# Patient Record
Sex: FEMALE | Race: WHITE | NOT HISPANIC OR LATINO | Employment: OTHER | URBAN - METROPOLITAN AREA
[De-identification: names, ages, dates, MRNs, and addresses within clinical notes are randomized per-mention and may not be internally consistent; named-entity substitution may affect disease eponyms.]

---

## 2017-01-11 ENCOUNTER — HOSPITAL ENCOUNTER (OUTPATIENT)
Dept: RADIOLOGY | Age: 62
Discharge: HOME | End: 2017-01-11
Payer: COMMERCIAL

## 2017-01-11 DIAGNOSIS — Z12.31 ENCOUNTER FOR SCREENING MAMMOGRAM FOR MALIGNANT NEOPLASM OF BREAST: ICD-10-CM

## 2017-01-11 PROCEDURE — G0202 SCR MAMMO BI INCL CAD: HCPCS

## 2017-01-13 ENCOUNTER — HOSPITAL ENCOUNTER (OUTPATIENT)
Dept: MAMMOGRAPHY | Facility: CLINIC | Age: 62
Discharge: HOME | End: 2017-01-13
Payer: COMMERCIAL

## 2017-01-13 ENCOUNTER — HOSPITAL ENCOUNTER (OUTPATIENT)
Dept: ULTRASOUND IMAGING | Facility: CLINIC | Age: 62
Discharge: HOME | End: 2017-01-13
Payer: COMMERCIAL

## 2017-01-13 DIAGNOSIS — R92.8 ABNORMAL MAMMOGRAM, UNSPECIFIED: ICD-10-CM

## 2017-01-13 PROCEDURE — 76642 ULTRASOUND BREAST LIMITED: CPT

## 2017-01-13 PROCEDURE — G0206 DX MAMMO INCL CAD UNI: HCPCS

## 2017-01-24 ENCOUNTER — ALLSCRIPTS OFFICE VISIT (OUTPATIENT)
Dept: OTHER | Facility: OTHER | Age: 62
End: 2017-01-24

## 2017-02-27 ENCOUNTER — ALLSCRIPTS OFFICE VISIT (OUTPATIENT)
Dept: OTHER | Facility: OTHER | Age: 62
End: 2017-02-27

## 2017-03-13 ENCOUNTER — TRANSCRIBE ORDERS (OUTPATIENT)
Dept: ADMINISTRATIVE | Age: 62
End: 2017-03-13

## 2017-03-13 ENCOUNTER — HOSPITAL ENCOUNTER (OUTPATIENT)
Dept: RADIOLOGY | Age: 62
Discharge: HOME | End: 2017-03-13
Payer: COMMERCIAL

## 2017-03-13 ENCOUNTER — ALLSCRIPTS OFFICE VISIT (OUTPATIENT)
Dept: OTHER | Facility: OTHER | Age: 62
End: 2017-03-13

## 2017-03-13 DIAGNOSIS — M79.605 PAIN OF LEFT LEG: ICD-10-CM

## 2017-03-13 DIAGNOSIS — M79.604 PAIN OF RIGHT LEG: ICD-10-CM

## 2017-03-13 PROCEDURE — 72110 X-RAY EXAM L-2 SPINE 4/>VWS: CPT

## 2017-03-13 PROCEDURE — 73521 X-RAY EXAM HIPS BI 2 VIEWS: CPT

## 2017-03-20 ENCOUNTER — APPOINTMENT (OUTPATIENT)
Dept: PHYSICAL THERAPY | Age: 62
End: 2017-03-20
Payer: COMMERCIAL

## 2017-03-20 PROCEDURE — 97163 PT EVAL HIGH COMPLEX 45 MIN: CPT

## 2017-03-22 ENCOUNTER — APPOINTMENT (OUTPATIENT)
Dept: PHYSICAL THERAPY | Age: 62
End: 2017-03-22
Payer: COMMERCIAL

## 2017-03-22 ENCOUNTER — GENERIC CONVERSION - ENCOUNTER (OUTPATIENT)
Dept: OTHER | Facility: OTHER | Age: 62
End: 2017-03-22

## 2017-03-22 PROCEDURE — 97110 THERAPEUTIC EXERCISES: CPT

## 2017-03-27 ENCOUNTER — APPOINTMENT (OUTPATIENT)
Dept: PHYSICAL THERAPY | Age: 62
End: 2017-03-27
Payer: COMMERCIAL

## 2017-03-27 PROCEDURE — 97110 THERAPEUTIC EXERCISES: CPT

## 2017-03-29 ENCOUNTER — LAB CONVERSION - ENCOUNTER (OUTPATIENT)
Dept: OTHER | Facility: OTHER | Age: 62
End: 2017-03-29

## 2017-03-29 ENCOUNTER — APPOINTMENT (OUTPATIENT)
Dept: PHYSICAL THERAPY | Age: 62
End: 2017-03-29
Payer: COMMERCIAL

## 2017-03-29 LAB
25(OH)D3 SERPL-MCNC: 46 NG/ML (ref 30–100)
A/G RATIO (HISTORICAL): 1.8 (CALC) (ref 1–2.5)
ALBUMIN SERPL BCP-MCNC: 4.4 G/DL (ref 3.6–5.1)
ALP SERPL-CCNC: 53 U/L (ref 33–130)
ALT SERPL W P-5'-P-CCNC: 20 U/L (ref 6–29)
AST SERPL W P-5'-P-CCNC: 21 U/L (ref 10–35)
BACTERIA UR QL AUTO: NORMAL /HPF
BASOPHILS # BLD AUTO: 0.6 %
BASOPHILS # BLD AUTO: 50 CELLS/UL (ref 0–200)
BILIRUB SERPL-MCNC: 0.4 MG/DL (ref 0.2–1.2)
BILIRUB UR QL STRIP: NEGATIVE
BUN SERPL-MCNC: 18 MG/DL (ref 7–25)
BUN/CREA RATIO (HISTORICAL): ABNORMAL (CALC) (ref 6–22)
CALCIUM SERPL-MCNC: 9.6 MG/DL (ref 8.6–10.4)
CHLORIDE SERPL-SCNC: 101 MMOL/L (ref 98–110)
CHOLEST SERPL-MCNC: 180 MG/DL (ref 125–200)
CHOLEST/HDLC SERPL: 1.9 (CALC)
CO2 SERPL-SCNC: 31 MMOL/L (ref 20–31)
COLOR UR: YELLOW
COMMENT (HISTORICAL): CLEAR
CREAT SERPL-MCNC: 0.87 MG/DL (ref 0.5–0.99)
DEPRECATED RDW RBC AUTO: 13.9 % (ref 11–15)
EGFR AFRICAN AMERICAN (HISTORICAL): 83 ML/MIN/1.73M2
EGFR-AMERICAN CALC (HISTORICAL): 71 ML/MIN/1.73M2
EOSINOPHIL # BLD AUTO: 398 CELLS/UL (ref 15–500)
EOSINOPHIL # BLD AUTO: 4.8 %
FECAL OCCULT BLOOD DIAGNOSTIC (HISTORICAL): NEGATIVE
GAMMA GLOBULIN (HISTORICAL): 2.4 G/DL (CALC) (ref 1.9–3.7)
GLUCOSE (HISTORICAL): 101 MG/DL (ref 65–99)
GLUCOSE (HISTORICAL): NEGATIVE
HCT VFR BLD AUTO: 46.2 % (ref 35–45)
HDLC SERPL-MCNC: 93 MG/DL
HGB BLD-MCNC: 15 G/DL (ref 11.7–15.5)
HYALINE CASTS #/AREA URNS LPF: NORMAL /LPF
KETONES UR STRIP-MCNC: NEGATIVE MG/DL
LDL CHOLESTEROL (HISTORICAL): 76 MG/DL (CALC)
LEUKOCYTE ESTERASE UR QL STRIP: NEGATIVE
LYMPHOCYTES # BLD AUTO: 1428 CELLS/UL (ref 850–3900)
LYMPHOCYTES # BLD AUTO: 17.2 %
MCH RBC QN AUTO: 29 PG (ref 27–33)
MCHC RBC AUTO-ENTMCNC: 32.4 G/DL (ref 32–36)
MCV RBC AUTO: 89.6 FL (ref 80–100)
MONOCYTES # BLD AUTO: 764 CELLS/UL (ref 200–950)
MONOCYTES (HISTORICAL): 9.2 %
NEUTROPHILS # BLD AUTO: 5661 CELLS/UL (ref 1500–7800)
NEUTROPHILS # BLD AUTO: 68.2 %
NITRITE UR QL STRIP: NEGATIVE
NON-HDL-CHOL (CHOL-HDL) (HISTORICAL): 87 MG/DL (CALC)
PH UR STRIP.AUTO: 6.5 [PH] (ref 5–8)
PLATELET # BLD AUTO: 171 THOUSAND/UL (ref 140–400)
PMV BLD AUTO: 10.9 FL (ref 7.5–12.5)
POTASSIUM SERPL-SCNC: 4.2 MMOL/L (ref 3.5–5.3)
PROT UR STRIP-MCNC: NEGATIVE MG/DL
RBC # BLD AUTO: 5.16 MILLION/UL (ref 3.8–5.1)
RBC (HISTORICAL): NORMAL /HPF
SODIUM SERPL-SCNC: 138 MMOL/L (ref 135–146)
SP GR UR STRIP.AUTO: 1.02 (ref 1–1.03)
SQUAMOUS EPITHELIAL CELLS (HISTORICAL): NORMAL /HPF
TOTAL PROTEIN (HISTORICAL): 6.8 G/DL (ref 6.1–8.1)
TRIGL SERPL-MCNC: 54 MG/DL
TSH SERPL DL<=0.05 MIU/L-ACNC: 3.01 MIU/L (ref 0.4–4.5)
WBC # BLD AUTO: 8.3 THOUSAND/UL (ref 3.8–10.8)
WBC # BLD AUTO: NORMAL /HPF

## 2017-03-29 PROCEDURE — 97110 THERAPEUTIC EXERCISES: CPT

## 2017-04-03 ENCOUNTER — APPOINTMENT (OUTPATIENT)
Dept: PHYSICAL THERAPY | Age: 62
End: 2017-04-03
Payer: COMMERCIAL

## 2017-04-03 PROCEDURE — 97110 THERAPEUTIC EXERCISES: CPT

## 2017-04-05 ENCOUNTER — APPOINTMENT (OUTPATIENT)
Dept: PHYSICAL THERAPY | Age: 62
End: 2017-04-05
Payer: COMMERCIAL

## 2017-04-06 ENCOUNTER — APPOINTMENT (OUTPATIENT)
Dept: PHYSICAL THERAPY | Age: 62
End: 2017-04-06
Payer: COMMERCIAL

## 2017-04-10 ENCOUNTER — APPOINTMENT (OUTPATIENT)
Dept: PHYSICAL THERAPY | Age: 62
End: 2017-04-10
Payer: COMMERCIAL

## 2017-04-12 ENCOUNTER — APPOINTMENT (OUTPATIENT)
Dept: PHYSICAL THERAPY | Age: 62
End: 2017-04-12
Payer: COMMERCIAL

## 2017-04-12 ENCOUNTER — ALLSCRIPTS OFFICE VISIT (OUTPATIENT)
Dept: OTHER | Facility: OTHER | Age: 62
End: 2017-04-12

## 2017-04-12 PROCEDURE — 97110 THERAPEUTIC EXERCISES: CPT

## 2017-04-18 ENCOUNTER — APPOINTMENT (OUTPATIENT)
Dept: PHYSICAL THERAPY | Age: 62
End: 2017-04-18
Payer: COMMERCIAL

## 2017-04-18 PROCEDURE — 97110 THERAPEUTIC EXERCISES: CPT

## 2017-04-20 ENCOUNTER — APPOINTMENT (OUTPATIENT)
Dept: PHYSICAL THERAPY | Age: 62
End: 2017-04-20
Payer: COMMERCIAL

## 2017-04-20 ENCOUNTER — GENERIC CONVERSION - ENCOUNTER (OUTPATIENT)
Dept: OTHER | Facility: OTHER | Age: 62
End: 2017-04-20

## 2017-04-20 PROCEDURE — 97110 THERAPEUTIC EXERCISES: CPT

## 2017-05-10 ENCOUNTER — ALLSCRIPTS OFFICE VISIT (OUTPATIENT)
Dept: OTHER | Facility: OTHER | Age: 62
End: 2017-05-10

## 2017-11-07 ENCOUNTER — ALLSCRIPTS OFFICE VISIT (OUTPATIENT)
Dept: OTHER | Facility: OTHER | Age: 62
End: 2017-11-07

## 2017-11-07 DIAGNOSIS — Z12.31 ENCOUNTER FOR SCREENING MAMMOGRAM FOR MALIGNANT NEOPLASM OF BREAST: ICD-10-CM

## 2017-11-07 DIAGNOSIS — I10 ESSENTIAL (PRIMARY) HYPERTENSION: ICD-10-CM

## 2017-11-07 DIAGNOSIS — R60.9 EDEMA: ICD-10-CM

## 2017-11-07 DIAGNOSIS — E03.9 HYPOTHYROIDISM: ICD-10-CM

## 2017-11-08 NOTE — PROGRESS NOTES
Assessment  1  Benign essential HTN (401 1) (I10)   2  Edema, unspecified type (782 3) (R60 9)   3  Hypothyroidism (244 9) (E03 9)    Plan  Benign essential HTN, Edema, unspecified type    · AMILoride-HydroCHLOROthiazide 5-50 MG Oral Tablet; take 1 tablet by mouth one time daily   · (1) URINALYSIS (will reflex a microscopy if leukocytes, occult blood, protein or nitrites are not within  normal limits); Status:Active; Requested for:07Nov2017;   Benign essential HTN, Edema, unspecified type, Hypothyroidism    · (1) COMPREHENSIVE METABOLIC PANEL; Status:Active; Requested for:07Nov2017;    · (1) TSH; Status:Active; Requested for:07Nov2017;   Benign essential HTN, Hypothyroidism    · (1) CBC/ PLT (NO DIFF); Status:Active; Requested for:07Nov2017;    · (1) HEMOGLOBIN A1C; Status:Active; Requested CGA:[id number];    · (1) LIPID PANEL, FASTING; Status:Active; Requested GLQ:[id number];   Depression screening    · *VB-Depression Screening ; every 1 year; Last 32CKL3502; Next 03GOJ2341; Status:Active   · PHQ-2 Adult Depression Screening ; every 1 year; Last 28UWP1840; Next 10KIT6293; Status:Active  Hypothyroidism    · Levothyroxine Sodium 50 MCG Oral Tablet; take 1 tablet by mouth one time daily  Need for influenza vaccination    · Stop: Fluzone Quadrivalent 0 5 ML Intramuscular Suspension Prefilled Syringe  PMH: Screening for malignant neoplasm of colon    · COLONOSCOPY; Status:Active; Requested CDF:[id number];     Discussion/Summary  Counseling Documentation With Imm: The patient was counseled regarding diagnostic results,-- prognosis,-- impressions  Chief Complaint  Chief Complaint Free Text Note Form: Patient is here for a 4 m f/u visit for hypertension, hypothyroidism and edema  Recommended a colonoscopy and fit test but she refused  No recent labs  She would like an order for labs  She is taking a natural supplement, Natural Calm Magnesium 350 mg powder before bedtime  Refill meds        History of Present Illness  Hypothyroidism (Follow-Up): Interval symptoms:  denies weight gain,-- denies cold intolerance,-- denies fatigue,-- denies weakness,-- denies constipation,-- denies menorrhagia,-- denies dyspnea on exertion,-- denies trouble concentrating,-- denies hair loss-- and-- denies dry skin  Hypertension (Follow-Up): The patient presents for follow-up of essential hypertension  The patient states she has been doing well with her blood pressure control since the last visit  She has no comorbid illnesses  She has no significant interval events  Symptoms: The patient is currently asymptomatic  denies impaired vision,-- denies dyspnea,-- denies chest pain,-- denies intermittent leg claudication-- and-- denies lower extremity edema  Review of Systems  Complete-Female:   Constitutional: No fever, no chills, feels well, no tiredness, no recent weight gain or weight loss  Eyes: No complaints of eye pain, no red eyes, no eyesight problems, no discharge, no dry eyes, no itching of eyes  ENT: no complaints of earache, no loss of hearing, no nose bleeds, no nasal discharge, no sore throat, no hoarseness  Cardiovascular: No complaints of slow heart rate, no fast heart rate, no chest pain, no palpitations, no leg claudication, no lower extremity edema  Respiratory: No complaints of shortness of breath, no wheezing, no cough, no SOB on exertion, no orthopnea, no PND  Gastrointestinal: No complaints of abdominal pain, no constipation, no nausea or vomiting, no diarrhea, no bloody stools  Genitourinary: No complaints of dysuria, no incontinence, no pelvic pain, no dysmenorrhea, no vaginal discharge or bleeding  Musculoskeletal: No complaints of arthralgias, no myalgias, no joint swelling or stiffness, no limb pain or swelling  Integumentary: No complaints of skin rash or lesions, no itching, no skin wounds, no breast pain or lump     Neurological: No complaints of headache, no confusion, no convulsions, no numbness, no dizziness or fainting, no tingling, no limb weakness, no difficulty walking  Psychiatric: Not suicidal, no sleep disturbance, no anxiety or depression, no change in personality, no emotional problems  Endocrine: No complaints of proptosis, no hot flashes, no muscle weakness, no deepening of the voice, no feelings of weakness  Hematologic/Lymphatic: No complaints of swollen glands, no swollen glands in the neck, does not bleed easily, does not bruise easily  Active Problems  1  Benign essential HTN (401 1) (I10)   2  Conjunctival hyperemia, left (372 71) (H11 432)   3  Edema, unspecified type (782 3) (R60 9)   4  Encounter for long-term (current) use of other medications (V58 69) (Z79 899)   5  Hair loss (704 00) (L65 9)   6  Hypothyroidism (244 9) (E03 9)   7  Internal hordeolum (373 12) (H00 029)   8  Lumbar paraspinal muscle spasm (724 8) (M62 830)    Past Medical History  1  History of Elevated BUN (790 6) (R79 9)   2  History of Fluid retention in legs (782 3) (R60 0)   3  History of hypothyroidism (V12 29) (Z86 39)   4  History of Leg pain, bilateral (729 5) (M79 604,M79 605)   5  History of Lumbago With Sciatica (724 3)   6  History of Lumbar radiculopathy (724 4) (M54 16)   7  History of Onychomycosis of toenail (110 1) (B35 1)   8  History of Pain, joint, shoulder (719 41) (M25 519)   9  History of Screening for osteoporosis (V82 81) (Z13 820)   10  History of Strain of abdominal wall, initial encounter (848 8) (S39 011A)    Surgical History  1  History of Appendectomy   2  History of Knee Arthroscopy (Therapeutic)    Family History  Mother    1  Family history of Breast Cancer (V16 3)   2  Family history of Essential Hypertension  Father    3  Family history of    4  Family history of No known health problems    Social History   · Never A Smoker   · No illicit drug use   · Rarely consumes alcohol (V49 89) (Z78 9)   · Travel history  Social History Reviewed:  The social history was reviewed and updated today  The social history was reviewed and is unchanged  Current Meds   1  AMILoride-HydroCHLOROthiazide 5-50 MG Oral Tablet; take 1 tablet by mouth one time daily; Therapy: 04XUH8490 to (Evaluate:11Oct2017)  Requested for: 04KJC2920; Last Rx:90Xjc2849   Ordered   2  Calcium TABS; Therapy: (Recorded:69Bdp2132) to Recorded   3  Fish Oil CAPS; Therapy: (Recorded:39Edx2184) to Recorded   4  Glucosamine-Chondroitin Oral Capsule; Therapy: (Recorded:07Nov2014) to Recorded   5  Levothyroxine Sodium 50 MCG Oral Tablet; take 1 tablet by mouth one time daily; Therapy: 67BXM9804 to (Evaluate:11Jul2017)  Requested for: 12Apr2017; Last Rx:12Apr2017   Ordered   6  Multi-Day Oral Tablet; Therapy: (Recorded:00Amm0527) to Recorded    Allergies  1  No Known Drug Allergies  2  No Known Environmental Allergies   3  No Known Food Allergies    Vitals  Vital Signs    Recorded: 67WME6305 09:33AM   Temperature 96 9 F, Tympanic   Heart Rate 73   Systolic 699, LUE, Sitting   Diastolic 78, LUE, Sitting   Height 5 ft 10 28 in   Weight 156 lb 8 oz   BMI Calculated 22 28   BSA Calculated 1 89   O2 Saturation 97     Physical Exam    Constitutional   General appearance: No acute distress, well appearing and well nourished  -- NAD; looks younger than stated age  Eyes   Conjunctiva and lids: No swelling, erythema or discharge  Pupils and irises: Equal, round and reactive to light  Ears, Nose, Mouth, and Throat   External inspection of ears and nose: Normal     Oropharynx: Normal with no erythema, edema, exudate or lesions  Pulmonary   Respiratory effort: No increased work of breathing or signs of respiratory distress  Auscultation of lungs: Clear to auscultation  Cardiovascular   Auscultation of heart: Normal rate and rhythm, normal S1 and S2, without murmurs      Examination of extremities for edema and/or varicosities: Abnormal   bilateral ankle 1+ pitting edema-- and-- bilateral pretibial 1+ pitting edema  Carotid pulses: Normal     Abdomen   Abdomen: Non-tender, no masses  Liver and spleen: No hepatomegaly or splenomegaly  Lymphatic   Palpation of lymph nodes in neck: No lymphadenopathy  Musculoskeletal   Gait and station: Normal     Digits and nails: Normal without clubbing or cyanosis  Inspection/palpation of joints, bones, and muscles: Normal  -- L lower lumbar TTP  hypertonicity lateral waist    Skin   Skin and subcutaneous tissue: Normal without rashes or lesions  Neurologic   Cranial nerves: Cranial nerves 2-12 intact  Reflexes: 2+ and symmetric  Sensation: No sensory loss  Psychiatric   Orientation to person, place, and time: Normal     Mood and affect: Normal     Additional Exam:  On physical examination there is no abdominal tenderness no guarding no rigidity but when she left her head when supine position I can see to bumps in the abdominal wall area suggestive of abdominal wall hernia but he disease really reducible, also on the examination of the back and lifting her both 1 leg she was having some pain in the abdominal muscles also he she was not able to lift her both legs together because of the tendon pain in the both abdominal wall also there was no tenderness on the lower back area  Results/Data  PHQ-2 Adult Depression Screening 37KIZ0058 09:49AM User, s     Test Name Result Flag Reference   PHQ-2 Adult Depression Score 0     Over the last two weeks, how often have you been bothered by any of the following problems? Little interest or pleasure in doing things: Not at all - 0  Feeling down, depressed, or hopeless: Not at all - 0   PHQ-2 Adult Depression Screening Negative         Health Management  Depression screening   *VB-Depression Screening; every 1 year; Last 29AXP1977; Next Due: 07QYU4677; Active  History of Screening for malignant neoplasm of colon   COLONOSCOPY; every 10 years; Last 35TEI8466; Next Due: 51TFP6194;  Overdue    Signatures Electronically signed by : Claudean Perry, MD; Nov 7 2017 10:05AM EST                       (Author)

## 2017-11-15 ENCOUNTER — LAB CONVERSION - ENCOUNTER (OUTPATIENT)
Dept: OTHER | Facility: OTHER | Age: 62
End: 2017-11-15

## 2017-11-15 LAB
A/G RATIO (HISTORICAL): 1.9 (CALC) (ref 1–2.5)
ALBUMIN SERPL BCP-MCNC: 4 G/DL (ref 3.6–5.1)
ALP SERPL-CCNC: 42 U/L (ref 33–130)
ALT SERPL W P-5'-P-CCNC: 20 U/L (ref 6–29)
AST SERPL W P-5'-P-CCNC: 24 U/L (ref 10–35)
BILIRUB SERPL-MCNC: 0.4 MG/DL (ref 0.2–1.2)
BILIRUB UR QL STRIP: NEGATIVE
BUN SERPL-MCNC: 18 MG/DL (ref 7–25)
BUN/CREA RATIO (HISTORICAL): ABNORMAL (CALC) (ref 6–22)
CALCIUM SERPL-MCNC: 8.9 MG/DL (ref 8.6–10.4)
CHLORIDE SERPL-SCNC: 100 MMOL/L (ref 98–110)
CHOLEST SERPL-MCNC: 151 MG/DL
CHOLEST/HDLC SERPL: 2.1 (CALC)
CO2 SERPL-SCNC: 33 MMOL/L (ref 20–31)
COLOR UR: YELLOW
COMMENT (HISTORICAL): CLEAR
CREAT SERPL-MCNC: 0.88 MG/DL (ref 0.5–0.99)
DEPRECATED RDW RBC AUTO: 12.9 % (ref 11–15)
EGFR AFRICAN AMERICAN (HISTORICAL): 81 ML/MIN/1.73M2
EGFR-AMERICAN CALC (HISTORICAL): 70 ML/MIN/1.73M2
FECAL OCCULT BLOOD DIAGNOSTIC (HISTORICAL): NEGATIVE
GAMMA GLOBULIN (HISTORICAL): 2.1 G/DL (CALC) (ref 1.9–3.7)
GLUCOSE (HISTORICAL): 86 MG/DL (ref 65–99)
GLUCOSE (HISTORICAL): NEGATIVE
HBA1C MFR BLD HPLC: 5.6 % OF TOTAL HGB
HCT VFR BLD AUTO: 39.9 % (ref 35–45)
HDLC SERPL-MCNC: 71 MG/DL
HGB BLD-MCNC: 13.3 G/DL (ref 11.7–15.5)
KETONES UR STRIP-MCNC: NEGATIVE MG/DL
LDL CHOLESTEROL (HISTORICAL): 66 MG/DL (CALC)
LEUKOCYTE ESTERASE UR QL STRIP: NEGATIVE
MCH RBC QN AUTO: 30 PG (ref 27–33)
MCHC RBC AUTO-ENTMCNC: 33.3 G/DL (ref 32–36)
MCV RBC AUTO: 89.9 FL (ref 80–100)
NITRITE UR QL STRIP: NEGATIVE
NON-HDL-CHOL (CHOL-HDL) (HISTORICAL): 80 MG/DL (CALC)
PH UR STRIP.AUTO: 7 [PH] (ref 5–8)
PLATELET # BLD AUTO: 190 THOUSAND/UL (ref 140–400)
PMV BLD AUTO: 11.6 FL (ref 7.5–12.5)
POTASSIUM SERPL-SCNC: 3.7 MMOL/L (ref 3.5–5.3)
PROT UR STRIP-MCNC: NEGATIVE MG/DL
RBC # BLD AUTO: 4.44 MILLION/UL (ref 3.8–5.1)
SODIUM SERPL-SCNC: 138 MMOL/L (ref 135–146)
SP GR UR STRIP.AUTO: 1.01 (ref 1–1.03)
TOTAL PROTEIN (HISTORICAL): 6.1 G/DL (ref 6.1–8.1)
TRIGL SERPL-MCNC: 48 MG/DL
TSH SERPL DL<=0.05 MIU/L-ACNC: 1.54 MIU/L (ref 0.4–4.5)
WBC # BLD AUTO: 7.4 THOUSAND/UL (ref 3.8–10.8)

## 2018-01-10 NOTE — RESULT NOTES
Verified Results  (1) COMPREHENSIVE METABOLIC PANEL [id number] 42:41BI [facility]     Test Name Result Flag Reference   GLUCOSE 86 mg/dL  65-99   Fasting reference interval   UREA NITROGEN (BUN) 18 mg/dL  7-25   CREATININE 0 88 mg/dL  0 50-0 99   For patients >52years of age, the reference limit  for Creatinine is approximately 13% higher for people  identified as -American  eGFR NON-AFR  AMERICAN 70 mL/min/1 73m2  > OR = 60   eGFR AFRICAN AMERICAN 81 mL/min/1 73m2  > OR = 60   BUN/CREATININE RATIO   4-33   NOT APPLICABLE (calc)   SODIUM 138 mmol/L  135-146   POTASSIUM 3 7 mmol/L  3 5-5 3   CHLORIDE 100 mmol/L     CARBON DIOXIDE 33 mmol/L H 20-31   CALCIUM 8 9 mg/dL  8 6-10 4   PROTEIN, TOTAL 6 1 g/dL  6 1-8 1   ALBUMIN 4 0 g/dL  3 6-5 1   GLOBULIN 2 1 g/dL (calc)  1 9-3 7   ALBUMIN/GLOBULIN RATIO 1 9 (calc)  1 0-2 5   BILIRUBIN, TOTAL 0 4 mg/dL  0 2-1 2   ALKALINE PHOSPHATASE 42 U/L     AST 24 U/L  10-35   ALT 20 U/L  6-29     (1) LIPID PANEL, FASTING 45WMS2744 06:47AM [facility]     Test Name Result Flag Reference   CHOLESTEROL, TOTAL 151 mg/dL  <200   HDL CHOLESTEROL 71 mg/dL  >06   TRIGLICERIDES 48 mg/dL  <812   LDL-CHOLESTEROL 66 mg/dL (calc)     Reference range: <100     Desirable range <100 mg/dL for patients with CHD or  diabetes and <70 mg/dL for diabetic patients with  known heart disease  LDL-C is now calculated using the Bassem-Solano   calculation, which is a validated novel method providing   better accuracy than the Friedewald equation in the   estimation of LDL-C  Star Hansen  [place] [id number];977(69): [phone]   (http://[employer]/faq/POP199)   CHOL/HDLC RATIO 2 1 (calc)  <5 0   NON HDL CHOLESTEROL 80 mg/dL (calc)  <130   For patients with diabetes plus 1 major ASCVD risk   factor, treating to a non-HDL-C goal of <100 mg/dL   (LDL-C of <70 mg/dL) is considered a therapeutic   option       (Q) CBC (H/H, RBC, INDICES, WBC, PLT) 62QGA3030 06: 47AM Rajni Alexis     Test Name Result Flag Reference   WHITE BLOOD CELL COUNT 7 4 Thousand/uL  3 8-10 8   RED BLOOD CELL COUNT 4 44 Million/uL  3 80-5 10   HEMOGLOBIN 13 3 g/dL  11 7-15 5   HEMATOCRIT 39 9 %  35 0-45 0   MCV 89 9 fL  80 0-100 0   MCH 30 0 pg  27 0-33 0   MCHC 33 3 g/dL  32 0-36 0   RDW 12 9 %  11 0-15 0   PLATELET COUNT 861 Thousand/uL  140-400   MPV 11 6 fL  7 5-12 5     (Q) URINALYSIS REFLEX [id number] 06:47AM Skylar Blake   REPORT COMMENT:  FASTING:YES     Test Name Result Flag Reference   COLOR YELLOW  YELLOW   APPEARANCE CLEAR  CLEAR   SPECIFIC GRAVITY 1 013  1 001-1 035   PH 7 0  5 0-8 0   GLUCOSE NEGATIVE  NEGATIVE   BILIRUBIN NEGATIVE  NEGATIVE   KETONES NEGATIVE  NEGATIVE   OCCULT BLOOD NEGATIVE  NEGATIVE   PROTEIN NEGATIVE  NEGATIVE   NITRITE NEGATIVE  NEGATIVE   LEUKOCYTE ESTERASE NEGATIVE  NEGATIVE     (Q) HEMOGLOBIN A1c [id number] 06:47AM Rajni Alexis     Test Name Result Flag Reference   HEMOGLOBIN A1c 5 6 % of total Hgb  <5 7   For the purpose of screening for the presence of  diabetes:     <5 7%       Consistent with the absence of diabetes  5 7-6 4%    Consistent with increased risk for diabetes              (prediabetes)  > or =6 5%  Consistent with diabetes     This assay result is consistent with a decreased risk  of diabetes  Currently, no consensus exists regarding use of  hemoglobin A1c for diagnosis of diabetes in children  According to American Diabetes Association (ADA)  guidelines, hemoglobin A1c <7 0% represents optimal  control in non-pregnant diabetic patients  Different  metrics may apply to specific patient populations  Standards of Medical Care in Diabetes(ADA)       (Q) TSH, 3RD GENERATION [id number] 06:47AM Rajni Alexis     Test Name Result Flag Reference   TSH 1 54 mIU/L  0 40-4 50

## 2018-01-12 VITALS
WEIGHT: 156.5 LBS | SYSTOLIC BLOOD PRESSURE: 122 MMHG | HEART RATE: 73 BPM | BODY MASS INDEX: 22.41 KG/M2 | HEIGHT: 70 IN | DIASTOLIC BLOOD PRESSURE: 78 MMHG | TEMPERATURE: 96.9 F | OXYGEN SATURATION: 97 %

## 2018-01-13 VITALS
OXYGEN SATURATION: 98 % | HEIGHT: 71 IN | SYSTOLIC BLOOD PRESSURE: 124 MMHG | BODY MASS INDEX: 23.03 KG/M2 | DIASTOLIC BLOOD PRESSURE: 82 MMHG | HEART RATE: 79 BPM | TEMPERATURE: 96.3 F | WEIGHT: 164.5 LBS

## 2018-01-14 VITALS
TEMPERATURE: 97.7 F | BODY MASS INDEX: 24.01 KG/M2 | SYSTOLIC BLOOD PRESSURE: 126 MMHG | DIASTOLIC BLOOD PRESSURE: 74 MMHG | OXYGEN SATURATION: 99 % | WEIGHT: 171.5 LBS | HEART RATE: 69 BPM | HEIGHT: 71 IN

## 2018-01-15 VITALS
TEMPERATURE: 96.8 F | SYSTOLIC BLOOD PRESSURE: 132 MMHG | DIASTOLIC BLOOD PRESSURE: 90 MMHG | HEIGHT: 71 IN | OXYGEN SATURATION: 97 % | HEART RATE: 68 BPM | WEIGHT: 163 LBS | BODY MASS INDEX: 22.82 KG/M2 | RESPIRATION RATE: 16 BRPM

## 2018-01-15 VITALS
SYSTOLIC BLOOD PRESSURE: 110 MMHG | WEIGHT: 164 LBS | HEIGHT: 70 IN | DIASTOLIC BLOOD PRESSURE: 80 MMHG | TEMPERATURE: 98.3 F | HEART RATE: 79 BPM | OXYGEN SATURATION: 97 % | BODY MASS INDEX: 23.48 KG/M2

## 2018-01-15 NOTE — MISCELLANEOUS
Provider Comments  Provider Comments:   dr Lina Calixto in bath for 6m-tsa appt      Signatures   Electronically signed by : Philip Maher MD; May 10 2017  5:01PM EST

## 2018-02-05 ENCOUNTER — TRANSCRIBE ORDERS (OUTPATIENT)
Dept: LAB | Facility: CLINIC | Age: 63
End: 2018-02-05

## 2018-02-06 ENCOUNTER — HOSPITAL ENCOUNTER (OUTPATIENT)
Dept: RADIOLOGY | Age: 63
Discharge: HOME | End: 2018-02-06
Payer: COMMERCIAL

## 2018-02-06 DIAGNOSIS — Z12.31 ENCOUNTER FOR SCREENING MAMMOGRAM FOR MALIGNANT NEOPLASM OF BREAST: ICD-10-CM

## 2018-02-06 PROCEDURE — 77067 SCR MAMMO BI INCL CAD: CPT

## 2018-03-21 LAB
ALBUMIN SERPL-MCNC: 4 G/DL (ref 3.6–5.1)
ALBUMIN/GLOB SERPL: 1.9 (CALC) (ref 1–2.5)
ALP SERPL-CCNC: 47 U/L (ref 33–130)
ALT SERPL-CCNC: 19 U/L (ref 6–29)
APPEARANCE UR: CLEAR
AST SERPL-CCNC: 23 U/L (ref 10–35)
BILIRUB SERPL-MCNC: 0.4 MG/DL (ref 0.2–1.2)
BILIRUB UR QL STRIP: NEGATIVE
BUN SERPL-MCNC: 20 MG/DL (ref 7–25)
BUN/CREAT SERPL: NORMAL (CALC) (ref 6–22)
CALCIUM SERPL-MCNC: 8.7 MG/DL (ref 8.6–10.4)
CHLORIDE SERPL-SCNC: 103 MMOL/L (ref 98–110)
CHOLEST SERPL-MCNC: 187 MG/DL
CHOLEST/HDLC SERPL: 2.6 (CALC)
CO2 SERPL-SCNC: 31 MMOL/L (ref 20–31)
COLOR UR: YELLOW
CREAT SERPL-MCNC: 0.86 MG/DL (ref 0.5–0.99)
ERYTHROCYTE [DISTWIDTH] IN BLOOD BY AUTOMATED COUNT: 12.4 % (ref 11–15)
GLOBULIN SER CALC-MCNC: 2.1 G/DL (CALC) (ref 1.9–3.7)
GLUCOSE SERPL-MCNC: 88 MG/DL (ref 65–99)
GLUCOSE UR QL STRIP: NEGATIVE
HBA1C MFR BLD: 5.5 % OF TOTAL HGB
HCT VFR BLD AUTO: 41.9 % (ref 35–45)
HDLC SERPL-MCNC: 73 MG/DL
HGB BLD-MCNC: 13.9 G/DL (ref 11.7–15.5)
HGB UR QL STRIP: NEGATIVE
KETONES UR QL STRIP: NEGATIVE
LDLC SERPL CALC-MCNC: 98 MG/DL (CALC)
LEUKOCYTE ESTERASE UR QL STRIP: NEGATIVE
MCH RBC QN AUTO: 30 PG (ref 27–33)
MCHC RBC AUTO-ENTMCNC: 33.2 G/DL (ref 32–36)
MCV RBC AUTO: 90.5 FL (ref 80–100)
NITRITE UR QL STRIP: NEGATIVE
NONHDLC SERPL-MCNC: 114 MG/DL (CALC)
PH UR STRIP: 7 [PH] (ref 5–8)
PLATELET # BLD AUTO: 182 THOUSAND/UL (ref 140–400)
PMV BLD REES-ECKER: 11.3 FL (ref 7.5–12.5)
POTASSIUM SERPL-SCNC: 4.3 MMOL/L (ref 3.5–5.3)
PROT SERPL-MCNC: 6.1 G/DL (ref 6.1–8.1)
PROT UR QL STRIP: NEGATIVE
RBC # BLD AUTO: 4.63 MILLION/UL (ref 3.8–5.1)
SL AMB EGFR AFRICAN AMERICAN: 83 ML/MIN/1.73M2
SL AMB EGFR NON AFRICAN AMERICAN: 72 ML/MIN/1.73M2
SODIUM SERPL-SCNC: 139 MMOL/L (ref 135–146)
SP GR UR STRIP: 1.02 (ref 1–1.03)
TRIGL SERPL-MCNC: 69 MG/DL
TSH SERPL-ACNC: 3.05 MIU/L (ref 0.4–4.5)
WBC # BLD AUTO: 7.9 THOUSAND/UL (ref 3.8–10.8)

## 2018-04-10 RX ORDER — AMOXICILLIN 500 MG
2 CAPSULE ORAL DAILY
COMMUNITY

## 2018-04-10 RX ORDER — LEVOTHYROXINE SODIUM 0.05 MG/1
1 TABLET ORAL DAILY
COMMUNITY
Start: 2011-08-16 | End: 2018-04-12 | Stop reason: SDUPTHER

## 2018-04-10 RX ORDER — AMILORIDE HYDROCHLORIDE AND HYDROCHLOROTHIAZIDE 5; 50 MG/1; MG/1
1 TABLET ORAL DAILY
COMMUNITY
Start: 2011-10-11 | End: 2018-04-12 | Stop reason: SDUPTHER

## 2018-04-10 RX ORDER — ASCORBIC ACID 500 MG
2 TABLET ORAL DAILY
COMMUNITY

## 2018-04-12 ENCOUNTER — OFFICE VISIT (OUTPATIENT)
Dept: INTERNAL MEDICINE CLINIC | Facility: CLINIC | Age: 63
End: 2018-04-12
Payer: COMMERCIAL

## 2018-04-12 VITALS
HEIGHT: 69 IN | WEIGHT: 159.8 LBS | HEART RATE: 67 BPM | DIASTOLIC BLOOD PRESSURE: 76 MMHG | TEMPERATURE: 97.9 F | BODY MASS INDEX: 23.67 KG/M2 | OXYGEN SATURATION: 97 % | SYSTOLIC BLOOD PRESSURE: 112 MMHG

## 2018-04-12 DIAGNOSIS — I10 BENIGN ESSENTIAL HTN: ICD-10-CM

## 2018-04-12 DIAGNOSIS — E03.9 ACQUIRED HYPOTHYROIDISM: Primary | ICD-10-CM

## 2018-04-12 DIAGNOSIS — I10 ESSENTIAL HYPERTENSION: ICD-10-CM

## 2018-04-12 DIAGNOSIS — L65.9 HAIR LOSS: Primary | ICD-10-CM

## 2018-04-12 DIAGNOSIS — Z12.11 SCREENING FOR COLON CANCER: ICD-10-CM

## 2018-04-12 DIAGNOSIS — E03.9 ACQUIRED HYPOTHYROIDISM: ICD-10-CM

## 2018-04-12 PROCEDURE — 3074F SYST BP LT 130 MM HG: CPT | Performed by: FAMILY MEDICINE

## 2018-04-12 PROCEDURE — 1036F TOBACCO NON-USER: CPT | Performed by: FAMILY MEDICINE

## 2018-04-12 PROCEDURE — 99214 OFFICE O/P EST MOD 30 MIN: CPT | Performed by: FAMILY MEDICINE

## 2018-04-12 PROCEDURE — 3078F DIAST BP <80 MM HG: CPT | Performed by: FAMILY MEDICINE

## 2018-04-12 RX ORDER — AMILORIDE HYDROCHLORIDE AND HYDROCHLOROTHIAZIDE 5; 50 MG/1; MG/1
1 TABLET ORAL DAILY
Qty: 90 TABLET | Refills: 1 | Status: SHIPPED | OUTPATIENT
Start: 2018-04-12 | End: 2018-07-22 | Stop reason: SDUPTHER

## 2018-04-12 RX ORDER — LEVOTHYROXINE SODIUM 0.05 MG/1
50 TABLET ORAL DAILY
Qty: 90 TABLET | Refills: 1 | Status: SHIPPED | OUTPATIENT
Start: 2018-04-12 | End: 2018-07-22 | Stop reason: SDUPTHER

## 2018-04-12 NOTE — PROGRESS NOTES
Assessment/Plan:    Hair loss  Discussed dermatology but she has been there in the past, she is trying a new hair oral supplement, recomm hair and nail vitamins  Do not damage with chemicals  Problem List Items Addressed This Visit        Endocrine    Hypothyroidism    Relevant Medications    levothyroxine 50 mcg tablet       Cardiovascular and Mediastinum    Benign essential HTN    Relevant Medications    AMILoride-hydrochlorothiazide (MODURETIC) 5-50 mg per tablet       Other    Hair loss - Primary     Discussed dermatology but she has been there in the past, she is trying a new hair oral supplement, recomm hair and nail vitamins  Do not damage with chemicals  Other Visit Diagnoses     Screening for colon cancer        Relevant Orders    Ambulatory referral to Gastroenterology            Subjective:      Patient ID: Laura Santo is a 60 y o  female  HPI     Hypothyroidism (Follow-Up): Interval symptoms:  denies weight gain,-- denies cold intolerance,-- denies fatigue,-- denies weakness,-- denies constipation,-- denies menorrhagia,-- denies dyspnea on exertion,-- denies trouble concentrating,-- denies hair loss-- and-- denies dry skin  Hypertension (Follow-Up): The patient presents for follow-up of essential hypertension  The patient states she has been doing well with her blood pressure control since the last visit  She has no comorbid illnesses  She has no significant interval events  Symptoms: The patient is currently asymptomatic  denies impaired vision,-- denies dyspnea,-- denies chest pain,-- denies intermittent leg claudication-- and-- denies lower extremity edema            The following portions of the patient's history were reviewed and updated as appropriate: allergies, current medications, past family history, past medical history, past social history, past surgical history and problem list     Review of Systems      Constitutional:  Denies fever or chills   Eyes:  Denies change in visual acuity   HENT:  Denies nasal congestion or sore throat   Respiratory:  Denies cough or shortness of breath or wheezing  Cardiovascular:  Denies palpitations or chest pain  GI:  Denies abdominal pain, nausea, or vomiting  Integument:  Denies rash   Neurologic:  Denies headache or focal weakness      Objective:      /76 (BP Location: Left arm, Patient Position: Sitting, Cuff Size: Standard)   Pulse 67   Temp 97 9 °F (36 6 °C) (Oral)   Ht 5' 9 29" (1 76 m)   Wt 72 5 kg (159 lb 12 8 oz)   SpO2 97%   BMI 23 40 kg/m²          Physical Exam      Constitutional:  Well developed, well nourished, no acute distress, non-toxic appearance   Eyes:  PERRL, conjunctiva normal , non icteric sclera  HENT:  Atraumatic, oropharynx moist  Neck-  supple   Respiratory:  CTA b/l, normal breath sounds, no rales, no wheezing   Cardiovascular:  RRR, no murmurs, no LE edema b/l  GI:  Soft, nondistended, normal bowel sounds x 4, nontender, no organomegaly, no mass, no rebound, no guarding   Neurologic:  no focal deficits noted   Psychiatric:  Speech and behavior appropriate , AAO x 3

## 2018-04-12 NOTE — ASSESSMENT & PLAN NOTE
Discussed dermatology but she has been there in the past, she is trying a new hair oral supplement, recomm hair and nail vitamins  Do not damage with chemicals

## 2018-07-09 DIAGNOSIS — I10 ESSENTIAL HYPERTENSION: ICD-10-CM

## 2018-07-09 RX ORDER — AMILORIDE HYDROCHLORIDE AND HYDROCHLOROTHIAZIDE 5; 50 MG/1; MG/1
TABLET ORAL
Qty: 90 TABLET | Refills: 1 | OUTPATIENT
Start: 2018-07-09

## 2018-07-22 DIAGNOSIS — I10 ESSENTIAL HYPERTENSION: ICD-10-CM

## 2018-07-22 DIAGNOSIS — E03.9 ACQUIRED HYPOTHYROIDISM: ICD-10-CM

## 2018-07-23 RX ORDER — AMILORIDE HYDROCHLORIDE AND HYDROCHLOROTHIAZIDE 5; 50 MG/1; MG/1
TABLET ORAL
Qty: 90 TABLET | Refills: 0 | Status: SHIPPED | OUTPATIENT
Start: 2018-07-23 | End: 2018-10-07 | Stop reason: SDUPTHER

## 2018-07-23 RX ORDER — LEVOTHYROXINE SODIUM 0.05 MG/1
TABLET ORAL
Qty: 90 TABLET | Refills: 1 | Status: SHIPPED | OUTPATIENT
Start: 2018-07-23 | End: 2018-12-27 | Stop reason: SDUPTHER

## 2018-10-07 DIAGNOSIS — I10 ESSENTIAL HYPERTENSION: ICD-10-CM

## 2018-10-07 RX ORDER — AMILORIDE HYDROCHLORIDE AND HYDROCHLOROTHIAZIDE 5; 50 MG/1; MG/1
TABLET ORAL
Qty: 90 TABLET | Refills: 0 | Status: SHIPPED | OUTPATIENT
Start: 2018-10-07 | End: 2019-01-05 | Stop reason: SDUPTHER

## 2018-12-27 DIAGNOSIS — E03.9 ACQUIRED HYPOTHYROIDISM: ICD-10-CM

## 2018-12-28 RX ORDER — LEVOTHYROXINE SODIUM 0.05 MG/1
TABLET ORAL
Qty: 90 TABLET | Refills: 1 | Status: SHIPPED | OUTPATIENT
Start: 2018-12-28 | End: 2019-06-25 | Stop reason: SDUPTHER

## 2019-01-05 DIAGNOSIS — I10 ESSENTIAL HYPERTENSION: ICD-10-CM

## 2019-01-07 RX ORDER — AMILORIDE HYDROCHLORIDE AND HYDROCHLOROTHIAZIDE 5; 50 MG/1; MG/1
TABLET ORAL
Qty: 90 TABLET | Refills: 0 | Status: SHIPPED | OUTPATIENT
Start: 2019-01-07 | End: 2019-04-05 | Stop reason: SDUPTHER

## 2019-01-15 ENCOUNTER — TRANSCRIBE ORDERS (OUTPATIENT)
Dept: ADMINISTRATIVE | Facility: HOSPITAL | Age: 64
End: 2019-01-15

## 2019-01-15 DIAGNOSIS — Z12.39 SCREENING BREAST EXAMINATION: Primary | ICD-10-CM

## 2019-01-21 ENCOUNTER — OFFICE VISIT (OUTPATIENT)
Dept: INTERNAL MEDICINE CLINIC | Age: 64
End: 2019-01-21
Payer: COMMERCIAL

## 2019-01-21 VITALS
DIASTOLIC BLOOD PRESSURE: 64 MMHG | BODY MASS INDEX: 24.34 KG/M2 | SYSTOLIC BLOOD PRESSURE: 110 MMHG | WEIGHT: 166.2 LBS | HEART RATE: 60 BPM | TEMPERATURE: 97.6 F | OXYGEN SATURATION: 99 %

## 2019-01-21 DIAGNOSIS — Z12.39 SCREENING FOR MALIGNANT NEOPLASM OF BREAST: Primary | ICD-10-CM

## 2019-01-21 DIAGNOSIS — Z12.11 SCREENING FOR COLON CANCER: ICD-10-CM

## 2019-01-21 DIAGNOSIS — E03.9 ACQUIRED HYPOTHYROIDISM: ICD-10-CM

## 2019-01-21 DIAGNOSIS — M62.830 LUMBAR PARASPINAL MUSCLE SPASM: ICD-10-CM

## 2019-01-21 DIAGNOSIS — R53.83 OTHER FATIGUE: ICD-10-CM

## 2019-01-21 DIAGNOSIS — I10 BENIGN ESSENTIAL HTN: ICD-10-CM

## 2019-01-21 PROCEDURE — 99214 OFFICE O/P EST MOD 30 MIN: CPT | Performed by: FAMILY MEDICINE

## 2019-01-21 PROCEDURE — 3074F SYST BP LT 130 MM HG: CPT | Performed by: FAMILY MEDICINE

## 2019-01-21 PROCEDURE — 3078F DIAST BP <80 MM HG: CPT | Performed by: FAMILY MEDICINE

## 2019-01-21 RX ORDER — NAPROXEN 500 MG/1
250 TABLET ORAL 2 TIMES DAILY WITH MEALS
Qty: 30 TABLET | Refills: 0 | Status: SHIPPED | OUTPATIENT
Start: 2019-01-21 | End: 2019-02-27

## 2019-01-21 NOTE — PROGRESS NOTES
Assessment/Plan:    No problem-specific Assessment & Plan notes found for this encounter  Problem List Items Addressed This Visit        Endocrine    Hypothyroidism    Relevant Orders    TSH, 3rd generation    T4, free       Cardiovascular and Mediastinum    Benign essential HTN       Other    Lumbar paraspinal muscle spasm    Relevant Medications    naproxen (NAPROSYN) 500 mg tablet      Other Visit Diagnoses     Screening for malignant neoplasm of breast    -  Primary    Relevant Orders    Mammo screening bilateral w 3d & cad    Screening for colon cancer        Relevant Orders    Ambulatory referral to Gastroenterology    Other fatigue        Relevant Orders    TSH, 3rd generation    T4, free    Vitamin D 25 hydroxy              Discussion, continue with medications as previous  Schedule colonoscopy as well as mammogram   Will be due for annual well visit and bone density study after age 71  Start Naprosyn b i d  With meals for the next few days and then as needed  Heat to the area and avoid extra new with activity for the next day or 2  Back pain likely muscular in nature and due to no bony tenderness on exam today  Last imaging from 2016 noted  Refer to Dermatology for lesion on right cheek  She has an appointment in July but would like to be seen sooner  She would like to hold off on biopsy right now      Subjective:  F/up hypothyroidism  Patient ID: Angely Nam is a 58 y o  female  HPI     Hypothyroidism (Follow-Up): Interval symptoms:  denies weight gain,-- denies cold intolerance,-- denies fatigue,-- denies weakness,-- denies constipation,-- denies menorrhagia,-- denies dyspnea on exertion,-- denies trouble concentrating,-- denies hair loss-- and-- denies dry skin  Hypertension (Follow-Up): The patient presents for follow-up of essential hypertension  The patient states she has been doing well with her blood pressure control since the last visit   She has no comorbid illnesses  She has no significant interval events  Symptoms: The patient is currently asymptomatic  denies impaired vision,-- denies dyspnea,-- denies chest pain,-- denies intermittent leg claudication-- and-- denies lower extremity edema        skin lesion on R cheek; dark  And flat but scaly and does not go away  Minor burning  Using neosporin cream  Has been there for several months  Back pain:  Not new but yesterday got much worse, had troube rolling in bed and getting gout, exercises daily and had been slightly aggrevated but now much worse  Not sure what aggrevated it but was having her kitchen renovated           The following portions of the patient's history were reviewed and updated as appropriate: allergies, current medications, past family history, past medical history, past social history, past surgical history and problem list     Review of Systems      Constitutional:  Denies fever or chills , 7 lb weight gain with winter clothes on  Eyes:  Denies change in visual acuity   HENT:  Denies nasal congestion or sore throat   Respiratory:  Denies cough or shortness of breath or wheezing  Cardiovascular:  Denies palpitations or chest pain  GI:  Denies abdominal pain, nausea, or vomiting  Integument:  Denies rash   Neurologic:  Denies headache or focal weakness      Objective:      /64 (BP Location: Left arm, Patient Position: Sitting, Cuff Size: Standard)   Pulse 60   Temp 97 6 °F (36 4 °C) (Tympanic)   Wt 75 4 kg (166 lb 3 2 oz) Comment: shoes on  SpO2 99%   BMI 24 34 kg/m²          Physical Exam      Constitutional:  Well developed, well nourished, no acute distress, non-toxic appearance   Eyes:  PERRL, conjunctiva normal , non icteric sclera  HENT:  Atraumatic, oropharynx moist  Neck-  supple   Respiratory:  CTA b/l, normal breath sounds, no rales, no wheezing   Cardiovascular:  RRR, no murmurs, no LE edema b/l  GI:  Soft, nondistended, normal bowel sounds x 4, nontender, no organomegaly, no mass, no rebound, no guarding   Neurologic:  no focal deficits noted   Psychiatric:  Speech and behavior appropriate , AAO x 3  Skin, right cheek with 2 mm dark circular lesion, flat  Musculoskeletal, mom minimal right straight leg test being positive, no bony pain in spine or sacrum  Tender to palpation right paraspinal muscles  No ischial  tenderness

## 2019-02-19 ENCOUNTER — HOSPITAL ENCOUNTER (OUTPATIENT)
Dept: RADIOLOGY | Age: 64
Discharge: HOME | End: 2019-02-19
Payer: COMMERCIAL

## 2019-02-19 VITALS — BODY MASS INDEX: 24.59 KG/M2 | WEIGHT: 166 LBS | HEIGHT: 69 IN

## 2019-02-19 DIAGNOSIS — Z12.39 SCREENING FOR MALIGNANT NEOPLASM OF BREAST: ICD-10-CM

## 2019-02-19 PROCEDURE — 77067 SCR MAMMO BI INCL CAD: CPT

## 2019-02-19 PROCEDURE — 77063 BREAST TOMOSYNTHESIS BI: CPT

## 2019-02-27 ENCOUNTER — APPOINTMENT (OUTPATIENT)
Dept: RADIOLOGY | Age: 64
End: 2019-02-27
Payer: COMMERCIAL

## 2019-02-27 ENCOUNTER — HOSPITAL ENCOUNTER (OUTPATIENT)
Dept: MAMMOGRAPHY | Facility: CLINIC | Age: 64
Discharge: HOME | End: 2019-02-27
Payer: COMMERCIAL

## 2019-02-27 ENCOUNTER — OFFICE VISIT (OUTPATIENT)
Dept: INTERNAL MEDICINE CLINIC | Age: 64
End: 2019-02-27
Payer: COMMERCIAL

## 2019-02-27 ENCOUNTER — HOSPITAL ENCOUNTER (OUTPATIENT)
Dept: ULTRASOUND IMAGING | Facility: CLINIC | Age: 64
Discharge: HOME | End: 2019-02-27
Payer: COMMERCIAL

## 2019-02-27 VITALS — TEMPERATURE: 97.7 F | DIASTOLIC BLOOD PRESSURE: 78 MMHG | HEART RATE: 64 BPM | SYSTOLIC BLOOD PRESSURE: 112 MMHG

## 2019-02-27 VITALS — BODY MASS INDEX: 24.59 KG/M2 | HEIGHT: 69 IN | WEIGHT: 166 LBS

## 2019-02-27 DIAGNOSIS — R92.8 ABNORMAL MAMMOGRAM: ICD-10-CM

## 2019-02-27 DIAGNOSIS — M79.672 LEFT FOOT PAIN: Primary | ICD-10-CM

## 2019-02-27 DIAGNOSIS — Z11.59 NEED FOR HEPATITIS C SCREENING TEST: ICD-10-CM

## 2019-02-27 DIAGNOSIS — M79.672 LEFT FOOT PAIN: ICD-10-CM

## 2019-02-27 PROCEDURE — 73630 X-RAY EXAM OF FOOT: CPT

## 2019-02-27 PROCEDURE — 77065 DX MAMMO INCL CAD UNI: CPT

## 2019-02-27 PROCEDURE — G0279 TOMOSYNTHESIS, MAMMO: HCPCS

## 2019-02-27 PROCEDURE — 99213 OFFICE O/P EST LOW 20 MIN: CPT | Performed by: NURSE PRACTITIONER

## 2019-02-27 PROCEDURE — 76642 ULTRASOUND BREAST LIMITED: CPT

## 2019-02-27 NOTE — PROGRESS NOTES
Assessment/Plan:    L foot pain  - primarily L 5th digit  - check xray to r/o fx    HM  - hep C screening - ordered  - influenza - declined     Diagnoses and all orders for this visit:    Left foot pain  -     XR foot 3+ vw left; Future  -     Cancel: Hepatitis C antibody; Future    Need for hepatitis C screening test  -     Hepatitis C antibody; Future        Subjective:      Patient ID: Saul Huerta is a 58 y o  female  HPI    L Foot Pain  Pt reports she bumped L foot into door on Monday  No fall  C/o L foot pain, primarily L 5th digit  Pt has been able to walk without difficulties  She has been using advil without some relief  No pain to ankle or calf  The following portions of the patient's history were reviewed and updated as appropriate: allergies, current medications, past family history, past medical history, past social history, past surgical history and problem list     Review of Systems   Cardiovascular: Negative for leg swelling  Musculoskeletal: Positive for arthralgias and joint swelling  Negative for back pain, gait problem and myalgias  Skin: Positive for color change (bruising to L foot)  Negative for wound  Neurological: Negative for numbness           Past Medical History:   Diagnosis Date    Benign essential HTN 9/27/2017    Elevated BUN     resolved 3/10/17    Fluid retention in legs     last assessed 5/12/15    Hypothyroidism     Lumbago with sciatica     last assessed 4/10/14    Lumbar radiculopathy     last assessed 4/10/14    Onychomycosis of toenail     last assessed 11/7/16         Current Outpatient Medications:     AMILoride-hydrochlorothiazide (MODURETIC) 5-50 mg per tablet, TAKE 1 TABLET DAILY, Disp: 90 tablet, Rfl: 0    levothyroxine 50 mcg tablet, TAKE 1 TABLET DAILY, Disp: 90 tablet, Rfl: 1    Multiple Vitamin (MULTI-DAY) TABS, Take 2 tablets by mouth daily , Disp: , Rfl:     Omega-3 Fatty Acids (FISH OIL) 645 MG CAPS, Take 1 capsule by mouth daily , Disp: , Rfl: No Known Allergies    Social History   Past Surgical History:   Procedure Laterality Date    APPENDECTOMY      BREAST CYST ASPIRATION Left 1993    KNEE ARTHROSCOPY Left     therapeutic     Family History   Problem Relation Age of Onset    Breast cancer Mother 80    Hypertension Mother     Ovarian cancer Maternal Aunt 29    Ovarian cancer Cousin 54    Prostate cancer Paternal Uncle 54       Objective:  /78 (BP Location: Left arm, Patient Position: Sitting, Cuff Size: Adult)   Pulse 64   Temp 97 7 °F (36 5 °C) (Tympanic)      Physical Exam   Constitutional: She is oriented to person, place, and time  She appears well-developed and well-nourished  No distress  Cardiovascular: Normal rate and regular rhythm  Pulmonary/Chest: Effort normal and breath sounds normal  No respiratory distress  She has no wheezes  Musculoskeletal:   L foot:  + ecchymosis to L 5th digit   + tenderness to palpation  Mild swelling  No tenderness, ecchymosis, or swelling noted to ankle  Ambulating without difficulties   Neurological: She is alert and oriented to person, place, and time  Skin: Skin is warm and dry  Psychiatric: She has a normal mood and affect   Her behavior is normal  Judgment and thought content normal

## 2019-02-28 ENCOUNTER — TELEPHONE (OUTPATIENT)
Dept: INTERNAL MEDICINE CLINIC | Facility: CLINIC | Age: 64
End: 2019-02-28

## 2019-02-28 DIAGNOSIS — S92.512A CLOSED DISPLACED FRACTURE OF PROXIMAL PHALANX OF LESSER TOE OF LEFT FOOT, INITIAL ENCOUNTER: Primary | ICD-10-CM

## 2019-03-01 ENCOUNTER — OFFICE VISIT (OUTPATIENT)
Dept: OBGYN CLINIC | Facility: CLINIC | Age: 64
End: 2019-03-01
Payer: COMMERCIAL

## 2019-03-01 VITALS
SYSTOLIC BLOOD PRESSURE: 135 MMHG | BODY MASS INDEX: 22.33 KG/M2 | HEIGHT: 70 IN | WEIGHT: 156 LBS | DIASTOLIC BLOOD PRESSURE: 82 MMHG | HEART RATE: 72 BPM

## 2019-03-01 DIAGNOSIS — S92.502A CLOSED FRACTURE OF PHALANX OF LEFT FIFTH TOE, INITIAL ENCOUNTER: Primary | ICD-10-CM

## 2019-03-01 PROCEDURE — 99203 OFFICE O/P NEW LOW 30 MIN: CPT | Performed by: ORTHOPAEDIC SURGERY

## 2019-03-01 PROCEDURE — 28510 TREATMENT OF TOE FRACTURE: CPT | Performed by: ORTHOPAEDIC SURGERY

## 2019-03-01 NOTE — ASSESSMENT & PLAN NOTE
Findings consistent with left 5th toe proximal phalanx nondisplaced fracture  Discussed findings and treatment options with the patient  I reviewed patient's right foot x-ray with her and her   Discussed prognosis of her injury  We will place patient in a postop shoe and allowed to weightbear as tolerated  Patient should avoid activity involve portion of the left foot  I will see patient back in 6 weeks for re-evaluation  Continue elevation, cold compress, and NSAIDs  All patient's questions were answered to their satisfaction  This note is created using dictation transcription  It may contain typographical errors, grammatical errors, improperly dictated words, background noise and other errors

## 2019-03-01 NOTE — PROGRESS NOTES
Assessment:     1  Closed fracture of phalanx of left fifth toe, initial encounter        Plan:     Problem List Items Addressed This Visit        Musculoskeletal and Integument    Closed fracture of phalanx of left fifth toe - Primary     Findings consistent with left 5th toe proximal phalanx nondisplaced fracture  Discussed findings and treatment options with the patient  I reviewed patient's right foot x-ray with her and her   Discussed prognosis of her injury  We will place patient in a postop shoe and allowed to weightbear as tolerated  Patient should avoid activity involve portion of the left foot  I will see patient back in 6 weeks for re-evaluation  Continue elevation, cold compress, and NSAIDs  All patient's questions were answered to their satisfaction  This note is created using dictation transcription  It may contain typographical errors, grammatical errors, improperly dictated words, background noise and other errors  Relevant Orders    Post Op Shoe    Fracture / Dislocation Treatment         Subjective:     Patient ID: Marilyn Kumar is a 58 y o  female  Chief Complaint:  68-year-old female injured her left 5th toe on 2/25/2019 when she walking to her bed  She developed pain in her left 5th toe with swelling and discoloration  Patient was seen by her PCP and an x-ray was ordered  Patient is here for further treatment recommendation  She continued to complain of pain over her left 5th toe when try to walk and wearing regular shoes  Information on patient's intake form was reviewed      Allergy:  No Known Allergies  Medications:  all current active meds have been reviewed  Past Medical History:  Past Medical History:   Diagnosis Date    Benign essential HTN 9/27/2017    Elevated BUN     resolved 3/10/17    Fluid retention in legs     last assessed 5/12/15    Hypothyroidism     Lumbago with sciatica     last assessed 4/10/14    Lumbar radiculopathy     last assessed 4/10/14  Onychomycosis of toenail     last assessed 11/7/16     Past Surgical History:  Past Surgical History:   Procedure Laterality Date    APPENDECTOMY      BREAST CYST ASPIRATION Left 1993    KNEE ARTHROSCOPY Left     therapeutic     Family History:  Family History   Problem Relation Age of Onset   Kendell Carreon Breast cancer Mother 80    Hypertension Mother     Ovarian cancer Maternal Aunt 29    Ovarian cancer Cousin 54    Prostate cancer Paternal Uncle 54     Social History:  Social History     Substance and Sexual Activity   Alcohol Use Yes    Comment: rarely     Social History     Substance and Sexual Activity   Drug Use No     Social History     Tobacco Use   Smoking Status Never Smoker   Smokeless Tobacco Never Used     Review of Systems   Constitutional: Negative  HENT: Negative  Eyes: Negative  Respiratory: Negative  Cardiovascular: Negative  Gastrointestinal: Negative  Endocrine: Negative  Genitourinary: Negative  Musculoskeletal: Positive for arthralgias (Left 5th toe), gait problem (Antalgic) and joint swelling (Left 5th toe)  Skin: Negative  Allergic/Immunologic: Negative  Hematological: Negative  Psychiatric/Behavioral: Negative  Objective:  BP Readings from Last 1 Encounters:   03/01/19 135/82      Wt Readings from Last 1 Encounters:   03/01/19 70 8 kg (156 lb)      BMI:   Estimated body mass index is 22 38 kg/m² as calculated from the following:    Height as of this encounter: 5' 10" (1 778 m)  Weight as of this encounter: 70 8 kg (156 lb)  BSA:   Estimated body surface area is 1 88 meters squared as calculated from the following:    Height as of this encounter: 5' 10" (1 778 m)  Weight as of this encounter: 70 8 kg (156 lb)  Physical Exam   Constitutional: She is oriented to person, place, and time  She appears well-developed  HENT:   Head: Normocephalic and atraumatic  Eyes: Conjunctivae and EOM are normal    Neck: Neck supple     Neurological: She is alert and oriented to person, place, and time  Skin: Skin is warm  Psychiatric: She has a normal mood and affect  Nursing note and vitals reviewed  Left Ankle Exam     Tenderness   Left ankle tenderness location: Left 5th toe  Swelling: mild (Left 5th toe)    Other   Erythema: absent  Sensation: normal  Pulse: present    Comments:  Left 5th toe show no gross deformity but with swelling and ecchymosis  I have personally reviewed pertinent films in PACS and my interpretation is Left foot show nondisplaced 5th toe proximal phalanx fracture       Fracture / Dislocation Treatment  Date/Time: 3/1/2019 1:29 PM  Performed by: Maritza Mckeon MD  Authorized by: Maritza Mckeon MD     Patient Location:  Clinic  Test results available and properly labeled: Yes    Injury location:  Toe  Location details:  Left fifth toe  Injury type:  Fracture  Fracture type: proximal phalanx    Neurovascular status: Neurovascularly intact    Distal perfusion: normal    Neurological function: normal    Range of motion: reduced    Manipulation performed?: No    Immobilization:  Other (comment) (Postop shoe)

## 2019-04-05 DIAGNOSIS — I10 ESSENTIAL HYPERTENSION: ICD-10-CM

## 2019-04-10 RX ORDER — AMILORIDE HYDROCHLORIDE AND HYDROCHLOROTHIAZIDE 5; 50 MG/1; MG/1
TABLET ORAL
Qty: 90 TABLET | Refills: 0 | Status: SHIPPED | OUTPATIENT
Start: 2019-04-10 | End: 2019-07-09 | Stop reason: SDUPTHER

## 2019-04-12 ENCOUNTER — APPOINTMENT (OUTPATIENT)
Dept: RADIOLOGY | Facility: CLINIC | Age: 64
End: 2019-04-12
Payer: COMMERCIAL

## 2019-04-12 ENCOUNTER — OFFICE VISIT (OUTPATIENT)
Dept: OBGYN CLINIC | Facility: CLINIC | Age: 64
End: 2019-04-12

## 2019-04-12 VITALS
HEIGHT: 70 IN | BODY MASS INDEX: 22.33 KG/M2 | DIASTOLIC BLOOD PRESSURE: 78 MMHG | WEIGHT: 156 LBS | SYSTOLIC BLOOD PRESSURE: 122 MMHG

## 2019-04-12 DIAGNOSIS — S92.502D CLOSED FRACTURE OF PHALANX OF LEFT FIFTH TOE WITH ROUTINE HEALING, SUBSEQUENT ENCOUNTER: Primary | ICD-10-CM

## 2019-04-12 DIAGNOSIS — S92.502A CLOSED FRACTURE OF PHALANX OF LEFT FIFTH TOE, INITIAL ENCOUNTER: ICD-10-CM

## 2019-04-12 PROCEDURE — 73630 X-RAY EXAM OF FOOT: CPT

## 2019-04-12 PROCEDURE — 99024 POSTOP FOLLOW-UP VISIT: CPT | Performed by: ORTHOPAEDIC SURGERY

## 2019-04-23 ENCOUNTER — OFFICE VISIT (OUTPATIENT)
Dept: INTERNAL MEDICINE CLINIC | Age: 64
End: 2019-04-23
Payer: COMMERCIAL

## 2019-04-23 VITALS
DIASTOLIC BLOOD PRESSURE: 78 MMHG | OXYGEN SATURATION: 99 % | HEART RATE: 83 BPM | WEIGHT: 157 LBS | TEMPERATURE: 97.6 F | BODY MASS INDEX: 22.53 KG/M2 | SYSTOLIC BLOOD PRESSURE: 118 MMHG

## 2019-04-23 DIAGNOSIS — Z53.20 COLONOSCOPY REFUSED: ICD-10-CM

## 2019-04-23 DIAGNOSIS — Z23 ENCOUNTER FOR IMMUNIZATION: ICD-10-CM

## 2019-04-23 DIAGNOSIS — I10 BENIGN ESSENTIAL HTN: ICD-10-CM

## 2019-04-23 DIAGNOSIS — E03.9 ACQUIRED HYPOTHYROIDISM: ICD-10-CM

## 2019-04-23 DIAGNOSIS — Z28.21 INFLUENZA VACCINE REFUSED: ICD-10-CM

## 2019-04-23 DIAGNOSIS — S92.502D CLOSED FRACTURE OF PHALANX OF LEFT FIFTH TOE WITH ROUTINE HEALING, SUBSEQUENT ENCOUNTER: ICD-10-CM

## 2019-04-23 DIAGNOSIS — Z12.11 SCREENING FOR COLON CANCER: Primary | ICD-10-CM

## 2019-04-23 PROBLEM — L65.9 HAIR LOSS: Status: RESOLVED | Noted: 2018-04-12 | Resolved: 2019-04-23

## 2019-04-23 PROBLEM — M62.830 LUMBAR PARASPINAL MUSCLE SPASM: Status: RESOLVED | Noted: 2017-01-24 | Resolved: 2019-04-23

## 2019-04-23 PROCEDURE — 3078F DIAST BP <80 MM HG: CPT | Performed by: FAMILY MEDICINE

## 2019-04-23 PROCEDURE — 99214 OFFICE O/P EST MOD 30 MIN: CPT | Performed by: FAMILY MEDICINE

## 2019-04-23 PROCEDURE — 1036F TOBACCO NON-USER: CPT | Performed by: FAMILY MEDICINE

## 2019-04-23 PROCEDURE — 3074F SYST BP LT 130 MM HG: CPT | Performed by: FAMILY MEDICINE

## 2019-04-23 RX ORDER — COLLAGEN, HYDROLYSATE (BOVINE) 100 %
1 POWDER (GRAM) MISCELLANEOUS DAILY
COMMUNITY

## 2019-06-25 DIAGNOSIS — E03.9 ACQUIRED HYPOTHYROIDISM: ICD-10-CM

## 2019-06-25 RX ORDER — LEVOTHYROXINE SODIUM 0.05 MG/1
TABLET ORAL
Qty: 90 TABLET | Refills: 1 | Status: SHIPPED | OUTPATIENT
Start: 2019-06-25 | End: 2019-07-09 | Stop reason: SDUPTHER

## 2019-07-01 ENCOUNTER — TRANSCRIBE ORDERS (OUTPATIENT)
Dept: ADMINISTRATIVE | Age: 64
End: 2019-07-01

## 2019-07-01 ENCOUNTER — APPOINTMENT (OUTPATIENT)
Dept: LAB | Age: 64
End: 2019-07-01
Payer: MEDICARE

## 2019-07-01 DIAGNOSIS — E03.9 ACQUIRED HYPOTHYROIDISM: ICD-10-CM

## 2019-07-01 DIAGNOSIS — Z11.59 NEED FOR HEPATITIS C SCREENING TEST: ICD-10-CM

## 2019-07-01 DIAGNOSIS — R53.83 OTHER FATIGUE: ICD-10-CM

## 2019-07-01 LAB
25(OH)D3 SERPL-MCNC: 62.3 NG/ML (ref 30–100)
HCV AB SER QL: NORMAL
T4 FREE SERPL-MCNC: 1.24 NG/DL (ref 0.76–1.46)
TSH SERPL DL<=0.05 MIU/L-ACNC: 1.08 UIU/ML (ref 0.36–3.74)

## 2019-07-01 PROCEDURE — 86803 HEPATITIS C AB TEST: CPT

## 2019-07-01 PROCEDURE — 36415 COLL VENOUS BLD VENIPUNCTURE: CPT

## 2019-07-01 PROCEDURE — 82306 VITAMIN D 25 HYDROXY: CPT

## 2019-07-01 PROCEDURE — 84439 ASSAY OF FREE THYROXINE: CPT

## 2019-07-01 PROCEDURE — 84443 ASSAY THYROID STIM HORMONE: CPT

## 2019-07-09 ENCOUNTER — OFFICE VISIT (OUTPATIENT)
Dept: INTERNAL MEDICINE CLINIC | Facility: CLINIC | Age: 64
End: 2019-07-09
Payer: MEDICARE

## 2019-07-09 VITALS
SYSTOLIC BLOOD PRESSURE: 120 MMHG | BODY MASS INDEX: 22.73 KG/M2 | TEMPERATURE: 98.1 F | HEART RATE: 69 BPM | HEIGHT: 70 IN | WEIGHT: 158.8 LBS | OXYGEN SATURATION: 98 % | DIASTOLIC BLOOD PRESSURE: 82 MMHG

## 2019-07-09 DIAGNOSIS — I10 ESSENTIAL HYPERTENSION: ICD-10-CM

## 2019-07-09 DIAGNOSIS — Z53.20 COLONOSCOPY REFUSED: ICD-10-CM

## 2019-07-09 DIAGNOSIS — Z28.21 INFLUENZA VACCINE REFUSED: ICD-10-CM

## 2019-07-09 DIAGNOSIS — I10 BENIGN ESSENTIAL HTN: ICD-10-CM

## 2019-07-09 DIAGNOSIS — Z23 NEED FOR PNEUMOCOCCAL VACCINE: Primary | ICD-10-CM

## 2019-07-09 DIAGNOSIS — E03.9 ACQUIRED HYPOTHYROIDISM: ICD-10-CM

## 2019-07-09 DIAGNOSIS — Z28.21 REFUSED PNEUMOCOCCAL VACCINATION: ICD-10-CM

## 2019-07-09 PROCEDURE — 99214 OFFICE O/P EST MOD 30 MIN: CPT | Performed by: FAMILY MEDICINE

## 2019-07-09 RX ORDER — LEVOTHYROXINE SODIUM 0.05 MG/1
50 TABLET ORAL DAILY
Qty: 90 TABLET | Refills: 3 | Status: SHIPPED | OUTPATIENT
Start: 2019-07-09 | End: 2019-07-16 | Stop reason: SDUPTHER

## 2019-07-09 RX ORDER — AMILORIDE HYDROCHLORIDE AND HYDROCHLOROTHIAZIDE 5; 50 MG/1; MG/1
1 TABLET ORAL DAILY
Qty: 90 TABLET | Refills: 1 | Status: SHIPPED | OUTPATIENT
Start: 2019-07-09 | End: 2019-07-16 | Stop reason: SDUPTHER

## 2019-07-09 NOTE — PROGRESS NOTES
BMI Counseling: Body mass index is 22 79 kg/m²  Discussed the patient's BMI with her  The BMI is below average  BMI counseling and education was provided to the patient  Patient was advised to gain weight  Assessment/Plan:    No problem-specific Assessment & Plan notes found for this encounter  Problem List Items Addressed This Visit        Endocrine    Hypothyroidism    Relevant Medications    levothyroxine 50 mcg tablet       Cardiovascular and Mediastinum    Benign essential HTN    Relevant Medications    AMILoride-hydrochlorothiazide (MODURETIC) 5-50 mg per tablet       Other    Colonoscopy refused    Influenza vaccine refused    Refused pneumococcal vaccination      Other Visit Diagnoses     Need for pneumococcal vaccine    -  Primary    Relevant Orders    PNEUMOCOCCAL CONJUGATE VACCINE 13-VALENT GREATER THAN 6 MONTHS    Essential hypertension        Relevant Medications    AMILoride-hydrochlorothiazide (MODURETIC) 5-50 mg per tablet              Discussion,   continue with medications as previous  Schedule colonoscopy as well as mammogram- however patient does refuse both  Will be due for annual well visit and bone density study after age 71  Continue with Dermatology for lesion on right cheek  refuse FIT testing  [facility] and cologaurd      BMI Counseling: Body mass index is 22 79 kg/m²  Discussed the patient's BMI with her  The BMI is below average  BMI counseling and education was provided to the patient  Patient was advised to gain weight  Subjective:  F/up hypothyroidism  Patient ID: Kun Sauceda is a 71 y o  female  HPI     Hypothyroidism (Follow-Up): Interval symptoms:  denies weight gain,-- denies cold intolerance,-- denies fatigue,-- denies weakness,-- denies constipation,-- denies menorrhagia,-- denies dyspnea on exertion,-- denies trouble concentrating,-- denies hair loss-- and-- denies dry skin  Hypertension (Follow-Up):  The patient presents for follow-up of essential hypertension  The patient states she has been doing well with her blood pressure control since the last visit  She has no comorbid illnesses  She has no significant interval events  Symptoms: The patient is currently asymptomatic  denies impaired vision,-- denies dyspnea,-- denies chest pain,-- denies intermittent leg claudication-- and-- denies lower extremity edema        skin lesion on R cheek; dark  And flat but scaly and does not go away  Minor burning  Using neosporin cream  Has been there for several months  4/2019; seeing Dermatology and had cryotherapy done twice  Going for full body check in June, July 2019 done and is good for one year            The following portions of the patient's history were reviewed and updated as appropriate: allergies, current medications, past family history, past medical history, past social history, past surgical history and problem list     Review of Systems      Constitutional:  Denies fever or chills ,  No change in weight  Eyes:  Denies change in visual acuity   HENT:  Denies nasal congestion or sore throat   Respiratory:  Denies cough or shortness of breath or wheezing  Cardiovascular:  Denies palpitations or chest pain  GI:  Denies abdominal pain, nausea, or vomiting, no heartburn  Integument:  Denies rash   Neurologic:  Denies headache or focal weakness, no dizziness  No urine issues no nocturia      Objective:      /82 (BP Location: Left arm, Patient Position: Sitting, Cuff Size: Adult)   Pulse 69   Temp 98 1 °F (36 7 °C) (Oral)   Ht 5' 10" (1 778 m)   Wt 72 kg (158 lb 12 8 oz)   SpO2 98%   BMI 22 79 kg/m²          Physical Exam      Constitutional:  Well developed, well nourished, no acute distress, non-toxic appearance   Eyes:  PERRL, conjunctiva normal , non icteric sclera  HENT:  Atraumatic, oropharynx moist  Neck-  supple   Respiratory:  CTA b/l, normal breath sounds, no rales, no wheezing   Cardiovascular:  RRR, no murmurs, no LE edema b/l  GI:  Soft, nondistended, normal bowel sounds x 4, nontender, no organomegaly, no mass, no rebound, no guarding   Neurologic:  no focal deficits noted   Psychiatric:  Speech and behavior appropriate , AAO x 3

## 2019-07-10 RX ORDER — AMILORIDE HYDROCHLORIDE AND HYDROCHLOROTHIAZIDE 5; 50 MG/1; MG/1
TABLET ORAL
Qty: 90 TABLET | Refills: 0 | OUTPATIENT
Start: 2019-07-10

## 2019-07-16 DIAGNOSIS — I10 ESSENTIAL HYPERTENSION: ICD-10-CM

## 2019-07-16 DIAGNOSIS — E03.9 ACQUIRED HYPOTHYROIDISM: ICD-10-CM

## 2019-07-16 RX ORDER — LEVOTHYROXINE SODIUM 0.05 MG/1
50 TABLET ORAL DAILY
Qty: 90 TABLET | Refills: 1 | Status: SHIPPED | OUTPATIENT
Start: 2019-07-16 | End: 2020-03-10 | Stop reason: SDUPTHER

## 2019-07-16 RX ORDER — AMILORIDE HYDROCHLORIDE AND HYDROCHLOROTHIAZIDE 5; 50 MG/1; MG/1
1 TABLET ORAL DAILY
Qty: 90 TABLET | Refills: 1 | Status: SHIPPED | OUTPATIENT
Start: 2019-07-16 | End: 2020-01-29 | Stop reason: SDUPTHER

## 2019-07-16 NOTE — TELEPHONE ENCOUNTER
Patient called very upset because she has not received her amiloride and levothyroxine yet  Dr Sandra Fry sent Rxs to [address] [place] [address] on 07/09/19, but I noted in patient's chart that the scripts printed in the office and were not sent  She must have these by tomorrow as she is leaving for [place] at the end of this week and does not want to risk waiting to the last minute  Patient also asked if you can call [facility] to cancel the order as she does not want to pay twice for the same medication  She is going to be out of the country [place] 3 [place]

## 2019-07-16 NOTE — TELEPHONE ENCOUNTER
Looks like scripts were printed at visit  Per Georgette Donnelly pt  Does not have scripts  Will re send to [facility] to sign off on  Will call pt  Once signed

## 2020-01-28 ENCOUNTER — OFFICE VISIT (OUTPATIENT)
Dept: INTERNAL MEDICINE CLINIC | Facility: CLINIC | Age: 65
End: 2020-01-28
Payer: MEDICARE

## 2020-01-28 VITALS
TEMPERATURE: 98.6 F | WEIGHT: 159.6 LBS | SYSTOLIC BLOOD PRESSURE: 114 MMHG | HEIGHT: 70 IN | DIASTOLIC BLOOD PRESSURE: 72 MMHG | OXYGEN SATURATION: 98 % | HEART RATE: 81 BPM | BODY MASS INDEX: 22.85 KG/M2

## 2020-01-28 DIAGNOSIS — E03.9 ACQUIRED HYPOTHYROIDISM: Primary | ICD-10-CM

## 2020-01-28 PROCEDURE — 99213 OFFICE O/P EST LOW 20 MIN: CPT | Performed by: FAMILY MEDICINE

## 2020-01-28 RX ORDER — QUINIDINE GLUCONATE 324 MG
2 TABLET, EXTENDED RELEASE ORAL DAILY
COMMUNITY

## 2020-01-28 RX ORDER — MAGNESIUM CARBONATE
325 POWDER (GRAM) MISCELLANEOUS DAILY
COMMUNITY

## 2020-01-28 NOTE — PROGRESS NOTES
Assessment/Plan:    1  Acquired hypothyroidism  -     TSH, 3rd generation; Future  -     T4, free; Future        emergency room records from Er at [facility] reviewed including lab work  They recommended patient see a cardiologist and she would like a 2nd opinion  She has an appointment tomorrow  I have advised her to take 1 and half tablets of Synthroid to make 75 mcg on Monday Wednesday Friday only and continue with 50 mcg all other days  She will repeat a TSH and T4 in 4-6 weeks  I also advised her to decrease her caffeine intake and increase water intake which she will do  There are no Patient Instructions on file for this visit  No follow-ups on file  Subjective:      Patient ID: Erick Dunaway is a 71 y o  female  Chief Complaint   Patient presents with    Follow-up     ER visit 1/19/20 at [facility] for palpitations and lightheadedness  Her symptoms continue to come and go  HPI       Palpitations, went to the emergency room due to feeling palpitations  She is not sure if there skipped beats or fast beats but had occasional dizziness with that  Her TSH was checked and found to be in the 4 range  She usually she is in the 2 range  She is taking 50 mcg of Synthroid every morning on an empty stomach and has not changed anything  Her other laboratory data was checked and was in normal range  She has had no syncopal or presyncopal episodes  She she drinks a large amount of caffeine through Ts throughout the day and sometimes bruise her own black tea  She does not drink any coffee  She is active at the gym and states she does drink enough water  She does not skip any meals      The following portions of the patient's history were reviewed and updated as appropriate: allergies, current medications, past family history, past medical history, past social history, past surgical history and problem list     Review of Systems      Constitutional:  Denies fever or chills   Eyes:  Denies double or blurry vision  HENT:  Denies nasal congestion or sore throat   Respiratory:  Denies cough or shortness of breath or wheezing  Cardiovascular:  Denies palpitations or chest pain  GI:  Denies abdominal pain, nausea, or vomiting  Integument:  Denies rash , no open areas  Neurologic:  Denies headache or focal weakness        Current Outpatient Medications   Medication Sig Dispense Refill    AMILoride-hydrochlorothiazide (MODURETIC) 5-50 mg per tablet Take 1 tablet by mouth daily 90 tablet 1    Calcium Carbonate-Vitamin D3 600-400 MG-UNIT TABS Take 2 tablets by mouth daily      Collagen Hydrolysate POWD 1 Scoop by Does not apply route daily       Glucosamine-Chondroit-Vit C-Mn (GLUCOSAMINE-CHONDROITIN) TABS Take 2 tablets by mouth daily      levothyroxine 50 mcg tablet Take 1 tablet (50 mcg total) by mouth daily 90 tablet 1    Magnesium Carbonate POWD 325 mg by Does not apply route daily      Multiple Vitamin (MULTI-DAY) TABS Take 2 tablets by mouth daily       Multiple Vitamins-Minerals (ULTRA AYSHA PO) Take 2 tablets by mouth daily      Omega-3 Fatty Acids (FISH OIL) 1200 MG CAPS Take 2 capsules by mouth daily        No current facility-administered medications for this visit          Objective:    /72 (BP Location: Left arm, Patient Position: Sitting, Cuff Size: Standard)   Pulse 81   Temp 98 6 °F (37 °C) (Oral)   Ht 5' 10 16" (1 782 m)   Wt 72 4 kg (159 lb 9 6 oz)   SpO2 98%   BMI 22 80 kg/m²        Physical Exam       Constitutional:  Well developed, well nourished, no acute distress, non-toxic appearance   Eyes:  PERRL, conjunctiva normal , non icteric sclera  HENT:  Atraumatic, oropharynx moist  Neck-  supple   Respiratory:  CTA b/l, normal breath sounds, no rales, no wheezing   Cardiovascular:  RRR, no murmurs, no LE edema b/l  GI:  Soft, nondistended, normal bowel sounds x 4, nontender, no organomegaly, no mass, no rebound, no guarding   Neurologic:  no focal deficits noted Psychiatric:  Speech and behavior appropriate , AAO x [address], [place]

## 2020-01-29 DIAGNOSIS — I10 ESSENTIAL HYPERTENSION: ICD-10-CM

## 2020-01-29 DIAGNOSIS — E03.9 ACQUIRED HYPOTHYROIDISM: ICD-10-CM

## 2020-01-29 RX ORDER — LEVOTHYROXINE SODIUM 0.05 MG/1
50 TABLET ORAL DAILY
Qty: 90 TABLET | Refills: 1 | Status: CANCELLED | OUTPATIENT
Start: 2020-01-29

## 2020-01-29 RX ORDER — AMILORIDE HYDROCHLORIDE AND HYDROCHLOROTHIAZIDE 5; 50 MG/1; MG/1
1 TABLET ORAL DAILY
Qty: 90 TABLET | Refills: 1 | Status: SHIPPED | OUTPATIENT
Start: 2020-01-29 | End: 2020-07-27 | Stop reason: SDUPTHER

## 2020-01-29 NOTE — TELEPHONE ENCOUNTER
This patient was seen by Dr Ekaterina Magaña yesterday and did not realize that she needed a refill on her Amloride and the pharmacy is [facility]

## 2020-02-03 ENCOUNTER — TELEPHONE (OUTPATIENT)
Dept: INTERNAL MEDICINE CLINIC | Facility: CLINIC | Age: 65
End: 2020-02-03

## 2020-02-03 DIAGNOSIS — E03.9 ACQUIRED HYPOTHYROIDISM: Primary | ICD-10-CM

## 2020-02-03 NOTE — TELEPHONE ENCOUNTER
Patient called inquiring about getting a lab order for Anti Thyro Globulin Anti body test - she states that you ordered her BW and she would like that test added as well

## 2020-02-10 ENCOUNTER — TRANSCRIBE ORDERS (OUTPATIENT)
Dept: ADMINISTRATIVE | Facility: HOSPITAL | Age: 65
End: 2020-02-10

## 2020-02-10 DIAGNOSIS — Z80.3 FAMILY HISTORY OF BREAST CANCER: Primary | ICD-10-CM

## 2020-02-10 DIAGNOSIS — Z12.39 SCREENING FOR MALIGNANT NEOPLASM OF BREAST: ICD-10-CM

## 2020-02-20 ENCOUNTER — TELEPHONE (OUTPATIENT)
Dept: INTERNAL MEDICINE CLINIC | Age: 65
End: 2020-02-20

## 2020-02-20 NOTE — TELEPHONE ENCOUNTER
Patient called the office and is due for her mammogram and needs an order for her mammogram  Can we please put an order in for this patient

## 2020-02-24 ENCOUNTER — APPOINTMENT (OUTPATIENT)
Dept: LAB | Age: 65
End: 2020-02-24
Payer: MEDICARE

## 2020-02-24 ENCOUNTER — HOSPITAL ENCOUNTER (OUTPATIENT)
Dept: RADIOLOGY | Age: 65
Discharge: HOME | End: 2020-02-24
Payer: MEDICARE

## 2020-02-24 VITALS — WEIGHT: 155 LBS | HEIGHT: 70 IN | BODY MASS INDEX: 22.19 KG/M2

## 2020-02-24 DIAGNOSIS — Z12.39 SCREENING FOR MALIGNANT NEOPLASM OF BREAST: ICD-10-CM

## 2020-02-24 DIAGNOSIS — Z80.3 FAMILY HISTORY OF BREAST CANCER: ICD-10-CM

## 2020-02-24 DIAGNOSIS — E03.9 ACQUIRED HYPOTHYROIDISM: ICD-10-CM

## 2020-02-24 LAB
T4 FREE SERPL-MCNC: 1.24 NG/DL (ref 0.76–1.46)
TSH SERPL DL<=0.05 MIU/L-ACNC: 1.67 UIU/ML (ref 0.36–3.74)

## 2020-02-24 PROCEDURE — 84439 ASSAY OF FREE THYROXINE: CPT

## 2020-02-24 PROCEDURE — 77063 BREAST TOMOSYNTHESIS BI: CPT

## 2020-02-24 PROCEDURE — 86800 THYROGLOBULIN ANTIBODY: CPT

## 2020-02-24 PROCEDURE — 36415 COLL VENOUS BLD VENIPUNCTURE: CPT

## 2020-02-24 PROCEDURE — 84443 ASSAY THYROID STIM HORMONE: CPT

## 2020-02-24 PROCEDURE — 77067 SCR MAMMO BI INCL CAD: CPT

## 2020-02-25 LAB — THYROGLOB AB SERPL-ACNC: <1 IU/ML (ref 0–0.9)

## 2020-03-10 ENCOUNTER — OFFICE VISIT (OUTPATIENT)
Dept: INTERNAL MEDICINE CLINIC | Facility: CLINIC | Age: 65
End: 2020-03-10
Payer: MEDICARE

## 2020-03-10 VITALS
DIASTOLIC BLOOD PRESSURE: 68 MMHG | WEIGHT: 161.2 LBS | TEMPERATURE: 98.3 F | HEART RATE: 64 BPM | BODY MASS INDEX: 23.08 KG/M2 | OXYGEN SATURATION: 99 % | HEIGHT: 70 IN | SYSTOLIC BLOOD PRESSURE: 100 MMHG

## 2020-03-10 DIAGNOSIS — Z28.21 INFLUENZA VACCINE REFUSED: ICD-10-CM

## 2020-03-10 DIAGNOSIS — E03.9 ACQUIRED HYPOTHYROIDISM: ICD-10-CM

## 2020-03-10 DIAGNOSIS — Z28.21 REFUSED PNEUMOCOCCAL VACCINATION: ICD-10-CM

## 2020-03-10 DIAGNOSIS — Z13.220 SCREENING CHOLESTEROL LEVEL: ICD-10-CM

## 2020-03-10 DIAGNOSIS — M94.9 DISORDER OF CARTILAGE: ICD-10-CM

## 2020-03-10 DIAGNOSIS — Z53.20 COLONOSCOPY REFUSED: ICD-10-CM

## 2020-03-10 DIAGNOSIS — Z00.00 WELCOME TO MEDICARE PREVENTIVE VISIT: Primary | ICD-10-CM

## 2020-03-10 PROBLEM — M79.672 LEFT FOOT PAIN: Status: RESOLVED | Noted: 2019-02-27 | Resolved: 2020-03-10

## 2020-03-10 PROCEDURE — 99214 OFFICE O/P EST MOD 30 MIN: CPT | Performed by: FAMILY MEDICINE

## 2020-03-10 PROCEDURE — G0403 EKG FOR INITIAL PREVENT EXAM: HCPCS | Performed by: FAMILY MEDICINE

## 2020-03-10 PROCEDURE — 3078F DIAST BP <80 MM HG: CPT | Performed by: FAMILY MEDICINE

## 2020-03-10 PROCEDURE — G0402 INITIAL PREVENTIVE EXAM: HCPCS | Performed by: FAMILY MEDICINE

## 2020-03-10 PROCEDURE — 1036F TOBACCO NON-USER: CPT | Performed by: FAMILY MEDICINE

## 2020-03-10 PROCEDURE — 3074F SYST BP LT 130 MM HG: CPT | Performed by: FAMILY MEDICINE

## 2020-03-10 PROCEDURE — 3008F BODY MASS INDEX DOCD: CPT | Performed by: FAMILY MEDICINE

## 2020-03-10 RX ORDER — LEVOTHYROXINE SODIUM 0.05 MG/1
50 TABLET ORAL DAILY
Qty: 110 TABLET | Refills: 1 | Status: CANCELLED | OUTPATIENT
Start: 2020-03-10

## 2020-03-10 RX ORDER — LEVOTHYROXINE SODIUM 0.05 MG/1
50 TABLET ORAL DAILY
Qty: 110 TABLET | Refills: 1 | Status: SHIPPED | OUTPATIENT
Start: 2020-03-10 | End: 2020-09-23 | Stop reason: SDUPTHER

## 2020-03-10 NOTE — PROGRESS NOTES
Assessment/Plan:    1  Welcome to Medicare preventive visit  -     POCT ECG  -     CBC and differential; Future    2  Disorder of cartilage  -     DXA bone density spine hip and pelvis; Future; Expected date: 03/10/2020    3  Influenza vaccine refused    4  Refused pneumococcal vaccination    5  Colonoscopy refused    6  Acquired hypothyroidism  -     levothyroxine 50 mcg tablet; Take 1 tablet (50 mcg total) by mouth daily Alt with 1 5 tab M,W,F  -     TSH, 3rd generation; Future  -     T4, free; Future  -     Comprehensive metabolic panel; Future  -     CBC and differential; Future    7  Screening cholesterol level  -     Lipid panel; Future            Patient Instructions       Medicare Preventive Visit Patient Instructions  Thank you for completing your Welcome to Medicare Visit or Medicare Annual Wellness Visit today  Your next wellness visit will be due in one year (3/10/2021)  The screening/preventive services that you may require over the next 5-10 years are detailed below  Some tests may not apply to you based off risk factors and/or age  Screening tests ordered at today's visit but not completed yet may show as past due  Also, please note that scanned in results may not display below  Preventive Screenings:  Service Recommendations Previous Testing/Comments   Colorectal Cancer Screening  * Colonoscopy    * Fecal Occult Blood Test (FOBT)/Fecal Immunochemical Test (FIT)  * Fecal DNA/Cologuard Test  * Flexible Sigmoidoscopy Age: 54-65 years old   Colonoscopy: every 10 years (may be performed more frequently if at higher risk)  OR  FOBT/FIT: every 1 year  OR  Cologuard: every 3 years  OR  Sigmoidoscopy: every 5 years  Screening may be recommended earlier than age 48 if at higher risk for colorectal cancer  Also, an individualized decision between you and your healthcare provider will decide whether screening between the ages of 74-80 would be appropriate   Colonoscopy: 03/10/2017  FOBT/FIT: Not on file  Cologuard: Not on file  Sigmoidoscopy: Not on file    Screening Current     Breast Cancer Screening Age: 36 years old  Frequency: every 1-2 years  Not required if history of left and right mastectomy Mammogram: 02/24/2020    Screening Current   Cervical Cancer Screening Between the ages of 21-29, pap smear recommended once every 3 years  Between the ages of 33-67, can perform pap smear with HPV co-testing every 5 years  Recommendations may differ for women with a history of total hysterectomy, cervical cancer, or abnormal pap smears in past  Pap Smear: Not on file    Screening Not Indicated   Hepatitis C Screening Once for adults born between 1945 and 1965  More frequently in patients at high risk for Hepatitis C Hep C Antibody: 07/01/2019    Screening Current   Diabetes Screening 1-2 times per year if you're at risk for diabetes or have pre-diabetes Fasting glucose: No results in last 5 years   A1C: 5 5 % of total Hgb       Cholesterol Screening Once every 5 years if you don't have a lipid disorder  May order more often based on risk factors  Lipid panel: 03/20/2018    Screening Current     Other Preventive Screenings Covered by Medicare:  1  Abdominal Aortic Aneurysm (AAA) Screening: covered once if your at risk  You're considered to be at risk if you have a family history of AAA  2  Lung Cancer Screening: covers low dose CT scan once per year if you meet all of the following conditions: (1) Age 50-69; (2) No signs or symptoms of lung cancer; (3) Current smoker or have quit smoking within the last 15 years; (4) You have a tobacco smoking history of at least 30 pack years (packs per day multiplied by number of years you smoked); (5) You get a written order from a healthcare provider    3  Glaucoma Screening: covered annually if you're considered high risk: (1) You have diabetes OR (2) Family history of glaucoma OR (3)  aged 48 and older OR (3)  American aged 72 and older  4  Osteoporosis Screening: covered every 2 years if you meet one of the following conditions: (1) You're estrogen deficient and at risk for osteoporosis based off medical history and other findings; (2) Have a vertebral abnormality; (3) On glucocorticoid therapy for more than 3 months; (4) Have primary hyperparathyroidism; (5) On osteoporosis medications and need to assess response to drug therapy  · Last bone density test (DXA Scan): Not on file  5  HIV Screening: covered annually if you're between the age of 12-76  Also covered annually if you are younger than 13 and older than 72 with risk factors for HIV infection  For pregnant patients, it is covered up to 3 times per pregnancy  Immunizations:  Immunization Recommendations   Influenza Vaccine Annual influenza vaccination during flu season is recommended for all persons aged >= 6 months who do not have contraindications   Pneumococcal Vaccine (Prevnar and Pneumovax)  * Prevnar = PCV13  * Pneumovax = PPSV23   Adults 25-60 years old: 1-3 doses may be recommended based on certain risk factors  Adults 72 years old: Prevnar (PCV13) vaccine recommended followed by Pneumovax (PPSV23) vaccine  If already received PPSV23 since turning 65, then PCV13 recommended at least one year after PPSV23 dose  Hepatitis B Vaccine 3 dose series if at intermediate or high risk (ex: diabetes, end stage renal disease, liver disease)   Tetanus (Td) Vaccine - COST NOT COVERED BY MEDICARE PART B Following completion of primary series, a booster dose should be given every 10 years to maintain immunity against tetanus  Td may also be given as tetanus wound prophylaxis  Tdap Vaccine - COST NOT COVERED BY MEDICARE PART B Recommended at least once for all adults  For pregnant patients, recommended with each pregnancy     Shingles Vaccine (Shingrix) - COST NOT COVERED BY MEDICARE PART B  2 shot series recommended in those aged 48 and above     Health Maintenance Due:      Topic Date Due    MAMMOGRAM  02/24/2021    CRC Screening: Colonoscopy  03/10/2027    Hepatitis C Screening  Completed     Immunizations Due:      Topic Date Due    DTaP,Tdap,and Td Vaccines (1 - Tdap) 05[birth year]02/1965    Pneumococcal Vaccine: 65+ Years (1 of 2 - PCV13) 05/02/2019    Influenza Vaccine  07/01/2019     Advance Directives   What are advance directives? Advance directives are legal documents that state your wishes and plans for medical care  These plans are made ahead of time in case you lose your ability to make decisions for yourself  Advance directives can apply to any medical decision, such as the treatments you want, and if you want to donate organs  What are the types of advance directives? There are many types of advance directives, and each state has rules about how to use them  You may choose a combination of any of the following:  · Living will: This is a written record of the treatment you want  You can also choose which treatments you do not want, which to limit, and which to stop at a certain time  This includes surgery, medicine, IV fluid, and tube feedings  · Durable power of  for healthcare [facility]): This is a written record that states who you want to make healthcare choices for you when you are unable to make them for yourself  This person, called a proxy, is usually a family member or a friend  You may choose more than 1 proxy  · Do not resuscitate (DNR) order:  A DNR order is used in case your heart stops beating or you stop breathing  It is a request not to have certain forms of treatment, such as CPR  A DNR order may be included in other types of advance directives  · Medical directive: This covers the care that you want if you are in a coma, near death, or unable to make decisions for yourself  You can list the treatments you want for each condition   Treatment may include pain medicine, surgery, blood transfusions, dialysis, IV or tube feedings, and a ventilator (breathing machine)  · Values history: This document has questions about your views, beliefs, and how you feel and think about life  This information can help others choose the care that you would choose  Why are advance directives important? An advance directive helps you control your care  Although spoken wishes may be used, it is better to have your wishes written down  Spoken wishes can be misunderstood, or not followed  Treatments may be given even if you do not want them  An advance directive may make it easier for your family to make difficult choices about your care  © Copyright [employer] 2018 Information is for End User's use only and may not be sold, redistributed or otherwise used for commercial purposes  All illustrations and images included in CareNotes® are the copyrighted property of A D [employer] M [employer] Inc  or [facility] Jocelyn Martinez         No follow-ups on file  Subjective:      Patient ID: Sahara Pineda is a 71 y o  female  Chief Complaint   Patient presents with    Follow-up     10 M f/u visit for hypothyroidism, review labs  Patient c/o pain in low back and left knee      Medicare Wellness Visit     Welcome to Medicare AW       HPI    The following portions of the patient's history were reviewed and updated as appropriate: allergies, current medications, past family history, past medical history, past social history, past surgical history and problem list     Review of Systems      Constitutional:  Denies fever or chills   Eyes:  Denies double or blurry vision  HENT:  Denies nasal congestion or sore throat   Respiratory:  Denies cough or shortness of breath or wheezing  Cardiovascular:  Denies palpitations or chest pain  GI:  Denies abdominal pain, nausea, or vomiting  Integument:  Denies rash , no open areas  Neurologic:  Denies headache or focal weakness        Current Outpatient Medications   Medication Sig Dispense Refill    AMILoride-hydrochlorothiazide (MODURETIC) 5-50 mg per tablet Take 1 tablet by mouth daily 90 tablet 1    Collagen Hydrolysate POWD 1 Scoop by Does not apply route daily       Glucosamine-Chondroit-Vit C-Mn (GLUCOSAMINE-CHONDROITIN) TABS Take 2 tablets by mouth daily      levothyroxine 50 mcg tablet Take 1 tablet (50 mcg total) by mouth daily Alt with 1 5 tab M,W,F 110 tablet 1    Magnesium Carbonate POWD 325 mg by Does not apply route daily      Multiple Vitamin (MULTI-DAY) TABS Take 2 tablets by mouth daily       Multiple Vitamins-Minerals (ULTRA ASYHA PO) Take 2 tablets by mouth daily      Omega-3 Fatty Acids (FISH OIL) 1200 MG CAPS Take 2 capsules by mouth daily       Calcium Carbonate-Vitamin D3 600-400 MG-UNIT TABS Take 2 tablets by mouth daily       No current facility-administered medications for this visit          Objective:    /68 (BP Location: Left arm, Patient Position: Sitting, Cuff Size: Standard)   Pulse 64   Temp 98 3 °F (36 8 °C) (Oral)   Ht 5' 10" (1 778 m)   Wt 73 1 kg (161 lb 3 2 oz)   SpO2 99%   BMI 23 13 kg/m²        Physical Exam       Constitutional:  Well developed, well nourished, no acute distress, non-toxic appearance   Eyes:  PERRL, conjunctiva normal , non icteric sclera  HENT:  Atraumatic, oropharynx moist  Neck-  supple   Respiratory:  CTA b/l, normal breath sounds, no rales, no wheezing   Cardiovascular:  RRR, no murmurs, no LE edema b/l  GI:  Soft, nondistended, normal bowel sounds x 4, nontender, no organomegaly, no mass, no rebound, no guarding   Neurologic:  no focal deficits noted   Psychiatric:  Speech and behavior appropriate , AAO x 3        Lendel Pyo, DO

## 2020-03-10 NOTE — PROGRESS NOTES
Assessment and Plan:     Problem List Items Addressed This Visit        Other    Colonoscopy refused    Influenza vaccine refused    Refused pneumococcal vaccination      Other Visit Diagnoses     Welcome to Medicare preventive visit    -  Primary    Relevant Orders    POCT ECG (Completed)    Disorder of cartilage        Relevant Orders    DXA bone density spine hip and pelvis           Preventive health issues were discussed with patient, and age appropriate screening tests were ordered as noted in patient's After Visit Summary  Personalized health advice and appropriate referrals for health education or preventive services given if needed, as noted in patient's After Visit Summary  History of Present Illness:     Patient presents for Welcome to Medicare visit       Patient Care Team:  Aaron Mcclelland DO as PCP - General (Family Medicine)     Review of Systems:     Review of Systems     Constitutional:  Denies fever or chills   Eyes:  Denies double or blurry vision  HENT:  Denies nasal congestion or sore throat   Respiratory:  Denies cough or shortness of breath or wheezing  Cardiovascular:  Denies palpitations or chest pain  GI:  Denies abdominal pain, nausea, or vomiting  Integument:  Denies rash , no open areas  Neurologic:  Denies headache or focal weakness       Problem List:     Patient Active Problem List   Diagnosis    Benign essential HTN    Hypothyroidism    Closed fracture of phalanx of left fifth toe    Colonoscopy refused    Influenza vaccine refused    Refused pneumococcal vaccination      Past Medical and Surgical History:     Past Medical History:   Diagnosis Date    Benign essential HTN 9/27/2017    Elevated BUN     resolved 3/10/17    Fluid retention in legs     last assessed 5/12/15    Hypothyroidism     Lumbago with sciatica     last assessed 4/10/14    Lumbar radiculopathy     last assessed 4/10/14    Onychomycosis of toenail     last assessed 11/7/16     Past Surgical History: Procedure Laterality Date    APPENDECTOMY      BREAST CYST ASPIRATION Left 1993    KNEE ARTHROSCOPY Left     therapeutic      Family History:     Family History   Problem Relation Age of Onset    Breast cancer Mother 80    Hypertension Mother     Ovarian cancer Maternal Aunt 29    Ovarian cancer Cousin 54    Prostate cancer Paternal Uncle 54    No Known Problems Maternal Grandmother     No Known Problems Maternal Grandfather     No Known Problems Paternal Grandmother     No Known Problems Paternal Grandfather     No Known Problems Maternal Aunt     No Known Problems Paternal Aunt       Social History:     E-Cigarette/Vaping    E-Cigarette Use Never User      E-Cigarette/Vaping Substances    Nicotine No     THC No     CBD No     Flavoring No     Other No     Unknown No      Social History     Socioeconomic History    Marital status: /Civil Union     Spouse name: None    Number of children: None    Years of education: None    Highest education level: None   Occupational History    None   Social Needs    Financial resource strain: None    Food insecurity:     Worry: None     Inability: None    Transportation needs:     Medical: None     Non-medical: None   Tobacco Use    Smoking status: Never Smoker    Smokeless tobacco: Never Used   Substance and Sexual Activity    Alcohol use: Yes     Comment: rarely    Drug use: No    Sexual activity: None   Lifestyle    Physical activity:     Days per week: None     Minutes per session: None    Stress: None   Relationships    Social connections:     Talks on phone: None     Gets together: None     Attends Orthodox service: None     Active member of club or organization: None     Attends meetings of clubs or organizations: None     Relationship status: None    Intimate partner violence:     Fear of current or ex partner: None     Emotionally abused: None     Physically abused: None     Forced sexual activity: None   Other Topics Concern    None   Social History Narrative    [place] April[place]      Medications and Allergies:     Current Outpatient Medications   Medication Sig Dispense Refill    AMILoride-hydrochlorothiazide (MODURETIC) 5-50 mg per tablet Take 1 tablet by mouth daily 90 tablet 1    Collagen Hydrolysate POWD 1 Scoop by Does not apply route daily       Glucosamine-Chondroit-Vit C-Mn (GLUCOSAMINE-CHONDROITIN) TABS Take 2 tablets by mouth daily      levothyroxine 50 mcg tablet Take 1 tablet (50 mcg total) by mouth daily 90 tablet 1    Magnesium Carbonate POWD 325 mg by Does not apply route daily      Multiple Vitamin (MULTI-DAY) TABS Take 2 tablets by mouth daily       Multiple Vitamins-Minerals (ULTRA AYSHA PO) Take 2 tablets by mouth daily      Omega-3 Fatty Acids (FISH OIL) 1200 MG CAPS Take 2 capsules by mouth daily       Calcium Carbonate-Vitamin D3 600-400 MG-UNIT TABS Take 2 tablets by mouth daily       No current facility-administered medications for this visit  No Known Allergies   Immunizations: There is no immunization history for the selected administration types on file for this patient  Health Maintenance:         Topic Date Due    MAMMOGRAM  02/24/2021    CRC Screening: Colonoscopy  03/10/2027    Hepatitis C Screening  Completed         Topic Date Due    DTaP,Tdap,and Td Vaccines (1 - Tdap) 05/02/1965    Pneumococcal Vaccine: 65+ Years (1 of 2 - PCV13) 05/02/2019    Influenza Vaccine  07/01/2019      Medicare Screening Tests and Risk Assessments:     Radha is here for her Welcome to Medicare visit  Health Risk Assessment:   Patient feels that their physical health rating is same  Eyesight was rated as slightly worse  Hearing was rated as same  Patient feels that their emotional and mental health rating is same  Pain experienced in the last 7 days has been some  Patient's pain rating has been 5/10   Patient states that she has experienced no weight loss or gain in last 6 months  Depression Screening:   PHQ-2 Score: 0      Fall Risk Screening: In the past year, patient has experienced: no history of falling in past year      Urinary Incontinence Screening:   Patient has not leaked urine accidently in the last six months  Home Safety:  Patient has trouble with stairs inside or outside of their home  Patient has working smoke alarms and has working carbon monoxide detector  Home safety hazards include: none  Nutrition:   Current diet is Regular  Medications:   Patient is currently taking over-the-counter supplements  OTC medications include: see medication list  Patient is able to manage medications  Activities of Daily Living (ADLs)/Instrumental Activities of Daily Living (IADLs):   Walk and transfer into and out of bed and chair?: Yes  Dress and groom yourself?: Yes    Bathe or shower yourself?: Yes    Feed yourself? Yes  Do your laundry/housekeeping?: Yes  Manage your money, pay your bills and track your expenses?: Yes  Make your own meals?: Yes    Do your own shopping?: Yes    Previous Hospitalizations:   Any hospitalizations or ED visits within the last 12 months?: Yes    How many hospitalizations have you had in the last year?: 1-2    Advance Care Planning:   Living will: No    Durable POA for healthcare:  Yes    Advanced directive: No    Advanced directive counseling given: Yes    End of Life Decisions reviewed with patient: Yes      Comments:     Cognitive Screening:   Provider or family/friend/caregiver concerned regarding cognition?: No    PREVENTIVE SCREENINGS      Cardiovascular Screening:    General: Screening Current      Colorectal Cancer Screening:     General: Screening Current      Breast Cancer Screening:     General: Screening Current      Cervical Cancer Screening:    General: Screening Not Indicated      Abdominal Aortic Aneurysm (AAA) Screening:        General: Screening Not Indicated      Lung Cancer Screening:     General: Screening Not Indicated      Hepatitis C Screening:    General: Screening Current    Other Counseling Topics:   Alcohol use counseling, car/seat belt/driving safety, skin self-exam, sunscreen and calcium and vitamin D intake and regular weightbearing exercise        Visual Acuity Screening    Right eye Left eye Both eyes   Without correction: 20/40 20/30 20/25   With correction:           Physical Exam:     /68 (BP Location: Left arm, Patient Position: Sitting, Cuff Size: Standard)   Pulse 64   Temp 98 3 °F (36 8 °C) (Oral)   Ht 5' 10" (1 778 m)   Wt 73 1 kg (161 lb 3 2 oz)   SpO2 99%   BMI 23 13 kg/m²     Physical Exam     Constitutional:  Well developed, well nourished, no acute distress, non-toxic appearance   Eyes:  PERRL, conjunctiva normal , non icteric sclera  HENT:  Atraumatic, oropharynx moist  Neck-  supple   Respiratory:  CTA b/l, normal breath sounds, no rales, no wheezing   Cardiovascular:  RRR, no murmurs, no LE edema b/l  GI:  Soft, nondistended, normal bowel sounds x 4, nontender, no organomegaly, no mass, no rebound, no guarding   Neurologic:  no focal deficits noted   Psychiatric:  Speech and behavior appropriate , AAO x 3        Kelvin Mario,

## 2020-03-10 NOTE — PATIENT INSTRUCTIONS
Medicare Preventive Visit Patient Instructions  Thank you for completing your Welcome to Medicare Visit or Medicare Annual Wellness Visit today  Your next wellness visit will be due in one year (3/10/2021)  The screening/preventive services that you may require over the next 5-10 years are detailed below  Some tests may not apply to you based off risk factors and/or age  Screening tests ordered at today's visit but not completed yet may show as past due  Also, please note that scanned in results may not display below  Preventive Screenings:  Service Recommendations Previous Testing/Comments   Colorectal Cancer Screening  * Colonoscopy    * Fecal Occult Blood Test (FOBT)/Fecal Immunochemical Test (FIT)  * Fecal DNA/Cologuard Test  * Flexible Sigmoidoscopy Age: 54-65 years old   Colonoscopy: every 10 years (may be performed more frequently if at higher risk)  OR  FOBT/FIT: every 1 year  OR  Cologuard: every 3 years  OR  Sigmoidoscopy: every 5 years  Screening may be recommended earlier than age 48 if at higher risk for colorectal cancer  Also, an individualized decision between you and your healthcare provider will decide whether screening between the ages of 74-80 would be appropriate  Colonoscopy: 03/10/2017  FOBT/FIT: Not on file  Cologuard: Not on file  Sigmoidoscopy: Not on file    Screening Current     Breast Cancer Screening Age: 36 years old  Frequency: every 1-2 years  Not required if history of left and right mastectomy Mammogram: 02/24/2020    Screening Current   Cervical Cancer Screening Between the ages of 21-29, pap smear recommended once every 3 years  Between the ages of 33-67, can perform pap smear with HPV co-testing every 5 years     Recommendations may differ for women with a history of total hysterectomy, cervical cancer, or abnormal pap smears in past  Pap Smear: Not on file    Screening Not Indicated   Hepatitis C Screening Once for adults born between 1945 and 1965  More frequently in patients at high risk for Hepatitis C Hep C Antibody: 07/01/2019    Screening Current   Diabetes Screening 1-2 times per year if you're at risk for diabetes or have pre-diabetes Fasting glucose: No results in last 5 years   A1C: 5 5 % of total Hgb       Cholesterol Screening Once every 5 years if you don't have a lipid disorder  May order more often based on risk factors  Lipid panel: 03/20/2018    Screening Current     Other Preventive Screenings Covered by Medicare:  1  Abdominal Aortic Aneurysm (AAA) Screening: covered once if your at risk  You're considered to be at risk if you have a family history of AAA  2  Lung Cancer Screening: covers low dose CT scan once per year if you meet all of the following conditions: (1) Age 50-69; (2) No signs or symptoms of lung cancer; (3) Current smoker or have quit smoking within the last 15 years; (4) You have a tobacco smoking history of at least 30 pack years (packs per day multiplied by number of years you smoked); (5) You get a written order from a healthcare provider  3  Glaucoma Screening: covered annually if you're considered high risk: (1) You have diabetes OR (2) Family history of glaucoma OR (3)  aged 48 and older OR (3)  American aged 72 and older  3  Osteoporosis Screening: covered every 2 years if you meet one of the following conditions: (1) You're estrogen deficient and at risk for osteoporosis based off medical history and other findings; (2) Have a vertebral abnormality; (3) On glucocorticoid therapy for more than 3 months; (4) Have primary hyperparathyroidism; (5) On osteoporosis medications and need to assess response to drug therapy  · Last bone density test (DXA Scan): Not on file  5  HIV Screening: covered annually if you're between the age of 12-76  Also covered annually if you are younger than 13 and older than 72 with risk factors for HIV infection   For pregnant patients, it is covered up to 3 times per pregnancy  Immunizations:  Immunization Recommendations   Influenza Vaccine Annual influenza vaccination during flu season is recommended for all persons aged >= 6 months who do not have contraindications   Pneumococcal Vaccine (Prevnar and Pneumovax)  * Prevnar = PCV13  * Pneumovax = PPSV23   Adults 25-60 years old: 1-3 doses may be recommended based on certain risk factors  Adults 72 years old: Prevnar (PCV13) vaccine recommended followed by Pneumovax (PPSV23) vaccine  If already received PPSV23 since turning 65, then PCV13 recommended at least one year after PPSV23 dose  Hepatitis B Vaccine 3 dose series if at intermediate or high risk (ex: diabetes, end stage renal disease, liver disease)   Tetanus (Td) Vaccine - COST NOT COVERED BY MEDICARE PART B Following completion of primary series, a booster dose should be given every 10 years to maintain immunity against tetanus  Td may also be given as tetanus wound prophylaxis  Tdap Vaccine - COST NOT COVERED BY MEDICARE PART B Recommended at least once for all adults  For pregnant patients, recommended with each pregnancy  Shingles Vaccine (Shingrix) - COST NOT COVERED BY MEDICARE PART B  2 shot series recommended in those aged 48 and above     Health Maintenance Due:      Topic Date Due    MAMMOGRAM  02/24/2021    CRC Screening: Colonoscopy  03/10/2027    Hepatitis C Screening  Completed     Immunizations Due:      Topic Date Due    DTaP,Tdap,and Td Vaccines (1 - Tdap) 05/02/1966    Pneumococcal Vaccine: 65+ Years (1 of 2 - PCV13) 05/02/2019    Influenza Vaccine  07/01/2019     Advance Directives   What are advance directives? Advance directives are legal documents that state your wishes and plans for medical care  These plans are made ahead of time in case you lose your ability to make decisions for yourself  Advance directives can apply to any medical decision, such as the treatments you want, and if you want to donate organs     What are the types of advance directives? There are many types of advance directives, and each state has rules about how to use them  You may choose a combination of any of the following:  · Living will: This is a written record of the treatment you want  You can also choose which treatments you do not want, which to limit, and which to stop at a certain time  This includes surgery, medicine, IV fluid, and tube feedings  · Durable power of  for healthcare [facility]): This is a written record that states who you want to make healthcare choices for you when you are unable to make them for yourself  This person, called a proxy, is usually a family member or a friend  You may choose more than 1 proxy  · Do not resuscitate (DNR) order:  A DNR order is used in case your heart stops beating or you stop breathing  It is a request not to have certain forms of treatment, such as CPR  A DNR order may be included in other types of advance directives  · Medical directive: This covers the care that you want if you are in a coma, near death, or unable to make decisions for yourself  You can list the treatments you want for each condition  Treatment may include pain medicine, surgery, blood transfusions, dialysis, IV or tube feedings, and a ventilator (breathing machine)  · Values history: This document has questions about your views, beliefs, and how you feel and think about life  This information can help others choose the care that you would choose  Why are advance directives important? An advance directive helps you control your care  Although spoken wishes may be used, it is better to have your wishes written down  Spoken wishes can be misunderstood, or not followed  Treatments may be given even if you do not want them  An advance directive may make it easier for your family to make difficult choices about your care        © Copyright [employer] 2018 Information is for End User's use only and may not be sold, redistributed or otherwise used for commercial purposes   All illustrations and images included in CareNotes® are the copyrighted property of A D A M , Inc  or Delphine Pedroza

## 2020-07-18 DIAGNOSIS — I10 ESSENTIAL HYPERTENSION: ICD-10-CM

## 2020-07-20 RX ORDER — AMILORIDE HYDROCHLORIDE AND HYDROCHLOROTHIAZIDE 5; 50 MG/1; MG/1
TABLET ORAL
Qty: 90 TABLET | Refills: 1 | OUTPATIENT
Start: 2020-07-20

## 2020-07-27 DIAGNOSIS — I10 ESSENTIAL HYPERTENSION: ICD-10-CM

## 2020-07-28 RX ORDER — AMILORIDE HYDROCHLORIDE AND HYDROCHLOROTHIAZIDE 5; 50 MG/1; MG/1
1 TABLET ORAL DAILY
Qty: 90 TABLET | Refills: 1 | Status: SHIPPED | OUTPATIENT
Start: 2020-07-28 | End: 2021-01-22 | Stop reason: SDUPTHER

## 2020-08-25 ENCOUNTER — TRANSCRIBE ORDERS (OUTPATIENT)
Dept: ADMINISTRATIVE | Age: 65
End: 2020-08-25

## 2020-08-25 ENCOUNTER — APPOINTMENT (OUTPATIENT)
Dept: LAB | Age: 65
End: 2020-08-25
Payer: MEDICARE

## 2020-08-25 DIAGNOSIS — Z00.00 WELCOME TO MEDICARE PREVENTIVE VISIT: ICD-10-CM

## 2020-08-25 DIAGNOSIS — E03.9 ACQUIRED HYPOTHYROIDISM: ICD-10-CM

## 2020-08-25 LAB
BASOPHILS # BLD AUTO: 0.06 THOUSANDS/ΜL (ref 0–0.1)
BASOPHILS NFR BLD AUTO: 1 % (ref 0–1)
EOSINOPHIL # BLD AUTO: 0.26 THOUSAND/ΜL (ref 0–0.61)
EOSINOPHIL NFR BLD AUTO: 3 % (ref 0–6)
ERYTHROCYTE [DISTWIDTH] IN BLOOD BY AUTOMATED COUNT: 13.1 % (ref 11.6–15.1)
HCT VFR BLD AUTO: 42.9 % (ref 34.8–46.1)
HGB BLD-MCNC: 13.8 G/DL (ref 11.5–15.4)
IMM GRANULOCYTES # BLD AUTO: 0.03 THOUSAND/UL (ref 0–0.2)
IMM GRANULOCYTES NFR BLD AUTO: 0 % (ref 0–2)
LYMPHOCYTES # BLD AUTO: 1.26 THOUSANDS/ΜL (ref 0.6–4.47)
LYMPHOCYTES NFR BLD AUTO: 16 % (ref 14–44)
MCH RBC QN AUTO: 29.9 PG (ref 26.8–34.3)
MCHC RBC AUTO-ENTMCNC: 32.2 G/DL (ref 31.4–37.4)
MCV RBC AUTO: 93 FL (ref 82–98)
MONOCYTES # BLD AUTO: 0.82 THOUSAND/ΜL (ref 0.17–1.22)
MONOCYTES NFR BLD AUTO: 10 % (ref 4–12)
NEUTROPHILS # BLD AUTO: 5.66 THOUSANDS/ΜL (ref 1.85–7.62)
NEUTS SEG NFR BLD AUTO: 70 % (ref 43–75)
NRBC BLD AUTO-RTO: 0 /100 WBCS
PLATELET # BLD AUTO: 180 THOUSANDS/UL (ref 149–390)
PMV BLD AUTO: 11.3 FL (ref 8.9–12.7)
RBC # BLD AUTO: 4.62 MILLION/UL (ref 3.81–5.12)
T4 FREE SERPL-MCNC: 1.04 NG/DL (ref 0.76–1.46)
TSH SERPL DL<=0.05 MIU/L-ACNC: 1.53 UIU/ML (ref 0.36–3.74)
WBC # BLD AUTO: 8.09 THOUSAND/UL (ref 4.31–10.16)

## 2020-08-25 PROCEDURE — 84439 ASSAY OF FREE THYROXINE: CPT

## 2020-08-25 PROCEDURE — 36415 COLL VENOUS BLD VENIPUNCTURE: CPT

## 2020-08-25 PROCEDURE — 84443 ASSAY THYROID STIM HORMONE: CPT

## 2020-08-25 PROCEDURE — 85025 COMPLETE CBC W/AUTO DIFF WBC: CPT

## 2020-09-08 ENCOUNTER — OFFICE VISIT (OUTPATIENT)
Dept: INTERNAL MEDICINE CLINIC | Facility: CLINIC | Age: 65
End: 2020-09-08
Payer: MEDICARE

## 2020-09-08 VITALS
HEART RATE: 65 BPM | BODY MASS INDEX: 23.25 KG/M2 | SYSTOLIC BLOOD PRESSURE: 122 MMHG | HEIGHT: 70 IN | OXYGEN SATURATION: 99 % | DIASTOLIC BLOOD PRESSURE: 82 MMHG | WEIGHT: 162.4 LBS | TEMPERATURE: 98.5 F

## 2020-09-08 DIAGNOSIS — I10 BENIGN ESSENTIAL HTN: Primary | ICD-10-CM

## 2020-09-08 DIAGNOSIS — Z23 NEED FOR PNEUMOCOCCAL VACCINE: ICD-10-CM

## 2020-09-08 DIAGNOSIS — Z23 NEED FOR DIPHTHERIA-TETANUS-PERTUSSIS (TDAP) VACCINE: ICD-10-CM

## 2020-09-08 DIAGNOSIS — E03.9 ACQUIRED HYPOTHYROIDISM: ICD-10-CM

## 2020-09-08 DIAGNOSIS — Z13.220 SCREENING CHOLESTEROL LEVEL: ICD-10-CM

## 2020-09-08 DIAGNOSIS — Z12.4 PAP SMEAR FOR CERVICAL CANCER SCREENING: ICD-10-CM

## 2020-09-08 PROBLEM — Z28.21 REFUSED PNEUMOCOCCAL VACCINATION: Status: RESOLVED | Noted: 2019-07-09 | Resolved: 2020-09-08

## 2020-09-08 PROBLEM — Z28.21 INFLUENZA VACCINE REFUSED: Status: RESOLVED | Noted: 2019-04-23 | Resolved: 2020-09-08

## 2020-09-08 PROCEDURE — 90471 IMMUNIZATION ADMIN: CPT | Performed by: FAMILY MEDICINE

## 2020-09-08 PROCEDURE — 99214 OFFICE O/P EST MOD 30 MIN: CPT | Performed by: FAMILY MEDICINE

## 2020-09-08 PROCEDURE — 90715 TDAP VACCINE 7 YRS/> IM: CPT | Performed by: FAMILY MEDICINE

## 2020-09-08 PROCEDURE — 90670 PCV13 VACCINE IM: CPT | Performed by: FAMILY MEDICINE

## 2020-09-08 PROCEDURE — G0009 ADMIN PNEUMOCOCCAL VACCINE: HCPCS | Performed by: FAMILY MEDICINE

## 2020-09-08 NOTE — PROGRESS NOTES
Assessment/Plan:    1  Benign essential HTN  -     Comprehensive metabolic panel; Future  -     CBC and differential; Future    2  Acquired hypothyroidism  -     TSH, 3rd generation; Future  -     T4, free; Future    3  Screening cholesterol level  -     Lipid panel; Future    4  Pap smear for cervical cancer screening  -     Ambulatory referral to Obstetrics / Gynecology; Future    5  Need for pneumococcal vaccine  -     PNEUMOCOCCAL CONJUGATE VACCINE 13-VALENT GREATER THAN 6 MONTHS    6  Need for diphtheria-tetanus-pertussis (Tdap) vaccine  -     TDAP VACCINE GREATER THAN OR EQUAL TO 6YO IM            There are no Patient Instructions on file for this visit  Return in about 6 months (around 3/8/2021) for Recheck and one month for Flu vaccine  Subjective:      Patient ID: Katelyn Mcgarry is a 76 y o  female  Chief Complaint   Patient presents with    Follow-up     10 M f/u visit for hypothyroidism, review labs       HPI      Hypothyroidism (Follow-Up): Interval symptoms:  denies weight gain,-- denies cold intolerance,-- denies fatigue,-- denies weakness,-- denies constipation,-- denies menorrhagia,-- denies dyspnea on exertion,-- denies trouble concentrating,-- denies hair loss-- and-- denies dry skin          Hypertension (Follow-Up): The patient presents for follow-up of essential hypertension  The patient states she has been doing well with her blood pressure control since the last visit  She has no comorbid illnesses  She has no significant interval events     Symptoms: The patient is currently asymptomatic  denies impaired vision,-- denies dyspnea,-- denies chest pain,-- denies intermittent leg claudication-- and-- denies lower extremity edema       The following portions of the patient's history were reviewed and updated as appropriate: allergies, current medications, past family history, past medical history, past social history, past surgical history and problem list     Review of Systems Constitutional:  Denies fever or chills , slightly increased since last visit  Eyes:  Denies double or blurry vision  HENT:  Denies nasal congestion or sore throat , no ear issues  Respiratory:  Denies cough or shortness of breath or wheezing  Cardiovascular:  Denies palpitations or chest pain  GI:  Denies abdominal pain, nausea, or vomiting, no heartburn  Integument:  Denies rash , no open areas  Neurologic:  Denies headache or focal weakness, no dizziness        Current Outpatient Medications   Medication Sig Dispense Refill    AMILoride-hydrochlorothiazide (MODURETIC) 5-50 mg per tablet Take 1 tablet by mouth daily 90 tablet 1    Calcium Carbonate-Vitamin D3 600-400 MG-UNIT TABS Take 2 tablets by mouth daily      Collagen Hydrolysate POWD 1 Scoop by Does not apply route daily       Glucosamine-Chondroit-Vit C-Mn (GLUCOSAMINE-CHONDROITIN) TABS Take 2 tablets by mouth daily      levothyroxine 50 mcg tablet Take 1 tablet (50 mcg total) by mouth daily Alt with 1 5 tab M,W,F 110 tablet 1    Magnesium Carbonate POWD 325 mg by Does not apply route daily      Multiple Vitamin (MULTI-DAY) TABS Take 2 tablets by mouth daily       Multiple Vitamins-Minerals (ULTRA AYSHA PO) Take 2 tablets by mouth daily      Omega-3 Fatty Acids (FISH OIL) 1200 MG CAPS Take 2 capsules by mouth daily        No current facility-administered medications for this visit          Objective:    /82 (BP Location: Left arm, Patient Position: Sitting, Cuff Size: Standard)   Pulse 65   Temp 98 5 °F (36 9 °C) (Temporal)   Ht 5' 10" (1 778 m)   Wt 73 7 kg (162 lb 6 4 oz)   SpO2 99%   BMI 23 30 kg/m²        Physical Exam         Constitutional:  Well developed, well nourished, no acute distress, non-toxic appearance   Eyes:  PERRL, conjunctiva normal , non icteric sclera  HENT:  Atraumatic, oropharynx moist  Neck-  supple   Respiratory:  CTA b/l, normal breath sounds, no rales, no wheezing   Cardiovascular:  RRR, no murmurs, no LE edema b/l  GI:  Soft, nondistended, normal bowel sounds x 4, nontender, no organomegaly, no mass, no rebound, no guarding   Neurologic:  no focal deficits noted   Psychiatric:  Speech and behavior appropriate , AAO x 3      Deatra Plume, DO

## 2020-09-23 DIAGNOSIS — E03.9 ACQUIRED HYPOTHYROIDISM: ICD-10-CM

## 2020-09-24 RX ORDER — LEVOTHYROXINE SODIUM 0.05 MG/1
50 TABLET ORAL DAILY
Qty: 110 TABLET | Refills: 3 | Status: SHIPPED | OUTPATIENT
Start: 2020-09-24 | End: 2020-09-28 | Stop reason: SDUPTHER

## 2020-09-28 DIAGNOSIS — E03.9 ACQUIRED HYPOTHYROIDISM: ICD-10-CM

## 2020-09-28 RX ORDER — LEVOTHYROXINE SODIUM 0.05 MG/1
50 TABLET ORAL DAILY
Qty: 10 TABLET | Refills: 0 | Status: SHIPPED | OUTPATIENT
Start: 2020-09-28 | End: 2020-12-31 | Stop reason: SDUPTHER

## 2020-09-28 NOTE — TELEPHONE ENCOUNTER
Patient in need of partial of levothyroxine #10 [place]  Patients mal order for [employer] will not be here in time

## 2020-09-29 ENCOUNTER — HOSPITAL ENCOUNTER (OUTPATIENT)
Dept: RADIOLOGY | Age: 65
Discharge: HOME | End: 2020-09-29
Payer: MEDICARE

## 2020-09-29 DIAGNOSIS — M94.9 DISORDER OF CARTILAGE: ICD-10-CM

## 2020-09-29 PROCEDURE — 77080 DXA BONE DENSITY AXIAL: CPT

## 2020-10-06 ENCOUNTER — CLINICAL SUPPORT (OUTPATIENT)
Dept: INTERNAL MEDICINE CLINIC | Age: 65
End: 2020-10-06
Payer: MEDICARE

## 2020-10-06 DIAGNOSIS — Z23 NEED FOR INFLUENZA VACCINATION: Primary | ICD-10-CM

## 2020-10-06 PROCEDURE — G0008 ADMIN INFLUENZA VIRUS VAC: HCPCS

## 2020-10-06 PROCEDURE — 90662 IIV NO PRSV INCREASED AG IM: CPT

## 2020-10-21 ENCOUNTER — OFFICE VISIT (OUTPATIENT)
Dept: INTERNAL MEDICINE CLINIC | Age: 65
End: 2020-10-21
Payer: MEDICARE

## 2020-10-21 VITALS
DIASTOLIC BLOOD PRESSURE: 70 MMHG | SYSTOLIC BLOOD PRESSURE: 110 MMHG | WEIGHT: 164.8 LBS | BODY MASS INDEX: 23.59 KG/M2 | HEIGHT: 70 IN | TEMPERATURE: 98 F | OXYGEN SATURATION: 99 % | HEART RATE: 76 BPM

## 2020-10-21 DIAGNOSIS — B35.1 ONYCHOMYCOSIS: Primary | ICD-10-CM

## 2020-10-21 PROCEDURE — 99213 OFFICE O/P EST LOW 20 MIN: CPT | Performed by: FAMILY MEDICINE

## 2020-10-21 RX ORDER — TERBINAFINE HYDROCHLORIDE 250 MG/1
250 TABLET ORAL DAILY
Qty: 90 TABLET | Refills: 0 | Status: SHIPPED | OUTPATIENT
Start: 2020-10-21 | End: 2021-01-19

## 2020-12-04 ENCOUNTER — LAB (OUTPATIENT)
Dept: LAB | Age: 65
End: 2020-12-04
Payer: MEDICARE

## 2020-12-04 DIAGNOSIS — B35.1 ONYCHOMYCOSIS: ICD-10-CM

## 2020-12-04 LAB
ALBUMIN SERPL BCP-MCNC: 3.8 G/DL (ref 3.5–5)
ALP SERPL-CCNC: 51 U/L (ref 46–116)
ALT SERPL W P-5'-P-CCNC: 29 U/L (ref 12–78)
AST SERPL W P-5'-P-CCNC: 20 U/L (ref 5–45)
BILIRUB DIRECT SERPL-MCNC: 0.14 MG/DL (ref 0–0.2)
BILIRUB SERPL-MCNC: 0.37 MG/DL (ref 0.2–1)
PROT SERPL-MCNC: 7 G/DL (ref 6.4–8.2)

## 2020-12-04 PROCEDURE — 80076 HEPATIC FUNCTION PANEL: CPT

## 2020-12-04 PROCEDURE — 36415 COLL VENOUS BLD VENIPUNCTURE: CPT

## 2020-12-31 DIAGNOSIS — E03.9 ACQUIRED HYPOTHYROIDISM: ICD-10-CM

## 2020-12-31 RX ORDER — LEVOTHYROXINE SODIUM 0.05 MG/1
50 TABLET ORAL DAILY
Qty: 108 TABLET | Refills: 1 | Status: SHIPPED | OUTPATIENT
Start: 2020-12-31 | End: 2021-03-23 | Stop reason: SDUPTHER

## 2021-01-05 DIAGNOSIS — B35.1 ONYCHOMYCOSIS: ICD-10-CM

## 2021-01-05 DIAGNOSIS — I10 ESSENTIAL HYPERTENSION: ICD-10-CM

## 2021-01-05 RX ORDER — AMILORIDE HYDROCHLORIDE AND HYDROCHLOROTHIAZIDE 5; 50 MG/1; MG/1
1 TABLET ORAL DAILY
Qty: 90 TABLET | Refills: 1 | OUTPATIENT
Start: 2021-01-05

## 2021-01-05 RX ORDER — TERBINAFINE HYDROCHLORIDE 250 MG/1
TABLET ORAL
Qty: 90 TABLET | Refills: 0 | OUTPATIENT
Start: 2021-01-05

## 2021-01-16 DIAGNOSIS — I10 ESSENTIAL HYPERTENSION: ICD-10-CM

## 2021-01-17 DIAGNOSIS — B35.1 ONYCHOMYCOSIS: ICD-10-CM

## 2021-01-18 RX ORDER — TERBINAFINE HYDROCHLORIDE 250 MG/1
TABLET ORAL
Qty: 90 TABLET | Refills: 0 | OUTPATIENT
Start: 2021-01-18

## 2021-01-19 RX ORDER — AMILORIDE HYDROCHLORIDE AND HYDROCHLOROTHIAZIDE 5; 50 MG/1; MG/1
1 TABLET ORAL DAILY
Qty: 90 TABLET | Refills: 1 | OUTPATIENT
Start: 2021-01-19

## 2021-01-22 DIAGNOSIS — I10 ESSENTIAL HYPERTENSION: ICD-10-CM

## 2021-01-22 RX ORDER — AMILORIDE HYDROCHLORIDE AND HYDROCHLOROTHIAZIDE 5; 50 MG/1; MG/1
1 TABLET ORAL DAILY
Qty: 90 TABLET | Refills: 1 | Status: SHIPPED | OUTPATIENT
Start: 2021-01-22 | End: 2021-07-22 | Stop reason: SDUPTHER

## 2021-02-13 DIAGNOSIS — Z23 ENCOUNTER FOR IMMUNIZATION: ICD-10-CM

## 2021-02-19 ENCOUNTER — TELEPHONE (OUTPATIENT)
Dept: INTERNAL MEDICINE CLINIC | Age: 66
End: 2021-02-19

## 2021-02-19 NOTE — TELEPHONE ENCOUNTER
Pt called the office and had a question about getting the antibody test once she has her second COVID vaccine on Sunday  She wanted to know how long does she need to wait to have this test done and do the hospitals only do this test or do the other labs within [place] [address] do the test too  I did tell her that someone would get back to her on Monday

## 2021-02-19 NOTE — TELEPHONE ENCOUNTER
Please discuss with patient that I am not sure if her insurance will cover antibody testing  I would recommend that she wait approximately 2 weeks after her 2nd vaccination to be tested  I am also not sure if other hospitals do this testing please call and notify  I would assume that they do since it is a blood draw

## 2021-02-22 NOTE — TELEPHONE ENCOUNTER
Please advise patient to refer to the [facility] website for the most up to date information

## 2021-02-22 NOTE — TELEPHONE ENCOUNTER
I called the patient with the information  She just wants to know if the vaccines "work "  I did explain re: unknown cost and availability of the testing  She will call the labs in her area for more information  I asked that she call back for the order if she wants to proceed

## 2021-03-18 ENCOUNTER — APPOINTMENT (OUTPATIENT)
Dept: LAB | Age: 66
End: 2021-03-18
Payer: MEDICARE

## 2021-03-18 DIAGNOSIS — I10 BENIGN ESSENTIAL HTN: ICD-10-CM

## 2021-03-18 DIAGNOSIS — E03.9 ACQUIRED HYPOTHYROIDISM: ICD-10-CM

## 2021-03-18 DIAGNOSIS — Z13.220 SCREENING CHOLESTEROL LEVEL: ICD-10-CM

## 2021-03-18 LAB
ALBUMIN SERPL BCP-MCNC: 3.8 G/DL (ref 3.5–5)
ALP SERPL-CCNC: 55 U/L (ref 46–116)
ALT SERPL W P-5'-P-CCNC: 27 U/L (ref 12–78)
ANION GAP SERPL CALCULATED.3IONS-SCNC: 1 MMOL/L (ref 4–13)
AST SERPL W P-5'-P-CCNC: 17 U/L (ref 5–45)
BASOPHILS # BLD AUTO: 0.06 THOUSANDS/ΜL (ref 0–0.1)
BASOPHILS NFR BLD AUTO: 1 % (ref 0–1)
BILIRUB SERPL-MCNC: 0.61 MG/DL (ref 0.2–1)
BUN SERPL-MCNC: 17 MG/DL (ref 5–25)
CALCIUM SERPL-MCNC: 8.9 MG/DL (ref 8.3–10.1)
CHLORIDE SERPL-SCNC: 104 MMOL/L (ref 100–108)
CHOLEST SERPL-MCNC: 178 MG/DL (ref 50–200)
CO2 SERPL-SCNC: 33 MMOL/L (ref 21–32)
CREAT SERPL-MCNC: 0.87 MG/DL (ref 0.6–1.3)
EOSINOPHIL # BLD AUTO: 0.56 THOUSAND/ΜL (ref 0–0.61)
EOSINOPHIL NFR BLD AUTO: 7 % (ref 0–6)
ERYTHROCYTE [DISTWIDTH] IN BLOOD BY AUTOMATED COUNT: 13.2 % (ref 11.6–15.1)
GFR SERPL CREATININE-BSD FRML MDRD: 70 ML/MIN/1.73SQ M
GLUCOSE P FAST SERPL-MCNC: 98 MG/DL (ref 65–99)
HCT VFR BLD AUTO: 44.5 % (ref 34.8–46.1)
HDLC SERPL-MCNC: 87 MG/DL
HGB BLD-MCNC: 14.6 G/DL (ref 11.5–15.4)
IMM GRANULOCYTES # BLD AUTO: 0.02 THOUSAND/UL (ref 0–0.2)
IMM GRANULOCYTES NFR BLD AUTO: 0 % (ref 0–2)
LDLC SERPL CALC-MCNC: 75 MG/DL (ref 0–100)
LYMPHOCYTES # BLD AUTO: 1.39 THOUSANDS/ΜL (ref 0.6–4.47)
LYMPHOCYTES NFR BLD AUTO: 17 % (ref 14–44)
MCH RBC QN AUTO: 30 PG (ref 26.8–34.3)
MCHC RBC AUTO-ENTMCNC: 32.8 G/DL (ref 31.4–37.4)
MCV RBC AUTO: 91 FL (ref 82–98)
MONOCYTES # BLD AUTO: 0.83 THOUSAND/ΜL (ref 0.17–1.22)
MONOCYTES NFR BLD AUTO: 10 % (ref 4–12)
NEUTROPHILS # BLD AUTO: 5.3 THOUSANDS/ΜL (ref 1.85–7.62)
NEUTS SEG NFR BLD AUTO: 65 % (ref 43–75)
NONHDLC SERPL-MCNC: 91 MG/DL
NRBC BLD AUTO-RTO: 0 /100 WBCS
PLATELET # BLD AUTO: 188 THOUSANDS/UL (ref 149–390)
PMV BLD AUTO: 11.1 FL (ref 8.9–12.7)
POTASSIUM SERPL-SCNC: 3.7 MMOL/L (ref 3.5–5.3)
PROT SERPL-MCNC: 6.9 G/DL (ref 6.4–8.2)
RBC # BLD AUTO: 4.87 MILLION/UL (ref 3.81–5.12)
SODIUM SERPL-SCNC: 138 MMOL/L (ref 136–145)
T4 FREE SERPL-MCNC: 1.16 NG/DL (ref 0.76–1.46)
TRIGL SERPL-MCNC: 80 MG/DL
TSH SERPL DL<=0.05 MIU/L-ACNC: 2.33 UIU/ML (ref 0.36–3.74)
WBC # BLD AUTO: 8.16 THOUSAND/UL (ref 4.31–10.16)

## 2021-03-18 PROCEDURE — 36415 COLL VENOUS BLD VENIPUNCTURE: CPT

## 2021-03-18 PROCEDURE — 84443 ASSAY THYROID STIM HORMONE: CPT

## 2021-03-18 PROCEDURE — 80061 LIPID PANEL: CPT

## 2021-03-18 PROCEDURE — 85025 COMPLETE CBC W/AUTO DIFF WBC: CPT

## 2021-03-18 PROCEDURE — 80053 COMPREHEN METABOLIC PANEL: CPT

## 2021-03-18 PROCEDURE — 84439 ASSAY OF FREE THYROXINE: CPT

## 2021-03-23 ENCOUNTER — OFFICE VISIT (OUTPATIENT)
Dept: INTERNAL MEDICINE CLINIC | Facility: CLINIC | Age: 66
End: 2021-03-23
Payer: MEDICARE

## 2021-03-23 VITALS
HEART RATE: 71 BPM | BODY MASS INDEX: 24.05 KG/M2 | TEMPERATURE: 98.3 F | WEIGHT: 168 LBS | HEIGHT: 70 IN | SYSTOLIC BLOOD PRESSURE: 120 MMHG | OXYGEN SATURATION: 98 % | DIASTOLIC BLOOD PRESSURE: 62 MMHG

## 2021-03-23 DIAGNOSIS — Z00.00 MEDICARE ANNUAL WELLNESS VISIT, SUBSEQUENT: ICD-10-CM

## 2021-03-23 DIAGNOSIS — B07.9 WARTS OF FOOT: ICD-10-CM

## 2021-03-23 DIAGNOSIS — Z53.20 COLONOSCOPY REFUSED: ICD-10-CM

## 2021-03-23 DIAGNOSIS — Z12.31 ENCOUNTER FOR SCREENING MAMMOGRAM FOR MALIGNANT NEOPLASM OF BREAST: ICD-10-CM

## 2021-03-23 DIAGNOSIS — E03.9 ACQUIRED HYPOTHYROIDISM: ICD-10-CM

## 2021-03-23 DIAGNOSIS — Z12.11 SCREENING FOR COLON CANCER: ICD-10-CM

## 2021-03-23 DIAGNOSIS — I10 BENIGN ESSENTIAL HTN: ICD-10-CM

## 2021-03-23 DIAGNOSIS — R10.2 PELVIC PAIN: ICD-10-CM

## 2021-03-23 DIAGNOSIS — Z12.39 ENCOUNTER FOR SCREENING FOR MALIGNANT NEOPLASM OF BREAST, UNSPECIFIED SCREENING MODALITY: Primary | ICD-10-CM

## 2021-03-23 PROCEDURE — 1123F ACP DISCUSS/DSCN MKR DOCD: CPT | Performed by: FAMILY MEDICINE

## 2021-03-23 PROCEDURE — G0438 PPPS, INITIAL VISIT: HCPCS | Performed by: FAMILY MEDICINE

## 2021-03-23 PROCEDURE — 99214 OFFICE O/P EST MOD 30 MIN: CPT | Performed by: FAMILY MEDICINE

## 2021-03-23 RX ORDER — LEVOTHYROXINE SODIUM 0.05 MG/1
50 TABLET ORAL DAILY
Qty: 108 TABLET | Refills: 3 | Status: SHIPPED | OUTPATIENT
Start: 2021-03-23 | End: 2022-03-21 | Stop reason: SDUPTHER

## 2021-03-23 NOTE — PROGRESS NOTES
Assessment and Plan:     Problem List Items Addressed This Visit        Endocrine    Hypothyroidism       Other    Medicare annual wellness visit, subsequent      Other Visit Diagnoses     Encounter for screening for malignant neoplasm of breast, unspecified screening modality    -  Primary           Preventive health issues were discussed with patient, and age appropriate screening tests were ordered as noted in patient's After Visit Summary  Personalized health advice and appropriate referrals for health education or preventive services given if needed, as noted in patient's After Visit Summary       History of Present Illness:     Patient presents for Medicare Annual Wellness visit    Patient Care Team:  Fawn Cueto DO as PCP - General (Family Medicine)  Fawn Cueto DO as PCP - PCP-[facility] Attributed-Roster     Problem List:     Patient Active Problem List   Diagnosis    Benign essential HTN    Hypothyroidism    Closed fracture of phalanx of left fifth toe    Colonoscopy refused    Screening cholesterol level    Medicare annual wellness visit, subsequent      Past Medical and Surgical History:     Past Medical History:   Diagnosis Date    Benign essential HTN 9/27/2017    Elevated BUN     resolved 3/10/17    Fluid retention in legs     last assessed 5/12/15    Hypothyroidism     Lumbago with sciatica     last assessed 4/10/14    Lumbar radiculopathy     last assessed 4/10/14    Onychomycosis of toenail     last assessed 11/7/16     Past Surgical History:   Procedure Laterality Date    APPENDECTOMY      BREAST CYST ASPIRATION Left 1993    KNEE ARTHROSCOPY Left     therapeutic      Family History:     Family History   Problem Relation Age of Onset   Hayden Fer Breast cancer Mother 80    Hypertension Mother     Ovarian cancer Maternal Aunt 29    Ovarian cancer Cousin 54    Prostate cancer Paternal Uncle 54    No Known Problems Maternal Grandmother     No Known Problems Maternal Grandfather  No Known Problems Paternal Grandmother     No Known Problems Paternal Grandfather     No Known Problems Maternal Aunt     No Known Problems Paternal Aunt       Social History:     E-Cigarette/Vaping    E-Cigarette Use Never User      E-Cigarette/Vaping Substances    Nicotine No     THC No     CBD No     Flavoring No     Other No     Unknown No      Social History     Socioeconomic History    Marital status: /Civil Union     Spouse name: None    Number of children: None    Years of education: None    Highest education level: None   Occupational History    None   Social Needs    Financial resource strain: None    Food insecurity     Worry: None     Inability: None    Transportation needs     Medical: None     Non-medical: None   Tobacco Use    Smoking status: Never Smoker    Smokeless tobacco: Never Used   Substance and Sexual Activity    Alcohol use: Yes     Comment: rarely    Drug use: No    Sexual activity: None   Lifestyle    Physical activity     Days per week: None     Minutes per session: None    Stress: None   Relationships    Social connections     Talks on phone: None     Gets together: None     Attends Hinduism service: None     Active member of club or organization: None     Attends meetings of clubs or organizations: None     Relationship status: None    Intimate partner violence     Fear of current or ex partner: None     Emotionally abused: None     Physically abused: None     Forced sexual activity: None   Other Topics Concern    None   Social History Narrative    Pt visited [place] April[place]July [place]      Medications and Allergies:     Current Outpatient Medications   Medication Sig Dispense Refill    AMILoride-hydrochlorothiazide (MODURETIC) 5-50 mg per tablet Take 1 tablet by mouth daily 90 tablet 1    Calcium Carbonate-Vitamin D3 600-400 MG-UNIT TABS Take 2 tablets by mouth daily      Collagen Hydrolysate POWD 1 Scoop by Does not apply route daily       Glucosamine-Chondroit-Vit C-Mn (GLUCOSAMINE-CHONDROITIN) TABS Take 2 tablets by mouth daily      levothyroxine 50 mcg tablet Take 1 tablet (50 mcg total) by mouth daily Alt with 1 5 tab M,W,F 108 tablet 1    Magnesium Carbonate POWD 325 mg by Does not apply route daily      Multiple Vitamins-Minerals (ULTRA AYSHA PO) Take 2 tablets by mouth daily      Omega-3 Fatty Acids (FISH OIL) 1200 MG CAPS Take 2 capsules by mouth daily       Multiple Vitamin (MULTI-DAY) TABS Take 2 tablets by mouth daily        No current facility-administered medications for this visit  No Known Allergies   Immunizations:     Immunization History   Administered Date(s) Administered    Influenza, high dose seasonal 0 7 mL 10/06/2020    Pneumococcal Conjugate 13-Valent 09/08/2020    SARS-CoV-2 / COVID-19 mRNA IM ([facility]) 01/24/2021, 02/21/2021    Tdap 09/08/2020      Health Maintenance:         Topic Date Due    MAMMOGRAM  02/24/2021    Colorectal Cancer Screening  03/10/2027    Hepatitis C Screening  Completed     There are no preventive care reminders to display for this patient  Medicare Health Risk Assessment:     /62 (BP Location: Left arm, Patient Position: Sitting, Cuff Size: Standard)   Pulse 71   Temp 98 3 °F (36 8 °C)   Ht 5' 10 47" (1 79 m) Comment: shoes on  Wt 76 2 kg (168 lb) Comment: shoes on  SpO2 98%   BMI 23 78 kg/m²      Radha is here for her Subsequent Wellness visit  Health Risk Assessment:   Patient rates overall health as very good  Patient feels that their physical health rating is same  Patient is very satisfied with their life  Eyesight was rated as slightly worse  Hearing was rated as same  Patient feels that their emotional and mental health rating is same  Patients states they are never, rarely angry  Patient states they are never, rarely unusually tired/fatigued  Pain experienced in the last 7 days has been some  Patient's pain rating has been 4/10   Patient states that she has experienced no weight loss or gain in last 6 months  Depression Screening:   PHQ-2 Score: 0      Fall Risk Screening: In the past year, patient has experienced: no history of falling in past year      Urinary Incontinence Screening:   Patient has not leaked urine accidently in the last six months  Home Safety:  Patient has trouble with stairs inside or outside of their home  Patient has working smoke alarms and has working carbon monoxide detector  Home safety hazards include: none  Nutrition:   Current diet is Regular  Medications:   Patient is currently taking over-the-counter supplements  OTC medications include: see medication list  Patient is able to manage medications  Activities of Daily Living (ADLs)/Instrumental Activities of Daily Living (IADLs):   Walk and transfer into and out of bed and chair?: Yes  Dress and groom yourself?: Yes    Bathe or shower yourself?: Yes    Feed yourself? Yes  Do your laundry/housekeeping?: Yes  Manage your money, pay your bills and track your expenses?: Yes  Make your own meals?: Yes    Do your own shopping?: Yes    Previous Hospitalizations:   Any hospitalizations or ED visits within the last 12 months?: No      Advance Care Planning:   Living will: No      PREVENTIVE SCREENINGS      Cardiovascular Screening:    General: Screening Current      Diabetes Screening:     General: Screening Current      Colorectal Cancer Screening:     General: Screening Current      Breast Cancer Screening:     General: Screening Current      Cervical Cancer Screening:    General: Screening Not Indicated      Lung Cancer Screening:     General: Screening Not Indicated      Hepatitis C Screening:    General: Screening Current    Screening, Brief Intervention, and Referral to Treatment (SBIRT)    Screening      Single Item Drug Screening:  How often have you used an illegal drug (including marijuana) or a prescription medication for non-medical reasons in the past year? never    Single Item Drug Screen Score: 0  Interpretation: Negative screen for possible drug use disorder    Other Counseling Topics:   Car/seat belt/driving safety, skin self-exam, sunscreen and regular weightbearing exercise and calcium and vitamin D intake         Lexie Leary DO

## 2021-03-23 NOTE — PATIENT INSTRUCTIONS
Medicare Preventive Visit Patient Instructions  Thank you for completing your Welcome to Medicare Visit or Medicare Annual Wellness Visit today  Your next wellness visit will be due in one year (3/24/2022)  The screening/preventive services that you may require over the next 5-10 years are detailed below  Some tests may not apply to you based off risk factors and/or age  Screening tests ordered at today's visit but not completed yet may show as past due  Also, please note that scanned in results may not display below  Preventive Screenings:  Service Recommendations Previous Testing/Comments   Colorectal Cancer Screening  * Colonoscopy    * Fecal Occult Blood Test (FOBT)/Fecal Immunochemical Test (FIT)  * Fecal DNA/Cologuard Test  * Flexible Sigmoidoscopy Age: 54-65 years old   Colonoscopy: every 10 years (may be performed more frequently if at higher risk)  OR  FOBT/FIT: every 1 year  OR  Cologuard: every 3 years  OR  Sigmoidoscopy: every 5 years  Screening may be recommended earlier than age 48 if at higher risk for colorectal cancer  Also, an individualized decision between you and your healthcare provider will decide whether screening between the ages of 74-80 would be appropriate  Colonoscopy: 03/10/2017  FOBT/FIT: Not on file  Cologuard: Not on file  Sigmoidoscopy: Not on file    Screening Current     Breast Cancer Screening Age: 36 years old  Frequency: every 1-2 years  Not required if history of left and right mastectomy Mammogram: 02/24/2020    Screening Current   Cervical Cancer Screening Between the ages of 21-29, pap smear recommended once every 3 years  Between the ages of 33-67, can perform pap smear with HPV co-testing every 5 years     Recommendations may differ for women with a history of total hysterectomy, cervical cancer, or abnormal pap smears in past  Pap Smear: Not on file    Screening Not Indicated   Hepatitis C Screening Once for adults born between 1945 and 1965  More frequently in patients at high risk for Hepatitis C Hep C Antibody: 07/01/2019    Screening Current   Diabetes Screening 1-2 times per year if you're at risk for diabetes or have pre-diabetes Fasting glucose: 98 mg/dL   A1C: 5 5 % of total Hgb    Screening Current   Cholesterol Screening Once every 5 years if you don't have a lipid disorder  May order more often based on risk factors  Lipid panel: 03/18/2021    Screening Current     Other Preventive Screenings Covered by Medicare:  1  Abdominal Aortic Aneurysm (AAA) Screening: covered once if your at risk  You're considered to be at risk if you have a family history of AAA  2  Lung Cancer Screening: covers low dose CT scan once per year if you meet all of the following conditions: (1) Age 50-69; (2) No signs or symptoms of lung cancer; (3) Current smoker or have quit smoking within the last 15 years; (4) You have a tobacco smoking history of at least 30 pack years (packs per day multiplied by number of years you smoked); (5) You get a written order from a healthcare provider  3  Glaucoma Screening: covered annually if you're considered high risk: (1) You have diabetes OR (2) Family history of glaucoma OR (3)  aged 48 and older OR (3)  American aged 72 and older  3  Osteoporosis Screening: covered every 2 years if you meet one of the following conditions: (1) You're estrogen deficient and at risk for osteoporosis based off medical history and other findings; (2) Have a vertebral abnormality; (3) On glucocorticoid therapy for more than 3 months; (4) Have primary hyperparathyroidism; (5) On osteoporosis medications and need to assess response to drug therapy  · Last bone density test (DXA Scan): 09/29/2020   5  HIV Screening: covered annually if you're between the age of 15-65  Also covered annually if you are younger than 13 and older than 72 with risk factors for HIV infection   For pregnant patients, it is covered up to 3 times per pregnancy  Immunizations:  Immunization Recommendations   Influenza Vaccine Annual influenza vaccination during flu season is recommended for all persons aged >= 6 months who do not have contraindications   Pneumococcal Vaccine (Prevnar and Pneumovax)  * Prevnar = PCV13  * Pneumovax = PPSV23   Adults 25-60 years old: 1-3 doses may be recommended based on certain risk factors  Adults 72 years old: Prevnar (PCV13) vaccine recommended followed by Pneumovax (PPSV23) vaccine  If already received PPSV23 since turning 65, then PCV13 recommended at least one year after PPSV23 dose  Hepatitis B Vaccine 3 dose series if at intermediate or high risk (ex: diabetes, end stage renal disease, liver disease)   Tetanus (Td) Vaccine - COST NOT COVERED BY MEDICARE PART B Following completion of primary series, a booster dose should be given every 10 years to maintain immunity against tetanus  Td may also be given as tetanus wound prophylaxis  Tdap Vaccine - COST NOT COVERED BY MEDICARE PART B Recommended at least once for all adults  For pregnant patients, recommended with each pregnancy  Shingles Vaccine (Shingrix) - COST NOT COVERED BY MEDICARE PART B  2 shot series recommended in those aged 48 and above     Health Maintenance Due:      Topic Date Due    MAMMOGRAM  02/24/2021    Colorectal Cancer Screening  03/10/2027    Hepatitis C Screening  Completed     Immunizations Due:  There are no preventive care reminders to display for this patient  Advance Directives   What are advance directives? Advance directives are legal documents that state your wishes and plans for medical care  These plans are made ahead of time in case you lose your ability to make decisions for yourself  Advance directives can apply to any medical decision, such as the treatments you want, and if you want to donate organs  What are the types of advance directives?   There are many types of advance directives, and each state has rules about how to use them  You may choose a combination of any of the following:  · Living will: This is a written record of the treatment you want  You can also choose which treatments you do not want, which to limit, and which to stop at a certain time  This includes surgery, medicine, IV fluid, and tube feedings  · Durable power of  for healthcare [facility]): This is a written record that states who you want to make healthcare choices for you when you are unable to make them for yourself  This person, called a proxy, is usually a family member or a friend  You may choose more than 1 proxy  · Do not resuscitate (DNR) order:  A DNR order is used in case your heart stops beating or you stop breathing  It is a request not to have certain forms of treatment, such as CPR  A DNR order may be included in other types of advance directives  · Medical directive: This covers the care that you want if you are in a coma, near death, or unable to make decisions for yourself  You can list the treatments you want for each condition  Treatment may include pain medicine, surgery, blood transfusions, dialysis, IV or tube feedings, and a ventilator (breathing machine)  · Values history: This document has questions about your views, beliefs, and how you feel and think about life  This information can help others choose the care that you would choose  Why are advance directives important? An advance directive helps you control your care  Although spoken wishes may be used, it is better to have your wishes written down  Spoken wishes can be misunderstood, or not followed  Treatments may be given even if you do not want them  An advance directive may make it easier for your family to make difficult choices about your care  © Copyright [employer] 2018 Information is for End User's use only and may not be sold, redistributed or otherwise used for commercial purposes   All illustrations and images included in CareNotes® are the copyrighted property of A D A M , Inc  or [address] [place], [place] [address]cheryl [facility]

## 2021-03-23 NOTE — PROGRESS NOTES
Assessment/Plan:    1  Encounter for screening for malignant neoplasm of breast, unspecified screening modality  -     Mammo screening bilateral w 3d & cad; Future; Expected date: 03/23/2021    2  Acquired hypothyroidism  -     levothyroxine 50 mcg tablet; Take 1 tablet (50 mcg total) by mouth daily Alt with 1 5 tab M,W,F    3  Medicare annual wellness visit, subsequent    4  Colonoscopy refused    5  Benign essential HTN    6  Screening for colon cancer  -     Cologuard; Future    7  Encounter for screening mammogram for malignant neoplasm of breast   -     Mammo screening bilateral w 3d & cad; Future; Expected date: 03/23/2021    8  Warts of foot    9  Pelvic pain  -     US pelvis complete non OB; Future; Expected date: 03/23/2021            Patient Instructions       Medicare Preventive Visit Patient Instructions  Thank you for completing your Welcome to Medicare Visit or Medicare Annual Wellness Visit today  Your next wellness visit will be due in one year (3/24/2022)  The screening/preventive services that you may require over the next 5-10 years are detailed below  Some tests may not apply to you based off risk factors and/or age  Screening tests ordered at today's visit but not completed yet may show as past due  Also, please note that scanned in results may not display below  Preventive Screenings:  Service Recommendations Previous Testing/Comments   Colorectal Cancer Screening  * Colonoscopy    * Fecal Occult Blood Test (FOBT)/Fecal Immunochemical Test (FIT)  * Fecal DNA/Cologuard Test  * Flexible Sigmoidoscopy Age: 54-65 years old   Colonoscopy: every 10 years (may be performed more frequently if at higher risk)  OR  FOBT/FIT: every 1 year  OR  Cologuard: every 3 years  OR  Sigmoidoscopy: every 5 years  Screening may be recommended earlier than age 48 if at higher risk for colorectal cancer   Also, an individualized decision between you and your healthcare provider will decide whether screening between the ages of 74-80 would be appropriate  Colonoscopy: 03/10/2017  FOBT/FIT: Not on file  Cologuard: Not on file  Sigmoidoscopy: Not on file    Screening Current     Breast Cancer Screening Age: 36 years old  Frequency: every 1-2 years  Not required if history of left and right mastectomy Mammogram: 02/24/2020    Screening Current   Cervical Cancer Screening Between the ages of 21-29, pap smear recommended once every 3 years  Between the ages of 33-67, can perform pap smear with HPV co-testing every 5 years  Recommendations may differ for women with a history of total hysterectomy, cervical cancer, or abnormal pap smears in past  Pap Smear: Not on file    Screening Not Indicated   Hepatitis C Screening Once for adults born between 1945 and 1965  More frequently in patients at high risk for Hepatitis C Hep C Antibody: 07/01/2019    Screening Current   Diabetes Screening 1-2 times per year if you're at risk for diabetes or have pre-diabetes Fasting glucose: 98 mg/dL   A1C: 5 5 % of total Hgb    Screening Current   Cholesterol Screening Once every 5 years if you don't have a lipid disorder  May order more often based on risk factors  Lipid panel: 03/18/2021    Screening Current     Other Preventive Screenings Covered by Medicare:  1  Abdominal Aortic Aneurysm (AAA) Screening: covered once if your at risk  You're considered to be at risk if you have a family history of AAA  2  Lung Cancer Screening: covers low dose CT scan once per year if you meet all of the following conditions: (1) Age 50-69; (2) No signs or symptoms of lung cancer; (3) Current smoker or have quit smoking within the last 15 years; (4) You have a tobacco smoking history of at least 30 pack years (packs per day multiplied by number of years you smoked); (5) You get a written order from a healthcare provider    3  Glaucoma Screening: covered annually if you're considered high risk: (1) You have diabetes OR (2) Family history of glaucoma OR (3)  aged 48 and older OR (3)  American aged 72 and older  3  Osteoporosis Screening: covered every 2 years if you meet one of the following conditions: (1) You're estrogen deficient and at risk for osteoporosis based off medical history and other findings; (2) Have a vertebral abnormality; (3) On glucocorticoid therapy for more than 3 months; (4) Have primary hyperparathyroidism; (5) On osteoporosis medications and need to assess response to drug therapy  · Last bone density test (DXA Scan): 09/29/2020   5  HIV Screening: covered annually if you're between the age of 15-65  Also covered annually if you are younger than 13 and older than 72 with risk factors for HIV infection  For pregnant patients, it is covered up to 3 times per pregnancy  Immunizations:  Immunization Recommendations   Influenza Vaccine Annual influenza vaccination during flu season is recommended for all persons aged >= 6 months who do not have contraindications   Pneumococcal Vaccine (Prevnar and Pneumovax)  * Prevnar = PCV13  * Pneumovax = PPSV23   Adults 25-60 years old: 1-3 doses may be recommended based on certain risk factors  Adults 72 years old: Prevnar (PCV13) vaccine recommended followed by Pneumovax (PPSV23) vaccine  If already received PPSV23 since turning 65, then PCV13 recommended at least one year after PPSV23 dose  Hepatitis B Vaccine 3 dose series if at intermediate or high risk (ex: diabetes, end stage renal disease, liver disease)   Tetanus (Td) Vaccine - COST NOT COVERED BY MEDICARE PART B Following completion of primary series, a booster dose should be given every 10 years to maintain immunity against tetanus  Td may also be given as tetanus wound prophylaxis  Tdap Vaccine - COST NOT COVERED BY MEDICARE PART B Recommended at least once for all adults  For pregnant patients, recommended with each pregnancy     Shingles Vaccine (Shingrix) - COST NOT COVERED BY MEDICARE PART B  2 shot series recommended in those aged 48 and above     Health Maintenance Due:      Topic Date Due    MAMMOGRAM  02/24/2021    Colorectal Cancer Screening  03/10/2027    Hepatitis C Screening  Completed     Immunizations Due:  There are no preventive care reminders to display for this patient  Advance Directives   What are advance directives? Advance directives are legal documents that state your wishes and plans for medical care  These plans are made ahead of time in case you lose your ability to make decisions for yourself  Advance directives can apply to any medical decision, such as the treatments you want, and if you want to donate organs  What are the types of advance directives? There are many types of advance directives, and each state has rules about how to use them  You may choose a combination of any of the following:  · Living will: This is a written record of the treatment you want  You can also choose which treatments you do not want, which to limit, and which to stop at a certain time  This includes surgery, medicine, IV fluid, and tube feedings  · Durable power of  for healthcare [facility]): This is a written record that states who you want to make healthcare choices for you when you are unable to make them for yourself  This person, called a proxy, is usually a family member or a friend  You may choose more than 1 proxy  · Do not resuscitate (DNR) order:  A DNR order is used in case your heart stops beating or you stop breathing  It is a request not to have certain forms of treatment, such as CPR  A DNR order may be included in other types of advance directives  · Medical directive: This covers the care that you want if you are in a coma, near death, or unable to make decisions for yourself  You can list the treatments you want for each condition  Treatment may include pain medicine, surgery, blood transfusions, dialysis, IV or tube feedings, and a ventilator (breathing machine)    · Values history: This document has questions about your views, beliefs, and how you feel and think about life  This information can help others choose the care that you would choose  Why are advance directives important? An advance directive helps you control your care  Although spoken wishes may be used, it is better to have your wishes written down  Spoken wishes can be misunderstood, or not followed  Treatments may be given even if you do not want them  An advance directive may make it easier for your family to make difficult choices about your care  © Copyright [employer] 2018 Information is for End User's use only and may not be sold, redistributed or otherwise used for commercial purposes  All illustrations and images included in CareNotes® are the copyrighted property of A D A M , Inc  or [address]        Return in about 6 months (around 9/23/2021) for Recheck- no labs next visit  Subjective:      Patient ID: Declan Arroyo is a 76 y o  female  Chief Complaint   Patient presents with    Follow-up     labs 3/18/21  - hypothyroidism     Medicare Wellness Visit     AWV-SUB       HPI       Lower pelvic abdominal pain  Was refer to gyn they did not feel that that was the source of her discomfort  Patient feels more persistently but not sure or stabbing and thinks it may be related to occasional constipation however she does have a bowel movement on a daily basis  She went to physical therapy and they stated that she has tight fascia in her legs  Hypothyroidism, on levothyroxine 50 mcg daily and is compliant  Thyroid levels are well controlled  Hypertension  On amiloride with hydrochlorothiazide that she is tolerating well  Checks blood pressure at home and is well controlled  No side effects and renal function is stable      The following portions of the patient's history were reviewed and updated as appropriate: allergies, current medications, past family history, past medical history, past social history, past surgical history and problem list     Review of Systems      Constitutional:  Denies fever or chills   Eyes:  Denies double or blurry vision  HENT:  Denies nasal congestion or sore throat   Respiratory:  Denies cough or shortness of breath or wheezing  Cardiovascular:  Denies palpitations or chest pain  GI:  Denies abdominal pain, nausea, or vomiting, no loose stools  Integument:  Denies rash , no open areas  Neurologic:  Denies headache or focal weakness, no dizziness        Current Outpatient Medications   Medication Sig Dispense Refill    AMILoride-hydrochlorothiazide (MODURETIC) 5-50 mg per tablet Take 1 tablet by mouth daily 90 tablet 1    Calcium Carbonate-Vitamin D3 600-400 MG-UNIT TABS Take 2 tablets by mouth daily      Collagen Hydrolysate POWD 1 Scoop by Does not apply route daily       Glucosamine-Chondroit-Vit C-Mn (GLUCOSAMINE-CHONDROITIN) TABS Take 2 tablets by mouth daily      levothyroxine 50 mcg tablet Take 1 tablet (50 mcg total) by mouth daily Alt with 1 5 tab M,W,F 108 tablet 3    Magnesium Carbonate POWD 325 mg by Does not apply route daily      Multiple Vitamins-Minerals (ULTRA AYSHA PO) Take 2 tablets by mouth daily      Omega-3 Fatty Acids (FISH OIL) 1200 MG CAPS Take 2 capsules by mouth daily       Multiple Vitamin (MULTI-DAY) TABS Take 2 tablets by mouth daily        No current facility-administered medications for this visit          Objective:    /62 (BP Location: Left arm, Patient Position: Sitting, Cuff Size: Standard)   Pulse 71   Temp 98 3 °F (36 8 °C)   Ht 5' 10 47" (1 79 m) Comment: shoes on  Wt 76 2 kg (168 lb) Comment: shoes on  SpO2 98%   BMI 23 78 kg/m²        Physical Exam       Constitutional:  Well developed, well nourished, no acute distress, non-toxic appearance   Eyes:  PERRL, conjunctiva normal , non icteric sclera  HENT:  Atraumatic, oropharynx moist  Neck-  supple   Respiratory:  CTA b/l, normal breath sounds, no rales, no wheezing   Cardiovascular:  RRR, no murmurs, no LE edema b/l  GI:  Soft, nondistended, normal bowel sounds x 4, nontender, no organomegaly, no mass, no rebound, no guarding   Neurologic:  no focal deficits noted   Psychiatric:  Speech and behavior appropriate , AAO x 3  MS; b/l IT band TTP    R lateral foot wart noted      Woody Pleas, DO

## 2021-03-24 ENCOUNTER — PROCEDURE VISIT (OUTPATIENT)
Dept: INTERNAL MEDICINE CLINIC | Facility: CLINIC | Age: 66
End: 2021-03-24
Payer: MEDICARE

## 2021-03-24 VITALS
TEMPERATURE: 98.3 F | OXYGEN SATURATION: 100 % | HEIGHT: 70 IN | SYSTOLIC BLOOD PRESSURE: 112 MMHG | DIASTOLIC BLOOD PRESSURE: 80 MMHG | WEIGHT: 168 LBS | HEART RATE: 74 BPM | BODY MASS INDEX: 24.05 KG/M2

## 2021-03-24 DIAGNOSIS — B07.9 WARTS OF FOOT: Primary | ICD-10-CM

## 2021-03-24 PROCEDURE — 17110 DESTRUCTION B9 LES UP TO 14: CPT | Performed by: FAMILY MEDICINE

## 2021-03-24 RX ORDER — MULTIVIT WITH MINERALS/LUTEIN
1000 TABLET ORAL DAILY
COMMUNITY

## 2021-03-24 NOTE — PROGRESS NOTES
Lesion Destruction    Date/Time: 3/24/2021 5:23 PM  Performed by: Lito Bell DO  Authorized by: Lito Bell DO   Universal Protocol:  Consent given by: patient  Patient understanding: patient states understanding of the procedure being performed      Procedure Details - Lesion Destruction:     Number of Lesions:  1  Lesion 1:     Body area:  Lower extremity    Lower extremity location:  R foot (lateral side)    Malignancy: benign lesion      Destruction method: surgical removal        Not sent for pathology  Post care instructions given to her and her

## 2021-04-13 ENCOUNTER — TELEPHONE (OUTPATIENT)
Dept: INTERNAL MEDICINE CLINIC | Facility: CLINIC | Age: 66
End: 2021-04-13

## 2021-04-13 NOTE — TELEPHONE ENCOUNTER
Left message on machine for patient to call me at [place] office  Dr Princess Thomas would patient to return Cologuard kit within next week  Pt can return kit to any one of the [facility] offices and staff will return kit to [facility] for processing

## 2021-04-13 NOTE — TELEPHONE ENCOUNTER
Patient returning Alphonse's call, Patient was busy this week and will return the Cologuard kit as soon as she can possibly this week yet

## 2021-07-22 DIAGNOSIS — I10 ESSENTIAL HYPERTENSION: ICD-10-CM

## 2021-07-23 RX ORDER — AMILORIDE HYDROCHLORIDE AND HYDROCHLOROTHIAZIDE 5; 50 MG/1; MG/1
1 TABLET ORAL DAILY
Qty: 90 TABLET | Refills: 1 | Status: SHIPPED | OUTPATIENT
Start: 2021-07-23 | End: 2022-01-18 | Stop reason: SDUPTHER

## 2021-08-19 ENCOUNTER — HOSPITAL ENCOUNTER (OUTPATIENT)
Dept: RADIOLOGY | Age: 66
Discharge: HOME | End: 2021-08-19
Payer: MEDICARE

## 2021-08-19 DIAGNOSIS — R10.2 PELVIC PAIN: ICD-10-CM

## 2021-08-19 PROCEDURE — 76830 TRANSVAGINAL US NON-OB: CPT

## 2021-08-19 PROCEDURE — 76856 US EXAM PELVIC COMPLETE: CPT

## 2021-08-27 ENCOUNTER — TELEPHONE (OUTPATIENT)
Dept: INTERNAL MEDICINE CLINIC | Age: 66
End: 2021-08-27

## 2021-08-27 NOTE — TELEPHONE ENCOUNTER
No emergent issues but would need GYN to evaluate " pelvic congestion"  and small cyst on US   If she needs referral, we can recommend

## 2021-08-30 NOTE — TELEPHONE ENCOUNTER
Spoke with pt regarding notes below, she is going to f/u with GYN as long as medicare pays for visit

## 2021-09-08 ENCOUNTER — TELEPHONE (OUTPATIENT)
Dept: INTERNAL MEDICINE CLINIC | Facility: CLINIC | Age: 66
End: 2021-09-08

## 2021-09-08 DIAGNOSIS — R93.89 ABNORMAL PELVIC ULTRASOUND: ICD-10-CM

## 2021-09-08 DIAGNOSIS — N89.8 CYST, VAGINA: Primary | ICD-10-CM

## 2021-09-08 NOTE — TELEPHONE ENCOUNTER
----- Message from Meir Azar, DO sent at 9/7/2021  5:31 PM EDT -----  Please advise patient that I would like her to see GYN to see if MRI id=s needed   I can recommend if she does not have anyone in mind, thanks

## 2021-09-08 NOTE — TELEPHONE ENCOUNTER
----- Message from Shane Ascencio, DO sent at 9/7/2021  5:31 PM EDT -----  Please advise patient that I would like her to see GYN to see if MRI id=s needed   I can recommend if she does not have anyone in mind, thanks

## 2021-09-08 NOTE — TELEPHONE ENCOUNTER
Order processed and patient called for GYN appt  The first available appt is on 11/17/21  Dr Kat Souza would like you to review the ultrasound report/films and determine if the timing of this appointment is appropriate  Thank you!

## 2021-09-08 NOTE — TELEPHONE ENCOUNTER
----- Message from Daya Brown, DO sent at 9/7/2021  5:31 PM EDT -----  Please advise patient that I would like her to see GYN to see if MRI id=s needed   I can recommend if she does not have anyone in mind, thanks

## 2021-09-08 NOTE — TELEPHONE ENCOUNTER
I called the patient with the result note message from Dr Tunde Hermosillo  She is agreeable to follow up with GYN  She would like a GYN provider that has Epic access  She is happy with Dr Carole Green but she is independent and does not have Epic access    Dr Tunde Hermosillo recommends Dr Scott Dyson

## 2021-09-17 ENCOUNTER — RA CDI HCC (OUTPATIENT)
Dept: OTHER | Facility: HOSPITAL | Age: 66
End: 2021-09-17

## 2021-09-23 ENCOUNTER — OFFICE VISIT (OUTPATIENT)
Dept: INTERNAL MEDICINE CLINIC | Age: 66
End: 2021-09-23
Payer: MEDICARE

## 2021-09-23 ENCOUNTER — OFFICE VISIT (OUTPATIENT)
Dept: OBGYN CLINIC | Facility: CLINIC | Age: 66
End: 2021-09-23
Payer: MEDICARE

## 2021-09-23 VITALS
BODY MASS INDEX: 23.05 KG/M2 | HEIGHT: 70 IN | DIASTOLIC BLOOD PRESSURE: 76 MMHG | SYSTOLIC BLOOD PRESSURE: 118 MMHG | WEIGHT: 161 LBS

## 2021-09-23 VITALS
HEIGHT: 70 IN | HEART RATE: 68 BPM | DIASTOLIC BLOOD PRESSURE: 70 MMHG | OXYGEN SATURATION: 100 % | TEMPERATURE: 98.2 F | BODY MASS INDEX: 23.08 KG/M2 | SYSTOLIC BLOOD PRESSURE: 110 MMHG | WEIGHT: 161.2 LBS

## 2021-09-23 DIAGNOSIS — L84 CALLUS OF FOOT: ICD-10-CM

## 2021-09-23 DIAGNOSIS — R93.89 ABNORMAL PELVIC ULTRASOUND: Primary | ICD-10-CM

## 2021-09-23 DIAGNOSIS — E03.9 ACQUIRED HYPOTHYROIDISM: ICD-10-CM

## 2021-09-23 DIAGNOSIS — Z01.419 ENCOUNTER FOR CERVICAL PAP SMEAR WITH PELVIC EXAM: ICD-10-CM

## 2021-09-23 DIAGNOSIS — I10 BENIGN ESSENTIAL HTN: Primary | ICD-10-CM

## 2021-09-23 DIAGNOSIS — Z13.220 SCREENING CHOLESTEROL LEVEL: ICD-10-CM

## 2021-09-23 DIAGNOSIS — N89.8 VAGINAL CYST: ICD-10-CM

## 2021-09-23 DIAGNOSIS — N89.8 CYST, VAGINA: ICD-10-CM

## 2021-09-23 PROBLEM — B07.9 WARTS OF FOOT: Status: RESOLVED | Noted: 2021-03-23 | Resolved: 2021-09-23

## 2021-09-23 PROCEDURE — G0145 SCR C/V CYTO,THINLAYER,RESCR: HCPCS | Performed by: OBSTETRICS & GYNECOLOGY

## 2021-09-23 PROCEDURE — G0476 HPV COMBO ASSAY CA SCREEN: HCPCS | Performed by: OBSTETRICS & GYNECOLOGY

## 2021-09-23 PROCEDURE — 99203 OFFICE O/P NEW LOW 30 MIN: CPT | Performed by: OBSTETRICS & GYNECOLOGY

## 2021-09-23 PROCEDURE — 99214 OFFICE O/P EST MOD 30 MIN: CPT | Performed by: FAMILY MEDICINE

## 2021-09-23 NOTE — PROGRESS NOTES
Assessment/Plan:    Pap smear done  Reviewed pelvic ultrasound revealing benign findings  Patient asymptomatic  No further evaluation needed  Reviewed all precautions  Return to office in 1 year  I am also requesting prior gyn records,  To assure stability of ultrasound findings  All questions answered  No problem-specific Assessment & Plan notes found for this encounter  Diagnoses and all orders for this visit:    Abnormal pelvic ultrasound  -     Ambulatory referral to Gynecology    Cyst, vagina  -     Ambulatory referral to Gynecology    Encounter for cervical Pap smear with pelvic exam  -     Cancel: Liquid-based pap, diagnostic  -     Liquid-based pap, screening          Subjective:      Patient ID: Mary Grace Yi is a 78 y o  female  HPI     This is a very pleasant 50-year-old female  ( x2, age 38, 44) presents as a new patient for discussion of pelvic ultrasound  Patient's last gyn exam was approximately 2 years ago  She went through menopause at age 38  She has never been on hormone replacement therapy  She denies any vaginal bleeding or spotting  There has been no changes in bowel or bladder function  She has been in a monogamous relationship with her  for over 42 years  They have not been sexually active in several years  She has been having episodes of lower pelvic discomfort for couple of years now,  Worse with physical activity, nonradiating  She denies any changes in bowel or bladder function  Patient states on her last couple of gyn exams she was having pelvic ultrasounds for possible vaginal cyst unchanged over the last several years  She denies any vaginal discomfort or bleeding spotting or discharge        Reviewed pelvic ultrasound done  revealing normal size uterus, normal endometrial stripe, nonspecific findings for pelvic congestion /adenomyosis, 1 cm simple cyst     The following portions of the patient's history were reviewed and updated as appropriate: allergies, current medications, past family history, past medical history, past social history, past surgical history and problem list     Review of Systems   Constitutional: Negative for fatigue, fever and unexpected weight change  Respiratory: Negative for cough, chest tightness, shortness of breath and wheezing  Cardiovascular: Negative  Negative for chest pain and palpitations  Gastrointestinal: Negative  Negative for abdominal distention, abdominal pain, blood in stool, constipation, diarrhea, nausea and vomiting  Genitourinary: Negative  Negative for difficulty urinating, dyspareunia, dysuria, flank pain, frequency, genital sores, hematuria, pelvic pain, urgency, vaginal bleeding, vaginal discharge and vaginal pain  Skin: Negative for rash  Objective:      /76   Ht 5' 10" (1 778 m)   Wt 73 kg (161 lb)   BMI 23 10 kg/m²          Physical Exam  Abdominal:      General: Bowel sounds are normal  There is no distension  Palpations: Abdomen is soft  Tenderness: There is no abdominal tenderness  There is no guarding or rebound  Genitourinary:     General: Normal vulva  Labia:         Right: No rash, tenderness or lesion  Left: No rash, tenderness or lesion  Vagina: No signs of injury  No vaginal discharge, tenderness or lesions  Cervix: No cervical motion tenderness, discharge, friability, lesion, erythema or cervical bleeding  Uterus: Not enlarged and not tender  Adnexa:         Right: No mass, tenderness or fullness  Left: No mass, tenderness or fullness  Comments: External genitalia is within normal limits  The vagina is evident of estrogen deficiency  Cervix is parous  There is a small nabothian cyst noted  No evidence of Amie's duct cyst  There is no pelvic floor prolapse  Rectovaginal exam is confirmatory  Neurological:      Mental Status: She is alert and oriented to person, place, and time  Psychiatric:         Behavior: Behavior normal

## 2021-09-23 NOTE — PROGRESS NOTES
Assessment/Plan:    1  Benign essential HTN  -     Comprehensive metabolic panel; Future  -     CBC and differential; Future    2  Acquired hypothyroidism  -     TSH, 3rd generation with Free T4 reflex; Future    3  Screening cholesterol level  -     Lipid panel; Future    4  Callus of foot    5  Vaginal cyst            There are no Patient Instructions on file for this visit  Return in about 6 months (around 3/23/2022) for Recheck  Subjective:      Patient ID: Juan Luis Rosa is a 78 y o  female  Chief Complaint   Patient presents with    Follow-up     6 month F/U        HPI         Lower pelvic abdominal pain  Was refer to gyn they did not feel that that was the source of her discomfort  Patient feels more persistently but not sure or stabbing and thinks it may be related to occasional constipation however she does have a bowel movement on a daily basis  She went to physical therapy and they stated that she has tight fascia in her legs  September 2021, intermittent but much less  Was seen by another gyn due to abnormal ultrasound showing vaginal cyst   She was cleared from that aspect does not need any further management     Hypothyroidism, on levothyroxine 50 mcg daily and is compliant  Thyroid levels are well controlled      Hypertension  On amiloride with hydrochlorothiazide that she is tolerating well  Checks blood pressure at home and is well controlled  No side effects and renal function is stable  September 2021, well controlled with medications    She is compliant    The following portions of the patient's history were reviewed and updated as appropriate: allergies, current medications, past family history, past medical history, past social history, past surgical history and problem list     Review of Systems        Constitutional:  Denies fever or chills   Eyes:  Denies double or blurry vision  HENT:  Denies nasal congestion or sore throat   Respiratory:  Denies cough or shortness of breath or wheezing  Cardiovascular:  Denies palpitations or chest pain  GI:  Denies abdominal pain, nausea, or vomiting, no loose stools, no reflux  Integument:  Denies rash , no open areas  Neurologic:  Denies headache or focal weakness, no dizziness  : no dysuria      Current Outpatient Medications   Medication Sig Dispense Refill    AMILoride-hydrochlorothiazide (MODURETIC) 5-50 mg per tablet Take 1 tablet by mouth daily 90 tablet 1    Ascorbic Acid (vitamin C) 1000 MG tablet Take 1,000 mg by mouth daily      Calcium Carbonate-Vitamin D3 600-400 MG-UNIT TABS Take 2 tablets by mouth daily      Collagen Hydrolysate POWD 1 Scoop by Does not apply route daily       Glucosamine-Chondroit-Vit C-Mn (GLUCOSAMINE-CHONDROITIN) TABS Take 2 tablets by mouth daily      levothyroxine 50 mcg tablet Take 1 tablet (50 mcg total) by mouth daily Alt with 1 5 tab M,W,F 108 tablet 3    Magnesium Carbonate POWD 325 mg by Does not apply route daily      Multiple Vitamin (MULTI-DAY) TABS Take 2 tablets by mouth daily       Multiple Vitamins-Minerals (ULTRA AYSHA PO) Take 2 tablets by mouth daily      Omega-3 Fatty Acids (FISH OIL) 1200 MG CAPS Take 2 capsules by mouth daily        No current facility-administered medications for this visit         Objective:    /70 (BP Location: Left arm, Patient Position: Sitting, Cuff Size: Standard)   Pulse 68   Temp 98 2 °F (36 8 °C) (Temporal)   Ht 5' 10" (1 778 m)   Wt 73 1 kg (161 lb 3 2 oz)   SpO2 100%   BMI 23 13 kg/m²        Physical Exam       Constitutional:  Well developed, well nourished, no acute distress, non-toxic appearance   Eyes:  PERRL, conjunctiva normal , non icteric sclera  HENT:  Atraumatic, oropharynx moist  Neck-  supple   Respiratory:  CTA b/l, normal breath sounds, no rales, no wheezing   Cardiovascular:  RRR, no murmurs, no LE edema b/l  GI:  Soft, nondistended, normal bowel sounds x 4, nontender, no organomegaly, no mass, no rebound, no guarding Neurologic:  no focal deficits noted   Psychiatric:  Speech and behavior appropriate , AAO x 3        Ellie DO Iker

## 2021-09-24 LAB
HPV HR 12 DNA CVX QL NAA+PROBE: NEGATIVE
HPV16 DNA CVX QL NAA+PROBE: NEGATIVE
HPV18 DNA CVX QL NAA+PROBE: NEGATIVE

## 2021-10-01 LAB
LAB AP GYN PRIMARY INTERPRETATION: NORMAL
Lab: NORMAL

## 2021-10-14 ENCOUNTER — APPOINTMENT (OUTPATIENT)
Dept: RADIOLOGY | Age: 66
End: 2021-10-14
Payer: MEDICARE

## 2021-10-14 ENCOUNTER — OFFICE VISIT (OUTPATIENT)
Dept: INTERNAL MEDICINE CLINIC | Age: 66
End: 2021-10-14
Payer: MEDICARE

## 2021-10-14 VITALS
DIASTOLIC BLOOD PRESSURE: 70 MMHG | OXYGEN SATURATION: 99 % | HEIGHT: 70 IN | HEART RATE: 78 BPM | WEIGHT: 163.6 LBS | SYSTOLIC BLOOD PRESSURE: 108 MMHG | TEMPERATURE: 98.2 F | BODY MASS INDEX: 23.42 KG/M2

## 2021-10-14 DIAGNOSIS — S89.90XA TRAUMATIC INJURY OF KNEE: ICD-10-CM

## 2021-10-14 DIAGNOSIS — M25.562 ACUTE PAIN OF LEFT KNEE: ICD-10-CM

## 2021-10-14 DIAGNOSIS — M25.562 ACUTE PAIN OF LEFT KNEE: Primary | ICD-10-CM

## 2021-10-14 PROCEDURE — 73564 X-RAY EXAM KNEE 4 OR MORE: CPT

## 2021-10-14 PROCEDURE — 99213 OFFICE O/P EST LOW 20 MIN: CPT | Performed by: INTERNAL MEDICINE

## 2021-10-19 ENCOUNTER — TELEPHONE (OUTPATIENT)
Dept: INTERNAL MEDICINE CLINIC | Age: 66
End: 2021-10-19

## 2021-11-02 ENCOUNTER — EVALUATION (OUTPATIENT)
Dept: PHYSICAL THERAPY | Facility: CLINIC | Age: 66
End: 2021-11-02
Payer: MEDICARE

## 2021-11-02 DIAGNOSIS — M25.562 ACUTE PAIN OF LEFT KNEE: Primary | ICD-10-CM

## 2021-11-02 PROCEDURE — 97110 THERAPEUTIC EXERCISES: CPT | Performed by: PHYSICAL THERAPIST

## 2021-11-02 PROCEDURE — 97161 PT EVAL LOW COMPLEX 20 MIN: CPT | Performed by: PHYSICAL THERAPIST

## 2021-11-02 PROCEDURE — 97112 NEUROMUSCULAR REEDUCATION: CPT | Performed by: PHYSICAL THERAPIST

## 2021-11-04 ENCOUNTER — OFFICE VISIT (OUTPATIENT)
Dept: PHYSICAL THERAPY | Facility: CLINIC | Age: 66
End: 2021-11-04
Payer: MEDICARE

## 2021-11-04 DIAGNOSIS — M25.562 ACUTE PAIN OF LEFT KNEE: Primary | ICD-10-CM

## 2021-11-04 PROCEDURE — 97110 THERAPEUTIC EXERCISES: CPT

## 2021-11-04 PROCEDURE — 97112 NEUROMUSCULAR REEDUCATION: CPT

## 2021-11-08 ENCOUNTER — COMPLETE EYE EXAM (OUTPATIENT)
Dept: URBAN - METROPOLITAN AREA CLINIC 6 | Facility: CLINIC | Age: 66
End: 2021-11-08

## 2021-11-08 ENCOUNTER — OFFICE VISIT (OUTPATIENT)
Dept: PHYSICAL THERAPY | Facility: CLINIC | Age: 66
End: 2021-11-08
Payer: MEDICARE

## 2021-11-08 DIAGNOSIS — M25.562 ACUTE PAIN OF LEFT KNEE: Primary | ICD-10-CM

## 2021-11-08 DIAGNOSIS — H35.363: ICD-10-CM

## 2021-11-08 DIAGNOSIS — H25.813: ICD-10-CM

## 2021-11-08 PROCEDURE — 97140 MANUAL THERAPY 1/> REGIONS: CPT | Performed by: PHYSICAL THERAPIST

## 2021-11-08 PROCEDURE — 92014 COMPRE OPH EXAM EST PT 1/>: CPT

## 2021-11-08 PROCEDURE — 97112 NEUROMUSCULAR REEDUCATION: CPT | Performed by: PHYSICAL THERAPIST

## 2021-11-08 PROCEDURE — 97110 THERAPEUTIC EXERCISES: CPT | Performed by: PHYSICAL THERAPIST

## 2021-11-08 PROCEDURE — 1036F TOBACCO NON-USER: CPT

## 2021-11-08 PROCEDURE — G8427 DOCREV CUR MEDS BY ELIG CLIN: HCPCS

## 2021-11-08 ASSESSMENT — VISUAL ACUITY
OS_CC: J1+
OS_PH: 20/30-1
OS_SC: 20/60
OD_OTHER: OTC +2.75
OD_SC: 20/60+2
OD_PH: 20/30-1
OD_CC: J1+

## 2021-11-08 ASSESSMENT — TONOMETRY
OD_IOP_MMHG: 13
OS_IOP_MMHG: 13

## 2021-11-11 ENCOUNTER — OFFICE VISIT (OUTPATIENT)
Dept: PHYSICAL THERAPY | Facility: CLINIC | Age: 66
End: 2021-11-11
Payer: MEDICARE

## 2021-11-11 DIAGNOSIS — M25.562 ACUTE PAIN OF LEFT KNEE: Primary | ICD-10-CM

## 2021-11-11 PROCEDURE — 97110 THERAPEUTIC EXERCISES: CPT

## 2021-11-11 PROCEDURE — 97112 NEUROMUSCULAR REEDUCATION: CPT

## 2021-11-11 PROCEDURE — 97140 MANUAL THERAPY 1/> REGIONS: CPT

## 2021-11-16 ENCOUNTER — OFFICE VISIT (OUTPATIENT)
Dept: PHYSICAL THERAPY | Facility: CLINIC | Age: 66
End: 2021-11-16
Payer: MEDICARE

## 2021-11-16 DIAGNOSIS — M25.562 ACUTE PAIN OF LEFT KNEE: Primary | ICD-10-CM

## 2021-11-16 PROCEDURE — 97112 NEUROMUSCULAR REEDUCATION: CPT | Performed by: PHYSICAL THERAPIST

## 2021-11-16 PROCEDURE — 97110 THERAPEUTIC EXERCISES: CPT | Performed by: PHYSICAL THERAPIST

## 2021-11-16 PROCEDURE — 97140 MANUAL THERAPY 1/> REGIONS: CPT | Performed by: PHYSICAL THERAPIST

## 2021-11-18 ENCOUNTER — OFFICE VISIT (OUTPATIENT)
Dept: INTERNAL MEDICINE CLINIC | Age: 66
End: 2021-11-18
Payer: MEDICARE

## 2021-11-18 ENCOUNTER — OFFICE VISIT (OUTPATIENT)
Dept: PHYSICAL THERAPY | Facility: CLINIC | Age: 66
End: 2021-11-18
Payer: MEDICARE

## 2021-11-18 ENCOUNTER — APPOINTMENT (OUTPATIENT)
Dept: RADIOLOGY | Age: 66
End: 2021-11-18
Payer: MEDICARE

## 2021-11-18 VITALS
BODY MASS INDEX: 23.31 KG/M2 | DIASTOLIC BLOOD PRESSURE: 74 MMHG | HEART RATE: 81 BPM | WEIGHT: 162.8 LBS | SYSTOLIC BLOOD PRESSURE: 116 MMHG | TEMPERATURE: 98.4 F | OXYGEN SATURATION: 100 % | HEIGHT: 70 IN

## 2021-11-18 DIAGNOSIS — M25.562 ACUTE PAIN OF LEFT KNEE: Primary | ICD-10-CM

## 2021-11-18 DIAGNOSIS — M79.672 ACUTE PAIN OF LEFT FOOT: ICD-10-CM

## 2021-11-18 DIAGNOSIS — M79.672 ACUTE PAIN OF LEFT FOOT: Primary | ICD-10-CM

## 2021-11-18 DIAGNOSIS — M25.562 ACUTE PAIN OF LEFT KNEE: ICD-10-CM

## 2021-11-18 PROBLEM — M79.673 PAIN OF FOOT AND TOES: Status: ACTIVE | Noted: 2019-02-27

## 2021-11-18 PROBLEM — M79.676 PAIN OF FOOT AND TOES: Status: ACTIVE | Noted: 2019-02-27

## 2021-11-18 PROCEDURE — 97140 MANUAL THERAPY 1/> REGIONS: CPT

## 2021-11-18 PROCEDURE — 73630 X-RAY EXAM OF FOOT: CPT

## 2021-11-18 PROCEDURE — 97110 THERAPEUTIC EXERCISES: CPT

## 2021-11-18 PROCEDURE — 97112 NEUROMUSCULAR REEDUCATION: CPT

## 2021-11-18 PROCEDURE — 99213 OFFICE O/P EST LOW 20 MIN: CPT

## 2021-11-19 ENCOUNTER — TELEPHONE (OUTPATIENT)
Dept: INTERNAL MEDICINE CLINIC | Age: 66
End: 2021-11-19

## 2021-11-22 ENCOUNTER — OFFICE VISIT (OUTPATIENT)
Dept: PHYSICAL THERAPY | Facility: CLINIC | Age: 66
End: 2021-11-22
Payer: MEDICARE

## 2021-11-22 DIAGNOSIS — M25.562 ACUTE PAIN OF LEFT KNEE: Primary | ICD-10-CM

## 2021-11-22 PROCEDURE — 97110 THERAPEUTIC EXERCISES: CPT

## 2021-11-22 PROCEDURE — 97112 NEUROMUSCULAR REEDUCATION: CPT

## 2021-11-22 PROCEDURE — 97140 MANUAL THERAPY 1/> REGIONS: CPT

## 2021-11-24 ENCOUNTER — OFFICE VISIT (OUTPATIENT)
Dept: PHYSICAL THERAPY | Facility: CLINIC | Age: 66
End: 2021-11-24
Payer: MEDICARE

## 2021-11-24 DIAGNOSIS — M25.562 ACUTE PAIN OF LEFT KNEE: Primary | ICD-10-CM

## 2021-11-24 PROCEDURE — 97110 THERAPEUTIC EXERCISES: CPT | Performed by: PHYSICAL THERAPIST

## 2021-11-24 PROCEDURE — 97112 NEUROMUSCULAR REEDUCATION: CPT | Performed by: PHYSICAL THERAPIST

## 2021-11-24 PROCEDURE — 97140 MANUAL THERAPY 1/> REGIONS: CPT | Performed by: PHYSICAL THERAPIST

## 2021-11-29 ENCOUNTER — OFFICE VISIT (OUTPATIENT)
Dept: PHYSICAL THERAPY | Facility: CLINIC | Age: 66
End: 2021-11-29
Payer: MEDICARE

## 2021-11-29 DIAGNOSIS — M25.562 ACUTE PAIN OF LEFT KNEE: Primary | ICD-10-CM

## 2021-11-29 PROCEDURE — 97112 NEUROMUSCULAR REEDUCATION: CPT | Performed by: PHYSICAL THERAPIST

## 2021-11-29 PROCEDURE — 97140 MANUAL THERAPY 1/> REGIONS: CPT | Performed by: PHYSICAL THERAPIST

## 2021-11-29 PROCEDURE — 97110 THERAPEUTIC EXERCISES: CPT | Performed by: PHYSICAL THERAPIST

## 2021-12-02 ENCOUNTER — EVALUATION (OUTPATIENT)
Dept: PHYSICAL THERAPY | Facility: CLINIC | Age: 66
End: 2021-12-02
Payer: MEDICARE

## 2021-12-02 DIAGNOSIS — M25.562 ACUTE PAIN OF LEFT KNEE: Primary | ICD-10-CM

## 2021-12-02 PROCEDURE — 97112 NEUROMUSCULAR REEDUCATION: CPT | Performed by: PHYSICAL THERAPIST

## 2021-12-02 PROCEDURE — 97110 THERAPEUTIC EXERCISES: CPT | Performed by: PHYSICAL THERAPIST

## 2021-12-06 ENCOUNTER — OFFICE VISIT (OUTPATIENT)
Dept: PHYSICAL THERAPY | Facility: CLINIC | Age: 66
End: 2021-12-06
Payer: MEDICARE

## 2021-12-06 DIAGNOSIS — M25.562 ACUTE PAIN OF LEFT KNEE: Primary | ICD-10-CM

## 2021-12-06 PROCEDURE — 97110 THERAPEUTIC EXERCISES: CPT | Performed by: PHYSICAL THERAPIST

## 2021-12-06 PROCEDURE — 97112 NEUROMUSCULAR REEDUCATION: CPT | Performed by: PHYSICAL THERAPIST

## 2021-12-07 ENCOUNTER — IMMUNIZATIONS (OUTPATIENT)
Dept: INTERNAL MEDICINE CLINIC | Age: 66
End: 2021-12-07
Payer: MEDICARE

## 2021-12-07 DIAGNOSIS — Z23 NEED FOR INFLUENZA VACCINATION: Primary | ICD-10-CM

## 2021-12-07 PROCEDURE — 90662 IIV NO PRSV INCREASED AG IM: CPT

## 2021-12-07 PROCEDURE — G0008 ADMIN INFLUENZA VIRUS VAC: HCPCS

## 2021-12-08 ENCOUNTER — OFFICE VISIT (OUTPATIENT)
Dept: PHYSICAL THERAPY | Facility: CLINIC | Age: 66
End: 2021-12-08
Payer: MEDICARE

## 2021-12-08 DIAGNOSIS — M25.562 ACUTE PAIN OF LEFT KNEE: Primary | ICD-10-CM

## 2021-12-08 PROCEDURE — 97110 THERAPEUTIC EXERCISES: CPT

## 2021-12-08 PROCEDURE — 97140 MANUAL THERAPY 1/> REGIONS: CPT

## 2021-12-08 PROCEDURE — 97112 NEUROMUSCULAR REEDUCATION: CPT

## 2021-12-13 ENCOUNTER — OFFICE VISIT (OUTPATIENT)
Dept: PHYSICAL THERAPY | Facility: CLINIC | Age: 66
End: 2021-12-13
Payer: MEDICARE

## 2021-12-13 DIAGNOSIS — M25.562 ACUTE PAIN OF LEFT KNEE: Primary | ICD-10-CM

## 2021-12-13 PROCEDURE — 97140 MANUAL THERAPY 1/> REGIONS: CPT | Performed by: PHYSICAL THERAPIST

## 2021-12-13 PROCEDURE — 97112 NEUROMUSCULAR REEDUCATION: CPT | Performed by: PHYSICAL THERAPIST

## 2021-12-13 PROCEDURE — 97110 THERAPEUTIC EXERCISES: CPT | Performed by: PHYSICAL THERAPIST

## 2021-12-16 ENCOUNTER — OFFICE VISIT (OUTPATIENT)
Dept: PHYSICAL THERAPY | Facility: CLINIC | Age: 66
End: 2021-12-16
Payer: MEDICARE

## 2021-12-16 DIAGNOSIS — M25.562 ACUTE PAIN OF LEFT KNEE: Primary | ICD-10-CM

## 2021-12-16 PROCEDURE — 97112 NEUROMUSCULAR REEDUCATION: CPT | Performed by: PHYSICAL THERAPIST

## 2021-12-16 PROCEDURE — 97110 THERAPEUTIC EXERCISES: CPT | Performed by: PHYSICAL THERAPIST

## 2021-12-21 ENCOUNTER — APPOINTMENT (OUTPATIENT)
Dept: PHYSICAL THERAPY | Facility: CLINIC | Age: 66
End: 2021-12-21
Payer: MEDICARE

## 2021-12-28 ENCOUNTER — APPOINTMENT (OUTPATIENT)
Dept: PHYSICAL THERAPY | Facility: CLINIC | Age: 66
End: 2021-12-28
Payer: MEDICARE

## 2022-01-10 ENCOUNTER — PRE-OP CATARACT MEASUREMENTS (OUTPATIENT)
Dept: URBAN - METROPOLITAN AREA CLINIC 6 | Facility: CLINIC | Age: 67
End: 2022-01-10

## 2022-01-10 DIAGNOSIS — H25.813: ICD-10-CM

## 2022-01-10 DIAGNOSIS — H35.363: ICD-10-CM

## 2022-01-10 PROCEDURE — 92012 INTRM OPH EXAM EST PATIENT: CPT

## 2022-01-10 PROCEDURE — 92136 OPHTHALMIC BIOMETRY: CPT

## 2022-01-10 ASSESSMENT — KERATOMETRY
OS_AXISANGLE_DEGREES: 168
OD_K1POWER_DIOPTERS: 41.00
OD_AXISANGLE2_DEGREES: 86
OS_AXISANGLE2_DEGREES: 78
OD_AXISANGLE_DEGREES: 176
OS_K1POWER_DIOPTERS: 40.50
OD_K2POWER_DIOPTERS: 42.25
OS_K2POWER_DIOPTERS: 41.50

## 2022-01-10 ASSESSMENT — VISUAL ACUITY
OD_CC: 20/60-1
OS_CC: J1-2
OD_CC: J1
OS_GLARE: 20/80
OD_PH: 20/25-1
OD_GLARE: 20/60
OS_CC: 20/30-2

## 2022-01-10 ASSESSMENT — TONOMETRY
OS_IOP_MMHG: 20
OD_IOP_MMHG: 18

## 2022-01-18 DIAGNOSIS — I10 ESSENTIAL HYPERTENSION: ICD-10-CM

## 2022-01-18 RX ORDER — AMILORIDE HYDROCHLORIDE AND HYDROCHLOROTHIAZIDE 5; 50 MG/1; MG/1
TABLET ORAL
Qty: 90 TABLET | Refills: 1 | OUTPATIENT
Start: 2022-01-18

## 2022-01-18 RX ORDER — AMILORIDE HYDROCHLORIDE AND HYDROCHLOROTHIAZIDE 5; 50 MG/1; MG/1
1 TABLET ORAL DAILY
Qty: 90 TABLET | Refills: 1 | Status: SHIPPED | OUTPATIENT
Start: 2022-01-18 | End: 2022-07-18 | Stop reason: SDUPTHER

## 2022-03-21 DIAGNOSIS — E03.9 ACQUIRED HYPOTHYROIDISM: ICD-10-CM

## 2022-03-21 RX ORDER — LEVOTHYROXINE SODIUM 0.05 MG/1
50 TABLET ORAL DAILY
Qty: 108 TABLET | Refills: 3 | Status: SHIPPED | OUTPATIENT
Start: 2022-03-21

## 2022-03-29 ENCOUNTER — CONSULT (OUTPATIENT)
Dept: INTERNAL MEDICINE CLINIC | Facility: OTHER | Age: 67
End: 2022-03-29
Payer: MEDICARE

## 2022-03-29 VITALS
DIASTOLIC BLOOD PRESSURE: 80 MMHG | TEMPERATURE: 98 F | HEIGHT: 70 IN | HEART RATE: 55 BPM | SYSTOLIC BLOOD PRESSURE: 130 MMHG | BODY MASS INDEX: 22.9 KG/M2 | WEIGHT: 160 LBS | OXYGEN SATURATION: 97 %

## 2022-03-29 DIAGNOSIS — H25.9 AGE-RELATED CATARACT OF BOTH EYES, UNSPECIFIED AGE-RELATED CATARACT TYPE: ICD-10-CM

## 2022-03-29 DIAGNOSIS — Z12.31 ENCOUNTER FOR SCREENING MAMMOGRAM FOR MALIGNANT NEOPLASM OF BREAST: Primary | ICD-10-CM

## 2022-03-29 DIAGNOSIS — Z01.818 PREOP EXAMINATION: ICD-10-CM

## 2022-03-29 DIAGNOSIS — I10 BENIGN ESSENTIAL HTN: ICD-10-CM

## 2022-03-29 DIAGNOSIS — E03.9 ACQUIRED HYPOTHYROIDISM: ICD-10-CM

## 2022-03-29 PROCEDURE — 99213 OFFICE O/P EST LOW 20 MIN: CPT | Performed by: NURSE PRACTITIONER

## 2022-03-29 RX ORDER — GENTAMICIN SULFATE 3 MG/ML
SOLUTION/ DROPS OPHTHALMIC
COMMUNITY
Start: 2022-01-10 | End: 2022-07-15

## 2022-03-29 RX ORDER — CYCLOPENTOLATE HCL 1 %
DROPS OPHTHALMIC (EYE)
COMMUNITY
Start: 2022-03-18 | End: 2022-04-15

## 2022-03-29 RX ORDER — BROMFENAC SODIUM 0.7 MG/ML
SOLUTION/ DROPS OPHTHALMIC
COMMUNITY
Start: 2022-03-16 | End: 2022-07-27

## 2022-03-29 RX ORDER — PREDNISOLONE ACETATE 10 MG/ML
SUSPENSION/ DROPS OPHTHALMIC
COMMUNITY
Start: 2022-03-14 | End: 2022-07-27

## 2022-03-29 RX ORDER — OFLOXACIN 3 MG/ML
SOLUTION/ DROPS OPHTHALMIC
COMMUNITY
Start: 2022-03-14 | End: 2022-07-27

## 2022-03-29 RX ORDER — KETOROLAC TROMETHAMINE 5 MG/ML
SOLUTION OPHTHALMIC
COMMUNITY
Start: 2022-01-10 | End: 2022-04-15 | Stop reason: ALTCHOICE

## 2022-03-29 NOTE — PROGRESS NOTES
Assessment/Plan:    Hypothyroidism  Due for repeat TSH  Continue on levothyroxine 50 mcg tablet daily  Benign essential HTN  Well controlled on Moduretic  HR on the softer side today  Diagnoses and all orders for this visit:    Encounter for screening mammogram for malignant neoplasm of breast  -     Mammo screening bilateral w 3d & cad; Future    Acquired hypothyroidism    Benign essential HTN    Age-related cataract of both eyes, unspecified age-related cataract type    Preop examination    Other orders  -     Prolensa 0 07 % SOLN  -     Cyclogyl 1 % ophthalmic solution; INSTILL 1 DROP INTO THE RIGHT EYE THREE TIMES DAILY  -     gentamicin (GARAMYCIN) 0 3 % ophthalmic solution  -     ketorolac (ACULAR) 0 5 % ophthalmic solution  -     ofloxacin (OCUFLOX) 0 3 % ophthalmic solution; INSTILL 1 DROP INTO THE RIGHT EYE FOUR TIMES A DAY  -     prednisoLONE acetate (PRED FORTE) 1 % ophthalmic suspension; INSTILL 1 DROP INTO THE RIGHT EYE THREE TIMES A DAY          Subjective:    Matthias Pantoja is a 78y o  year old male or female who presents to the office today for a preoperative consultation at the request of surgeon Dr Alisia Dan who plans on performing right cataract surgery on 4/4/22 and left cataract surgery on 5/2/22  This consultation is requested for the specific conditions prompting preoperative evaluation (i e  because of potential affect on operative risk)  Planned anesthesia: MAC  The patient has the following known anesthesia issues: denies  Patients bleeding risk: no remote history of abnormal bleeding  Patient does not have objections to receiving blood products if needed  Patient is able to walk 4 blocks without symptoms  Patient is able to walk up 2 flights of stairs without symptoms       Significant past medical history includes:  Patient Active Problem List   Diagnosis    Benign essential HTN    Hypothyroidism    Acute pain of left foot    Closed fracture of phalanx of left fifth toe    Screening cholesterol level    Medicare annual wellness visit, subsequent    Pelvic pain    Callus of foot    Vaginal cyst    Acute pain of left knee    Traumatic injury of knee    Age-related cataract of both eyes          Tobacco use: denies  Alcohol use: social  Illicit drug use: denies    Symptoms:   Easy bleeding: no  Easy bruising: no  Frequent nose bleeds: no  Chest pain: no  Cough: no  Dyspnea on exertion:no  Edema: no  Palpitations: no  Wheezing: no    Living situation: Patient lives with her  in a two story home  Her  will be caring for her after surgery  shedoes not have post-op concerns  The following portions of the patient's history were reviewed and updated as appropriate: allergies, current medications, past family history, past medical history, past social history, past surgical history and problem list     Review of Systems  Review of Systems   Constitutional: Negative for activity change, appetite change, chills, diaphoresis and fever  HENT: Negative for congestion, ear discharge, ear pain, postnasal drip, rhinorrhea, sinus pressure, sinus pain and sore throat  Eyes: Negative for pain, discharge, itching and visual disturbance  Respiratory: Negative for cough, chest tightness, shortness of breath and wheezing  Cardiovascular: Negative for chest pain, palpitations and leg swelling  Gastrointestinal: Negative for abdominal pain, blood in stool, constipation, diarrhea, nausea and vomiting  Endocrine: Negative for polydipsia, polyphagia and polyuria  Genitourinary: Negative for difficulty urinating, dysuria, hematuria and urgency  Musculoskeletal: Negative for arthralgias, back pain and neck pain  Skin: Negative for rash and wound  Neurological: Negative for dizziness, weakness, numbness and headaches           Past Medical History:   Diagnosis Date    Benign essential HTN 9/27/2017    Elevated BUN     resolved 3/10/17    Fluid retention in legs last assessed 5/12/15    Hypothyroidism     Lumbago with sciatica     last assessed 4/10/14    Lumbar radiculopathy     last assessed 4/10/14    Onychomycosis of toenail     last assessed 11/7/16     Past Surgical History:   Procedure Laterality Date    APPENDECTOMY      BREAST CYST ASPIRATION Left 1993    KNEE ARTHROSCOPY Left     therapeutic     Social History     Tobacco Use    Smoking status: Never Smoker    Smokeless tobacco: Never Used   Vaping Use    Vaping Use: Never used   Substance Use Topics    Alcohol use: Yes     Comment: rarely    Drug use: No     Family History   Problem Relation Age of Onset    Breast cancer Mother 80    Hypertension Mother     Ovarian cancer Maternal Aunt 29    Ovarian cancer Cousin 54    Prostate cancer Paternal Uncle 54    No Known Problems Maternal Grandmother     No Known Problems Maternal Grandfather     No Known Problems Paternal Grandmother     No Known Problems Paternal Grandfather     No Known Problems Maternal Aunt     No Known Problems Paternal Aunt      Patient has no known allergies    Current Outpatient Medications   Medication Sig Dispense Refill    AMILoride-hydrochlorothiazide (MODURETIC) 5-50 mg per tablet Take 1 tablet by mouth daily 90 tablet 1    Ascorbic Acid (vitamin C) 1000 MG tablet Take 1,000 mg by mouth daily      Calcium Carbonate-Vitamin D3 600-400 MG-UNIT TABS Take 2 tablets by mouth daily      Collagen Hydrolysate POWD 1 Scoop by Does not apply route daily       Cyclogyl 1 % ophthalmic solution INSTILL 1 DROP INTO THE RIGHT EYE THREE TIMES DAILY      gentamicin (GARAMYCIN) 0 3 % ophthalmic solution       Glucosamine-Chondroit-Vit C-Mn (GLUCOSAMINE-CHONDROITIN) TABS Take 2 tablets by mouth daily      ketorolac (ACULAR) 0 5 % ophthalmic solution       levothyroxine 50 mcg tablet Take 1 tablet (50 mcg total) by mouth daily Alt with 1 5 tab M,W,F 108 tablet 3    Magnesium Carbonate POWD 325 mg by Does not apply route daily  Multiple Vitamin (MULTI-DAY) TABS Take 2 tablets by mouth daily       Multiple Vitamins-Minerals (ULTRA AYSHA PO) Take 2 tablets by mouth daily      ofloxacin (OCUFLOX) 0 3 % ophthalmic solution INSTILL 1 DROP INTO THE RIGHT EYE FOUR TIMES A DAY      Omega-3 Fatty Acids (FISH OIL) 1200 MG CAPS Take 2 capsules by mouth daily       prednisoLONE acetate (PRED FORTE) 1 % ophthalmic suspension INSTILL 1 DROP INTO THE RIGHT EYE THREE TIMES A DAY      Prolensa 0 07 % SOLN        No current facility-administered medications for this visit  Objective:       /80 (BP Location: Left arm, Patient Position: Sitting, Cuff Size: Adult)   Pulse 55   Temp 98 °F (36 7 °C) (Temporal)   Ht 5' 10" (1 778 m)   Wt 72 6 kg (160 lb)   SpO2 97%   BMI 22 96 kg/m²   Physical Exam  Constitutional:       General: She is not in acute distress  Appearance: She is well-developed  She is not diaphoretic  HENT:      Head: Normocephalic and atraumatic  Right Ear: External ear normal       Left Ear: External ear normal       Nose: Nose normal       Mouth/Throat:      Pharynx: No oropharyngeal exudate  Eyes:      General:         Right eye: No discharge  Left eye: No discharge  Conjunctiva/sclera: Conjunctivae normal       Pupils: Pupils are equal, round, and reactive to light  Neck:      Thyroid: No thyromegaly  Cardiovascular:      Rate and Rhythm: Normal rate and regular rhythm  Heart sounds: Normal heart sounds  No murmur heard  No friction rub  No gallop  Pulmonary:      Effort: Pulmonary effort is normal  No respiratory distress  Breath sounds: Normal breath sounds  No stridor  No wheezing or rales  Abdominal:      General: Bowel sounds are normal  There is no distension  Palpations: Abdomen is soft  Tenderness: There is no abdominal tenderness  Musculoskeletal:      Cervical back: Normal range of motion and neck supple     Lymphadenopathy:      Cervical: No cervical adenopathy  Skin:     General: Skin is warm and dry  Findings: No erythema or rash  Neurological:      Mental Status: She is alert and oriented to person, place, and time  Psychiatric:         Behavior: Behavior normal          Thought Content: Thought content normal          Judgment: Judgment normal             Lab Review   Due for updated blood work, last labs from last year  Assessment:     78 y o  male or female with planned surgery as above  Known risk factors for perioperative complications: None      Cardiac Risk Estimation:     Radha Mendez is cleared from a cardiovascular standpoint to proceed with surgery  she is at a mild risk from a cardiovascular standpoint at this time without any additional cardiac testing  Reevaluation needed if he should present with symptoms prior to surgery  Plan:  Preoperative workup as follows fasting blood work  Change in medication regimen before surgery: Discussed with surgery prior to surgery      Addendum 03/30/2022 at 1:01 p m :  Reviewed fasting blood work patient cleared for surgery

## 2022-03-30 ENCOUNTER — APPOINTMENT (OUTPATIENT)
Dept: LAB | Age: 67
End: 2022-03-30
Payer: MEDICARE

## 2022-03-30 ENCOUNTER — TELEPHONE (OUTPATIENT)
Dept: INTERNAL MEDICINE CLINIC | Facility: OTHER | Age: 67
End: 2022-03-30

## 2022-03-30 DIAGNOSIS — E03.9 ACQUIRED HYPOTHYROIDISM: ICD-10-CM

## 2022-03-30 DIAGNOSIS — I10 BENIGN ESSENTIAL HTN: ICD-10-CM

## 2022-03-30 DIAGNOSIS — Z13.220 SCREENING CHOLESTEROL LEVEL: ICD-10-CM

## 2022-03-30 LAB
ALBUMIN SERPL BCP-MCNC: 3.5 G/DL (ref 3.5–5)
ALP SERPL-CCNC: 49 U/L (ref 46–116)
ALT SERPL W P-5'-P-CCNC: 23 U/L (ref 12–78)
ANION GAP SERPL CALCULATED.3IONS-SCNC: 1 MMOL/L (ref 4–13)
AST SERPL W P-5'-P-CCNC: 21 U/L (ref 5–45)
BASOPHILS # BLD AUTO: 0.06 THOUSANDS/ΜL (ref 0–0.1)
BASOPHILS NFR BLD AUTO: 1 % (ref 0–1)
BILIRUB SERPL-MCNC: 0.65 MG/DL (ref 0.2–1)
BUN SERPL-MCNC: 14 MG/DL (ref 5–25)
CALCIUM SERPL-MCNC: 8.9 MG/DL (ref 8.3–10.1)
CHLORIDE SERPL-SCNC: 99 MMOL/L (ref 100–108)
CHOLEST SERPL-MCNC: 171 MG/DL
CO2 SERPL-SCNC: 32 MMOL/L (ref 21–32)
CREAT SERPL-MCNC: 0.79 MG/DL (ref 0.6–1.3)
EOSINOPHIL # BLD AUTO: 0.35 THOUSAND/ΜL (ref 0–0.61)
EOSINOPHIL NFR BLD AUTO: 5 % (ref 0–6)
ERYTHROCYTE [DISTWIDTH] IN BLOOD BY AUTOMATED COUNT: 13.2 % (ref 11.6–15.1)
GFR SERPL CREATININE-BSD FRML MDRD: 77 ML/MIN/1.73SQ M
GLUCOSE P FAST SERPL-MCNC: 85 MG/DL (ref 65–99)
HCT VFR BLD AUTO: 42 % (ref 34.8–46.1)
HDLC SERPL-MCNC: 82 MG/DL
HGB BLD-MCNC: 13.8 G/DL (ref 11.5–15.4)
IMM GRANULOCYTES # BLD AUTO: 0.02 THOUSAND/UL (ref 0–0.2)
IMM GRANULOCYTES NFR BLD AUTO: 0 % (ref 0–2)
LDLC SERPL CALC-MCNC: 80 MG/DL (ref 0–100)
LYMPHOCYTES # BLD AUTO: 1.19 THOUSANDS/ΜL (ref 0.6–4.47)
LYMPHOCYTES NFR BLD AUTO: 18 % (ref 14–44)
MCH RBC QN AUTO: 29.6 PG (ref 26.8–34.3)
MCHC RBC AUTO-ENTMCNC: 32.9 G/DL (ref 31.4–37.4)
MCV RBC AUTO: 90 FL (ref 82–98)
MONOCYTES # BLD AUTO: 0.88 THOUSAND/ΜL (ref 0.17–1.22)
MONOCYTES NFR BLD AUTO: 13 % (ref 4–12)
NEUTROPHILS # BLD AUTO: 4.07 THOUSANDS/ΜL (ref 1.85–7.62)
NEUTS SEG NFR BLD AUTO: 63 % (ref 43–75)
NONHDLC SERPL-MCNC: 89 MG/DL
NRBC BLD AUTO-RTO: 0 /100 WBCS
PLATELET # BLD AUTO: 201 THOUSANDS/UL (ref 149–390)
PMV BLD AUTO: 10.6 FL (ref 8.9–12.7)
POTASSIUM SERPL-SCNC: 4.1 MMOL/L (ref 3.5–5.3)
PROT SERPL-MCNC: 6.5 G/DL (ref 6.4–8.2)
RBC # BLD AUTO: 4.66 MILLION/UL (ref 3.81–5.12)
SODIUM SERPL-SCNC: 132 MMOL/L (ref 136–145)
TRIGL SERPL-MCNC: 43 MG/DL
TSH SERPL DL<=0.05 MIU/L-ACNC: 2.14 UIU/ML (ref 0.36–3.74)
WBC # BLD AUTO: 6.57 THOUSAND/UL (ref 4.31–10.16)

## 2022-03-30 PROCEDURE — 36415 COLL VENOUS BLD VENIPUNCTURE: CPT

## 2022-03-30 PROCEDURE — 85025 COMPLETE CBC W/AUTO DIFF WBC: CPT

## 2022-03-30 PROCEDURE — 84443 ASSAY THYROID STIM HORMONE: CPT

## 2022-03-30 PROCEDURE — 80053 COMPREHEN METABOLIC PANEL: CPT

## 2022-03-30 PROCEDURE — 80061 LIPID PANEL: CPT

## 2022-03-30 NOTE — TELEPHONE ENCOUNTER
Please call and notify patient that I reviewed her labs and we faxed forms and note to surgeon for surgery  She was noted to be hyponatremic which means a low-sodium, we would recommend a repeat BMP in 2 weeks, please call and notify patient  Patient will have follow-up with Dr Gali Moran in the middle of April I will send as an FYI  If still noted to be hyponatremic may recommend adjusting Amiloride-hydrochlorothiazide

## 2022-04-15 ENCOUNTER — OFFICE VISIT (OUTPATIENT)
Dept: INTERNAL MEDICINE CLINIC | Age: 67
End: 2022-04-15
Payer: MEDICARE

## 2022-04-15 VITALS
HEIGHT: 70 IN | TEMPERATURE: 97.6 F | HEART RATE: 74 BPM | WEIGHT: 161.3 LBS | SYSTOLIC BLOOD PRESSURE: 120 MMHG | BODY MASS INDEX: 23.09 KG/M2 | DIASTOLIC BLOOD PRESSURE: 78 MMHG | OXYGEN SATURATION: 98 %

## 2022-04-15 DIAGNOSIS — M25.562 ACUTE PAIN OF LEFT KNEE: ICD-10-CM

## 2022-04-15 DIAGNOSIS — I10 BENIGN ESSENTIAL HTN: ICD-10-CM

## 2022-04-15 DIAGNOSIS — R79.89 HIGH SERUM HIGH DENSITY LIPOPROTEIN (HDL): ICD-10-CM

## 2022-04-15 DIAGNOSIS — E03.9 ACQUIRED HYPOTHYROIDISM: ICD-10-CM

## 2022-04-15 DIAGNOSIS — M85.89 OSTEOPENIA OF MULTIPLE SITES: ICD-10-CM
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
## 2022-04-15 DIAGNOSIS — E87.1 HYPONATREMIA: ICD-10-CM

## 2022-04-15 DIAGNOSIS — Z00.00 MEDICARE ANNUAL WELLNESS VISIT, SUBSEQUENT: Primary | ICD-10-CM

## 2022-04-15 DIAGNOSIS — M25.469 EDEMA OF KNEE: ICD-10-CM

## 2022-04-15 PROBLEM — M79.672 ACUTE PAIN OF LEFT FOOT: Status: RESOLVED | Noted: 2019-02-27 | Resolved: 2022-04-15

## 2022-04-15 PROBLEM — Z13.220 SCREENING CHOLESTEROL LEVEL: Status: RESOLVED | Noted: 2020-09-08 | Resolved: 2022-04-15

## 2022-04-15 PROBLEM — S92.502A: Status: RESOLVED | Noted: 2019-02-28 | Resolved: 2022-04-15

## 2022-04-15 PROCEDURE — G0439 PPPS, SUBSEQ VISIT: HCPCS | Performed by: FAMILY MEDICINE

## 2022-04-15 PROCEDURE — 99214 OFFICE O/P EST MOD 30 MIN: CPT | Performed by: FAMILY MEDICINE

## 2022-04-15 PROCEDURE — 1123F ACP DISCUSS/DSCN MKR DOCD: CPT | Performed by: FAMILY MEDICINE

## 2022-04-15 RX ORDER — AMOXICILLIN 875 MG/1
TABLET, COATED ORAL
COMMUNITY
Start: 2022-04-15 | End: 2022-07-15

## 2022-04-15 NOTE — PROGRESS NOTES
Assessment and Plan:     Problem List Items Addressed This Visit        Endocrine    Hypothyroidism       Cardiovascular and Mediastinum    Benign essential HTN       Other    Medicare annual wellness visit, subsequent - Primary    Hyponatremia    High serum high density lipoprotein (HDL)          mammo due  [place] 2021  PNA 23 - due     Preventive health issues were discussed with patient, and age appropriate screening tests were ordered as noted in patient's After Visit Summary  Personalized health advice and appropriate referrals for health education or preventive services given if needed, as noted in patient's After Visit Summary       History of Present Illness:     Patient presents for Medicare Annual Wellness visit    Patient Care Team:  Jb Looney DO as PCP - General (Family Medicine)  Jb Looney DO as PCP - PCP-[facility] Dollene Gosselin, MD (Obstetrics and Gynecology)     Problem List:     Patient Active Problem List   Diagnosis    Benign essential HTN    Hypothyroidism    Acute pain of left foot    Closed fracture of phalanx of left fifth toe    Medicare annual wellness visit, subsequent    Pelvic pain    Callus of foot    Vaginal cyst    Acute pain of left knee    Traumatic injury of knee    Age-related cataract of both eyes    Hyponatremia    High serum high density lipoprotein (HDL)      Past Medical and Surgical History:     Past Medical History:   Diagnosis Date    Benign essential HTN 9/27/2017    Elevated BUN     resolved 3/10/17    Fluid retention in legs     last assessed 5/12/15    Hypothyroidism     Lumbago with sciatica     last assessed 4/10/14    Lumbar radiculopathy     last assessed 4/10/14    Onychomycosis of toenail     last assessed 11/7/16     Past Surgical History:   Procedure Laterality Date    APPENDECTOMY      BREAST CYST ASPIRATION Left 1993    CATARACT EXTRACTION Right     KNEE ARTHROSCOPY Left     therapeutic      Family History:     Family History   Problem Relation Age of Onset    Breast cancer Mother 80    Hypertension Mother     Ovarian cancer Maternal Aunt 29    Ovarian cancer Cousin 54    Prostate cancer Paternal Uncle 54    No Known Problems Maternal Grandmother     No Known Problems Maternal Grandfather     No Known Problems Paternal Grandmother     No Known Problems Paternal Grandfather     No Known Problems Maternal Aunt     No Known Problems Paternal Aunt       Social History:     Social History     Socioeconomic History    Marital status: /Civil Union     Spouse name: None    Number of children: None    Years of education: None    Highest education level: None   Occupational History    None   Tobacco Use    Smoking status: Never Smoker    Smokeless tobacco: Never Used   Vaping Use    Vaping Use: Never used   Substance and Sexual Activity    Alcohol use: Yes     Comment: rarely    Drug use: No    Sexual activity: Not Currently   Other Topics Concern    None   Social History Narrative    [place] April[place]     Social Determinants of Health     Financial Resource Strain: Not on file   Food Insecurity: Not on file   Transportation Needs: Not on file   Physical Activity: Not on file   Stress: Not on file   Social Connections: Not on file   Intimate Partner Violence: Not on file   Housing Stability: Not on file      Medications and Allergies:     Current Outpatient Medications   Medication Sig Dispense Refill    AMILoride-hydrochlorothiazide (MODURETIC) 5-50 mg per tablet Take 1 tablet by mouth daily 90 tablet 1    Ascorbic Acid (vitamin C) 1000 MG tablet Take 1,000 mg by mouth daily      Calcium Carbonate-Vitamin D3 600-400 MG-UNIT TABS Take 2 tablets by mouth daily      Collagen Hydrolysate POWD 1 Scoop by Does not apply route daily       gentamicin (GARAMYCIN) 0 3 % ophthalmic solution       Glucosamine-Chondroit-Vit C-Mn (GLUCOSAMINE-CHONDROITIN) TABS Take 2 tablets by mouth daily      levothyroxine 50 mcg tablet Take 1 tablet (50 mcg total) by mouth daily Alt with 1 5 tab M,W,F 108 tablet 3    Magnesium Carbonate POWD 325 mg by Does not apply route daily      Multiple Vitamin (MULTI-DAY) TABS Take 2 tablets by mouth daily       Multiple Vitamins-Minerals (ULTRA AYSAH PO) Take 2 tablets by mouth daily      ofloxacin (OCUFLOX) 0 3 % ophthalmic solution INSTILL 1 DROP INTO THE RIGHT EYE FOUR TIMES A DAY      Omega-3 Fatty Acids (FISH OIL) 1200 MG CAPS Take 2 capsules by mouth daily       prednisoLONE acetate (PRED FORTE) 1 % ophthalmic suspension INSTILL 1 DROP INTO THE RIGHT EYE THREE TIMES A DAY      amoxicillin (AMOXIL) 875 mg tablet  (Patient not taking: Reported on 4/15/2022 )      Prolensa 0 07 % SOLN        No current facility-administered medications for this visit  No Known Allergies   Immunizations:     Immunization History   Administered Date(s) Administered    COVID-19 MODERNA VACC 0 5 ML IM 01/24/2021, 02/21/2021, 10/24/2021    COVID-19, unspecified 01/24/2021, 02/21/2021    Influenza, high dose seasonal 0 7 mL 10/06/2020, 12/07/2021    Pneumococcal Conjugate 13-Valent 09/08/2020    Tdap 09/08/2020      Health Maintenance:         Topic Date Due    Breast Cancer Screening: Mammogram  02/24/2021    Colorectal Cancer Screening  04/20/2024    Hepatitis C Screening  Completed         Topic Date Due    Pneumococcal Vaccine: 65+ Years (2 of 2 - PPSV23) 09/08/2021      Medicare Health Risk Assessment:     /78 (BP Location: Left arm, Patient Position: Sitting, Cuff Size: Standard)   Pulse 74   Temp 97 6 °F (36 4 °C) (Temporal)   Ht 5' 10" (1 778 m)   Wt 73 2 kg (161 lb 4 8 oz)   SpO2 98%   BMI 23 14 kg/m²      Radha is here for her Subsequent Wellness visit  Last Medicare Wellness visit information reviewed, patient interviewed and updates made to the record today  Health Risk Assessment:   Patient rates overall health as very good   Patient feels that their physical health rating is same  Patient is satisfied with their life  Eyesight was rated as slightly worse  Hearing was rated as slightly worse  Patient feels that their emotional and mental health rating is same  Patients states they are sometimes angry  Patient states they are sometimes unusually tired/fatigued  Pain experienced in the last 7 days has been some  Patient's pain rating has been 6/10  Patient states that she has experienced no weight loss or gain in last 6 months  Depression Screening:   PHQ-2 Score: 0      Fall Risk Screening: In the past year, patient has experienced: history of falling in past year    Number of falls: 2 or more  Injured during fall?: Yes    Feels unsteady when standing or walking?: Yes    Worried about falling?: Yes      Urinary Incontinence Screening:   Patient has not leaked urine accidently in the last six months  Home Safety:  Patient does not have trouble with stairs inside or outside of their home  Patient has working smoke alarms and has working carbon monoxide detector  Home safety hazards include: none  Nutrition:   Current diet is Regular  Medications:   Patient is currently taking over-the-counter supplements  OTC medications include: Vitamins  Patient is able to manage medications  Activities of Daily Living (ADLs)/Instrumental Activities of Daily Living (IADLs):   Walk and transfer into and out of bed and chair?: Yes  Dress and groom yourself?: Yes    Bathe or shower yourself?: Yes    Feed yourself?  Yes  Do your laundry/housekeeping?: Yes  Manage your money, pay your bills and track your expenses?: Yes  Make your own meals?: Yes    Do your own shopping?: Yes    Previous Hospitalizations:   Any hospitalizations or ED visits within the last 12 months?: No      Advance Care Planning:   Living will: No    Durable POA for healthcare: No    Advanced directive: No      Comments: Her     Cognitive Screening:   Provider or family/friend/caregiver concerned regarding cognition?: No    PREVENTIVE SCREENINGS      Cardiovascular Screening:    General: Screening Current      Diabetes Screening:     General: Screening Current      Colorectal Cancer Screening:     General: Screening Current      Cervical Cancer Screening:    General: Screening Not Indicated      Lung Cancer Screening:     General: Screening Not Indicated      Hepatitis C Screening:    General: Screening Current    Screening, Brief Intervention, and Referral to Treatment (SBIRT)    Screening  Typical number of drinks in a day: 0  Typical number of drinks in a week: 0  Interpretation: Low risk drinking behavior  AUDIT-C Screenin) How often did you have a drink containing alcohol in the past year? monthly or less  2) How many drinks did you have on a typical day when you were drinking in the past year? 0  3) How often did you have 6 or more drinks on one occasion in the past year? never    AUDIT-C Score: 1  Interpretation: Score 0-2 (female): Negative screen for alcohol misuse    Single Item Drug Screening:  How often have you used an illegal drug (including marijuana) or a prescription medication for non-medical reasons in the past year? never    Single Item Drug Screen Score: 0  Interpretation: Negative screen for possible drug use disorder    Brief Intervention  Alcohol & drug use screenings were reviewed  No concerns regarding substance use disorder identified  Other Counseling Topics:   Car/seat belt/driving safety, skin self-exam, sunscreen and regular weightbearing exercise and calcium and vitamin D intake         Yue Ramírez,

## 2022-04-15 NOTE — PROGRESS NOTES
Answers for HPI/ROS submitted by the patient on 4/8/2022  How would you rate your overall health?: very good  Compared to last year, how is your physical health?: same  In general, how satisfied are you with your life?: satisfied  Compared to last year, how is your eyesight?: slightly worse  Compared to last year, how is your hearing?: slightly worse  Compared to last year, how is your emotional/mental health?: same  How often is anger a problem for you?: sometimes  How often do you feel unusually tired/fatigued?: sometimes  In the past 7 days, how much pain have you experienced?: some  If you answered "some" or "a lot", please rate the severity of your pain on a scale of 1 to 10 (1 being the least severe pain and 10 being the most intense pain)  : 6/10  In the past 6 months, have you lost or gained 10 pounds without trying?: No  One or more falls in the last year: Yes  In the past 6 months, have you accidentally leaked urine?: No  Do you have trouble with the stairs inside or outside your home?: No  Does your home have working smoke alarms?: Yes  Does your home have a carbon monoxide monitor?: Yes  Which safety hazards (if any) have you experienced in your home? Please select all that apply : none  How would you describe your current diet?  Please select all that apply : Regular  In addition to prescription medications, are you taking any over-the-counter supplements?: Yes  If yes, what supplements are you taking?: Vitamins  Can you manage your medications?: Yes  Are you currently taking any opioid medications?: No  Can you walk and transfer into and out of your bed and chair?: Yes  Can you dress and groom yourself?: Yes  Can you bathe or shower yourself?: Yes  Can you feed yourself?: Yes  Can you do your laundry/ housekeeping?: Yes  Can you manage your money, pay your bills, and track your expenses?: Yes  Can you make your own meals?: Yes  Can you do your own shopping?: Yes  Within the last 12 months, have you had any hospitalizations or Emergency Department visits?: No  Do you have a living will?: No  Do you have a Durable POA (Power of ) for healthcare decisions?: No  Do you have an Advanced Directive for end of life decisions?: No  How often have you used an illegal drug (including marijuana) or a prescription medication for non-medical reasons in the past year?: never  What is the typical number of drinks you consume in a day?: 0  What is the typical number of drinks you consume in a week?: 0  How often did you have a drink containing alcohol in the past year?: monthly or less  How many drinks did you have on a typical day  when you were drinking in the past year?: 0  How often did you have 6 or more drinks on one occasion in the past year?: never    Assessment/Plan:    1  Medicare annual wellness visit, subsequent    2  Benign essential HTN  -     Comprehensive metabolic panel; Future    3  Acquired hypothyroidism    4  Hyponatremia    5  High serum high density lipoprotein (HDL)    6  Acute pain of left knee  -     MRI knee left  wo contrast; Future; Expected date: 04/15/2022    7  Fall on same level from slipping, initial encounter  -     MRI knee left  wo contrast; Future; Expected date: 04/15/2022    8  Osteopenia of multiple sites  -     DXA bone density spine hip and pelvis; Future; Expected date: 04/15/2022    9  Edema of knee  -     MRI knee left  wo contrast; Future; Expected date: 04/15/2022       completed PT, no change or improvement, still swollen and had a fall since that time, had ecchymosis, last XRAY was suggestive of ligament injury  Visible edema noted today        Depression Screening and Follow-up Plan: Patient was screened for depression during today's encounter  They screened negative with a PHQ-2 score of 0  Falls Plan of Care: balance, strength, and gait training instructions were provided            Patient Instructions       Medicare Preventive Visit Patient Instructions  Thank you for completing your Welcome to Medicare Visit or Medicare Annual Wellness Visit today  Your next wellness visit will be due in one year (4/16/2023)  The screening/preventive services that you may require over the next 5-10 years are detailed below  Some tests may not apply to you based off risk factors and/or age  Screening tests ordered at today's visit but not completed yet may show as past due  Also, please note that scanned in results may not display below  Preventive Screenings:  Service Recommendations Previous Testing/Comments   Colorectal Cancer Screening  * Colonoscopy    * Fecal Occult Blood Test (FOBT)/Fecal Immunochemical Test (FIT)  * Fecal DNA/Cologuard Test  * Flexible Sigmoidoscopy Age: 54-65 years old   Colonoscopy: every 10 years (may be performed more frequently if at higher risk)  OR  FOBT/FIT: every 1 year  OR  Cologuard: every 3 years  OR  Sigmoidoscopy: every 5 years  Screening may be recommended earlier than age 48 if at higher risk for colorectal cancer  Also, an individualized decision between you and your healthcare provider will decide whether screening between the ages of 74-80 would be appropriate  Colonoscopy: 01/01/2007  FOBT/FIT: Not on file  Cologuard: 04/20/2021  Sigmoidoscopy: Not on file    Screening Current     Breast Cancer Screening Age: 36 years old  Frequency: every 1-2 years  Not required if history of left and right mastectomy Mammogram: 02/24/2020        Cervical Cancer Screening Between the ages of 21-29, pap smear recommended once every 3 years  Between the ages of 33-67, can perform pap smear with HPV co-testing every 5 years     Recommendations may differ for women with a history of total hysterectomy, cervical cancer, or abnormal pap smears in past  Pap Smear: 09/23/2021    Screening Not Indicated   Hepatitis C Screening Once for adults born between 1945 and 1965  More frequently in patients at high risk for Hepatitis C Hep C Antibody: 07/01/2019    Screening Current   Diabetes Screening 1-2 times per year if you're at risk for diabetes or have pre-diabetes Fasting glucose: 85 mg/dL   A1C: 5 5 % of total Hgb    Screening Current   Cholesterol Screening Once every 5 years if you don't have a lipid disorder  May order more often based on risk factors  Lipid panel: 03/30/2022    Screening Current     Other Preventive Screenings Covered by Medicare:  1  Abdominal Aortic Aneurysm (AAA) Screening: covered once if your at risk  You're considered to be at risk if you have a family history of AAA  2  Lung Cancer Screening: covers low dose CT scan once per year if you meet all of the following conditions: (1) Age 50-69; (2) No signs or symptoms of lung cancer; (3) Current smoker or have quit smoking within the last 15 years; (4) You have a tobacco smoking history of at least 30 pack years (packs per day multiplied by number of years you smoked); (5) You get a written order from a healthcare provider  3  Glaucoma Screening: covered annually if you're considered high risk: (1) You have diabetes OR (2) Family history of glaucoma OR (3)  aged 48 and older OR (3)  American aged 72 and older  3  Osteoporosis Screening: covered every 2 years if you meet one of the following conditions: (1) You're estrogen deficient and at risk for osteoporosis based off medical history and other findings; (2) Have a vertebral abnormality; (3) On glucocorticoid therapy for more than 3 months; (4) Have primary hyperparathyroidism; (5) On osteoporosis medications and need to assess response to drug therapy  · Last bone density test (DXA Scan): 09/29/2020   5  HIV Screening: covered annually if you're between the age of 15-65  Also covered annually if you are younger than 13 and older than 72 with risk factors for HIV infection  For pregnant patients, it is covered up to 3 times per pregnancy      Immunizations:  Immunization Recommendations   Influenza Vaccine Annual influenza vaccination during flu season is recommended for all persons aged >= 6 months who do not have contraindications   Pneumococcal Vaccine (Prevnar and Pneumovax)  * Prevnar = PCV13  * Pneumovax = PPSV23   Adults 25-60 years old: 1-3 doses may be recommended based on certain risk factors  Adults 72 years old: Prevnar (PCV13) vaccine recommended followed by Pneumovax (PPSV23) vaccine  If already received PPSV23 since turning 65, then PCV13 recommended at least one year after PPSV23 dose  Hepatitis B Vaccine 3 dose series if at intermediate or high risk (ex: diabetes, end stage renal disease, liver disease)   Tetanus (Td) Vaccine - COST NOT COVERED BY MEDICARE PART B Following completion of primary series, a booster dose should be given every 10 years to maintain immunity against tetanus  Td may also be given as tetanus wound prophylaxis  Tdap Vaccine - COST NOT COVERED BY MEDICARE PART B Recommended at least once for all adults  For pregnant patients, recommended with each pregnancy  Shingles Vaccine (Shingrix) - COST NOT COVERED BY MEDICARE PART B  2 shot series recommended in those aged 48 and above     Health Maintenance Due:      Topic Date Due    Breast Cancer Screening: Mammogram  02/24/2021    Colorectal Cancer Screening  04/20/2024    Hepatitis C Screening  Completed     Immunizations Due:      Topic Date Due    Pneumococcal Vaccine: 65+ Years (2 of 2 - PPSV23) 09/08/2021     Advance Directives   What are advance directives? Advance directives are legal documents that state your wishes and plans for medical care  These plans are made ahead of time in case you lose your ability to make decisions for yourself  Advance directives can apply to any medical decision, such as the treatments you want, and if you want to donate organs  What are the types of advance directives? There are many types of advance directives, and each state has rules about how to use them   You may choose a combination of any of the following:  · Living will: This is a written record of the treatment you want  You can also choose which treatments you do not want, which to limit, and which to stop at a certain time  This includes surgery, medicine, IV fluid, and tube feedings  · Durable power of  for healthcare [place]): This is a written record that states who you want to make healthcare choices for you when you are unable to make them for yourself  This person, called a proxy, is usually a family member or a friend  You may choose more than 1 proxy  · Do not resuscitate (DNR) order:  A DNR order is used in case your heart stops beating or you stop breathing  It is a request not to have certain forms of treatment, such as CPR  A DNR order may be included in other types of advance directives  · Medical directive: This covers the care that you want if you are in a coma, near death, or unable to make decisions for yourself  You can list the treatments you want for each condition  Treatment may include pain medicine, surgery, blood transfusions, dialysis, IV or tube feedings, and a ventilator (breathing machine)  · Values history: This document has questions about your views, beliefs, and how you feel and think about life  This information can help others choose the care that you would choose  Why are advance directives important? An advance directive helps you control your care  Although spoken wishes may be used, it is better to have your wishes written down  Spoken wishes can be misunderstood, or not followed  Treatments may be given even if you do not want them  An advance directive may make it easier for your family to make difficult choices about your care  Fall Prevention    Fall prevention  includes ways to make your home and other areas safer  It also includes ways you can move more carefully to prevent a fall   Health conditions that cause changes in your blood pressure, vision, or muscle strength and coordination may increase your risk for falls  Medicines may also increase your risk for falls if they make you dizzy, weak, or sleepy  Fall prevention tips:   · Stand or sit up slowly  · Use assistive devices as directed  · Wear shoes that fit well and have soles that   · Wear a personal alarm  · Stay active  · Manage your medical conditions  Home Safety Tips:  · Add items to prevent falls in the bathroom  · Keep paths clear  · Install bright lights in your home  · Keep items you use often on shelves within reach  · Paint or place reflective tape on the edges of your stairs  © Copyright [employer] 2018 Information is for End User's use only and may not be sold, redistributed or otherwise used for commercial purposes  All illustrations and images included in CareNotes® are the copyrighted property of [employer]  or [facility] Prevention   AMBULATORY CARE:   Fall prevention  includes ways to make your home and other areas safer  It also includes ways you can move more carefully to prevent a fall  Health conditions that cause changes in your blood pressure, vision, or muscle strength and coordination may increase your risk for falls  Medicines may also increase your risk for falls if they make you dizzy, weak, or sleepy  Call 911 or have someone else call if:   · You have fallen and are unconscious  · You have fallen and cannot move part of your body  Contact your healthcare provider if:   · You have fallen and have pain or a headache  · You have questions or concerns about your condition or care  Fall prevention tips:   · Stand or sit up slowly  This may help you keep your balance and prevent falls  · Use assistive devices as directed  Your healthcare provider may suggest that you use a cane or walker to help you keep your balance  You may need to have grab bars put in your bathroom near the toilet or in the shower      · Wear shoes that fit well and have soles that   Wear shoes both inside and outside  Use slippers with good   Do not wear shoes with high heels  · Wear a personal alarm  This is a device that allows you to call 911 if you fall and need help  Ask your healthcare provider for more information  · Stay active  Exercise can help strengthen your muscles and improve your balance  Your healthcare provider may recommend water aerobics or walking  He or she may also recommend physical therapy to improve your coordination  Never start an exercise program without talking to your healthcare provider first          · Manage your medical conditions  Keep all appointments with your healthcare providers  Visit your eye doctor as directed  Home safety tips:       · Add items to prevent falls in the bathroom  Put nonslip strips on your bath or shower floor to prevent you from slipping  Use a bath mat if you do not have carpet in the bathroom  This will prevent you from falling when you step out of the bath or shower  Use a shower seat so you do not need to stand while you shower  Sit on the toilet or a chair in your bathroom to dry yourself and put on clothing  This will prevent you from losing your balance from drying or dressing yourself while you are standing  · Keep paths clear  Remove books, shoes, and other objects from walkways and stairs  Place cords for telephones and lamps out of the way so that you do not need to walk over them  Tape them down if you cannot move them  Remove small rugs  If you cannot remove a rug, secure it with double-sided tape  This will prevent you from tripping  · Install bright lights in your home  Use night lights to help light paths to the bathroom or kitchen  Always turn on the light before you start walking  · Keep items you use often on shelves within reach  Do not use a step stool to help you reach an item  · Paint or place reflective tape on the edges of your stairs    This will help you see the stairs better  Follow up with your doctor as directed:  Write down your questions so you remember to ask them during your visits  © Copyright [employer] 2022 Information is for End User's use only and may not be sold, redistributed or otherwise used for commercial purposes  All illustrations and images included in CareNotes® are the copyrighted property of [employer]  or Delphine Martinez   The above information is an  only  It is not intended as medical advice for individual conditions or treatments  Talk to your doctor, nurse or pharmacist before following any medical regimen to see if it is safe and effective for you  Due for PNA 23- will schedule in 3 weeks due to easter and eye surgery upcoming    Return in about 6 months (around 10/15/2022) for Recheck  Subjective:      Patient ID: Sanaz Rodriguez is a 78 y o  female  Chief Complaint   Patient presents with    Follow-up     HTN  Review labs     Medicare Wellness Visit     SUB AWV       HPI     Hypothyroidism, on levothyroxine 50 mcg daily and is compliant   Thyroid levels are well controlled      Hypertension   On amiloride with hydrochlorothiazide that she is tolerating well   Checks blood pressure at home and is well controlled   No side effects and renal function is stable  September 2021, well controlled with medications  She is compliant      Knee swelling, left knee swelling for several months now  Completed her 6 weeks of physical therapy with no improvement  X-ray she is suggestive of ligamentous injury  Since that time she has had a fall and her weight landed on the left knee    It is still markedly swollen and she is afraid that something happened      The following portions of the patient's history were reviewed and updated as appropriate: allergies, current medications, past family history, past medical history, past social history, past surgical history and problem list     Review of Systems Constitutional:  Denies fever or chills   Eyes:  Denies double , blurry vision or eye pain  HENT:  Denies nasal congestion or sore throat   Respiratory:  Denies cough or shortness of breath or wheezing  Cardiovascular:  Denies palpitations or chest pain  GI:  Denies abdominal pain, nausea, or vomiting, no loose stools, no reflux  Integument:  Denies rash , no open areas  Neurologic:  Denies headache or focal weakness, no dizziness  : no dysuria, or hematuria'    Current Outpatient Medications   Medication Sig Dispense Refill    AMILoride-hydrochlorothiazide (MODURETIC) 5-50 mg per tablet Take 1 tablet by mouth daily 90 tablet 1    Ascorbic Acid (vitamin C) 1000 MG tablet Take 1,000 mg by mouth daily      Calcium Carbonate-Vitamin D3 600-400 MG-UNIT TABS Take 2 tablets by mouth daily      Collagen Hydrolysate POWD 1 Scoop by Does not apply route daily       gentamicin (GARAMYCIN) 0 3 % ophthalmic solution       Glucosamine-Chondroit-Vit C-Mn (GLUCOSAMINE-CHONDROITIN) TABS Take 2 tablets by mouth daily      levothyroxine 50 mcg tablet Take 1 tablet (50 mcg total) by mouth daily Alt with 1 5 tab M,W,F 108 tablet 3    Magnesium Carbonate POWD 325 mg by Does not apply route daily      Multiple Vitamin (MULTI-DAY) TABS Take 2 tablets by mouth daily       Multiple Vitamins-Minerals (ULTRA AYSHA PO) Take 2 tablets by mouth daily      ofloxacin (OCUFLOX) 0 3 % ophthalmic solution INSTILL 1 DROP INTO THE RIGHT EYE FOUR TIMES A DAY      Omega-3 Fatty Acids (FISH OIL) 1200 MG CAPS Take 2 capsules by mouth daily       prednisoLONE acetate (PRED FORTE) 1 % ophthalmic suspension INSTILL 1 DROP INTO THE RIGHT EYE THREE TIMES A DAY      amoxicillin (AMOXIL) 875 mg tablet  (Patient not taking: Reported on 4/15/2022 )      Prolensa 0 07 % SOLN        No current facility-administered medications for this visit         Objective:    /78 (BP Location: Left arm, Patient Position: Sitting, Cuff Size: Standard) Pulse 74   Temp 97 6 °F (36 4 °C) (Temporal)   Ht 5' 10" (1 778 m)   Wt 73 2 kg (161 lb 4 8 oz)   SpO2 98%   BMI 23 14 kg/m²        Physical Exam        Constitutional:  Well developed, well nourished, no acute distress, non-toxic appearance   Eyes:  PERRL, conjunctiva normal , non icteric sclera  HENT:  Atraumatic, oropharynx moist  Neck-  supple   Respiratory:  CTA b/l, normal breath sounds, no rales, no wheezing   Cardiovascular:  RRR, no murmurs, no LE edema b/l  GI:  Soft, nondistended, normal bowel sounds x 4, nontender, no organomegaly, no mass, no rebound, no guarding   Neurologic:  no focal deficits noted   Psychiatric:  Speech and behavior appropriate , AAO x 3  Musculoskeletal, no gait, left knee visibly edematous compared to the right  Full range of motion  No laxity  Yong Machado,   Falls Plan of Care: Balance, strength, and gait training instructions were provided

## 2022-04-15 NOTE — PATIENT INSTRUCTIONS
Medicare Preventive Visit Patient Instructions  Thank you for completing your Welcome to Medicare Visit or Medicare Annual Wellness Visit today  Your next wellness visit will be due in one year (4/16/2023)  The screening/preventive services that you may require over the next 5-10 years are detailed below  Some tests may not apply to you based off risk factors and/or age  Screening tests ordered at today's visit but not completed yet may show as past due  Also, please note that scanned in results may not display below  Preventive Screenings:  Service Recommendations Previous Testing/Comments   Colorectal Cancer Screening  * Colonoscopy    * Fecal Occult Blood Test (FOBT)/Fecal Immunochemical Test (FIT)  * Fecal DNA/Cologuard Test  * Flexible Sigmoidoscopy Age: 54-65 years old   Colonoscopy: every 10 years (may be performed more frequently if at higher risk)  OR  FOBT/FIT: every 1 year  OR  Cologuard: every 3 years  OR  Sigmoidoscopy: every 5 years  Screening may be recommended earlier than age 48 if at higher risk for colorectal cancer  Also, an individualized decision between you and your healthcare provider will decide whether screening between the ages of 74-80 would be appropriate  Colonoscopy: 01/01/2007  FOBT/FIT: Not on file  Cologuard: 04/20/2021  Sigmoidoscopy: Not on file    Screening Current     Breast Cancer Screening Age: 36 years old  Frequency: every 1-2 years  Not required if history of left and right mastectomy Mammogram: 02/24/2020        Cervical Cancer Screening Between the ages of 21-29, pap smear recommended once every 3 years  Between the ages of 33-67, can perform pap smear with HPV co-testing every 5 years     Recommendations may differ for women with a history of total hysterectomy, cervical cancer, or abnormal pap smears in past  Pap Smear: 09/23/2021    Screening Not Indicated   Hepatitis C Screening Once for adults born between 1945 and 1965  More frequently in patients at high risk for Hepatitis C Hep C Antibody: 07/01/2019    Screening Current   Diabetes Screening 1-2 times per year if you're at risk for diabetes or have pre-diabetes Fasting glucose: 85 mg/dL   A1C: 5 5 % of total Hgb    Screening Current   Cholesterol Screening Once every 5 years if you don't have a lipid disorder  May order more often based on risk factors  Lipid panel: 03/30/2022    Screening Current     Other Preventive Screenings Covered by Medicare:  1  Abdominal Aortic Aneurysm (AAA) Screening: covered once if your at risk  You're considered to be at risk if you have a family history of AAA  2  Lung Cancer Screening: covers low dose CT scan once per year if you meet all of the following conditions: (1) Age 50-69; (2) No signs or symptoms of lung cancer; (3) Current smoker or have quit smoking within the last 15 years; (4) You have a tobacco smoking history of at least 30 pack years (packs per day multiplied by number of years you smoked); (5) You get a written order from a healthcare provider  3  Glaucoma Screening: covered annually if you're considered high risk: (1) You have diabetes OR (2) Family history of glaucoma OR (3)  aged 48 and older OR (3)  American aged 72 and older  3  Osteoporosis Screening: covered every 2 years if you meet one of the following conditions: (1) You're estrogen deficient and at risk for osteoporosis based off medical history and other findings; (2) Have a vertebral abnormality; (3) On glucocorticoid therapy for more than 3 months; (4) Have primary hyperparathyroidism; (5) On osteoporosis medications and need to assess response to drug therapy  · Last bone density test (DXA Scan): 09/29/2020   5  HIV Screening: covered annually if you're between the age of 15-65  Also covered annually if you are younger than 13 and older than 72 with risk factors for HIV infection  For pregnant patients, it is covered up to 3 times per pregnancy      Immunizations:  Immunization Recommendations   Influenza Vaccine Annual influenza vaccination during flu season is recommended for all persons aged >= 6 months who do not have contraindications   Pneumococcal Vaccine (Prevnar and Pneumovax)  * Prevnar = PCV13  * Pneumovax = PPSV23   Adults 25-60 years old: 1-3 doses may be recommended based on certain risk factors  Adults 72 years old: Prevnar (PCV13) vaccine recommended followed by Pneumovax (PPSV23) vaccine  If already received PPSV23 since turning 65, then PCV13 recommended at least one year after PPSV23 dose  Hepatitis B Vaccine 3 dose series if at intermediate or high risk (ex: diabetes, end stage renal disease, liver disease)   Tetanus (Td) Vaccine - COST NOT COVERED BY MEDICARE PART B Following completion of primary series, a booster dose should be given every 10 years to maintain immunity against tetanus  Td may also be given as tetanus wound prophylaxis  Tdap Vaccine - COST NOT COVERED BY MEDICARE PART B Recommended at least once for all adults  For pregnant patients, recommended with each pregnancy  Shingles Vaccine (Shingrix) - COST NOT COVERED BY MEDICARE PART B  2 shot series recommended in those aged 48 and above     Health Maintenance Due:      Topic Date Due    Breast Cancer Screening: Mammogram  02/24/2021    Colorectal Cancer Screening  04/20/2024    Hepatitis C Screening  Completed     Immunizations Due:      Topic Date Due    Pneumococcal Vaccine: 65+ Years (2 of 2 - PPSV23) 09/08/2021     Advance Directives   What are advance directives? Advance directives are legal documents that state your wishes and plans for medical care  These plans are made ahead of time in case you lose your ability to make decisions for yourself  Advance directives can apply to any medical decision, such as the treatments you want, and if you want to donate organs  What are the types of advance directives?   There are many types of advance directives, and each state has rules about how to use them  You may choose a combination of any of the following:  · Living will: This is a written record of the treatment you want  You can also choose which treatments you do not want, which to limit, and which to stop at a certain time  This includes surgery, medicine, IV fluid, and tube feedings  · Durable power of  for healthcare [facility] [place] [facility]): This is a written record that states who you want to make healthcare choices for you when you are unable to make them for yourself  This person, called a proxy, is usually a family member or a friend  You may choose more than 1 proxy  · Do not resuscitate (DNR) order:  A DNR order is used in case your heart stops beating or you stop breathing  It is a request not to have certain forms of treatment, such as CPR  A DNR order may be included in other types of advance directives  · Medical directive: This covers the care that you want if you are in a coma, near death, or unable to make decisions for yourself  You can list the treatments you want for each condition  Treatment may include pain medicine, surgery, blood transfusions, dialysis, IV or tube feedings, and a ventilator (breathing machine)  · Values history: This document has questions about your views, beliefs, and how you feel and think about life  This information can help others choose the care that you would choose  Why are advance directives important? An advance directive helps you control your care  Although spoken wishes may be used, it is better to have your wishes written down  Spoken wishes can be misunderstood, or not followed  Treatments may be given even if you do not want them  An advance directive may make it easier for your family to make difficult choices about your care  Fall Prevention    Fall prevention  includes ways to make your home and other areas safer  It also includes ways you can move more carefully to prevent a fall   Health conditions that cause changes in your blood pressure, vision, or muscle strength and coordination may increase your risk for falls  Medicines may also increase your risk for falls if they make you dizzy, weak, or sleepy  Fall prevention tips:   · Stand or sit up slowly  · Use assistive devices as directed  · Wear shoes that fit well and have soles that   · Wear a personal alarm  · Stay active  · Manage your medical conditions  Home Safety Tips:  · Add items to prevent falls in the bathroom  · Keep paths clear  · Install bright lights in your home  · Keep items you use often on shelves within reach  · Paint or place reflective tape on the edges of your stairs  © Copyright [employer] 2018 Information is for End User's use only and may not be sold, redistributed or otherwise used for commercial purposes  All illustrations and images included in CareNotes® are the copyrighted property of [employer]  or [facility] Prevention   AMBULATORY CARE:   Fall prevention  includes ways to make your home and other areas safer  It also includes ways you can move more carefully to prevent a fall  Health conditions that cause changes in your blood pressure, vision, or muscle strength and coordination may increase your risk for falls  Medicines may also increase your risk for falls if they make you dizzy, weak, or sleepy  Call 911 or have someone else call if:   · You have fallen and are unconscious  · You have fallen and cannot move part of your body  Contact your healthcare provider if:   · You have fallen and have pain or a headache  · You have questions or concerns about your condition or care  Fall prevention tips:   · Stand or sit up slowly  This may help you keep your balance and prevent falls  · Use assistive devices as directed  Your healthcare provider may suggest that you use a cane or walker to help you keep your balance   You may need to have grab bars put in your bathroom near the toilet or in the shower  · Wear shoes that fit well and have soles that   Wear shoes both inside and outside  Use slippers with good   Do not wear shoes with high heels  · Wear a personal alarm  This is a device that allows you to call 911 if you fall and need help  Ask your healthcare provider for more information  · Stay active  Exercise can help strengthen your muscles and improve your balance  Your healthcare provider may recommend water aerobics or walking  He or she may also recommend physical therapy to improve your coordination  Never start an exercise program without talking to your healthcare provider first          · Manage your medical conditions  Keep all appointments with your healthcare providers  Visit your eye doctor as directed  Home safety tips:       · Add items to prevent falls in the bathroom  Put nonslip strips on your bath or shower floor to prevent you from slipping  Use a bath mat if you do not have carpet in the bathroom  This will prevent you from falling when you step out of the bath or shower  Use a shower seat so you do not need to stand while you shower  Sit on the toilet or a chair in your bathroom to dry yourself and put on clothing  This will prevent you from losing your balance from drying or dressing yourself while you are standing  · Keep paths clear  Remove books, shoes, and other objects from walkways and stairs  Place cords for telephones and lamps out of the way so that you do not need to walk over them  Tape them down if you cannot move them  Remove small rugs  If you cannot remove a rug, secure it with double-sided tape  This will prevent you from tripping  · Install bright lights in your home  Use night lights to help light paths to the bathroom or kitchen  Always turn on the light before you start walking  · Keep items you use often on shelves within reach  Do not use a step stool to help you reach an item      · Paint or place reflective tape on the edges of your stairs  This will help you see the stairs better  Follow up with your doctor as directed:  Write down your questions so you remember to ask them during your visits  © Copyright [employer] 2022 Information is for End User's use only and may not be sold, redistributed or otherwise used for commercial purposes  All illustrations and images included in CareNotes® are the copyrighted property of [employer]  or Delphine Pedroza  The above information is an  only  It is not intended as medical advice for individual conditions or treatments  Talk to your doctor, nurse or pharmacist before following any medical regimen to see if it is safe and effective for you

## 2022-04-26 ENCOUNTER — CONSULT (OUTPATIENT)
Dept: INTERNAL MEDICINE CLINIC | Age: 67
End: 2022-04-26
Payer: MEDICARE

## 2022-04-26 VITALS
HEART RATE: 74 BPM | WEIGHT: 164 LBS | DIASTOLIC BLOOD PRESSURE: 68 MMHG | HEIGHT: 70 IN | BODY MASS INDEX: 23.48 KG/M2 | OXYGEN SATURATION: 99 % | TEMPERATURE: 98.3 F | SYSTOLIC BLOOD PRESSURE: 116 MMHG

## 2022-04-26 DIAGNOSIS — Z01.818 PRE-OP TESTING: ICD-10-CM

## 2022-04-26 DIAGNOSIS — I10 BENIGN ESSENTIAL HTN: ICD-10-CM

## 2022-04-26 DIAGNOSIS — E03.9 ACQUIRED HYPOTHYROIDISM: ICD-10-CM

## 2022-04-26 DIAGNOSIS — H25.9 AGE-RELATED CATARACT OF BOTH EYES, UNSPECIFIED AGE-RELATED CATARACT TYPE: Primary | ICD-10-CM

## 2022-04-26 PROCEDURE — 99213 OFFICE O/P EST LOW 20 MIN: CPT | Performed by: NURSE PRACTITIONER

## 2022-04-26 NOTE — PROGRESS NOTES
Assessment/Plan:    Age-related cataract of both eyes  Pre op form faxed, cleared for surgery  Hypothyroidism  TSH  Within normal range, continue levothyroxine 50 mcg tablet daily  Benign essential HTN  Well controlled on Moduretic  Diagnoses and all orders for this visit:    Age-related cataract of both eyes, unspecified age-related cataract type    Acquired hypothyroidism    Benign essential HTN          Subjective:    Kelby Wing is a 78y o  year old male or female who presents to the office today for a preoperative consultation at the request of surgeon Dr Mati Alan who plans on performing left eye cataract surgery on May 2  This consultation is requested for the specific conditions prompting preoperative evaluation (i e  because of potential affect on operative risk): Ramez Chaney Planned anesthesia: local  The patient has the following known anesthesia issues: none  Patients bleeding risk: no recent abnormal bleeding  Patient does not have objections to receiving blood products if needed  Patient is able to walk 4 blocks without symptoms  Patient is able to walk up 2 flights of stairs without symptoms  Significant past medical history includes  Patient Active Problem List   Diagnosis    Benign essential HTN    Hypothyroidism    Medicare annual wellness visit, subsequent    Pelvic pain    Callus of foot    Vaginal cyst    Acute pain of left knee    Traumatic injury of knee    Age-related cataract of both eyes    Hyponatremia    High serum high density lipoprotein (HDL)    Fall from slipping       Tobacco use: denies  Alcohol use: occassional  Illicit drug use: denies    Symptoms:   Easy bleeding: no  Easy bruising: no  Frequent nose bleeds: no  Chest pain: no  Cough: no  Dyspnea on exertion:no  Edema: no  Palpitations: no  Wheezing: no    Living situation: Patient lives with her  in a two story home  Her  will be caring for her after surgery  shedoes not have post-op concerns  The following portions of the patient's history were reviewed and updated as appropriate: allergies, current medications, past family history, past medical history, past social history, past surgical history and problem list     Review of Systems  Review of Systems   Constitutional: Negative for activity change, appetite change, chills, diaphoresis and fever  HENT: Negative for congestion, ear discharge, ear pain, postnasal drip, rhinorrhea, sinus pressure, sinus pain and sore throat  Eyes: Negative for pain, discharge, itching and visual disturbance  Respiratory: Negative for cough, chest tightness, shortness of breath and wheezing  Cardiovascular: Negative for chest pain, palpitations and leg swelling  Gastrointestinal: Negative for abdominal pain, constipation, diarrhea, nausea and vomiting  Endocrine: Negative for polydipsia, polyphagia and polyuria  Genitourinary: Negative for difficulty urinating, dysuria and urgency  Musculoskeletal: Negative for arthralgias, back pain and neck pain  Skin: Negative for rash and wound  Neurological: Negative for dizziness, weakness, numbness and headaches           Past Medical History:   Diagnosis Date    Benign essential HTN 9/27/2017    Elevated BUN     resolved 3/10/17    Fluid retention in legs     last assessed 5/12/15    Hypothyroidism     Lumbago with sciatica     last assessed 4/10/14    Lumbar radiculopathy     last assessed 4/10/14    Onychomycosis of toenail     last assessed 11/7/16     Past Surgical History:   Procedure Laterality Date    APPENDECTOMY      BREAST CYST ASPIRATION Left 1993    CATARACT EXTRACTION Right     KNEE ARTHROSCOPY Left     therapeutic     Social History     Tobacco Use    Smoking status: Never Smoker    Smokeless tobacco: Never Used   Vaping Use    Vaping Use: Never used   Substance Use Topics    Alcohol use: Not Currently     Alcohol/week: 0 0 standard drinks     Comment: rarely    Drug use: Never Family History   Problem Relation Age of Onset    Breast cancer Mother 80    Hypertension Mother     Ovarian cancer Maternal Aunt 29    Ovarian cancer Cousin 54    Prostate cancer Paternal Uncle 54    No Known Problems Maternal Grandmother     No Known Problems Maternal Grandfather     No Known Problems Paternal Grandmother     No Known Problems Paternal Grandfather     No Known Problems Maternal Aunt     No Known Problems Paternal Aunt      Patient has no known allergies  Current Outpatient Medications   Medication Sig Dispense Refill    AMILoride-hydrochlorothiazide (MODURETIC) 5-50 mg per tablet Take 1 tablet by mouth daily 90 tablet 1    amoxicillin (AMOXIL) 875 mg tablet        Ascorbic Acid (vitamin C) 1000 MG tablet Take 1,000 mg by mouth daily      Calcium Carbonate-Vitamin D3 600-400 MG-UNIT TABS Take 2 tablets by mouth daily      Collagen Hydrolysate POWD 1 Scoop by Does not apply route daily       gentamicin (GARAMYCIN) 0 3 % ophthalmic solution       Glucosamine-Chondroit-Vit C-Mn (GLUCOSAMINE-CHONDROITIN) TABS Take 2 tablets by mouth daily      levothyroxine 50 mcg tablet Take 1 tablet (50 mcg total) by mouth daily Alt with 1 5 tab M,W,F 108 tablet 3    Magnesium Carbonate POWD 325 mg by Does not apply route daily      Multiple Vitamin (MULTI-DAY) TABS Take 2 tablets by mouth daily       Multiple Vitamins-Minerals (ULTRA AYSHA PO) Take 2 tablets by mouth daily      ofloxacin (OCUFLOX) 0 3 % ophthalmic solution INSTILL 1 DROP INTO THE RIGHT EYE FOUR TIMES A DAY      Omega-3 Fatty Acids (FISH OIL) 1200 MG CAPS Take 2 capsules by mouth daily       prednisoLONE acetate (PRED FORTE) 1 % ophthalmic suspension INSTILL 1 DROP INTO THE RIGHT EYE THREE TIMES A DAY      Prolensa 0 07 % SOLN        No current facility-administered medications for this visit         Objective:       /68 (BP Location: Left arm, Patient Position: Sitting, Cuff Size: Standard)   Pulse 74   Temp 98 3 °F (36 8 °C) (Temporal)   Ht 5' 10" (1 778 m)   Wt 74 4 kg (164 lb)   SpO2 99%   BMI 23 53 kg/m²   Physical Exam  Constitutional:       General: She is not in acute distress  Appearance: She is well-developed  She is not diaphoretic  HENT:      Head: Normocephalic and atraumatic  Right Ear: External ear normal       Left Ear: External ear normal       Nose: Nose normal       Mouth/Throat:      Pharynx: No oropharyngeal exudate  Eyes:      General:         Right eye: No discharge  Left eye: No discharge  Conjunctiva/sclera: Conjunctivae normal       Pupils: Pupils are equal, round, and reactive to light  Neck:      Thyroid: No thyromegaly  Cardiovascular:      Rate and Rhythm: Normal rate and regular rhythm  Heart sounds: Normal heart sounds  No murmur heard  No friction rub  No gallop  Pulmonary:      Effort: Pulmonary effort is normal  No respiratory distress  Breath sounds: Normal breath sounds  No stridor  No wheezing or rales  Abdominal:      General: Bowel sounds are normal  There is no distension  Palpations: Abdomen is soft  Tenderness: There is no abdominal tenderness  Musculoskeletal:      Cervical back: Normal range of motion and neck supple  Lymphadenopathy:      Cervical: No cervical adenopathy  Skin:     General: Skin is warm and dry  Findings: No erythema or rash  Neurological:      Mental Status: She is alert and oriented to person, place, and time  Psychiatric:         Behavior: Behavior normal          Thought Content:  Thought content normal          Judgment: Judgment normal             Lab Review   Appointment on 03/30/2022   Component Date Value    Sodium 03/30/2022 132*    Potassium 03/30/2022 4 1     Chloride 03/30/2022 99*    CO2 03/30/2022 32     ANION GAP 03/30/2022 1*    BUN 03/30/2022 14     Creatinine 03/30/2022 0 79     Glucose, Fasting 03/30/2022 85     Calcium 03/30/2022 8 9     AST 03/30/2022 21     ALT 03/30/2022 23     Alkaline Phosphatase 03/30/2022 49     Total Protein 03/30/2022 6 5     Albumin 03/30/2022 3 5     Total Bilirubin 03/30/2022 0 65     eGFR 03/30/2022 77     TSH 3RD GENERATON 03/30/2022 2 140     Cholesterol 03/30/2022 171     Triglycerides 03/30/2022 43     HDL, Direct 03/30/2022 82     LDL Calculated 03/30/2022 80     Non-HDL-Chol (CHOL-HDL) 03/30/2022 89     WBC 03/30/2022 6 57     RBC 03/30/2022 4 66     Hemoglobin 03/30/2022 13 8     Hematocrit 03/30/2022 42 0     MCV 03/30/2022 90     MCH 03/30/2022 29 6     MCHC 03/30/2022 32 9     RDW 03/30/2022 13 2     MPV 03/30/2022 10 6     Platelets 55/02/1402 201     nRBC 03/30/2022 0     Neutrophils Relative 03/30/2022 63     Immat GRANS % 03/30/2022 0     Lymphocytes Relative 03/30/2022 18     Monocytes Relative 03/30/2022 13*    Eosinophils Relative 03/30/2022 5     Basophils Relative 03/30/2022 1     Neutrophils Absolute 03/30/2022 4 07     Immature Grans Absolute 03/30/2022 0 02     Lymphocytes Absolute 03/30/2022 1 19     Monocytes Absolute 03/30/2022 0 88     Eosinophils Absolute 03/30/2022 0 35     Basophils Absolute 03/30/2022 0 06         Assessment:     78 y o  male or female with planned surgery as above  Known risk factors for perioperative complications: None      Cardiac Risk Estimation:     Radha Mendez is cleared from a cardiovascular standpoint to proceed with surgery  she is at a mild risk from a cardiovascular standpoint at this time without any additional cardiac testing  Reevaluation needed if he should present with symptoms prior to surgery  Plan:  Preoperative workup as follows none  Change in medication regimen before surgery: Patient is to discuss with surgeon prior to surgery  Jennie Lemus

## 2022-05-06 ENCOUNTER — HOSPITAL ENCOUNTER (OUTPATIENT)
Dept: RADIOLOGY | Age: 67
Discharge: HOME | End: 2022-05-06
Payer: MEDICARE

## 2022-05-06 DIAGNOSIS — M25.562 ACUTE PAIN OF LEFT KNEE: ICD-10-CM
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
## 2022-05-06 DIAGNOSIS — M25.469 EDEMA OF KNEE: ICD-10-CM

## 2022-05-06 PROCEDURE — 73721 MRI JNT OF LWR EXTRE W/O DYE: CPT

## 2022-05-10 ENCOUNTER — TELEPHONE (OUTPATIENT)
Dept: INTERNAL MEDICINE CLINIC | Age: 67
End: 2022-05-10

## 2022-05-10 ENCOUNTER — CLINICAL SUPPORT (OUTPATIENT)
Dept: INTERNAL MEDICINE CLINIC | Age: 67
End: 2022-05-10
Payer: MEDICARE

## 2022-05-10 DIAGNOSIS — Z23 NEED FOR PNEUMOCOCCAL VACCINE: Primary | ICD-10-CM

## 2022-05-10 PROBLEM — S83.232A COMPLEX TEAR OF MEDIAL MENISCUS OF LEFT KNEE AS CURRENT INJURY: Status: ACTIVE | Noted: 2022-05-10

## 2022-05-10 PROCEDURE — 90670 PCV13 VACCINE IM: CPT

## 2022-05-10 PROCEDURE — G0009 ADMIN PNEUMOCOCCAL VACCINE: HCPCS

## 2022-05-10 NOTE — TELEPHONE ENCOUNTER
Pt was in today for her pneumo vaccine and was asking to review results fo MRI  They will be travelling at the end of the month so they would like to know if there will be a tx plan before traveling

## 2022-05-10 NOTE — TELEPHONE ENCOUNTER
Thanks, she has a tear of one of her knee ligaments  She can travel but I would recommend ortho eval when she comes back  Referral placed

## 2022-07-15 ENCOUNTER — APPOINTMENT (OUTPATIENT)
Dept: RADIOLOGY | Age: 67
End: 2022-07-15
Payer: MEDICARE

## 2022-07-15 ENCOUNTER — OFFICE VISIT (OUTPATIENT)
Dept: INTERNAL MEDICINE CLINIC | Age: 67
End: 2022-07-15
Payer: MEDICARE

## 2022-07-15 VITALS
SYSTOLIC BLOOD PRESSURE: 108 MMHG | BODY MASS INDEX: 21.76 KG/M2 | DIASTOLIC BLOOD PRESSURE: 66 MMHG | WEIGHT: 152 LBS | OXYGEN SATURATION: 98 % | TEMPERATURE: 97.7 F | HEART RATE: 72 BPM | HEIGHT: 70 IN

## 2022-07-15 DIAGNOSIS — M25.562 ACUTE PAIN OF BOTH KNEES: ICD-10-CM

## 2022-07-15 DIAGNOSIS — M25.561 ACUTE PAIN OF RIGHT KNEE: ICD-10-CM

## 2022-07-15 DIAGNOSIS — G89.29 CHRONIC RIGHT-SIDED LOW BACK PAIN WITH RIGHT-SIDED SCIATICA: ICD-10-CM

## 2022-07-15 DIAGNOSIS — M54.41 CHRONIC RIGHT-SIDED LOW BACK PAIN WITH RIGHT-SIDED SCIATICA: ICD-10-CM

## 2022-07-15 DIAGNOSIS — M25.561 ACUTE PAIN OF BOTH KNEES: ICD-10-CM
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
## 2022-07-15 DIAGNOSIS — I49.9 IRREGULARLY IRREGULAR HEART RHYTHM: ICD-10-CM

## 2022-07-15 PROCEDURE — 99214 OFFICE O/P EST MOD 30 MIN: CPT | Performed by: INTERNAL MEDICINE

## 2022-07-15 PROCEDURE — 72110 X-RAY EXAM L-2 SPINE 4/>VWS: CPT

## 2022-07-15 PROCEDURE — 93000 ELECTROCARDIOGRAM COMPLETE: CPT | Performed by: INTERNAL MEDICINE

## 2022-07-15 PROCEDURE — 73564 X-RAY EXAM KNEE 4 OR MORE: CPT

## 2022-07-15 RX ORDER — NAPROXEN 500 MG/1
500 TABLET ORAL 2 TIMES DAILY WITH MEALS
Qty: 30 TABLET | Refills: 1 | Status: SHIPPED | OUTPATIENT
Start: 2022-07-15 | End: 2022-10-24

## 2022-07-15 NOTE — PROGRESS NOTES
Assessment/Plan:    Bilateral knee pain status post fall  -will order x-ray of bilateral knees and follow up with the results  -will start patient on naproxen 500 mg twice daily with meals p r n  pain  -will refer patient to physical therapy    Low back pain  -lumbar x-ray done about 5 years ago showed degenerative disc disease with disc space narrowing diffusely throughout the lumbar spine and most pronounced at L2-L3  -will order an updated lumbar x-ray  -continue with naproxen   -will refer patient to physical therapy    Cardiac arrhythmia  -heart rhythm was irregularly irregular and  point of care EKG showed presence of T-waves but with premature ventricular beats  - I offered to refer patient to Cardiology with she states that she will follow with her neighbor who is a cardiologist and has seen her in the past  -I urged patient to ensure that she sees a cardiologist as soon as possible and explained to her and her  that her heart is much more important than her knees  -she was counseled to cut down on caffeine and to increase her water intake     Diagnoses and all orders for this visit:    Fall, initial encounter  -     XR knee 4+ vw left injury; Future    Acute pain of right knee  -     XR knee 4+ vw right injury; Future  -     Ambulatory Referral to Physical Therapy; Future  -     naproxen (Naprosyn) 500 mg tablet; Take 1 tablet (500 mg total) by mouth 2 (two) times a day with meals    Chronic right-sided low back pain with right-sided sciatica  -     XR spine lumbar minimum 4 views non injury; Future  -     Ambulatory Referral to Physical Therapy; Future  -     naproxen (Naprosyn) 500 mg tablet; Take 1 tablet (500 mg total) by mouth 2 (two) times a day with meals    Irregularly irregular heart rhythm  -     POCT ECG    Acute pain of both knees  -     XR knee 4+ vw left injury; Future  -     Ambulatory Referral to Physical Therapy; Future  -     naproxen (Naprosyn) 500 mg tablet;  Take 1 tablet (500 mg total) by mouth 2 (two) times a day with meals             Subjective:      Patient ID: Angely Nam is a 75 y o  female  HPI  Pt presents with complaints that she fell about 2 weeks ago in [place] ([facility]) and did not have time to go the hospital because she was busy [employer]  Of note, she has fallen multiple times  She denies any dizziness, palpitations or chest pain because she fell  She states that she has fascia problems from doing a lot of sports from when she was young and normally uses a foam roller to reduce the pain in her thighs but that for the past few days she has neglected to use it because of the pain in her knees and now has pain in her thighs  Of note, she states that she tripped and fell in [place] ([facility]) and bruised up her knees and legs  She fell on both knees and had fallen two times last year on her left knee and is going to see an orthopedic surgeon because of the tear in her knee joint  She has been taking advil once a day at night  Her pain is 0/10 sitting but pain is  occurs on trying to stand up or trying to sit down and at those times it is excrutiaing  Also c/o of back pain, right sided for about a couple of years  Last xray lumbar region was in 2017 and showed DDD  She used to go horse back riding as a child  Back pain is 7/10 and radiates down the right leg to the thigh  Walks every morning and walked 4 miles this morning   Occasionally has palpitations and per/she took a lot of caffeine today prior to visit  A review of her previous  EKG done in at [facility] was read as sinus arrhythmia  POC EKG today shows ventricular ectopic beats  Patient states that she has a cardiologist who is her neighbor that she will follow-up with  She keeps insisting that she drank a lot of caffeine this morning, walked 4 miles and was recently very stressed out and believes that this is what is likely contributing to her abnormal heart rhythm        The following portions of the patient's history were reviewed and updated as appropriate:   She  has a past medical history of Benign essential HTN (9/27/2017), Elevated BUN, Fluid retention in legs, Hypothyroidism, Lumbago with sciatica, Lumbar radiculopathy, and Onychomycosis of toenail  She   Patient Active Problem List    Diagnosis Date Noted    Complex tear of medial meniscus of left knee as current injury 05/10/2022    Hyponatremia 04/15/2022    High serum high density lipoprotein (HDL) 04/15/2022    Fall from slipping 04/15/2022    Age-related cataract of both eyes 03/29/2022    Acute pain of left knee 10/14/2021    Traumatic injury of knee 10/14/2021    Callus of foot 09/23/2021    Vaginal cyst 09/23/2021    Medicare annual wellness visit, subsequent 03/23/2021    Pelvic pain 03/23/2021    Benign essential HTN 09/27/2017    Hypothyroidism 10/11/2013     She  has a past surgical history that includes Appendectomy; Knee arthroscopy (Left); Breast cyst aspiration (Left, 1993); and Cataract extraction (Right)  Her family history includes Breast cancer (age of onset: 80) in her mother; Hypertension in her mother; No Known Problems in her maternal aunt, maternal grandfather, maternal grandmother, paternal aunt, paternal grandfather, and paternal grandmother; Ovarian cancer (age of onset: 29) in her maternal aunt; Ovarian cancer (age of onset: 54) in her cousin; Prostate cancer (age of onset: 54) in her paternal uncle  She  reports that she has never smoked  She has never used smokeless tobacco  She reports previous alcohol use  She reports that she does not use drugs    Current Outpatient Medications   Medication Sig Dispense Refill    AMILoride-hydrochlorothiazide (MODURETIC) 5-50 mg per tablet Take 1 tablet by mouth daily 90 tablet 1    Ascorbic Acid (vitamin C) 1000 MG tablet Take 1,000 mg by mouth daily      Calcium Carbonate-Vitamin D3 600-400 MG-UNIT TABS Take 2 tablets by mouth daily      Collagen Hydrolysate POWD 1 Scoop by Does not apply route daily       Glucosamine-Chondroit-Vit C-Mn (GLUCOSAMINE-CHONDROITIN) TABS Take 2 tablets by mouth daily      levothyroxine 50 mcg tablet Take 1 tablet (50 mcg total) by mouth daily Alt with 1 5 tab M,W,F 108 tablet 3    Magnesium Carbonate POWD 325 mg by Does not apply route daily      Multiple Vitamin (MULTI-DAY) TABS Take 2 tablets by mouth daily       Multiple Vitamins-Minerals (ULTRA AYSHA PO) Take 2 tablets by mouth daily      naproxen (Naprosyn) 500 mg tablet Take 1 tablet (500 mg total) by mouth 2 (two) times a day with meals 30 tablet 1    ofloxacin (OCUFLOX) 0 3 % ophthalmic solution INSTILL 1 DROP INTO THE RIGHT EYE FOUR TIMES A DAY      Omega-3 Fatty Acids (FISH OIL) 1200 MG CAPS Take 2 capsules by mouth daily       prednisoLONE acetate (PRED FORTE) 1 % ophthalmic suspension INSTILL 1 DROP INTO THE RIGHT EYE THREE TIMES A DAY      Prolensa 0 07 % SOLN        No current facility-administered medications for this visit       Current Outpatient Medications on File Prior to Visit   Medication Sig    AMILoride-hydrochlorothiazide (MODURETIC) 5-50 mg per tablet Take 1 tablet by mouth daily    Ascorbic Acid (vitamin C) 1000 MG tablet Take 1,000 mg by mouth daily    Calcium Carbonate-Vitamin D3 600-400 MG-UNIT TABS Take 2 tablets by mouth daily    Collagen Hydrolysate POWD 1 Scoop by Does not apply route daily     Glucosamine-Chondroit-Vit C-Mn (GLUCOSAMINE-CHONDROITIN) TABS Take 2 tablets by mouth daily    levothyroxine 50 mcg tablet Take 1 tablet (50 mcg total) by mouth daily Alt with 1 5 tab M,W,F    Magnesium Carbonate POWD 325 mg by Does not apply route daily    Multiple Vitamin (MULTI-DAY) TABS Take 2 tablets by mouth daily     Multiple Vitamins-Minerals (ULTRA AYSHA PO) Take 2 tablets by mouth daily    ofloxacin (OCUFLOX) 0 3 % ophthalmic solution INSTILL 1 DROP INTO THE RIGHT EYE FOUR TIMES A DAY    Omega-3 Fatty Acids (FISH OIL) 1200 MG CAPS Take 2 capsules by mouth daily  prednisoLONE acetate (PRED FORTE) 1 % ophthalmic suspension INSTILL 1 DROP INTO THE RIGHT EYE THREE TIMES A DAY    Prolensa 0 07 % SOLN     [DISCONTINUED] amoxicillin (AMOXIL) 875 mg tablet      [DISCONTINUED] gentamicin (GARAMYCIN) 0 3 % ophthalmic solution      No current facility-administered medications on file prior to visit  She has No Known Allergies       Review of Systems   Constitutional: Negative for activity change, chills, fatigue, fever and unexpected weight change  HENT: Negative for ear pain, postnasal drip, rhinorrhea, sinus pressure and sore throat  Eyes: Negative for pain  Respiratory: Negative for cough, choking, chest tightness, shortness of breath and wheezing  Cardiovascular: Positive for palpitations (Occasional palpitation)  Negative for chest pain and leg swelling  Gastrointestinal: Negative for abdominal pain, constipation, diarrhea, nausea and vomiting  Genitourinary: Negative for dysuria and hematuria  Musculoskeletal: Positive for arthralgias ( bilateral knee pain, right worse than left) and back pain  Negative for gait problem, joint swelling, myalgias and neck stiffness  Skin: Positive for color change ( bruising of the lower extremities status post fall)  Negative for pallor and rash  Neurological: Negative for dizziness, tremors, seizures, syncope, light-headedness and headaches  Hematological: Negative for adenopathy  Psychiatric/Behavioral: Negative for behavioral problems  Objective:      /66 (BP Location: Left arm, Patient Position: Sitting, Cuff Size: Standard)   Pulse 72   Temp 97 7 °F (36 5 °C) (Temporal)   Ht 5' 10" (1 778 m)   Wt 68 9 kg (152 lb)   SpO2 98%   BMI 21 81 kg/m²          Physical Exam  Constitutional:       General: She is not in acute distress  Appearance: She is well-developed  She is not diaphoretic  HENT:      Head: Normocephalic and atraumatic        Right Ear: External ear normal       Left Ear: External ear normal       Nose: Nose normal       Mouth/Throat:      Mouth: Mucous membranes are dry  Pharynx: No oropharyngeal exudate  Eyes:      General: No scleral icterus  Right eye: No discharge  Left eye: No discharge  Conjunctiva/sclera: Conjunctivae normal       Pupils: Pupils are equal, round, and reactive to light  Neck:      Thyroid: No thyromegaly  Vascular: No JVD  Trachea: No tracheal deviation  Cardiovascular:      Rate and Rhythm: Normal rate  Rhythm irregularly irregular  Heart sounds: Normal heart sounds  No murmur heard  No friction rub  No gallop  Comments: Irregularly irregular heart rhythm  Pulmonary:      Effort: Pulmonary effort is normal  No respiratory distress  Breath sounds: Normal breath sounds  No wheezing or rales  Chest:      Chest wall: No tenderness  Abdominal:      General: Bowel sounds are normal  There is no distension  Palpations: Abdomen is soft  There is no mass  Tenderness: There is no abdominal tenderness  There is no guarding or rebound  Musculoskeletal:         General: No deformity  Normal range of motion  Cervical back: Normal range of motion and neck supple  Lumbar back: No tenderness ( no lumbar tenderness on palpation with negative straight leg raise test)  Right knee: Tenderness (Tenderness in the anterior joint line and the lateral joint and I of the right knee with bruising and swelling) present over the lateral joint line  Left knee: Tenderness present over the medial joint line ( tenderness in the mid the joint line of the left knee)  Lymphadenopathy:      Cervical: No cervical adenopathy  Skin:     General: Skin is warm and dry  Coloration: Skin is not pale  Findings: Ecchymosis ( ecchymosis, healing on the lower right leg) present  No erythema or rash  Neurological:      Mental Status: She is alert and oriented to person, place, and time        Cranial Nerves: No cranial nerve deficit  Motor: No abnormal muscle tone  Coordination: Coordination normal       Gait: Gait abnormal (Antalgic gait, favoring the right lower extremity)  Deep Tendon Reflexes: Reflexes are normal and symmetric  Psychiatric:         Behavior: Behavior normal            Appointment on 03/30/2022   Component Date Value Ref Range Status    Sodium 03/30/2022 132 (A) 136 - 145 mmol/L Final    Potassium 03/30/2022 4 1  3 5 - 5 3 mmol/L Final    Chloride 03/30/2022 99 (A) 100 - 108 mmol/L Final    CO2 03/30/2022 32  21 - 32 mmol/L Final    ANION GAP 03/30/2022 1 (A) 4 - 13 mmol/L Final    BUN 03/30/2022 14  5 - 25 mg/dL Final    Creatinine 03/30/2022 0 79  0 60 - 1 30 mg/dL Final    Standardized to IDMS reference method    Glucose, Fasting 03/30/2022 85  65 - 99 mg/dL Final    Specimen collection should occur prior to Sulfasalazine administration due to the potential for falsely depressed results  Specimen collection should occur prior to Sulfapyridine administration due to the potential for falsely elevated results   Calcium 03/30/2022 8 9  8 3 - 10 1 mg/dL Final    AST 03/30/2022 21  5 - 45 U/L Final    Specimen collection should occur prior to Sulfasalazine administration due to the potential for falsely depressed results   ALT 03/30/2022 23  12 - 78 U/L Final    Specimen collection should occur prior to Sulfasalazine and/or Sulfapyridine administration due to the potential for falsely depressed results   Alkaline Phosphatase 03/30/2022 49  46 - 116 U/L Final    Total Protein 03/30/2022 6 5  6 4 - 8 2 g/dL Final    Albumin 03/30/2022 3 5  3 5 - 5 0 g/dL Final    Total Bilirubin 03/30/2022 0 65  0 20 - 1 00 mg/dL Final    Use of this assay is not recommended for patients undergoing treatment with eltrombopag due to the potential for falsely elevated results      eGFR 03/30/2022 77  ml/min/1 73sq m Final    TSH 3RD GENERATON 03/30/2022 2 140  0 358 - 3 740 uIU/mL Final    The recommended reference ranges for TSH during pregnancy are as follows:   First trimester 0 1 to 2 5 uIU/mL   Second trimester  0 2 to 3 0 uIU/mL   Third trimester 0 3 to 3 0 uIU/m    Note: Normal ranges may not apply to patients who are transgender, non-binary, or whose legal sex, sex at birth, and gender identity differ   Cholesterol 03/30/2022 171  See Comment mg/dL Final    Cholesterol:         Pediatric <18 Years        Desirable          <170 mg/dL      Borderline High    170-199 mg/dL      High               >=200 mg/dL        Adult >=18 Years            Desirable         <200 mg/dL      Borderline High   200-239 mg/dL      High              >239 mg/dL      Triglycerides 03/30/2022 43  See Comment mg/dL Final    Triglyceride:     0-9Y            <75mg/dL     10Y-17Y         <90 mg/dL       >=18Y     Normal          <150 mg/dL     Borderline High 150-199 mg/dL     High            200-499 mg/dL        Very High       >499 mg/dL    Specimen collection should occur prior to N-Acetylcysteine or Metamizole administration due to the potential for falsely depressed results   HDL, Direct 03/30/2022 82  >=50 mg/dL Final    Specimen collection should occur prior to Metamizole administration due to the potential for falsley depressed results   LDL Calculated 03/30/2022 80  0 - 100 mg/dL Final    LDL Cholesterol:     Optimal           <100 mg/dl     Near Optimal      100-129 mg/dl     Above Optimal       Borderline High 130-159 mg/dl       High            160-189 mg/dl       Very High       >189 mg/dl         This screening LDL is a calculated result  It does not have the accuracy of the Direct Measured LDL in the monitoring of patients with hyperlipidemia and/or statin therapy  Direct Measure LDL (UOI820) must be ordered separately in these patients      Non-HDL-Chol (CHOL-HDL) 03/30/2022 89  mg/dl Final    WBC 03/30/2022 6 57  4 31 - 10 16 Thousand/uL Final    RBC 03/30/2022 4 66  3 81 - 5 12 Million/uL Final    Hemoglobin 03/30/2022 13 8  11 5 - 15 4 g/dL Final    Hematocrit 03/30/2022 42 0  34 8 - 46 1 % Final    MCV 03/30/2022 90  82 - 98 fL Final    MCH 03/30/2022 29 6  26 8 - 34 3 pg Final    MCHC 03/30/2022 32 9  31 4 - 37 4 g/dL Final    RDW 03/30/2022 13 2  11 6 - 15 1 % Final    MPV 03/30/2022 10 6  8 9 - 12 7 fL Final    Platelets 51/89/0116 201  149 - 390 Thousands/uL Final    nRBC 03/30/2022 0  /100 WBCs Final    Neutrophils Relative 03/30/2022 63  43 - 75 % Final    Immat GRANS % 03/30/2022 0  0 - 2 % Final    Lymphocytes Relative 03/30/2022 18  14 - 44 % Final    Monocytes Relative 03/30/2022 13 (A) 4 - 12 % Final    Eosinophils Relative 03/30/2022 5  0 - 6 % Final    Basophils Relative 03/30/2022 1  0 - 1 % Final    Neutrophils Absolute 03/30/2022 4 07  1 85 - 7 62 Thousands/µL Final    Immature Grans Absolute 03/30/2022 0 02  0 00 - 0 20 Thousand/uL Final    Lymphocytes Absolute 03/30/2022 1 19  0 60 - 4 47 Thousands/µL Final    Monocytes Absolute 03/30/2022 0 88  0 17 - 1 22 Thousand/µL Final    Eosinophils Absolute 03/30/2022 0 35  0 00 - 0 61 Thousand/µL Final    Basophils Absolute 03/30/2022 0 06  0 00 - 0 10 Thousands/µL Final   Office Visit on 09/23/2021   Component Date Value Ref Range Status    Case Report 09/23/2021    Final                    Value:Gynecologic Cytology Report                       Case: [id number]                                  Authorizing Provider:  Lilly Huddleston DO       Collected:           09/23/2021 [id number]              Ordering Location:     [facility]      Received:            09/23/2021 7025              First Screen:          Lin Wilks                                                                  Rescreen:              [place] Anthony                                                               Specimen:    LIQUID-BASED PAP, SCREENING, Cervix                                                        Primary Interpretation 09/23/2021 Negative for intraepithelial lesion or malignancy   Final    Specimen Adequacy 09/23/2021 Satisfactory for evaluation  Endocervical/transformation zone component present  Final    Additional Information 09/23/2021    Final                    Value: This result contains rich text formatting which cannot be displayed here   LMP 09/23/2021    Final    This result contains rich text formatting which cannot be displayed here   HPV Other HR 09/23/2021 Negative  Negative Final    HPV types: 64,43,47,93,06,21,15,17,61,73,33 and 68 DNA are undetectable or below the pre-set threshold      HPV16 09/23/2021 Negative  Negative Final    HPV18 09/23/2021 Negative  Negative Final

## 2022-07-18 DIAGNOSIS — I10 ESSENTIAL HYPERTENSION: ICD-10-CM

## 2022-07-18 RX ORDER — AMILORIDE HYDROCHLORIDE AND HYDROCHLOROTHIAZIDE 5; 50 MG/1; MG/1
1 TABLET ORAL DAILY
Qty: 90 TABLET | Refills: 1 | Status: SHIPPED | OUTPATIENT
Start: 2022-07-18

## 2022-07-19 ENCOUNTER — EVALUATION (OUTPATIENT)
Dept: PHYSICAL THERAPY | Age: 67
End: 2022-07-19
Payer: MEDICARE

## 2022-07-19 DIAGNOSIS — M25.562 ACUTE PAIN OF BOTH KNEES: Primary | ICD-10-CM

## 2022-07-19 DIAGNOSIS — G89.29 CHRONIC RIGHT-SIDED LOW BACK PAIN WITH RIGHT-SIDED SCIATICA: ICD-10-CM

## 2022-07-19 DIAGNOSIS — M54.41 CHRONIC RIGHT-SIDED LOW BACK PAIN WITH RIGHT-SIDED SCIATICA: ICD-10-CM

## 2022-07-19 DIAGNOSIS — M25.561 ACUTE PAIN OF RIGHT KNEE: ICD-10-CM

## 2022-07-19 DIAGNOSIS — M25.561 ACUTE PAIN OF BOTH KNEES: Primary | ICD-10-CM

## 2022-07-19 PROCEDURE — 97161 PT EVAL LOW COMPLEX 20 MIN: CPT

## 2022-07-19 PROCEDURE — 97110 THERAPEUTIC EXERCISES: CPT

## 2022-07-19 NOTE — PROGRESS NOTES
PT Evaluation     Today's date: 2022  Patient name: Lucinda Mcgrath  : 1955  MRN: [id number]  Referring provider: Antonio Rodgers DO  Dx:   Encounter Diagnosis     ICD-10-CM    1  Acute pain of both knees  M25 561 Ambulatory Referral to Physical Therapy    M25 562    2  Acute pain of right knee  M25 561 Ambulatory Referral to Physical Therapy   3  Chronic right-sided low back pain with right-sided sciatica  M54 41 Ambulatory Referral to Physical Therapy    G89 29                   Assessment  Assessment details: Pt is a 75 y o  female presents to IE with chief c/o b/l knee pain and R sided low back pain that radiates into RLE  Hx of multiple falls in the past year onto both knees, arthroscopic knee surgery, and MRI finding of meniscal tear in L knee which she previously received OP PT  Signs and symptoms indicate diagnosis of SI joint dysfunction and acute pain of b/l knees following fall/trauma  Pt responded positively to repeated extension in standing to reduce SI joint pain  She has primary impairments of decreased LE strength, decreased LE flexibility, and increased pain  Exhibits difficulty with ambulation and transfers during IE  Pt's impairments are limiting her functionally as she has difficulty walking, difficulty standing for long periods of time, difficulty with transitional movements, difficulty ascending/descending stairs, and difficulty with ADL's/leisure activities  Pt was given exercises as part of an HEP for LE flexibility and repeated extension  Educated pt on proper completion of HEP, normal response to exercises, and general POC for diagnoses  Advised her to continue with foam rolling for symptom management  She verbalizes understanding of all education provided  All questions answered  Pt would benefit from skilled OP PT in order to improve upon impairments and return to PLOF    Impairments: abnormal gait, abnormal or restricted ROM, impaired physical strength, lacks appropriate home exercise program, pain with function and poor posture   Understanding of Dx/Px/POC: good  Goals  Short term goals  Pt will improve strength by 1/2 grade in LE's  Pt will improve lumbar ROM by 10%  Pt will improve hamstring flexibility in 90/90 by 2 degrees  Pt will report 50% functional improvement  Pt will be able to walk usual distances with <5/10 pain     Long term goals  Pt will be independent with HEP and/or symptom management   Pt will return to PLOF and perform normal leisure activities with a 90% reduction in symptoms  Pt will be able to ambulate usual distances with <3/10 pain  Pt will be able to perform sit-to-stand transfer with <3/10 pain and without use of UE's   Pt will improve FOTO >/= expected        Plan  Planned therapy interventions: patient education, therapeutic exercise, graded exercise, functional ROM exercises, flexibility, home exercise program, manual therapy, activity modification, strengthening, stretching, joint mobilization, massage, therapeutic activities, balance/weight bearing training and neuromuscular re-education  Frequency: 2x week  Duration in visits: 8  Duration in weeks: 4  Treatment plan discussed with: patient        Subjective Evaluation    History of Present Illness  Mechanism of injury: Pt states she has fallen about 3 times in the past year  She fell in August of last year where she tripped and fell on L knee  It swelled up and she went to PT at [place] [address] but the swelling never went away so she stopped  She did not have any pain in the knee it was just the swelling and when she was climbing into bed or kneeling  She then fell again in February on her L knee again  Then she was on vacation about 3-4 weeks ago and fell onto both knees  The knees swelled up and were painful  She saw her MD when she got back from vacation and was told to see ortho and go to PT  She has ortho MD appt later this month  She would like to avoid surgery if possible   She has had two arthroscopic surgeries on her L knee  Her R knee is currently more painful than L knee  She only has pain in her knees when she puts weight through her legs  She has been putting ice on her knees  Denies clicking/popping/locking in her knees  Denies instability  Her main complaint is tightness and soreness in the thighs  She states that she has a fascia problem and has neglected to use foam roller like she normally does because her knees have been sore form the fall  She went and got x-rays on her knees but has not gotten results back yet  Back pain started a few years ago  She has pain on the R side and it is stiff  It feels as if she bumped it  Pt states that sometimes the pain will radiate down the back of the thigh  Mostly when she walking she has back pain and when she is standing  If she is sitting she does not have any pain  No pain when she is sleeping  She tries to keep her legs elevated with a pillow  She is more concerned about her low back than her knees at this time  Recurrent probem    Quality of life: good    Pain  Current pain ratin  At best pain ratin  At worst pain ratin  Quality: dull ache, sharp and discomfort  Relieving factors: rest, ice and medications  Aggravating factors: walking, standing and lifting  Progression: worsening    Treatments  Previous treatment: physical therapy  Current treatment: physical therapy  Patient Goals  Patient goal: Trying to get rid of the fluid in her knees         Objective     Tenderness   Left Knee   Tenderness in the lateral joint line and medial joint line  Right Knee   Tenderness in the fibular head and lateral joint line       Active Range of Motion     Lumbar   Flexion:  with pain Restriction level: minimal  Extension:  Restriction level: moderate  Left lateral flexion:  Restriction level: moderate  Right lateral flexion:  with pain Restriction level: moderate  Left rotation:  Restriction level: moderate  Right rotation:  Restriction level: moderate  Left Knee   Flexion: 133 degrees   Extension: 0 degrees     Right Knee   Flexion: 138 degrees   Extension: 0 degrees     Additional Active Range of Motion Details  Lumbar AROM (cm from third finger tip to floor)    Flex: 5 cm  L lat flex: 58 cm  R lat flex: 60 cm    Ext: 20 degrees    Strength/Myotome Testing     Left Hip   Planes of Motion   Flexion: 3+  Abduction: 4  Adduction: 4+    Right Hip   Planes of Motion   Flexion: 3+  Abduction: 4  Adduction: 4+    Left Knee   Flexion: 4  Extension: 4    Right Knee   Flexion: 4-  Extension: 3+    Left Ankle/Foot   Dorsiflexion: WFL  Plantar flexion: WFL  Right Ankle/Foot   Dorsiflexion: WFL  Plantar flexion: WFL  Tests     Lumbar   Positive SIJ compression  Negative sacral thrust       Left   Negative passive SLR and slump test      Right   Negative passive SLR and slump test      Left Pelvic Girdle/Sacrum   Positive: thigh thrust      Right Pelvic Girdle/Sacrum   Positive: thigh thrust      Left Hip   Negative RAFFI and FADIR  Right Hip   Negative RAFFI and FADIR  Precautions: Hx of falls;  Hx of L medial meniscal tear; Hypothyroidism; HTN      Manuals 7/19             Piriformis stretch                                                    Neuro Re-Ed                                                                                                        Ther Ex             HEP (H/S stretch, hip flexor stretch prone, Standing repeated extension) Education 10'             Nu step              Prone repeated ext             Supine bridge             SLR's on mat             SAQ             Lat band walks             [employer] machines             Ther Activity             Step ups             Sled push/pull              Gait Training                                       Modalities

## 2022-07-26 ENCOUNTER — OFFICE VISIT (OUTPATIENT)
Dept: PHYSICAL THERAPY | Age: 67
End: 2022-07-26
Payer: MEDICARE

## 2022-07-26 DIAGNOSIS — M25.561 ACUTE PAIN OF BOTH KNEES: Primary | ICD-10-CM

## 2022-07-26 DIAGNOSIS — M25.561 ACUTE PAIN OF RIGHT KNEE: ICD-10-CM

## 2022-07-26 DIAGNOSIS — M25.562 ACUTE PAIN OF BOTH KNEES: Primary | ICD-10-CM

## 2022-07-26 DIAGNOSIS — G89.29 CHRONIC RIGHT-SIDED LOW BACK PAIN WITH RIGHT-SIDED SCIATICA: ICD-10-CM

## 2022-07-26 DIAGNOSIS — M54.41 CHRONIC RIGHT-SIDED LOW BACK PAIN WITH RIGHT-SIDED SCIATICA: ICD-10-CM

## 2022-07-26 PROCEDURE — 97110 THERAPEUTIC EXERCISES: CPT

## 2022-07-26 PROCEDURE — 97140 MANUAL THERAPY 1/> REGIONS: CPT

## 2022-07-26 NOTE — PROGRESS NOTES
Daily Note     Today's date: 2022  Patient name: Angy Taylor  : 1955  MRN: [id number]  Referring provider: Milvia Mazariegos DO  Dx:   Encounter Diagnosis     ICD-10-CM    1  Acute pain of both knees  M25 561     M25 562    2  Acute pain of right knee  M25 561    3  Chronic right-sided low back pain with right-sided sciatica  M54 41     G89 29                   Subjective: Pt states that she is feeling some R thigh pain today that she believes is coming from her back  Objective: See treatment diary below      Assessment: Initiated POC for low back pain and b/l knee pain as indicated below  Pt challenged by LE strengthening exercises  Decreased symptoms since IE  Educated pt to continue with extension-preference and monitor symptoms  She verbalized understanding  Tolerated treatment well  Patient demonstrated fatigue post treatment, exhibited good technique with therapeutic exercises and would benefit from continued PT      Plan: Continue per plan of care  Precautions: Hx of falls;  Hx of L medial meniscal tear; Hypothyroidism; HTN      Manuals            Piriformis stretch  NR           Prone hip flexor stretch  NR                                     Neuro Re-Ed                                                                                                        Ther Ex             HEP (H/S stretch, hip flexor stretch prone, Standing repeated extension) Education 10'             Nu step   10' L3           Prone press ups  x30            Supine bridge  3x10            SLR's on mat  2x10 flex/abd           SAQ  3x10            Standing ext  x30 // bars           Lat band walks             Cybex machines             Ther Activity             Step ups             Sled push/pull              Gait Training                                       Modalities

## 2022-07-28 ENCOUNTER — OFFICE VISIT (OUTPATIENT)
Dept: OBGYN CLINIC | Facility: HOSPITAL | Age: 67
End: 2022-07-28
Attending: FAMILY MEDICINE
Payer: MEDICARE

## 2022-07-28 ENCOUNTER — OFFICE VISIT (OUTPATIENT)
Dept: PHYSICAL THERAPY | Age: 67
End: 2022-07-28
Payer: MEDICARE

## 2022-07-28 VITALS
HEIGHT: 70 IN | DIASTOLIC BLOOD PRESSURE: 87 MMHG | BODY MASS INDEX: 22.05 KG/M2 | WEIGHT: 154 LBS | HEART RATE: 85 BPM | SYSTOLIC BLOOD PRESSURE: 136 MMHG

## 2022-07-28 DIAGNOSIS — M54.41 CHRONIC RIGHT-SIDED LOW BACK PAIN WITH RIGHT-SIDED SCIATICA: ICD-10-CM

## 2022-07-28 DIAGNOSIS — M70.61 GREATER TROCHANTERIC BURSITIS OF RIGHT HIP: ICD-10-CM

## 2022-07-28 DIAGNOSIS — G89.29 CHRONIC RIGHT-SIDED LOW BACK PAIN WITHOUT SCIATICA: Primary | ICD-10-CM

## 2022-07-28 DIAGNOSIS — M25.561 ACUTE PAIN OF RIGHT KNEE: ICD-10-CM

## 2022-07-28 DIAGNOSIS — M17.11 LOCALIZED OSTEOARTHRITIS OF RIGHT KNEE: ICD-10-CM

## 2022-07-28 DIAGNOSIS — G89.29 CHRONIC RIGHT-SIDED LOW BACK PAIN WITH RIGHT-SIDED SCIATICA: ICD-10-CM

## 2022-07-28 DIAGNOSIS — M25.562 ACUTE PAIN OF BOTH KNEES: Primary | ICD-10-CM

## 2022-07-28 DIAGNOSIS — S83.232D COMPLEX TEAR OF MEDIAL MENISCUS OF LEFT KNEE AS CURRENT INJURY, SUBSEQUENT ENCOUNTER: ICD-10-CM

## 2022-07-28 DIAGNOSIS — M25.561 ACUTE PAIN OF BOTH KNEES: Primary | ICD-10-CM

## 2022-07-28 DIAGNOSIS — M70.52 PES ANSERINUS BURSITIS OF LEFT KNEE: ICD-10-CM

## 2022-07-28 DIAGNOSIS — M17.12 LOCALIZED OSTEOARTHRITIS OF LEFT KNEE: ICD-10-CM

## 2022-07-28 DIAGNOSIS — M54.50 CHRONIC RIGHT-SIDED LOW BACK PAIN WITHOUT SCIATICA: Primary | ICD-10-CM

## 2022-07-28 PROCEDURE — 20610 DRAIN/INJ JOINT/BURSA W/O US: CPT | Performed by: ORTHOPAEDIC SURGERY

## 2022-07-28 PROCEDURE — 97110 THERAPEUTIC EXERCISES: CPT

## 2022-07-28 PROCEDURE — 99204 OFFICE O/P NEW MOD 45 MIN: CPT | Performed by: ORTHOPAEDIC SURGERY

## 2022-07-28 PROCEDURE — 97140 MANUAL THERAPY 1/> REGIONS: CPT

## 2022-07-28 RX ORDER — LIDOCAINE HYDROCHLORIDE 10 MG/ML
2 INJECTION, SOLUTION INFILTRATION; PERINEURAL
Status: COMPLETED | OUTPATIENT
Start: 2022-07-28 | End: 2022-07-28

## 2022-07-28 RX ORDER — BETAMETHASONE SODIUM PHOSPHATE AND BETAMETHASONE ACETATE 3; 3 MG/ML; MG/ML
6 INJECTION, SUSPENSION INTRA-ARTICULAR; INTRALESIONAL; INTRAMUSCULAR; SOFT TISSUE
Status: COMPLETED | OUTPATIENT
Start: 2022-07-28 | End: 2022-07-28

## 2022-07-28 RX ORDER — BUPIVACAINE HYDROCHLORIDE 2.5 MG/ML
2 INJECTION, SOLUTION INFILTRATION; PERINEURAL
Status: COMPLETED | OUTPATIENT
Start: 2022-07-28 | End: 2022-07-28

## 2022-07-28 RX ADMIN — LIDOCAINE HYDROCHLORIDE 2 ML: 10 INJECTION, SOLUTION INFILTRATION; PERINEURAL at 16:53

## 2022-07-28 RX ADMIN — BUPIVACAINE HYDROCHLORIDE 2 ML: 2.5 INJECTION, SOLUTION INFILTRATION; PERINEURAL at 16:53

## 2022-07-28 RX ADMIN — BETAMETHASONE SODIUM PHOSPHATE AND BETAMETHASONE ACETATE 6 MG: 3; 3 INJECTION, SUSPENSION INTRA-ARTICULAR; INTRALESIONAL; INTRAMUSCULAR; SOFT TISSUE at 16:53

## 2022-07-28 NOTE — PROGRESS NOTES
Daily Note     Today's date: 2022  Patient name: Wisam Arambula  : 1955  MRN: [id number]  Referring provider: Saima Haddad DO  Dx:   Encounter Diagnosis     ICD-10-CM    1  Acute pain of both knees  M25 561     M25 562    2  Acute pain of right knee  M25 561    3  Chronic right-sided low back pain with right-sided sciatica  M54 41     G89 29                   Subjective: Pt states that she noticed less pain in her back last night when she was standing and cooking which was surprising because this is when her back usually hurts the most  She has now noticed that in the mornings it is more painful and then she walks and it improves but following her walks she starts to get pain on the front of her thigh  She walked 5 miles this morning and has pain on the front of her thigh  Objective: See treatment diary below      Assessment: Pt had to stop exercise on Nu step due to experiencing pain in RLE  After standing and ambulating a few feet the symptoms in RLE diminished  She exhibited decreased R hip flexor flexibility with manual stretching when compared to R  Educated pt to decrease walking distance in the morning or try to perform two walks (one in the AM and once in the PM) at shorter distances  She verbalizes understanding  Tolerated treatment well  Patient demonstrated fatigue post treatment, exhibited good technique with therapeutic exercises and would benefit from continued PT      Plan: Continue per plan of care  Precautions: Hx of falls;  Hx of L medial meniscal tear; Hypothyroidism; HTN      Manuals           Piriformis stretch  NR           Prone hip flexor stretch  NR NR          H/S stretch   NR                       Neuro Re-Ed                                                                                                        Ther Ex             HEP (H/S stretch, hip flexor stretch prone, Standing repeated extension) Education 10'             Nu step (seat #12 / arms #10)  10' L3 8 5' L5          Prone press ups  x30  x30           Supine bridge  3x10  3x10           SLR's on mat  2x10 flex/abd 2x10 ea flex/abd           SAQ  3x10  3x10 3#          Standing ext  x30 // bars x30 // bars          Lat band walks             Cybex machines   NT          Ther Activity             Step ups             Sled push/pull              Gait Training                                       Modalities

## 2022-07-28 NOTE — PROGRESS NOTES
Assessment:   Diagnosis ICD-10-CM Associated Orders   1  Chronic right-sided low back pain without sciatica  M54 50 Ambulatory Referral to Pain Management    G89 29    2  Complex tear of medial meniscus of left knee as current injury, subsequent encounter  S83 [id number] Ambulatory Referral to Orthopedic Surgery   3  Localized osteoarthritis of left knee  M17 12    4  Localized osteoarthritis of right knee  M17 11    5  Pes anserinus bursitis of left knee  M70 52        Plan:  Radha describes weight-bearing discomfort to both knees after multiple falls during this past year which has mostly resolved  She does however continue with bilateral knee swelling  She was offered corticosteroid injections for her bilateral osteoarthritic knees, left knee pes bursitis and right greater trochanter bursitisse were excepted and performed as outlined below  She was instructed to ice and elevate her knees tonight  She can take her naproxen as prescribed to her by her PCP as needed  In regards to her right-sided low back pain she should continue in physical therapy with hopeful continued improvement  A referral was also made to our Spine and Pain Team for evaluation treatment  To do next visit:  No follow-ups on file  The above stated was discussed in layman's terms and the patient expressed understanding  All questions were answered to the patient's satisfaction  Subjective:   Annie Denney is a 75 y o  female who presents today for evaluation of both knees and her right sided buttock pain  She began with mostly left-sided knee pain at the end of the summer after a fall  She was seen by her PCP at which time a referral to physical therapy was made  She has been a few months in PT which extended into the holidays  She did have some mild relief after finishing a formal course of physical therapy but mostly noticed some continued discomfort and persistent swelling    She again fell the beginning of this year and an MRI was performed  This showed a meniscal tear and she was referred to our office for further orthopedic evaluation and treatment  She took a trip to [place] in the meantime for 6 weeks at which time she again fell onto both knees and had an onset of knee pain, swelling and bruising  This has since subsided but she still notices puffiness to both knees  Along with the jelly fish feeling to both knees she notices some right sided buttock pain since her fall as well  This seems to be her biggest pain generator at today's office visit  Her primary care physician did put her into more for formal physical therapy for treatment of her right-sided low back pain which has helped some over the past few visits but still continues  She does admit to occasional radiation down into the right leg  She denies numbness or tingling  She has never been treated for low back issues in the past   She is currently taking naproxen as prescribed by her primary care physician        Review of systems negative unless otherwise specified in HPI    Past Medical History:   Diagnosis Date    Benign essential HTN 9/27/2017    Elevated BUN     resolved 3/10/17    Fluid retention in legs     last assessed 5/12/15    Hypothyroidism     Lumbago with sciatica     last assessed 4/10/14    Lumbar radiculopathy     last assessed 4/10/14    Onychomycosis of toenail     last assessed 11/7/16       Past Surgical History:   Procedure Laterality Date    APPENDECTOMY      BREAST CYST ASPIRATION Left 1993    CATARACT EXTRACTION Right     KNEE ARTHROSCOPY Left     therapeutic       Family History   Problem Relation Age of Onset    Breast cancer Mother 80    Hypertension Mother     Ovarian cancer Maternal Aunt 29    Ovarian cancer Cousin 54    Prostate cancer Paternal Uncle 54    No Known Problems Maternal Grandmother     No Known Problems Maternal Grandfather     No Known Problems Paternal Grandmother     No Known Problems Paternal Grandfather     No Known Problems Maternal Aunt     No Known Problems Paternal Aunt        Social History     Occupational History    Not on file   Tobacco Use    Smoking status: Never Smoker    Smokeless tobacco: Never Used   Vaping Use    Vaping Use: Never used   Substance and Sexual Activity    Alcohol use: Not Currently     Alcohol/week: 0 0 standard drinks     Comment: rarely    Drug use: Never    Sexual activity: Not Currently     Partners: Male         Current Outpatient Medications:     AMILoride-hydrochlorothiazide (MODURETIC) 5-50 mg per tablet, Take 1 tablet by mouth daily, Disp: 90 tablet, Rfl: 1    Ascorbic Acid (vitamin C) 1000 MG tablet, Take 1,000 mg by mouth daily, Disp: , Rfl:     Calcium Carbonate-Vitamin D3 600-400 MG-UNIT TABS, Take 2 tablets by mouth daily, Disp: , Rfl:     Collagen Hydrolysate POWD, 1 Scoop by Does not apply route daily , Disp: , Rfl:     Glucosamine-Chondroit-Vit C-Mn (GLUCOSAMINE-CHONDROITIN) TABS, Take 2 tablets by mouth daily, Disp: , Rfl:     levothyroxine 50 mcg tablet, Take 1 tablet (50 mcg total) by mouth daily Alt with 1 5 tab M,W,F, Disp: 108 tablet, Rfl: 3    Magnesium Carbonate POWD, 325 mg by Does not apply route daily, Disp: , Rfl:     Multiple Vitamin (MULTI-DAY) TABS, Take 2 tablets by mouth daily , Disp: , Rfl:     Multiple Vitamins-Minerals (ULTRA AYSHA PO), Take 2 tablets by mouth daily, Disp: , Rfl:     naproxen (Naprosyn) 500 mg tablet, Take 1 tablet (500 mg total) by mouth 2 (two) times a day with meals, Disp: 30 tablet, Rfl: 1    Omega-3 Fatty Acids (FISH OIL) 1200 MG CAPS, Take 2 capsules by mouth daily , Disp: , Rfl:     No Known Allergies         Vitals:    07/28/22 1558   BP: 136/87   Pulse: 85       Objective:                    Bilateral knee exam reveals some soft tissue swelling to both knees with left-sided pes anserine bursa tenderness  Trace effusions to both knees and bilateral mild varus deformity    She has retropatellar crepitation with passive motion which is full to 0° of extension and flexion past 110  She has tenderness to palpation of the right greater trochanter bursa and mildly into the right SI joint  Negative straight leg raise  Her motor exam is 5/5  Diagnostics, reviewed and taken today if performed as documented:  X-rays of both knees were reviewed that were taken in July of 2022 and confirm tricompartmental osteoarthritis with medial lateral and patellofemoral joint space narrowing as well as bone spur formation  Her lumbar x-rays were also reviewed and do also confirm degenerative changes including decreased disc space especially at L3-4  Procedures, if performed today:    Large joint arthrocentesis: bilateral knee  Universal Protocol:  Risks and benefits: risks, benefits and alternatives were discussed  Time out: Immediately prior to procedure a "time out" was called to verify the correct patient, procedure, equipment, support staff and site/side marked as required  Timeout called at: 7/28/2022 4:43 PM   Supporting Documentation  Indications: pain   Procedure Details  Location: knee - bilateral knee  Needle size: 22 G  Ultrasound guidance: no  Approach: anterolateral    Patient tolerance: patient tolerated the procedure well with no immediate complications    Large joint arthrocentesis: L anserine bursa  Universal Protocol:  Consent given by: patient  Time out: Immediately prior to procedure a "time out" was called to verify the correct patient, procedure, equipment, support staff and site/side marked as required    Timeout called at: 7/28/2022 4:43 PM   Supporting Documentation  Indications: pain   Procedure Details  Location: knee - L anserine bursa  Needle size: 22 G  Ultrasound guidance: no  Approach: posterior  Medications administered: 2 mL lidocaine 1 %; 6 mg betamethasone acetate-betamethasone sodium phosphate 6 (3-3) mg/mL; 2 mL bupivacaine 0 25 %    Patient tolerance: patient tolerated the procedure well with no immediate complications    Large joint arthrocentesis: R greater trochanteric bursa  Universal Protocol:  Consent given by: patient  Time out: Immediately prior to procedure a "time out" was called to verify the correct patient, procedure, equipment, support staff and site/side marked as required  Timeout called at: 7/28/2022 4:44 PM   Supporting Documentation  Indications: pain   Procedure Details  Location: hip - R greater trochanteric bursa  Needle size: 22 G  Ultrasound guidance: no  Approach: lateral  Medications administered: 2 mL lidocaine 1 %; 6 mg betamethasone acetate-betamethasone sodium phosphate 6 (3-3) mg/mL; 2 mL bupivacaine 0 25 %    Patient tolerance: patient tolerated the procedure well with no immediate complications          Portions of the record may have been created with voice recognition software  Occasional wrong word or "sound a like" substitutions may have occurred due to the inherent limitations of voice recognition software  Read the chart carefully and recognize, using context, where substitutions have occurred

## 2022-08-02 ENCOUNTER — APPOINTMENT (OUTPATIENT)
Dept: PHYSICAL THERAPY | Age: 67
End: 2022-08-02
Payer: MEDICARE

## 2022-08-04 ENCOUNTER — OFFICE VISIT (OUTPATIENT)
Dept: PHYSICAL THERAPY | Age: 67
End: 2022-08-04
Payer: MEDICARE

## 2022-08-04 DIAGNOSIS — M25.562 ACUTE PAIN OF BOTH KNEES: ICD-10-CM

## 2022-08-04 DIAGNOSIS — M25.561 ACUTE PAIN OF RIGHT KNEE: ICD-10-CM

## 2022-08-04 DIAGNOSIS — G89.29 CHRONIC RIGHT-SIDED LOW BACK PAIN WITH RIGHT-SIDED SCIATICA: Primary | ICD-10-CM

## 2022-08-04 DIAGNOSIS — M54.41 CHRONIC RIGHT-SIDED LOW BACK PAIN WITH RIGHT-SIDED SCIATICA: Primary | ICD-10-CM

## 2022-08-04 DIAGNOSIS — M25.561 ACUTE PAIN OF BOTH KNEES: ICD-10-CM

## 2022-08-04 PROCEDURE — 97110 THERAPEUTIC EXERCISES: CPT

## 2022-08-04 PROCEDURE — 97140 MANUAL THERAPY 1/> REGIONS: CPT

## 2022-08-04 NOTE — PROGRESS NOTES
Daily Note     Today's date: 2022  Patient name: Wisam Arambula  : 1955  MRN: [id number]  Referring provider: Saima Haddad DO  Dx:   Encounter Diagnosis     ICD-10-CM    1  Chronic right-sided low back pain with right-sided sciatica  M54 41     G89 29    2  Acute pain of both knees  M25 561     M25 562    3  Acute pain of right knee  M25 561                   Subjective: Pt states that she is "getting there" in regards to symptoms  She reports R buttock pain today  She saw the orthopedic MD who gave her injections in both knees and into her R hip  She has noted improvement in symptoms since receiving the injections  She has decreased her walking in the morning  Objective: See treatment diary below      Assessment: Pt was unable to tolerate nu step due to pain in R leg  Had pt switch to recumbent bike which she was able to tolerate without R knee symptoms  Incorporated additional hip abductor strengthening exercises with resisted hip abduction and lateral step ups  Pt was able to complete without significant increase in symptoms  Tolerated treatment well  Patient demonstrated fatigue post treatment, exhibited good technique with therapeutic exercises and would benefit from continued PT      Plan: Continue per plan of care  Precautions: Hx of falls;  Hx of L medial meniscal tear; Hypothyroidism; HTN      Manuals          Piriformis stretch  NR  NR         Prone hip flexor stretch  NR NR NR         H/S stretch   NR NR                      Neuro Re-Ed                                                                                                        Ther Ex             HEP (H/S stretch, hip flexor stretch prone, Standing repeated extension) Education 10'             Nu step (seat #12 / arms #10)  10' L3 8 5' L5 Nu step 5' L5 (switched to bike 2/2 pain) Bike 5' L1         Prone press ups  x30  x30  x30          Supine bridge  3x10  3x10  3x10          SLR's on mat  2x10 flex/abd 2x10 ea flex/abd  2x10 flex/abd/ext         SAQ  3x10  3x10 3# NT         Standing ext  x30 // bars x30 // bars          Hip abd TB    3x10 blue          Lat band walks             Cybex machines   NT          Ther Activity    Lat 6" 3x10 b/l         Step ups             Sled push/pull              Gait Training                                       Modalities

## 2022-08-09 ENCOUNTER — OFFICE VISIT (OUTPATIENT)
Dept: PHYSICAL THERAPY | Age: 67
End: 2022-08-09
Payer: MEDICARE

## 2022-08-09 DIAGNOSIS — M25.561 ACUTE PAIN OF RIGHT KNEE: ICD-10-CM

## 2022-08-09 DIAGNOSIS — G89.29 CHRONIC RIGHT-SIDED LOW BACK PAIN WITH RIGHT-SIDED SCIATICA: Primary | ICD-10-CM

## 2022-08-09 DIAGNOSIS — M25.561 ACUTE PAIN OF BOTH KNEES: ICD-10-CM

## 2022-08-09 DIAGNOSIS — M54.41 CHRONIC RIGHT-SIDED LOW BACK PAIN WITH RIGHT-SIDED SCIATICA: Primary | ICD-10-CM

## 2022-08-09 DIAGNOSIS — M25.562 ACUTE PAIN OF BOTH KNEES: ICD-10-CM

## 2022-08-09 PROCEDURE — 97140 MANUAL THERAPY 1/> REGIONS: CPT

## 2022-08-09 PROCEDURE — 97110 THERAPEUTIC EXERCISES: CPT

## 2022-08-09 NOTE — PROGRESS NOTES
Daily Note     Today's date: 2022  Patient name: Shady Rome  : 1955  MRN: [id number]  Referring provider: Mike Rosales DO  Dx:   Encounter Diagnosis     ICD-10-CM    1  Chronic right-sided low back pain with right-sided sciatica  M54 41     G89 29    2  Acute pain of both knees  M25 561     M25 562    3  Acute pain of right knee  M25 561                   Subjective: Pt states that in the morning she has a lot of pain her back and R leg  Pain improves throughout the day  Objective: See treatment diary below      Assessment: Pt continues to exhibit decreased hamstring and hip flexor flexibility  Improves following manual static stretching  Incorporated and educated pt today on core strengthening and started with basic posterior pelvic exercise  Pt exhibited good form and control of abdominal muscles with this exercise  Challenged with additional of alternating hip flexion  Continued with hip strengthening and stabilization  Tolerated treatment well  Patient demonstrated fatigue post treatment, exhibited good technique with therapeutic exercises and would benefit from continued PT      Plan: Continue per plan of care  Precautions: Hx of falls;  Hx of L medial meniscal tear; Hypothyroidism; HTN      Manuals         Piriformis stretch  NR  NR NR        Prone hip flexor stretch  NR NR NR         H/S stretch   NR NR NR                     Neuro Re-Ed                                                                                                        Ther Ex             HEP (H/S stretch, hip flexor stretch prone, Standing repeated extension) Education 10'             Nu step (seat #12 / arms #10)  10' L3 8 5' L5 Nu step 5' L5 (switched to bike 2/2 pain) Bike 5' L1 Bike 10'         PPT      5"x15        PPT with alternating hip flex     x20 ea R/L        Prone press ups  x30  x30  x30  NT        Supine bridge  3x10  3x10  3x10  3x10         SLR's on mat  2x10 flex/abd 2x10 ea flex/abd  2x10 flex/abd/ext 2x10 ext        SAQ  3x10  3x10 3# NT         Standing ext  x30 // bars x30 // bars          Hip abd TB    3x10 blue  3x10 black         Lat band walks             Cybex machines   NT          Ther Activity             Step ups    Lat 6" 3x10 b/l Lat 6" 3x10 b/l         Sled push/pull              Gait Training                                       Modalities

## 2022-08-11 ENCOUNTER — APPOINTMENT (OUTPATIENT)
Dept: PHYSICAL THERAPY | Age: 67
End: 2022-08-11
Payer: MEDICARE

## 2022-08-16 ENCOUNTER — OFFICE VISIT (OUTPATIENT)
Dept: PHYSICAL THERAPY | Age: 67
End: 2022-08-16
Payer: MEDICARE

## 2022-08-16 DIAGNOSIS — M25.562 ACUTE PAIN OF BOTH KNEES: ICD-10-CM

## 2022-08-16 DIAGNOSIS — M54.41 CHRONIC RIGHT-SIDED LOW BACK PAIN WITH RIGHT-SIDED SCIATICA: Primary | ICD-10-CM

## 2022-08-16 DIAGNOSIS — G89.29 CHRONIC RIGHT-SIDED LOW BACK PAIN WITH RIGHT-SIDED SCIATICA: Primary | ICD-10-CM

## 2022-08-16 DIAGNOSIS — M25.561 ACUTE PAIN OF BOTH KNEES: ICD-10-CM

## 2022-08-16 DIAGNOSIS — M25.561 ACUTE PAIN OF RIGHT KNEE: ICD-10-CM

## 2022-08-16 PROCEDURE — 97110 THERAPEUTIC EXERCISES: CPT

## 2022-08-16 NOTE — PROGRESS NOTES
Daily Note     Today's date: 2022  Patient name: Alannah Edwards  : 1955  MRN: [id number]  Referring provider: Dora Ch DO  Dx:   Encounter Diagnosis     ICD-10-CM    1  Chronic right-sided low back pain with right-sided sciatica  M54 41     G89 29    2  Acute pain of both knees  M25 561     M25 562    3  Acute pain of right knee  M25 561                   Subjective: Pt states that she has pain in her low back in the mornings  She was unable to complete exercises for her low back over the past week because she had family visiting and she was on her feet a lot  She continues to walk every day  She is putting ointment on her knees and is trying to take care of her body  Objective: See treatment diary below      Assessment: Pt challenged by current therex as she experiences fatigue and slight muscle soreness especially with hip straight leg raises  Flexibility deficits noted with manual stretching today  Pt's pain improves with general movement and exercise  Provided pt with advanced HEP for LE strengthening and lumbar mobility  Tolerated treatment well  Patient demonstrated fatigue post treatment, exhibited good technique with therapeutic exercises and would benefit from continued PT      Plan: Continue per plan of care  Precautions: Hx of falls;  Hx of L medial meniscal tear; Hypothyroidism; HTN      Manuals        Piriformis stretch  NR  NR NR NR       Prone hip flexor stretch  NR NR NR         H/S stretch   NR NR NR NR                    Neuro Re-Ed                                                                                                        Ther Ex             HEP (H/S stretch, hip flexor stretch prone, Standing repeated extension) Education 10'             Nu step (seat #12 / arms #10)  10' L3 8 5' L5 Nu step 5' L5 (switched to bike 2/2 pain) Bike 5' L1 Bike 10'  Bike 10'        PPT      5"x15        PPT with alternating hip flex     x20 ea R/L x30 Prone press ups  x30  x30  x30  NT x20        Supine bridge  3x10  3x10  3x10  3x10  3x10        SLR's on mat  2x10 flex/abd 2x10 ea flex/abd  2x10 flex/abd/ext 2x10 ext 2x10 flex/abd/ext RLE       SAQ  3x10  3x10 3# NT         Standing ext  x30 // bars x30 // bars          Hip abd TB    3x10 blue  3x10 black  3x10 black        Lat band walks             Cybex machines   NT          Ther Activity             Step ups    Lat 6" 3x10 b/l Lat 6" 3x10 b/l  Lat 6" 3x10 b/l        Sled push/pull              Gait Training                                       Modalities

## 2022-08-18 ENCOUNTER — EVALUATION (OUTPATIENT)
Dept: PHYSICAL THERAPY | Age: 67
End: 2022-08-18
Payer: MEDICARE

## 2022-08-18 DIAGNOSIS — M25.561 ACUTE PAIN OF BOTH KNEES: ICD-10-CM

## 2022-08-18 DIAGNOSIS — M25.562 ACUTE PAIN OF BOTH KNEES: ICD-10-CM

## 2022-08-18 DIAGNOSIS — G89.29 CHRONIC RIGHT-SIDED LOW BACK PAIN WITH RIGHT-SIDED SCIATICA: Primary | ICD-10-CM

## 2022-08-18 DIAGNOSIS — M25.561 ACUTE PAIN OF RIGHT KNEE: ICD-10-CM

## 2022-08-18 DIAGNOSIS — M54.41 CHRONIC RIGHT-SIDED LOW BACK PAIN WITH RIGHT-SIDED SCIATICA: Primary | ICD-10-CM

## 2022-08-18 PROCEDURE — 97164 PT RE-EVAL EST PLAN CARE: CPT

## 2022-08-18 PROCEDURE — 97110 THERAPEUTIC EXERCISES: CPT

## 2022-08-18 NOTE — PROGRESS NOTES
PT Re-Evaluation     Today's date: 2022  Patient name: Bijan Luo  : 1955  MRN: [id number]  Referring provider: Anna Guadalupe DO  Dx:   Encounter Diagnosis     ICD-10-CM    1  Chronic right-sided low back pain with right-sided sciatica  M54 41     G89 29    2  Acute pain of both knees  M25 561     M25 562    3  Acute pain of right knee  M25 561                   Assessment  Assessment details: Pt is a 75 y o  female presents to OP PT for re-evaluation with of chief c/o b/l knee pain and R sided low back pain that radiates into RLE  Pain is the most severe in the mornings but improves with movement  She has attended 6 sessions of OP PT at this time  She has not noticed any significant improvement since starting PT but her condition has not worsened which she attributes to staying active and performing the exercises  Pt continues to be limited functionally as she has difficulty walking, difficulty standing for long periods of time, difficulty with transitional movements, difficulty ascending/descending stairs, and difficulty with ADL's/leisure activities  Pt would benefit from continued OP PT for 2 more visits to become independent with exercises and transition to HEP     Impairments: abnormal gait, abnormal or restricted ROM, impaired physical strength, lacks appropriate home exercise program, pain with function and poor posture   Understanding of Dx/Px/POC: good  Goals  Short term goals  Pt will improve strength by 1/2 grade in LE's - in progress  Pt will improve lumbar ROM by 10% - MET  Pt will improve hamstring flexibility in 90/90 by 2 degrees - MET  Pt will report 50% functional improvement - in progress  Pt will be able to walk usual distances with <5/10 pain - in progress    Long term goals  Pt will be independent with HEP and/or symptom management   Pt will return to PLOF and perform normal leisure activities with a 90% reduction in symptoms  Pt will be able to ambulate usual distances with <3/10 pain  Pt will be able to perform sit-to-stand transfer with <3/10 pain and without use of UE's   Pt will improve FOTO >/= expected        Plan  Planned therapy interventions: patient education, therapeutic exercise, graded exercise, functional ROM exercises, flexibility, home exercise program, manual therapy, activity modification, strengthening, stretching, joint mobilization, massage, therapeutic activities, balance/weight bearing training and neuromuscular re-education  Frequency: 2x week  Duration in visits: 8  Duration in weeks: 4  Treatment plan discussed with: patient        Subjective Evaluation    History of Present Illness  Mechanism of injury: Pt states she has fallen about 3 times in the past year  She fell in August of last year where she tripped and fell on L knee  It swelled up and she went to PT at [place] [id number] but the swelling never went away so she stopped  She did not have any pain in the knee it was just the swelling and when she was climbing into bed or kneeling  She then fell again in February on her L knee again  Then she was on vacation about 3-4 weeks ago and fell onto both knees  The knees swelled up and were painful  She saw her MD when she got back from vacation and was told to see ortho and go to PT  She has ortho MD appt later this month  She would like to avoid surgery if possible  She has had two arthroscopic surgeries on her L knee  Her R knee is currently more painful than L knee  She only has pain in her knees when she puts weight through her legs  She has been putting ice on her knees  Denies clicking/popping/locking in her knees  Denies instability  Her main complaint is tightness and soreness in the thighs  She states that she has a fascia problem and has neglected to use foam roller like she normally does because her knees have been sore form the fall  She went and got x-rays on her knees but has not gotten results back yet  Back pain started a few years ago   She has pain on the R side and it is stiff  It feels as if she bumped it  Pt states that sometimes the pain will radiate down the back of the thigh  Mostly when she walking she has back pain and when she is standing  If she is sitting she does not have any pain  No pain when she is sleeping  She tries to keep her legs elevated with a pillow  She is more concerned about her low back than her knees at this time  Re-evaluation (22): Pt states that she has felt the same this week  She wakes up with a lot of pain in her back but it gets better when she walks and throughout the day  Pt has not noticed any improvement since starting PT  She still has the pain in the morning in her back and also in her R leg  She also notices swelling in both knees  She knows she needs to keep active and strengthen her muscles because of the arthritis in both her back and her knees  She would like to keep her two appointment next week in order to stay with exercising and become more independent with them so that she can transition to doing exercises on her own  Recurrent probem    Quality of life: good    Pain  Current pain ratin  At best pain rating: 3  At worst pain ratin  Quality: dull ache, sharp and discomfort  Relieving factors: rest, ice and medications  Aggravating factors: walking, standing and lifting  Progression: worsening    Treatments  Previous treatment: physical therapy  Current treatment: physical therapy  Patient Goals  Patient goal: Trying to get rid of the fluid in her knees         Objective     Tenderness   Left Knee   Tenderness in the lateral joint line and medial joint line  Right Knee   Tenderness in the fibular head and lateral joint line       Active Range of Motion     Lumbar   Flexion:  with pain Restriction level: minimal  Extension:  Restriction level: moderate  Left lateral flexion:  Restriction level: moderate  Right lateral flexion:  with pain Restriction level: moderate  Left rotation:  Restriction level: moderate  Right rotation:  Restriction level: moderate  Left Knee   Flexion: 135 degrees   Extension: 0 degrees     Right Knee   Flexion: 138 degrees   Extension: 0 degrees     Additional Active Range of Motion Details  Lumbar AROM (cm from third finger tip to floor)    Flex: 5 cm  L lat flex: 58 cm  R lat flex: 60 cm    Ext: 20 degrees    Strength/Myotome Testing     Left Hip   Planes of Motion   Flexion: 4-  Abduction: 4  Adduction: 4+    Right Hip   Planes of Motion   Flexion: 4-  Abduction: 4  Adduction: 4+    Left Knee   Flexion: 4  Extension: 4    Right Knee   Flexion: 4-  Extension: 4-    Left Ankle/Foot   Dorsiflexion: WFL  Plantar flexion: WFL  Right Ankle/Foot   Dorsiflexion: WFL  Plantar flexion: WFL  Tests     Lumbar   Positive SIJ compression  Negative sacral thrust       Left   Negative passive SLR and slump test      Right   Negative passive SLR and slump test      Left Pelvic Girdle/Sacrum   Positive: thigh thrust      Right Pelvic Girdle/Sacrum   Positive: thigh thrust      Left Hip   Negative RAFFI and FADIR  Right Hip   Negative RAFFI and FADIR  Precautions: Hx of falls;  Hx of L medial meniscal tear; Hypothyroidism; HTN    Manuals 7/19 7/26 7/28 8/4 8/9 8/16 8/18      Piriformis stretch  NR  NR NR NR NR      Prone hip flexor stretch  NR NR NR         H/S stretch   NR NR NR NR NR                   Neuro Re-Ed                                                                                                        Ther Ex             HEP (H/S stretch, hip flexor stretch prone, Standing repeated extension) Education 10'             Nu step (seat #12 / arms #10)  10' L3 8 5' L5 Nu step 5' L5 (switched to bike 2/2 pain) Bike 5' L1 Bike 10'  Bike 10'  Bike 10'       PPT      5"x15        PPT with alternating hip flex     x20 ea R/L x30        Prone press ups  x30  x30  x30  NT x20  x20       Supine bridge  3x10  3x10  3x10  3x10  3x10 SLR's on mat  2x10 flex/abd 2x10 ea flex/abd  2x10 flex/abd/ext 2x10 ext 2x10 flex/abd/ext RLE 2x10 flex/abd/ext      SAQ  3x10  3x10 3# NT         Standing ext  x30 // bars x30 // bars          Hip abd TB    3x10 blue  3x10 black  3x10 black        Lat band walks             Cybex leg press   NT    3x10 50#      Cybex LAQ       3x10 10#      Cybex hip abd       3x10 25#      Cybex ham curl        3x10 15#                   Ther Activity             Step ups    Lat 6" 3x10 b/l Lat 6" 3x10 b/l  Lat 6" 3x10 b/l  Lat 8" 3x10 b/l       Sled push/pull              Gait Training                                       Modalities

## 2022-08-22 ENCOUNTER — HOSPITAL ENCOUNTER (OUTPATIENT)
Dept: RADIOLOGY | Age: 67
Discharge: HOME | End: 2022-08-22
Payer: MEDICARE

## 2022-08-22 VITALS — HEIGHT: 70 IN | BODY MASS INDEX: 22.33 KG/M2 | WEIGHT: 156 LBS

## 2022-08-22 DIAGNOSIS — Z12.31 ENCOUNTER FOR SCREENING MAMMOGRAM FOR MALIGNANT NEOPLASM OF BREAST: ICD-10-CM

## 2022-08-22 DIAGNOSIS — Z12.39 ENCOUNTER FOR SCREENING FOR MALIGNANT NEOPLASM OF BREAST, UNSPECIFIED SCREENING MODALITY: ICD-10-CM

## 2022-08-22 PROCEDURE — 77067 SCR MAMMO BI INCL CAD: CPT

## 2022-08-22 PROCEDURE — 77063 BREAST TOMOSYNTHESIS BI: CPT

## 2022-08-23 ENCOUNTER — OFFICE VISIT (OUTPATIENT)
Dept: PHYSICAL THERAPY | Age: 67
End: 2022-08-23
Payer: MEDICARE

## 2022-08-23 DIAGNOSIS — M25.562 ACUTE PAIN OF BOTH KNEES: ICD-10-CM

## 2022-08-23 DIAGNOSIS — M25.561 ACUTE PAIN OF RIGHT KNEE: ICD-10-CM

## 2022-08-23 DIAGNOSIS — G89.29 CHRONIC RIGHT-SIDED LOW BACK PAIN WITH RIGHT-SIDED SCIATICA: Primary | ICD-10-CM

## 2022-08-23 DIAGNOSIS — M54.41 CHRONIC RIGHT-SIDED LOW BACK PAIN WITH RIGHT-SIDED SCIATICA: Primary | ICD-10-CM

## 2022-08-23 DIAGNOSIS — M25.561 ACUTE PAIN OF BOTH KNEES: ICD-10-CM

## 2022-08-23 PROCEDURE — 97110 THERAPEUTIC EXERCISES: CPT

## 2022-08-23 NOTE — PROGRESS NOTES
Daily Note     Today's date: 2022  Patient name: Katelyn Mcgarry  : 1955  MRN: [id number]  Referring provider: Roxana Quarles DO  Dx:   Encounter Diagnosis     ICD-10-CM    1  Chronic right-sided low back pain with right-sided sciatica  M54 41     G89 29    2  Acute pain of both knees  M25 561     M25 562                   Subjective: Pt notes continued pain in the mornings which improves after she starts walking for about 10 minutes  Her pain last night was improved but she has been taking one aleve per day  Objective: See treatment diary below      Assessment: Pt exhibiting decreased hamstring flexibility L>R today  Challenged by straight leg raises on plinth especially prone hip extension as she report fatigue following completion  She was able to tolerate LE strengthening on machines but does fatigue significantly  Tolerated treatment well  Patient demonstrated fatigue post treatment, exhibited good technique with therapeutic exercises and would benefit from continued PT      Plan: Continue per plan of care  Precautions: Hx of falls;  Hx of L medial meniscal tear; Hypothyroidism; HTN    Manuals      Piriformis stretch  NR  NR NR NR NR NR     Prone hip flexor stretch  NR NR NR         H/S stretch   NR NR NR NR NR                   Neuro Re-Ed                                                                                                        Ther Ex             HEP (H/S stretch, hip flexor stretch prone, Standing repeated extension) Education 10'             Nu step (seat #12 / arms #10)  10' L3 8 5' L5 Nu step 5' L5 (switched to bike 2/2 pain) Bike 5' L1 Bike 10'  Bike 10'  Bike 10'  Nu step 10'      PPT      5"x15        PPT with alternating hip flex     x20 ea R/L x30        Prone press ups  x30  x30  x30  NT x20  x20       Supine bridge  3x10  3x10  3x10  3x10  3x10        SLR's on mat  2x10 flex/abd 2x10 ea flex/abd  2x10 flex/abd/ext 2x10 ext 2x10 flex/abd/ext RLE 2x10 flex/abd/ext 3x10 flex/abd/ext     SAQ  3x10  3x10 3# NT         Standing ext  x30 // bars x30 // bars          Hip abd TB    3x10 blue  3x10 black  3x10 black        Lat band walks             Cybex leg press   NT    3x10 50# NT (machine occupied)     Cybex LAQ       3x10 10# 3x10 10#     Cybex hip abd       3x10 25# 3x10 25#     Cybex ham curl        3x10 15# 3x10 15#                  Ther Activity             Step ups    Lat 6" 3x10 b/l Lat 6" 3x10 b/l  Lat 6" 3x10 b/l  Lat 8" 3x10 b/l  NT     Sled push/pull              Gait Training                                       Modalities

## 2022-08-25 ENCOUNTER — OFFICE VISIT (OUTPATIENT)
Dept: PHYSICAL THERAPY | Age: 67
End: 2022-08-25
Payer: MEDICARE

## 2022-08-25 DIAGNOSIS — M54.41 CHRONIC RIGHT-SIDED LOW BACK PAIN WITH RIGHT-SIDED SCIATICA: Primary | ICD-10-CM

## 2022-08-25 DIAGNOSIS — G89.29 CHRONIC RIGHT-SIDED LOW BACK PAIN WITH RIGHT-SIDED SCIATICA: Primary | ICD-10-CM

## 2022-08-25 DIAGNOSIS — M25.562 ACUTE PAIN OF BOTH KNEES: ICD-10-CM

## 2022-08-25 DIAGNOSIS — M25.561 ACUTE PAIN OF BOTH KNEES: ICD-10-CM

## 2022-08-25 PROCEDURE — 97140 MANUAL THERAPY 1/> REGIONS: CPT

## 2022-08-25 PROCEDURE — 97110 THERAPEUTIC EXERCISES: CPT

## 2022-08-25 NOTE — PROGRESS NOTES
Daily Note     Today's date: 2022  Patient name: Bijan Luo  : 1955  MRN: [id number]  Referring provider: Anna Guadalupe DO  Dx:   Encounter Diagnosis     ICD-10-CM    1  Chronic right-sided low back pain with right-sided sciatica  M54 41     G89 29    2  Acute pain of both knees  M25 561     M25 562                   Subjective: Pt states she experienced some pain in her low back on the R side when getting out of the car today  But she is "doing okay"      Objective: See treatment diary below      Assessment: Completed final session prior to transition to HEP  Pt is I with HEP, symptom management and exercise routine  She is appropriate for d/c at this time  She is in agreement with plan  Educated pt to continue with HEP for continue therapeutic benefit  She verbalizes understanding  Tolerated treatment well  Patient demonstrated fatigue post treatment and exhibited good technique with therapeutic exercises      Plan: D/C to HEP     Precautions: Hx of falls;  Hx of L medial meniscal tear; Hypothyroidism; HTN    Manuals     Piriformis stretch  NR  NR NR NR NR NR NR    Prone hip flexor stretch  NR NR NR         H/S stretch   NR NR NR NR NR                   Neuro Re-Ed                                                                                                        Ther Ex             HEP (H/S stretch, hip flexor stretch prone, Standing repeated extension) Education 10'             Nu step (seat #12 / arms #10)  10' L3 8 5' L5 Nu step 5' L5 (switched to bike 2/2 pain) Bike 5' L1 Bike 10'  Bike 10'  Bike 10'  Nu step 10'  Bike 10'     PPT      5"x15        PPT with alternating hip flex     x20 ea R/L x30        Prone press ups  x30  x30  x30  NT x20  x20       Supine bridge  3x10  3x10  3x10  3x10  3x10        SLR's on mat  2x10 flex/abd 2x10 ea flex/abd  2x10 flex/abd/ext 2x10 ext 2x10 flex/abd/ext RLE 2x10 flex/abd/ext 3x10 flex/abd/ext 3x10 flex/abd SAQ  3x10  3x10 3# NT         Standing ext  x30 // bars x30 // bars          Hip abd TB    3x10 blue  3x10 black  3x10 black        Lat band walks             Cybex leg press   NT    3x10 50# NT (machine occupied) 3x10 50#    Cybex LAQ       3x10 10# 3x10 10# 3x10 10#    Cybex hip abd       3x10 25# 3x10 25# 3x10 25#    Cybex ham curl        3x10 15# 3x10 15#     SKTC and H/L rotation         5"x15 ea    Ther Activity             Step ups    Lat 6" 3x10 b/l Lat 6" 3x10 b/l  Lat 6" 3x10 b/l  Lat 8" 3x10 b/l  NT     Sled push/pull              Gait Training                                       Modalities

## 2022-09-13 ENCOUNTER — TRANSCRIBE ORDERS (OUTPATIENT)
Dept: PAIN MEDICINE | Facility: CLINIC | Age: 67
End: 2022-09-13

## 2022-09-13 ENCOUNTER — CONSULT (OUTPATIENT)
Dept: PAIN MEDICINE | Facility: CLINIC | Age: 67
End: 2022-09-13
Payer: MEDICARE

## 2022-09-13 VITALS
BODY MASS INDEX: 22.38 KG/M2 | SYSTOLIC BLOOD PRESSURE: 110 MMHG | WEIGHT: 156 LBS | DIASTOLIC BLOOD PRESSURE: 78 MMHG | HEART RATE: 87 BPM

## 2022-09-13 DIAGNOSIS — G89.4 CHRONIC PAIN SYNDROME: Primary | ICD-10-CM

## 2022-09-13 DIAGNOSIS — M51.16 LUMBAR DISC DISEASE WITH RADICULOPATHY: ICD-10-CM

## 2022-09-13 DIAGNOSIS — M17.10 ARTHRITIS OF KNEE: ICD-10-CM

## 2022-09-13 PROCEDURE — 99204 OFFICE O/P NEW MOD 45 MIN: CPT | Performed by: PHYSICAL MEDICINE & REHABILITATION

## 2022-09-13 NOTE — PROGRESS NOTES
Assessment  1  Chronic pain syndrome    2  Lumbar disc disease with radiculopathy    3  Arthritis of knee        Plan  Ms Kenna Cohen is a pleasant 79-year-old female who presents for initial evaluation regarding low back pain with intermittent radiating symptoms into bilateral lower extremities and bilateral knee joint pain referred by Ortho  During today's evaluation she is demonstrating with clinical and diagnostic evidence of pain that is likely multifactorial nature with majority the pain appears to be generating from the bilateral knees with mild osteoarthritic changes as well as the low back with multilevel degenerative disc disease  She is already completed greater than 6 weeks of physical therapy in the last 6 months with minimal improvements  At this time further diagnostic workup and interventional approaches may be beneficial and warranted  As such we will   1  Order MRI lumbar spine without contrast to better identify disc and spine pathology contributing to symptoms  2  Extensive conversation regarding interventional approaches including epidural steroid injections as well as possible genicular blocks regarding the knee pain and lumbar radiculopathy  For now we will await MRI lumbar spine and discuss plan moving forward afterwards    My impressions and treatment recommendations were discussed in detail with the patient who verbalized understanding and had no further questions  Discharge instructions were provided  I personally saw and examined the patient and I agree with the above discussed plan of care  Orders Placed This Encounter   Procedures    MRI lumbar spine without contrast     Standing Status:   Future     Standing Expiration Date:   9/13/2026     Scheduling Instructions: There is no preparation for this test  Please leave your jewelry and valuables at home, wedding rings are the exception  All patients will be required to change into a hospital gown and pants    Street clothes are not permitted in the MRI  Magnetic nail polish must be removed prior to arrival for your test  Please bring your insurance cards, a form of photo ID and a list of your medications with you  Arrive 15 minutes prior to your appointment time in order to register  Please bring any prior CT or MRI studies of this area that were not performed at a [facility] facility  To schedule this appointment, please contact Central Scheduling at [phone]  Prior to your appointment, please make sure you complete the MRI Screening Form when you e-Check in for your appointment  This will be available starting 7 days before your appointment in [address]  You may receive an e-mail with an activation code if you do not have a [employer] account  If you do not have access to a device, we will complete your screening at your appointment  Order Specific Question:   What is the patient's sedation requirement? Answer:   No Sedation     Order Specific Question:   Release to patient through [facility]     Answer:   Immediate     Order Specific Question:   Is order priority selected as STAT? Answer:   No     Order Specific Question:   Reason for Exam (FREE TEXT)     Answer:   Lumbar radic     Order Specific Question:   When should the test be performed? Answer:   Elective- non urgent     No orders of the defined types were placed in this encounter  History of Present Illness    Radha Victoria is a 75 y o  female presents to [place] [address] [facility] for initial evaluation regarding low back pain with radicular symptoms into bilateral lower extremities  Patient denies any significant inciting event or recent trauma  She does report a fall June 30, 2022 and has been dealing with pain ever since  Today reports moderate to severe pain rated 7/10 and interfering with daily activities  Pain is intermittent 30-60% of the time that is worse in the morning    Describes symptoms as cramping, shooting, sharp, throbbing pain  Denies any significant weakness or falls  Does not use any durable medical equipment for ambulation  Symptoms are also worse with prior, standing, bending, walking, exercise  No significant relief with physical therapy or home exercises  Currently taking naproxen with minimal relief  Presents today for initial evaluation  I have personally reviewed and/or updated the patient's past medical history, past surgical history, family history, social history, current medications, allergies, and vital signs today  Review of Systems   Constitutional: Negative for fever and unexpected weight change  HENT: Negative for trouble swallowing  Eyes: Negative for visual disturbance  Respiratory: Negative for shortness of breath and wheezing  Cardiovascular: Negative for chest pain and palpitations  Gastrointestinal: Negative for constipation, diarrhea, nausea and vomiting  Endocrine: Negative for cold intolerance, heat intolerance and polydipsia  Genitourinary: Negative for difficulty urinating and frequency  Musculoskeletal: Positive for back pain, gait problem and joint swelling  Negative for arthralgias and myalgias  Skin: Negative for rash  Neurological: Negative for dizziness, seizures, syncope, weakness and headaches  Hematological: Does not bruise/bleed easily  Psychiatric/Behavioral: Negative for dysphoric mood  All other systems reviewed and are negative        Patient Active Problem List   Diagnosis    Benign essential HTN    Hypothyroidism    Medicare annual wellness visit, subsequent    Pelvic pain    Callus of foot    Vaginal cyst    Acute pain of left knee    Traumatic injury of knee    Age-related cataract of both eyes    Hyponatremia    High serum high density lipoprotein (HDL)    Fall from slipping    Complex tear of medial meniscus of left knee as current injury       Past Medical History:   Diagnosis Date    Benign essential HTN 9/27/2017  Elevated BUN     resolved 3/10/17    Fluid retention in legs     last assessed 5/12/15    Hypothyroidism     Lumbago with sciatica     last assessed 4/10/14    Lumbar radiculopathy     last assessed 4/10/14    Onychomycosis of toenail     last assessed 11/7/16       Past Surgical History:   Procedure Laterality Date    APPENDECTOMY      BREAST CYST ASPIRATION Left 1993    CATARACT EXTRACTION Right     KNEE ARTHROSCOPY Left     therapeutic       Family History   Problem Relation Age of Onset    Breast cancer Mother 80    Hypertension Mother     Ovarian cancer Maternal Aunt 29    Ovarian cancer Cousin 54    Prostate cancer Paternal Uncle 54    No Known Problems Maternal Grandmother     No Known Problems Maternal Grandfather     No Known Problems Paternal Grandmother     No Known Problems Paternal Grandfather     No Known Problems Maternal Aunt     No Known Problems Paternal Aunt        Social History     Occupational History    Not on file   Tobacco Use    Smoking status: Never Smoker    Smokeless tobacco: Never Used   Vaping Use    Vaping Use: Never used   Substance and Sexual Activity    Alcohol use: Not Currently     Alcohol/week: 0 0 standard drinks     Comment: rarely    Drug use: Never    Sexual activity: Not Currently     Partners: Male       Current Outpatient Medications on File Prior to Visit   Medication Sig    AMILoride-hydrochlorothiazide (MODURETIC) 5-50 mg per tablet Take 1 tablet by mouth daily    Ascorbic Acid (vitamin C) 1000 MG tablet Take 1,000 mg by mouth daily    Calcium Carbonate-Vitamin D3 600-400 MG-UNIT TABS Take 2 tablets by mouth daily    Collagen Hydrolysate POWD 1 Scoop by Does not apply route daily     Glucosamine-Chondroit-Vit C-Mn (GLUCOSAMINE-CHONDROITIN) TABS Take 2 tablets by mouth daily    levothyroxine 50 mcg tablet Take 1 tablet (50 mcg total) by mouth daily Alt with 1 5 tab M,W,F    Magnesium Carbonate POWD 325 mg by Does not apply route daily    Multiple Vitamin (MULTI-DAY) TABS Take 2 tablets by mouth daily     Multiple Vitamins-Minerals (ULTRA AYSHA PO) Take 2 tablets by mouth daily    naproxen (Naprosyn) 500 mg tablet Take 1 tablet (500 mg total) by mouth 2 (two) times a day with meals    Omega-3 Fatty Acids (FISH OIL) 1200 MG CAPS Take 2 capsules by mouth daily      No current facility-administered medications on file prior to visit  No Known Allergies    Physical Exam    /78   Pulse 87   Wt 70 8 kg (156 lb)   BMI 22 38 kg/m²     Constitutional: normal, well developed, well nourished, alert, in no distress and non-toxic and no overt pain behavior  Eyes: anicteric  HEENT: grossly intact  Neck: supple, symmetric, trachea midline and no masses   Pulmonary:even and unlabored  Cardiovascular:No edema or pitting edema present  Skin:Normal without rashes or lesions and well hydrated  Psychiatric:Mood and affect appropriate  Neurologic:Cranial Nerves II-XII grossly intact  Musculoskeletal:antalgic, tenderness to palpation bilateral lumbar paraspinals and bilateral medial lateral joint line knees, decreased active and passive range of motion with lumbar flexion and extension limited by pain, MMT 5/5 bilateral lower extremities, sensation decreased to light touch in patchy distribution left lower extremites, DTRs within normal limits, positive straight leg raise right lower extremity    Imaging        LUMBAR SPINE     INDICATION:   M54 41: Lumbago with sciatica, right side  G89 29:  Other chronic pain      COMPARISON:  3/13/2017     VIEWS:  XR SPINE LUMBAR MINIMUM 4 VIEWS NON INJURY  Images: 5     FINDINGS:     There are 5 non rib bearing lumbar vertebral bodies       There is no evidence of acute fracture or destructive osseous lesion      Increased levoscoliosis, apex L3-4     Progressive moderate degenerative disc disease L2-3, L3-4, L4-5     The pedicles appear intact      Soft tissues are unremarkable      IMPRESSION:  Progressive multilevel degenerative spondylosis, levoscoliosis     No acute lumbar spinal abnormalities           LEFT KNEE     INDICATION:   W19  XXXA: Unspecified fall, initial encounter  M25 561: Pain in right knee  M25 562: Pain in left knee      COMPARISON:  5/6/2022 MRI     VIEWS:  XR KNEE 4+ VW LEFT INJURY   Images: 4     FINDINGS:     There is no acute fracture or dislocation      There is no joint effusion      Mild tricompartmental degenerative arthritis  Medial condyle enthesophyte at the origin of the MCL, consistent with MRI     No lytic or blastic osseous lesion      Soft tissues are unremarkable      IMPRESSION:  Degenerative changes as discussed above     No acute osseous abnormality      Consistent with MRI            RIGHT KNEE     INDICATION:   M25 561: Pain in right knee      COMPARISON:  None     VIEWS:  XR KNEE 4+ VW RIGHT INJURY   Images: 4     FINDINGS:     There is no acute fracture or dislocation      There is no joint effusion      Mild tricompartmental degenerative arthritis     No lytic or blastic osseous lesion      Soft tissues are unremarkable      IMPRESSION:  Mild degenerative arthritis as discussed above     No acute osseous abnormality             MRI LEFT KNEE     INDICATION:   M25 562: Pain in left knee  W01  0XXA: Fall on same level from slipping, tripping and stumbling without subsequent striking against object, initial encounter  M25 469: Effusion, unspecified knee      COMPARISON: X-ray left knee dated October 14, 2021      TECHNIQUE:    MR sequences were obtained of the left knee including:  Localizer, axial T2 fat sat, coronal T1/T2 fat sat, sagittal PD/T2 fat sat      Imaging performed on 1 5T MRI   Gadolinium was not used      FINDINGS:     SUBCUTANEOUS TISSUES: Mild prepatellar subcutaneous edema without localized fluid collection      JOINT EFFUSION: None      BAKER'S CYST: There is trace amount of fluid in Baker's cyst      MENISCI: Complex tear posterior horn of medial meniscus without flipped fragment (series 4 images 20-24)  There is an adjacent 1 6 x 0 5 cm cyst (6/9, 2/21), possibly representing a para meniscal cyst   Mild degenerative signal in the anterior horn of   lateral meniscus  Otherwise, the lateral meniscus is intact      CRUCIATE LIGAMENTS: Intact      EXTENSOR APPARATUS: Intact      COLLATERAL LIGAMENTS: Thickening of the MCL, in keeping with chronic sprain  There is associated enthesophyte at the medial femoral condyle insertion (7/11)  LCL is intact      ARTICULAR SURFACES:   Medial compartment: Mild osteoarthritis  Lateral compartment: Mild osteoarthritis  Patellofemoral compartment: Normal      BONES: Normal      MUSCULATURE:  Intact      IMPRESSION:     1  Complex tear posterior horn of medial meniscus with probable small para meniscal cyst      2    Chronic MCL sprain      3   Mild osteoarthritis of medial and lateral tibiofemoral compartments

## 2022-09-27 ENCOUNTER — ANNUAL EXAM (OUTPATIENT)
Dept: OBGYN CLINIC | Facility: CLINIC | Age: 67
End: 2022-09-27
Payer: MEDICARE

## 2022-09-27 VITALS
DIASTOLIC BLOOD PRESSURE: 80 MMHG | HEIGHT: 70 IN | BODY MASS INDEX: 22.9 KG/M2 | SYSTOLIC BLOOD PRESSURE: 110 MMHG | WEIGHT: 160 LBS

## 2022-09-27 DIAGNOSIS — Z01.419 ENCOUNTER FOR ANNUAL ROUTINE GYNECOLOGICAL EXAMINATION: Primary | ICD-10-CM

## 2022-09-27 DIAGNOSIS — Z12.31 ENCOUNTER FOR SCREENING MAMMOGRAM FOR MALIGNANT NEOPLASM OF BREAST: ICD-10-CM

## 2022-09-27 PROCEDURE — G0101 CA SCREEN;PELVIC/BREAST EXAM: HCPCS | Performed by: OBSTETRICS & GYNECOLOGY

## 2022-09-27 RX ORDER — AMOXICILLIN 875 MG/1
TABLET, COATED ORAL
COMMUNITY
Start: 2022-09-06 | End: 2022-10-24

## 2022-09-27 NOTE — PROGRESS NOTES
Assessment/Plan:  Pap smear deferred due to low risk status  Encouraged self-breast examination as well as calcium supplementation  Continue annual mammogram   Reviewed colon cancer screening, up-to-date with colonoscopy  She will continue to follow-up with primary care as scheduled  Return to office in 1 year or p r n  No problem-specific Assessment & Plan notes found for this encounter  Diagnoses and all orders for this visit:    Encounter for annual routine gynecological examination    Encounter for screening mammogram for malignant neoplasm of breast  -     Mammo screening bilateral w 3d & cad; Future    Other orders  -     amoxicillin (AMOXIL) 875 mg tablet; TAKE 1 TABLET BY MOUTH EVERY 12 HOURS START DAY PRIOR TO PROCEDURE IN THE A M  Subjective:      Patient ID: Angy Taylor is a 75 y o  female  HPI     This is a very pleasant 69-year-old female  ( x2, age 42, 43) presents for gyn exam   She went through menopause at age 38  She has never been on hormone replacement therapy  She denies any vaginal bleeding or spotting  No changes in bowel or bladder function  She has been in a monogamous relationship with her  for over 37 years  They have not been sexually active in several years  Pap smears have been normal   Colonoscopy 2021, normal  Mammogram 2022 normal  DEXA scan, scheduled    She does follow up with her primary care physician on a regular basis  More recently she had a significant fall while on vacation 2022  She has had significant leg and knee pain and is currently going through physical therapy  The following portions of the patient's history were reviewed and updated as appropriate: allergies, current medications, past family history, past medical history, past social history, past surgical history and problem list     Review of Systems   Constitutional: Negative for fatigue, fever and unexpected weight change     Respiratory: Negative for cough, chest tightness, shortness of breath and wheezing  Cardiovascular: Negative  Negative for chest pain and palpitations  Gastrointestinal: Negative  Negative for abdominal distention, abdominal pain, blood in stool, constipation, diarrhea, nausea and vomiting  Genitourinary: Negative  Negative for difficulty urinating, dyspareunia, dysuria, flank pain, frequency, genital sores, hematuria, pelvic pain, urgency, vaginal bleeding, vaginal discharge and vaginal pain  Skin: Negative for rash  Objective:      /80   Ht 5' 10" (1 778 m)   Wt 72 6 kg (160 lb)   BMI 22 96 kg/m²          Physical Exam  Constitutional:       Appearance: Normal appearance  She is well-developed  Cardiovascular:      Rate and Rhythm: Normal rate and regular rhythm  Pulmonary:      Effort: Pulmonary effort is normal       Breath sounds: Normal breath sounds  Chest:   Breasts:      Right: No inverted nipple, mass, nipple discharge, skin change or tenderness  Left: No inverted nipple, mass, nipple discharge, skin change or tenderness  Abdominal:      General: Bowel sounds are normal  There is no distension  Palpations: Abdomen is soft  Tenderness: There is no abdominal tenderness  There is no guarding or rebound  Genitourinary:     Labia:         Right: No rash, tenderness or lesion  Left: No rash, tenderness or lesion  Vagina: Normal  No signs of injury  No vaginal discharge or tenderness  Cervix: No cervical motion tenderness, discharge, friability, lesion, erythema or cervical bleeding  Uterus: Not enlarged and not tender  Adnexa:         Right: No mass, tenderness or fullness  Left: No mass, tenderness or fullness  Neurological:      Mental Status: She is alert and oriented to person, place, and time  Psychiatric:         Behavior: Behavior normal        external genitalia is within normal limits    The vagina is evident of estrogen deficiency  Cervix is parous, small nabothian cyst noted at 10o'clock  Rectovaginal exam is confirmatory

## 2022-10-03 ENCOUNTER — HOSPITAL ENCOUNTER (OUTPATIENT)
Dept: RADIOLOGY | Age: 67
Discharge: HOME | End: 2022-10-03
Payer: MEDICARE

## 2022-10-03 DIAGNOSIS — G89.4 CHRONIC PAIN SYNDROME: ICD-10-CM

## 2022-10-03 DIAGNOSIS — M51.16 LUMBAR DISC DISEASE WITH RADICULOPATHY: ICD-10-CM

## 2022-10-03 DIAGNOSIS — M17.10 ARTHRITIS OF KNEE: ICD-10-CM

## 2022-10-03 PROCEDURE — 72148 MRI LUMBAR SPINE W/O DYE: CPT

## 2022-10-03 PROCEDURE — G1004 CDSM NDSC: HCPCS

## 2022-10-05 ENCOUNTER — TELEPHONE (OUTPATIENT)
Dept: PAIN MEDICINE | Facility: CLINIC | Age: 67
End: 2022-10-05

## 2022-10-05 DIAGNOSIS — M48.061 LUMBAR FORAMINAL STENOSIS: ICD-10-CM

## 2022-10-05 DIAGNOSIS — M51.16 LUMBAR DISC DISEASE WITH RADICULOPATHY: Primary | ICD-10-CM

## 2022-10-05 NOTE — TELEPHONE ENCOUNTER
Caller: patient    Doctor: dr Beni Acosta    Reason for call: wants MRI results explained    Call back#: [phone]

## 2022-10-05 NOTE — TELEPHONE ENCOUNTER
S/W pt and advised of results and plan  Pt would like to proceed with injection  Please schedule ASAP per pt.

## 2022-10-05 NOTE — TELEPHONE ENCOUNTER
Clinical:  Please notify patient of MRI lumbar spine results demonstrating disc bulging and right sided disc herniation at L3-L4 that is causing narrowing with the exiting right L3 nerve all of which is likely contributing to the ongoing radicular pain into her leg    Nicole Kumar:  If patient is interested and amenable would proceed with L3-L4 lumbar epidural steroid injection under fluoro guidance   Order placed  Please schedule if so   Thank you

## 2022-10-12 ENCOUNTER — HOSPITAL ENCOUNTER (OUTPATIENT)
Dept: RADIOLOGY | Facility: CLINIC | Age: 67
Discharge: HOME | End: 2022-10-12
Payer: MEDICARE

## 2022-10-12 VITALS
DIASTOLIC BLOOD PRESSURE: 80 MMHG | HEART RATE: 83 BPM | RESPIRATION RATE: 20 BRPM | TEMPERATURE: 97.7 F | OXYGEN SATURATION: 98 % | SYSTOLIC BLOOD PRESSURE: 128 MMHG

## 2022-10-12 DIAGNOSIS — M51.16 LUMBAR DISC DISEASE WITH RADICULOPATHY: ICD-10-CM

## 2022-10-12 DIAGNOSIS — M48.061 LUMBAR FORAMINAL STENOSIS: ICD-10-CM

## 2022-10-12 PROBLEM — Z00.00 MEDICARE ANNUAL WELLNESS VISIT, SUBSEQUENT: Status: RESOLVED | Noted: 2021-03-23 | Resolved: 2022-10-12

## 2022-10-12 PROCEDURE — 62323 NJX INTERLAMINAR LMBR/SAC: CPT | Performed by: PHYSICAL MEDICINE & REHABILITATION

## 2022-10-12 RX ORDER — METHYLPREDNISOLONE ACETATE 80 MG/ML
80 INJECTION, SUSPENSION INTRA-ARTICULAR; INTRALESIONAL; INTRAMUSCULAR; PARENTERAL; SOFT TISSUE ONCE
Status: COMPLETED | OUTPATIENT
Start: 2022-10-12 | End: 2022-10-12

## 2022-10-12 RX ADMIN — METHYLPREDNISOLONE ACETATE 80 MG: 80 INJECTION, SUSPENSION INTRA-ARTICULAR; INTRALESIONAL; INTRAMUSCULAR; PARENTERAL; SOFT TISSUE at 08:54

## 2022-10-12 RX ADMIN — IOHEXOL 1 ML: 300 INJECTION, SOLUTION INTRAVENOUS at 08:54

## 2022-10-12 NOTE — DISCHARGE INSTR - LAB
Epidural Steroid Injection   WHAT YOU NEED TO KNOW:   An epidural steroid injection (NEO) is a procedure to inject steroid medicine into the epidural space  The epidural space is between your spinal cord and vertebrae  Steroids reduce inflammation and fluid buildup in your spine that may be causing pain  You may be given pain medicine along with the steroids  ACTIVITY  Do not drive or operate machinery today  No strenuous activity today - bending, lifting, etc   You may resume normal activites starting tomorrow - start slowly and as tolerated  You may shower today, but no tub baths or hot tubs  You may have numbness for several hours from the local anesthetic  Please use caution and common sense, especially with weight-bearing activities  CARE OF THE INJECTION SITE  If you have soreness or pain, apply ice to the area today (20 minutes on/20 minutes off)  Starting tomorrow, you may use warm, moist heat or ice if needed  You may have an increase or change in your discomfort for 36-48 hours after your treatment  Apply ice and continue with any pain medication you have been prescribed  Notify [facility] if you have any of the following: redness, drainage, swelling, headache, stiff neck or fever above 100°F     SPECIAL INSTRUCTIONS  Our office will contact you in approximately 7 days for a progress report  MEDICATIONS  Continue to take all routine medications  Our office may have instructed you to hold some medications  As no general anesthesia was used in today's procedure, you should not experience any side effects related to anesthesia  If you are diabetic, the steroids used in today's injection may temporarily increase your blood sugar levels after the first few days after your injection  Please keep a close eye on your sugars and alert the doctor who manages your diabetes if your sugars are significantly high from your baseline or you are symptomatic       If you have a problem specifically related to your procedure, please call our office at [phone]  Problems not related to your procedure should be directed to your primary care physician

## 2022-10-12 NOTE — H&P
History of Present Illness:  The patient is a 75 y o  female who presents with complaints of low back pain    Past Medical History:   Diagnosis Date   • Benign essential HTN 9/27/2017   • Elevated BUN     resolved 3/10/17   • Fluid retention in legs     last assessed 5/12/15   • Hypothyroidism    • Lumbago with sciatica     last assessed 4/10/14   • Lumbar radiculopathy     last assessed 4/10/14   • Onychomycosis of toenail     last assessed 11/7/16       Past Surgical History:   Procedure Laterality Date   • APPENDECTOMY     • BREAST CYST ASPIRATION Left 1993   • CATARACT EXTRACTION Right    • KNEE ARTHROSCOPY Left     therapeutic         Current Outpatient Medications:   •  AMILoride-hydrochlorothiazide (MODURETIC) 5-50 mg per tablet, Take 1 tablet by mouth daily, Disp: 90 tablet, Rfl: 1  •  amoxicillin (AMOXIL) 875 mg tablet, TAKE 1 TABLET BY MOUTH EVERY 12 HOURS START DAY PRIOR TO PROCEDURE IN THE A M , Disp: , Rfl:   •  Ascorbic Acid (vitamin C) 1000 MG tablet, Take 1,000 mg by mouth daily, Disp: , Rfl:   •  Calcium Carbonate-Vitamin D3 600-400 MG-UNIT TABS, Take 2 tablets by mouth daily, Disp: , Rfl:   •  Collagen Hydrolysate POWD, 1 Scoop by Does not apply route daily , Disp: , Rfl:   •  Glucosamine-Chondroit-Vit C-Mn (GLUCOSAMINE-CHONDROITIN) TABS, Take 2 tablets by mouth daily, Disp: , Rfl:   •  levothyroxine 50 mcg tablet, Take 1 tablet (50 mcg total) by mouth daily Alt with 1 5 tab M,W,F, Disp: 108 tablet, Rfl: 3  •  Magnesium Carbonate POWD, 325 mg by Does not apply route daily, Disp: , Rfl:   •  Multiple Vitamin (MULTI-DAY) TABS, Take 2 tablets by mouth daily , Disp: , Rfl:   •  Multiple Vitamins-Minerals (ULTRA AYSHA PO), Take 2 tablets by mouth daily, Disp: , Rfl:   •  naproxen (Naprosyn) 500 mg tablet, Take 1 tablet (500 mg total) by mouth 2 (two) times a day with meals, Disp: 30 tablet, Rfl: 1  •  Omega-3 Fatty Acids (FISH OIL) 1200 MG CAPS, Take 2 capsules by mouth daily , Disp: , Rfl:     Current Facility-Administered Medications:   •  iohexol (OMNIPAQUE) 300 mg/mL injection 50 mL, 50 mL, Epidural, Once, Leann Best, DO  •  methylPREDNISolone acetate (DEPO-MEDROL) injection 80 mg, 80 mg, Epidural, Once, Leann Best, DO    No Known Allergies    Physical Exam:   Vitals:    10/12/22 0840   BP: 144/89   Pulse: 92   Resp: 18   Temp: 97 7 °F (36 5 °C)   SpO2: 98%     General: Awake, Alert, Oriented x 3, Mood and affect appropriate  Respiratory: Respirations even and unlabored  Cardiovascular: Peripheral pulses intact; no edema  Musculoskeletal Exam: Tenderness to palpation bilateral lumbar paraspinals    ASA Score: 2    Patient/Chart Verification  Patient ID Verified: Verbal  ID Band Applied: No  Consents Confirmed: Procedural, To be obtained in the Pre-Procedure area  H&P( within 30 days) Verified: To be obtained in the Pre-Procedure area  Allergies Reviewed: Yes  Anticoag/NSAID held?: No  Currently on antibiotics?: No    Assessment:   1  Lumbar disc disease with radiculopathy    2   Lumbar foraminal stenosis        Plan: LESI (L3-L4)

## 2022-10-19 ENCOUNTER — TELEPHONE (OUTPATIENT)
Dept: PAIN MEDICINE | Facility: CLINIC | Age: 67
End: 2022-10-19

## 2022-10-19 NOTE — TELEPHONE ENCOUNTER
1st attempt call,unable to leave msg to call back with % of improvement and pain level. Call declined

## 2022-10-21 NOTE — TELEPHONE ENCOUNTER
Caller: Patient    Doctor/office: Eliana Kang CB#: [phone]      % of improvement: 50%  Pain Scale (1-10): 5/10

## 2022-10-24 ENCOUNTER — OFFICE VISIT (OUTPATIENT)
Dept: INTERNAL MEDICINE CLINIC | Age: 67
End: 2022-10-24
Payer: MEDICARE

## 2022-10-24 VITALS
TEMPERATURE: 97.8 F | HEART RATE: 90 BPM | WEIGHT: 161.3 LBS | DIASTOLIC BLOOD PRESSURE: 78 MMHG | SYSTOLIC BLOOD PRESSURE: 118 MMHG | BODY MASS INDEX: 23.09 KG/M2 | HEIGHT: 70 IN | OXYGEN SATURATION: 99 %

## 2022-10-24 DIAGNOSIS — I10 BENIGN ESSENTIAL HTN: Primary | ICD-10-CM

## 2022-10-24 DIAGNOSIS — E03.9 ACQUIRED HYPOTHYROIDISM: ICD-10-CM

## 2022-10-24 DIAGNOSIS — M17.0 PRIMARY OSTEOARTHRITIS OF BOTH KNEES: ICD-10-CM

## 2022-10-24 PROBLEM — M25.562 ACUTE PAIN OF LEFT KNEE: Status: RESOLVED | Noted: 2021-10-14 | Resolved: 2022-10-24

## 2022-10-24 PROBLEM — E87.1 HYPONATREMIA: Status: RESOLVED | Noted: 2022-04-15 | Resolved: 2022-10-24

## 2022-10-24 PROBLEM — L84 CALLUS OF FOOT: Status: RESOLVED | Noted: 2021-09-23 | Resolved: 2022-10-24

## 2022-10-24 PROCEDURE — 99213 OFFICE O/P EST LOW 20 MIN: CPT | Performed by: FAMILY MEDICINE

## 2022-10-24 NOTE — PROGRESS NOTES
Assessment/Plan:    1  Benign essential HTN  -     Comprehensive metabolic panel; Future  -     CBC and differential; Future    2  Acquired hypothyroidism  -     TSH, 3rd generation with Free T4 reflex; Future    3  Primary osteoarthritis of both knees            There are no Patient Instructions on file for this visit  Return in about 6 months (around 4/24/2023) for Recheck  Subjective:      Patient ID: Shalom Woods is a 75 y o  female  Chief Complaint   Patient presents with   • Follow-up     HTN           HPI     Hypertension   On amiloride with hydrochlorothiazide that she is tolerating well   Checks blood pressure at home and is well controlled   No side effects and renal function is stable   September 2021, well controlled with medications  Ochoa Fonseca is compliant   Oct 2022: doing well, compliant with meds, BP well controlled       The following portions of the patient's history were reviewed and updated as appropriate: allergies, current medications, past family history, past medical history, past social history, past surgical history and problem list     Review of Systems        Constitutional:  Denies fever or chills   Eyes:  Denies double , blurry vision or eye pain  HENT:  Denies nasal congestion or sore throat   Respiratory:  Denies cough or shortness of breath or wheezing  Cardiovascular:  Denies palpitations or chest pain  GI:  Denies abdominal pain, nausea, or vomiting, no loose stools, no reflux  Integument:  Denies rash , no open areas  Neurologic:  Denies headache or focal weakness, no dizziness  : no dysuria, or hematuria      Current Outpatient Medications   Medication Sig Dispense Refill   • AMILoride-hydrochlorothiazide (MODURETIC) 5-50 mg per tablet Take 1 tablet by mouth daily 90 tablet 1   • Ascorbic Acid (vitamin C) 1000 MG tablet Take 1,000 mg by mouth daily     • Calcium Carbonate-Vitamin D3 600-400 MG-UNIT TABS Take 2 tablets by mouth daily     • Collagen Hydrolysate POWD 1 Scoop by Does not apply route daily      • Glucosamine-Chondroit-Vit C-Mn (GLUCOSAMINE-CHONDROITIN) TABS Take 2 tablets by mouth daily     • levothyroxine 50 mcg tablet Take 1 tablet (50 mcg total) by mouth daily Alt with 1 5 tab M,W,F 108 tablet 3   • Magnesium Carbonate POWD 325 mg by Does not apply route daily     • Multiple Vitamin (MULTI-DAY) TABS Take 2 tablets by mouth daily      • Multiple Vitamins-Minerals (ULTRA AYSHA PO) Take 2 tablets by mouth daily     • Omega-3 Fatty Acids (FISH OIL) 1200 MG CAPS Take 2 capsules by mouth daily        No current facility-administered medications for this visit         Objective:    /78 (BP Location: Left arm, Patient Position: Sitting, Cuff Size: Standard)   Pulse 90   Temp 97 8 °F (36 6 °C) (Temporal)   Ht 5' 10" (1 778 m)   Wt 73 2 kg (161 lb 4 8 oz)   SpO2 99%   BMI 23 14 kg/m²        Physical Exam       Constitutional:  Well developed, well nourished, no acute distress, non-toxic appearance   Eyes:  PERRL, conjunctiva normal , non icteric sclera  HENT:  Atraumatic, oropharynx moist  Neck-  supple   Respiratory:  CTA b/l, normal breath sounds, no rales, no wheezing   Cardiovascular:  RRR, no murmurs, no LE edema b/l  GI:  Soft, nondistended, normal bowel sounds x 4, nontender, no organomegaly, no mass, no rebound, no guarding   Neurologic:  no focal deficits noted   Psychiatric:  Speech and behavior appropriate , AAO x 3  MS: b/l knee edema,- generalized, L > R      Abhishek Lawson

## 2022-11-01 ENCOUNTER — OFFICE VISIT (OUTPATIENT)
Dept: OBGYN CLINIC | Facility: HOSPITAL | Age: 67
End: 2022-11-01

## 2022-11-01 VITALS
DIASTOLIC BLOOD PRESSURE: 88 MMHG | HEART RATE: 73 BPM | WEIGHT: 165 LBS | HEIGHT: 70 IN | SYSTOLIC BLOOD PRESSURE: 138 MMHG | BODY MASS INDEX: 23.62 KG/M2

## 2022-11-01 DIAGNOSIS — M17.12 LOCALIZED OSTEOARTHRITIS OF LEFT KNEE: ICD-10-CM

## 2022-11-01 DIAGNOSIS — M17.11 LOCALIZED OSTEOARTHRITIS OF RIGHT KNEE: Primary | ICD-10-CM

## 2022-11-01 DIAGNOSIS — M54.16 LUMBAR RADICULOPATHY: ICD-10-CM

## 2022-11-01 NOTE — PROGRESS NOTES
Orthopaedic Surgery - Office Note  iJn Lynn (40 y o  female)   : 1955   MRN: [id number]  Encounter Date: 2022    Chief Complaint   Patient presents with   • Left Knee - Follow-up   • Right Knee - Follow-up       Assessment / Plan  Bilateral knee osteoarthritis with symptoms likely exacerbated by lumbar radiculopathy    · Bilateral Orthovisc ordered during today's visit  · Activity as tolerated  · Home exercise program reviewed  · Anti-inflammatories or Tylenol prn pain  · Ice and heat as needed  Return in about 4 weeks (around 2022) for Visco injection  History of Present Illness  Radha Castillo is a 75 y o  female who presents today for follow-up evaluation bilateral knee osteoarthritis  She states that she is doing well at this time reports mild pain and discomfort diffusely throughout both knees  She recently did see [facility] on 10/12/2022 where she did receive an epidural injection that did provide relief of her low back and 50% of her knee symptoms  She states she has been compliant with her home exercise program   She denies any new sense of instability  She does report occasional grinding and clicking sensation  She denies any numbness or tingling  Review of Systems  Pertinent items are noted in HPI  All other systems were reviewed and are negative  Physical Exam  /88   Pulse 73   Ht 5' 10" (1 778 m)   Wt 74 8 kg (165 lb)   BMI 23 68 kg/m²   Cons: Appears well  No apparent distress  Psych: Alert  Oriented x3  Mood and affect normal   Eyes: PERRLA, EOMI  Resp: Normal effort  No audible wheezing or stridor  CV: Palpable pulse  No discernable arrhythmia  No LE edema  Lymph:  No palpable cervical, axillary, or inguinal lymphadenopathy  Skin: Warm  No palpable masses  No visible lesions  Neuro: Normal muscle tone  Normal and symmetric DTR's  Bilateral Knee Exam  Alignment:  Normal knee alignment  Inspection:  No swelling  No edema    Palpation:  Mild joint like and patellar tenderness  No effusion  ROM:  Knee Extension 0  Knee Flexion 110  Strength:  Able to actively extend knee against gravity  Stability:  No objective knee instability  Stable Varus / Valgus stress, Lachman, and Posterior drawer  Tests:  No pertinent positive or negative tests  Patella:  Patella tracks centrally with crepitus  Neurovascular:  Sensation intact in DP/SP/Welch/Sa/T nerve distributions  2+ DP & PT pulses  Gait:  Normal       Studies Reviewed  XR of left knee - performed on 07/15/2022 demonstrates mild tricompartmental osteoarthritis with joint space narrowing and osteophyte formation  There is also medial condyle enthesophyte at the origin of the MCL  XR of right knee - performed on 07/15/2022 demonstrates mild tricompartmental osteoarthritis with joint space narrowing and osteophyte formation  MRI of  lumbar spine - performed on 10/03/2022 demonstrates diffuse annular bulge with right foraminal disc herniation results in moderate to severe right foraminal stenosis at L3-4 is moderate to severe in degree potentially impacting the right L3 nerve root  Correlate for right L3 radiculopathy    Procedures  No procedures today  Medical, Surgical, Family, and Social History  The patient's medical history, family history, and social history, were reviewed and updated as appropriate      Past Medical History:   Diagnosis Date   • Benign essential HTN 9/27/2017   • Elevated BUN     resolved 3/10/17   • Fluid retention in legs     last assessed 5/12/15   • Hypothyroidism    • Lumbago with sciatica     last assessed 4/10/14   • Lumbar radiculopathy     last assessed 4/10/14   • Onychomycosis of toenail     last assessed 11/7/16   • Primary osteoarthritis of both knees 10/24/2022       Past Surgical History:   Procedure Laterality Date   • APPENDECTOMY     • BREAST CYST ASPIRATION Left 1993   • CATARACT EXTRACTION Right    • KNEE ARTHROSCOPY Left     therapeutic       Family History Problem Relation Age of Onset   • Breast cancer Mother 80   • Hypertension Mother    • Ovarian cancer Maternal Aunt 29   • Ovarian cancer Cousin 54   • Prostate cancer Paternal Uncle 54   • No Known Problems Maternal Grandmother    • No Known Problems Maternal Grandfather    • No Known Problems Paternal Grandmother    • No Known Problems Paternal Grandfather    • No Known Problems Maternal Aunt    • No Known Problems Paternal Aunt        Social History     Occupational History   • Not on file   Tobacco Use   • Smoking status: Never Smoker   • Smokeless tobacco: Never Used   Vaping Use   • Vaping Use: Never used   Substance and Sexual Activity   • Alcohol use: Not Currently     Alcohol/week: 0 0 standard drinks     Comment: rarely   • Drug use: Never   • Sexual activity: Not Currently     Partners: Male       No Known Allergies      Current Outpatient Medications:   •  AMILoride-hydrochlorothiazide (MODURETIC) 5-50 mg per tablet, Take 1 tablet by mouth daily, Disp: 90 tablet, Rfl: 1  •  Ascorbic Acid (vitamin C) 1000 MG tablet, Take 1,000 mg by mouth daily, Disp: , Rfl:   •  Calcium Carbonate-Vitamin D3 600-400 MG-UNIT TABS, Take 2 tablets by mouth daily, Disp: , Rfl:   •  Collagen Hydrolysate POWD, 1 Scoop by Does not apply route daily , Disp: , Rfl:   •  Glucosamine-Chondroit-Vit C-Mn (GLUCOSAMINE-CHONDROITIN) TABS, Take 2 tablets by mouth daily, Disp: , Rfl:   •  levothyroxine 50 mcg tablet, Take 1 tablet (50 mcg total) by mouth daily Alt with 1 5 tab M,W,F, Disp: 108 tablet, Rfl: 3  •  Magnesium Carbonate POWD, 325 mg by Does not apply route daily, Disp: , Rfl:   •  Multiple Vitamin (MULTI-DAY) TABS, Take 2 tablets by mouth daily , Disp: , Rfl:   •  Multiple Vitamins-Minerals (ULTRA AYSHA PO), Take 2 tablets by mouth daily, Disp: , Rfl:   •  Omega-3 Fatty Acids (FISH OIL) 1200 MG CAPS, Take 2 capsules by mouth daily , Disp: , Rfl:       [employer]    I,:   am acting as a scribe while in the presence of the attending physician :       I,:   personally performed the services described in this documentation    as scribed in my presence :

## 2022-11-04 ENCOUNTER — IMMUNIZATIONS (OUTPATIENT)
Dept: INTERNAL MEDICINE CLINIC | Age: 67
End: 2022-11-04

## 2022-11-04 DIAGNOSIS — Z23 NEED FOR INFLUENZA VACCINATION: Primary | ICD-10-CM

## 2022-11-07 ENCOUNTER — TELEPHONE (OUTPATIENT)
Dept: OBGYN CLINIC | Facility: HOSPITAL | Age: 67
End: 2022-11-07

## 2022-11-07 NOTE — TELEPHONE ENCOUNTER
Force on request sent to [facility],  Please advise if the following patient can be forced onto the schedule:    Patient: Don Abrams    : [phone]    MRN: [id number]    Call back #:     Insurance: medicare    Reason for appointment: Patient is eligible for Euflexxa series of 3 shots 22 and the first available appt is 2023  She is in a lot of pain and was hoping to get shots sooner  Requested doctor/location: Natalia/Xavier      Thank you

## 2022-11-08 ENCOUNTER — OFFICE VISIT (OUTPATIENT)
Dept: PAIN MEDICINE | Facility: CLINIC | Age: 67
End: 2022-11-08

## 2022-11-08 VITALS
HEART RATE: 78 BPM | TEMPERATURE: 98 F | HEIGHT: 70 IN | RESPIRATION RATE: 19 BRPM | SYSTOLIC BLOOD PRESSURE: 112 MMHG | BODY MASS INDEX: 23.48 KG/M2 | DIASTOLIC BLOOD PRESSURE: 75 MMHG | WEIGHT: 164 LBS

## 2022-11-08 DIAGNOSIS — M51.16 LUMBAR DISC DISEASE WITH RADICULOPATHY: ICD-10-CM

## 2022-11-08 DIAGNOSIS — M17.0 PRIMARY OSTEOARTHRITIS OF BOTH KNEES: Primary | ICD-10-CM

## 2022-11-08 DIAGNOSIS — M62.838 MUSCLE SPASM: ICD-10-CM

## 2022-11-08 DIAGNOSIS — M48.061 LUMBAR FORAMINAL STENOSIS: ICD-10-CM

## 2022-11-08 NOTE — PROGRESS NOTES
Pain Medicine Follow-Up Note    Assessment:  1  Primary osteoarthritis of both knees    2  Muscle spasm    3  Lumbar disc disease with radiculopathy    4  Lumbar foraminal stenosis        Plan:  Ms Enrike Del Cid is a pleasant 42-year-old female who presents for follow-up and re-evaluation regarding lumbar foraminal stenosis and bilateral primary knee osteoarthritis  We previously performed a lumbar epidural steroid injection which continues to provide 50-70% relief in her pain  However, she reports continued swelling and difficulty ambulating with bilateral knee osteoarthritic pain  Extensive conversation regarding next steps and she is currently being scheduled for Orthovisc supplementation with Dr Edwige Valentin  Patient reports she can not be scheduled until January and is requesting for to be done sooner  For now I will discuss with [facility] staff and notify Dr Zuleta Figures office regarding her concerns  Will be available as needed to assist in any way  If her pain does get progressively worse may consider genicular blocks in the future  Will also send Voltaren gel to be used up to 4 times a day as needed for bilateral knee pain  History of Present Illness:    Don Abrams is a 75 y o  female who presents to [facility] for interval re-evaluation of the above stated pain complaints  The patient has a past medical and chronic pain history as outlined in the assessment section  Currently reports 6/10 pain that is described as a constant throbbing, shooting, aching, stabbing sensation  Previously performed an epidural steroid injection L3-L4 on October 12, 2022 which patient reports approximately 50-70% relief in her low back pain and continues to have relief  Her main complaint at this time is the bilateral knee swelling and tenderness  Review of Systems:    Review of Systems   Constitutional: Negative for unexpected weight change  HENT: Negative for ear pain      Eyes: Negative for visual disturbance  Respiratory: Negative for shortness of breath and wheezing  Gastrointestinal: Negative for abdominal pain  Musculoskeletal: Positive for back pain, gait problem and joint swelling  Joint stiffness in b/l knees and lower back that radiates down r leg   Neurological: Negative for weakness and numbness  Psychiatric/Behavioral: Negative for decreased concentration           Patient Active Problem List   Diagnosis   • Benign essential HTN   • Hypothyroidism   • Pelvic pain   • Vaginal cyst   • Traumatic injury of knee   • Age-related cataract of both eyes   • High serum high density lipoprotein (HDL)   • Fall from slipping   • Complex tear of medial meniscus of left knee as current injury   • Lumbar disc disease with radiculopathy   • Lumbar foraminal stenosis   • Primary osteoarthritis of both knees       Past Medical History:   Diagnosis Date   • Benign essential HTN 9/27/2017   • Elevated BUN     resolved 3/10/17   • Fluid retention in legs     last assessed 5/12/15   • Hypothyroidism    • Lumbago with sciatica     last assessed 4/10/14   • Lumbar radiculopathy     last assessed 4/10/14   • Onychomycosis of toenail     last assessed 11/7/16   • Primary osteoarthritis of both knees 10/24/2022       Past Surgical History:   Procedure Laterality Date   • APPENDECTOMY     • BREAST CYST ASPIRATION Left 1993   • CATARACT EXTRACTION Right    • KNEE ARTHROSCOPY Left     therapeutic       Family History   Problem Relation Age of Onset   • Breast cancer Mother 80   • Hypertension Mother    • Ovarian cancer Maternal Aunt 34   • Ovarian cancer Cousin 54   • Prostate cancer Paternal Uncle 54   • No Known Problems Maternal Grandmother    • No Known Problems Maternal Grandfather    • No Known Problems Paternal Grandmother    • No Known Problems Paternal Grandfather    • No Known Problems Maternal Aunt    • No Known Problems Paternal Aunt        Social History     Occupational History   • Not on file Tobacco Use   • Smoking status: Never Smoker   • Smokeless tobacco: Never Used   Vaping Use   • Vaping Use: Never used   Substance and Sexual Activity   • Alcohol use: Not Currently     Alcohol/week: 0 0 standard drinks     Comment: rarely   • Drug use: Never   • Sexual activity: Not Currently     Partners: Male         Current Outpatient Medications:   •  AMILoride-hydrochlorothiazide (MODURETIC) 5-50 mg per tablet, Take 1 tablet by mouth daily, Disp: 90 tablet, Rfl: 1  •  Ascorbic Acid (vitamin C) 1000 MG tablet, Take 1,000 mg by mouth daily, Disp: , Rfl:   •  Calcium Carbonate-Vitamin D3 600-400 MG-UNIT TABS, Take 2 tablets by mouth daily, Disp: , Rfl:   •  Collagen Hydrolysate POWD, 1 Scoop by Does not apply route daily , Disp: , Rfl:   •  Diclofenac Sodium (VOLTAREN) 1 %, Apply 2 g topically 4 (four) times a day, Disp: 100 g, Rfl: 1  •  Glucosamine-Chondroit-Vit C-Mn (GLUCOSAMINE-CHONDROITIN) TABS, Take 2 tablets by mouth daily, Disp: , Rfl:   •  levothyroxine 50 mcg tablet, Take 1 tablet (50 mcg total) by mouth daily Alt with 1 5 tab M,W,F, Disp: 108 tablet, Rfl: 3  •  Magnesium Carbonate POWD, 325 mg by Does not apply route daily, Disp: , Rfl:   •  Multiple Vitamin (MULTI-DAY) TABS, Take 2 tablets by mouth daily , Disp: , Rfl:   •  Multiple Vitamins-Minerals (ULTRA AYSHA PO), Take 2 tablets by mouth daily, Disp: , Rfl:   •  Omega-3 Fatty Acids (FISH OIL) 1200 MG CAPS, Take 2 capsules by mouth daily , Disp: , Rfl:     No Known Allergies    Physical Exam:    /75   Pulse 78   Temp 98 °F (36 7 °C)   Resp 19   Ht 5' 10" (1 778 m)   Wt 74 4 kg (164 lb)   BMI 23 53 kg/m²     Constitutional:normal, well developed, well nourished, alert, in no distress and non-toxic and no overt pain behavior    Eyes:anicteric  HEENT:grossly intact  Neck:supple, symmetric, trachea midline and no masses   Pulmonary:even and unlabored  Cardiovascular:No edema or pitting edema present  Skin:Normal without rashes or lesions and well hydrated  Psychiatric:Mood and affect appropriate  Neurologic:Cranial Nerves II-XII grossly intact  Musculoskeletal:antalgic      Imaging  No orders to display         No orders of the defined types were placed in this encounter

## 2022-11-30 ENCOUNTER — PROCEDURE VISIT (OUTPATIENT)
Dept: OBGYN CLINIC | Facility: HOSPITAL | Age: 67
End: 2022-11-30

## 2022-11-30 VITALS
BODY MASS INDEX: 24.34 KG/M2 | HEIGHT: 70 IN | SYSTOLIC BLOOD PRESSURE: 131 MMHG | WEIGHT: 170 LBS | DIASTOLIC BLOOD PRESSURE: 75 MMHG | HEART RATE: 76 BPM

## 2022-11-30 DIAGNOSIS — M17.0 PRIMARY OSTEOARTHRITIS OF BOTH KNEES: Primary | ICD-10-CM

## 2022-11-30 NOTE — PROGRESS NOTES
Assessment:   Diagnosis ICD-10-CM Associated Orders   1  Primary osteoarthritis of both knees  M17 0           Plan:     Pt was offered, accepted, performed and Orthovisc injection #1 of 3 for symptomatic relief  Pt tolerated injections well  Ice and post injection protocol advised  Weigh bearing activities as tolerated  Spoke with pt about the injection and it affects inside the joint  To do next visit:  Return in about 1 week (around 12/7/2022) for Visco     The above stated was discussed in layman's terms and the patient expressed understanding  All questions were answered to the patient's satisfaction  Scribe Attestation    I,:  Frederic Avelino am acting as a scribe while in the presence of the attending physician :       I,:  Mirza Cormier MD personally performed the services described in this documentation    as scribed in my presence :             Subjective:   Radha Huynh is a 75 y o  female who presents today for Othrovisc injection #1 of 3      Review of systems negative unless otherwise specified in HPI  Review of Systems   Constitutional: Negative for chills and fever  HENT: Negative for ear pain and sore throat  Eyes: Negative for pain and visual disturbance  Respiratory: Negative for cough and shortness of breath  Cardiovascular: Negative for chest pain and palpitations  Gastrointestinal: Negative for abdominal pain and vomiting  Genitourinary: Negative for dysuria and hematuria  Musculoskeletal: Negative for arthralgias and back pain  Skin: Negative for color change and rash  Neurological: Negative for seizures and syncope  All other systems reviewed and are negative        Past Medical History:   Diagnosis Date   • Benign essential HTN 9/27/2017   • Elevated BUN     resolved 3/10/17   • Fluid retention in legs     last assessed 5/12/15   • Hypothyroidism    • Lumbago with sciatica     last assessed 4/10/14   • Lumbar radiculopathy     last assessed 4/10/14   • Onychomycosis of toenail     last assessed 11/7/16   • Primary osteoarthritis of both knees 10/24/2022       Past Surgical History:   Procedure Laterality Date   • APPENDECTOMY     • BREAST CYST ASPIRATION Left 1993   • CATARACT EXTRACTION Right    • KNEE ARTHROSCOPY Left     therapeutic       Family History   Problem Relation Age of Onset   • Breast cancer Mother 80   • Hypertension Mother    • Ovarian cancer Maternal Aunt 34   • Ovarian cancer Cousin 54   • Prostate cancer Paternal Uncle 54   • No Known Problems Maternal Grandmother    • No Known Problems Maternal Grandfather    • No Known Problems Paternal Grandmother    • No Known Problems Paternal Grandfather    • No Known Problems Maternal Aunt    • No Known Problems Paternal Aunt        Social History     Occupational History   • Not on file   Tobacco Use   • Smoking status: Never   • Smokeless tobacco: Never   Vaping Use   • Vaping Use: Never used   Substance and Sexual Activity   • Alcohol use: Not Currently     Alcohol/week: 0 0 standard drinks     Comment: rarely   • Drug use: Never   • Sexual activity: Not Currently     Partners: Male         Current Outpatient Medications:   •  AMILoride-hydrochlorothiazide (MODURETIC) 5-50 mg per tablet, Take 1 tablet by mouth daily, Disp: 90 tablet, Rfl: 1  •  Ascorbic Acid (vitamin C) 1000 MG tablet, Take 1,000 mg by mouth daily, Disp: , Rfl:   •  Calcium Carbonate-Vitamin D3 600-400 MG-UNIT TABS, Take 2 tablets by mouth daily, Disp: , Rfl:   •  Collagen Hydrolysate POWD, 1 Scoop by Does not apply route daily , Disp: , Rfl:   •  Diclofenac Sodium (VOLTAREN) 1 %, Apply 2 g topically 4 (four) times a day, Disp: 100 g, Rfl: 1  •  Glucosamine-Chondroit-Vit C-Mn (GLUCOSAMINE-CHONDROITIN) TABS, Take 2 tablets by mouth daily, Disp: , Rfl:   •  levothyroxine 50 mcg tablet, Take 1 tablet (50 mcg total) by mouth daily Alt with 1 5 tab M,W,F, Disp: 108 tablet, Rfl: 3  •  Magnesium Carbonate POWD, 325 mg by Does not apply route daily, Disp: , Rfl:   •  Multiple Vitamin (MULTI-DAY) TABS, Take 2 tablets by mouth daily , Disp: , Rfl:   •  Multiple Vitamins-Minerals (ULTRA AYSHA PO), Take 2 tablets by mouth daily, Disp: , Rfl:   •  Omega-3 Fatty Acids (FISH OIL) 1200 MG CAPS, Take 2 capsules by mouth daily , Disp: , Rfl:     No Known Allergies       Vitals:    11/30/22 1350   BP: 131/75   Pulse: 76       Objective:                    Right Knee Exam     Muscle Strength   The patient has normal right knee strength  Range of Motion   The patient has normal right knee ROM  Other   Erythema: absent  Scars: absent  Sensation: normal  Pulse: present  Swelling: none      Left Knee Exam     Muscle Strength   The patient has normal left knee strength  Range of Motion   The patient has normal left knee ROM  Other   Erythema: absent  Scars: absent  Sensation: normal  Pulse: present  Swelling: none            Diagnostics, reviewed and taken today if performed as documented:    None performed     The attending physician has personally reviewed the pertinent films in PACS and interpretation is as follows    Procedures, if performed today:    Large joint arthrocentesis: bilateral knee  Universal Protocol:  Consent: Verbal consent obtained    Risks and benefits: risks, benefits and alternatives were discussed  Consent given by: patient  Patient understanding: patient states understanding of the procedure being performed  Site marked: the operative site was marked  Patient identity confirmed: verbally with patient    Supporting Documentation  Indications: pain   Procedure Details  Location: knee - bilateral knee  Needle size: 22 G  Ultrasound guidance: no  Approach: anterolateral    Medications (Right): 30 mg sodium hyaluronate 30 mg/2 mLSpecialty Pharmacy Supplied (Right): for right side  Medications (Left): 30 mg sodium hyaluronate 30 mg/2 mL   Specialty Pharmacy Supplied (Left): for left side  Patient tolerance: patient tolerated the procedure well with no immediate complications  Dressing:  Sterile dressing applied              Portions of the record may have been created with voice recognition software  Occasional wrong word or "sound a like" substitutions may have occurred due to the inherent limitations of voice recognition software  Read the chart carefully and recognize, using context, where substitutions have occurred

## 2022-12-07 ENCOUNTER — PROCEDURE VISIT (OUTPATIENT)
Dept: OBGYN CLINIC | Facility: HOSPITAL | Age: 67
End: 2022-12-07

## 2022-12-07 VITALS
DIASTOLIC BLOOD PRESSURE: 71 MMHG | HEART RATE: 88 BPM | HEIGHT: 70 IN | BODY MASS INDEX: 24.39 KG/M2 | SYSTOLIC BLOOD PRESSURE: 102 MMHG

## 2022-12-07 DIAGNOSIS — M25.562 CHRONIC PAIN OF BOTH KNEES: ICD-10-CM

## 2022-12-07 DIAGNOSIS — G89.29 CHRONIC PAIN OF BOTH KNEES: ICD-10-CM

## 2022-12-07 DIAGNOSIS — M17.0 PRIMARY OSTEOARTHRITIS OF BOTH KNEES: Primary | ICD-10-CM

## 2022-12-07 DIAGNOSIS — M25.561 CHRONIC PAIN OF BOTH KNEES: ICD-10-CM

## 2022-12-07 NOTE — PROGRESS NOTES
Assessment:   Diagnosis ICD-10-CM Associated Orders   1  Primary osteoarthritis of both knees  M17 0 Large joint arthrocentesis: bilateral knee      2  Chronic pain of both knees  M25 561 Large joint arthrocentesis: bilateral knee    M25 562     G89 29           Plan:  Bilateral knees known osteoarthritis  Both of her knees were injected with the second of 3 Orthovisc injections today  She tolerated both injections well  Ice and postinjection protocol vies  Weightbearing and activities as tolerated  She will follow-up next week to complete the 3 shot Visco series  To do next visit:  Return in about 1 week (around 12/14/2022) for re-check and orthovisc #3 B/L knees  The above stated was discussed in layman's terms and the patient expressed understanding  All questions were answered to the patient's satisfaction  Scribe Attestation    I,:  Rolanda Cordova am acting as a scribe while in the presence of the attending physician :       I,:  Jeremy Ruiz MD personally performed the services described in this documentation    as scribed in my presence :             Subjective:   Alannah Edwards is a 75 y o  female who presents today for repeat evaluation of her bilateral knees, known osteoarthritis  She returns today for the second of 3 Orthovisc injections  She tolerated last week's initial injection rather well  There are no indications not to proceed with today's second Visco injection        Review of systems negative unless otherwise specified in HPI  Review of Systems    Past Medical History:   Diagnosis Date   • Benign essential HTN 9/27/2017   • Elevated BUN     resolved 3/10/17   • Fluid retention in legs     last assessed 5/12/15   • Hypothyroidism    • Lumbago with sciatica     last assessed 4/10/14   • Lumbar radiculopathy     last assessed 4/10/14   • Onychomycosis of toenail     last assessed 11/7/16   • Primary osteoarthritis of both knees 10/24/2022       Past Surgical History: Procedure Laterality Date   • APPENDECTOMY     • BREAST CYST ASPIRATION Left 1993   • CATARACT EXTRACTION Right    • KNEE ARTHROSCOPY Left     therapeutic       Family History   Problem Relation Age of Onset   • Breast cancer Mother 80   • Hypertension Mother    • Ovarian cancer Maternal Aunt 34   • Ovarian cancer Cousin 54   • Prostate cancer Paternal Uncle 54   • No Known Problems Maternal Grandmother    • No Known Problems Maternal Grandfather    • No Known Problems Paternal Grandmother    • No Known Problems Paternal Grandfather    • No Known Problems Maternal Aunt    • No Known Problems Paternal Aunt        Social History     Occupational History   • Not on file   Tobacco Use   • Smoking status: Never   • Smokeless tobacco: Never   Vaping Use   • Vaping Use: Never used   Substance and Sexual Activity   • Alcohol use: Not Currently     Alcohol/week: 0 0 standard drinks     Comment: rarely   • Drug use: Never   • Sexual activity: Not Currently     Partners: Male         Current Outpatient Medications:   •  AMILoride-hydrochlorothiazide (MODURETIC) 5-50 mg per tablet, Take 1 tablet by mouth daily, Disp: 90 tablet, Rfl: 1  •  Ascorbic Acid (vitamin C) 1000 MG tablet, Take 1,000 mg by mouth daily, Disp: , Rfl:   •  Calcium Carbonate-Vitamin D3 600-400 MG-UNIT TABS, Take 2 tablets by mouth daily, Disp: , Rfl:   •  Collagen Hydrolysate POWD, 1 Scoop by Does not apply route daily , Disp: , Rfl:   •  Glucosamine-Chondroit-Vit C-Mn (GLUCOSAMINE-CHONDROITIN) TABS, Take 2 tablets by mouth daily, Disp: , Rfl:   •  levothyroxine 50 mcg tablet, Take 1 tablet (50 mcg total) by mouth daily Alt with 1 5 tab M,W,F, Disp: 108 tablet, Rfl: 3  •  Magnesium Carbonate POWD, 325 mg by Does not apply route daily, Disp: , Rfl:   •  Multiple Vitamin (MULTI-DAY) TABS, Take 2 tablets by mouth daily , Disp: , Rfl:   •  Multiple Vitamins-Minerals (ULTRA AYSHA PO), Take 2 tablets by mouth daily, Disp: , Rfl:   •  Omega-3 Fatty Acids (FISH OIL) 1200 MG CAPS, Take 2 capsules by mouth daily , Disp: , Rfl:     No Known Allergies         Vitals:    12/07/22 1441   BP: 102/71   Pulse: 88       Objective:                    Right Knee Exam     Muscle Strength   The patient has normal right knee strength  Tenderness   The patient is experiencing tenderness in the medial joint line and lateral joint line  Range of Motion   Extension: 0   Right knee flexion: 115 degrees with crepitation and stiffness  Other   Erythema: absent  Sensation: normal  Swelling: mild  Effusion: no effusion present      Left Knee Exam     Muscle Strength   The patient has normal left knee strength  Tenderness   The patient is experiencing tenderness in the lateral joint line and medial joint line  Range of Motion   Extension: 0   Left knee flexion: 115 degrees with crepitation and stiffness  Other   Erythema: absent  Sensation: normal  Swelling: mild  Effusion: no effusion present            Diagnostics, reviewed and taken today if performed as documented:    None performed          Procedures, if performed today:    Large joint arthrocentesis: bilateral knee  Universal Protocol:  Consent: Verbal consent obtained  Risks and benefits: risks, benefits and alternatives were discussed  Consent given by: patient  Time out: Immediately prior to procedure a "time out" was called to verify the correct patient, procedure, equipment, support staff and site/side marked as required    Timeout called at: 12/7/2022 3:04 PM   Patient understanding: patient states understanding of the procedure being performed  Site marked: the operative site was marked  Patient identity confirmed: verbally with patient    Supporting Documentation  Indications: pain   Procedure Details  Location: knee - bilateral knee  Preparation: Patient was prepped and draped in the usual sterile fashion  Needle size: 22 G  Ultrasound guidance: no  Approach: anteromedial    Medications (Right): 30 mg sodium hyaluronate 30 mg/2 mLMedications (Left): 30 mg sodium hyaluronate 30 mg/2 mL   Patient tolerance: patient tolerated the procedure well with no immediate complications  Dressing:  Sterile dressing applied              Portions of the record may have been created with voice recognition software  Occasional wrong word or "sound a like" substitutions may have occurred due to the inherent limitations of voice recognition software  Read the chart carefully and recognize, using context, where substitutions have occurred

## 2022-12-14 ENCOUNTER — PROCEDURE VISIT (OUTPATIENT)
Dept: OBGYN CLINIC | Facility: HOSPITAL | Age: 67
End: 2022-12-14

## 2022-12-14 VITALS
HEIGHT: 70 IN | HEART RATE: 80 BPM | DIASTOLIC BLOOD PRESSURE: 82 MMHG | BODY MASS INDEX: 24.39 KG/M2 | SYSTOLIC BLOOD PRESSURE: 125 MMHG

## 2022-12-14 DIAGNOSIS — M54.16 LUMBAR RADICULOPATHY: ICD-10-CM

## 2022-12-14 DIAGNOSIS — M17.0 PRIMARY OSTEOARTHRITIS OF BOTH KNEES: Primary | ICD-10-CM

## 2022-12-14 RX ORDER — BUPIVACAINE HYDROCHLORIDE 2.5 MG/ML
4 INJECTION, SOLUTION INFILTRATION; PERINEURAL
Status: COMPLETED | OUTPATIENT
Start: 2022-12-14 | End: 2022-12-14

## 2022-12-14 RX ADMIN — BUPIVACAINE HYDROCHLORIDE 4 ML: 2.5 INJECTION, SOLUTION INFILTRATION; PERINEURAL at 14:21

## 2022-12-14 NOTE — PROGRESS NOTES
Assessment:   Diagnosis ICD-10-CM Associated Orders   1  Primary osteoarthritis of both knees  M17 0       2  Lumbar radiculopathy  M54 16           Plan:  Pt was offered and she accepted an Orthovisc injection #3 of 3 for symptomatic relief  Pt tolerated injections well  Ice and post injection protocol advised  Weigh bearing activities as tolerated  For her radicular symptoms, she was advised to revisit the pain and spine team for possible repeat lumbar injections       To do next visit:  No follow-ups on file  Follow up in three months for repeat assessment     The above stated was discussed in layman's terms and the patient expressed understanding  All questions were answered to the patient's satisfaction  Subjective:   Annie Denney is a 75 y o  female who presents for her third of three OrthoVisc knee injections  She reports no change in  Her knee symptoms  She reports the pain is tolerable and most of her pain is from her back  She denies new injury         Review of systems negative unless otherwise specified in HPI    Past Medical History:   Diagnosis Date   • Benign essential HTN 9/27/2017   • Elevated BUN     resolved 3/10/17   • Fluid retention in legs     last assessed 5/12/15   • Hypothyroidism    • Lumbago with sciatica     last assessed 4/10/14   • Lumbar radiculopathy     last assessed 4/10/14   • Onychomycosis of toenail     last assessed 11/7/16   • Primary osteoarthritis of both knees 10/24/2022       Past Surgical History:   Procedure Laterality Date   • APPENDECTOMY     • BREAST CYST ASPIRATION Left 1993   • CATARACT EXTRACTION Right    • KNEE ARTHROSCOPY Left     therapeutic       Family History   Problem Relation Age of Onset   • Breast cancer Mother 80   • Hypertension Mother    • Ovarian cancer Maternal Aunt 34   • Ovarian cancer Cousin 54   • Prostate cancer Paternal Uncle 54   • No Known Problems Maternal Grandmother    • No Known Problems Maternal Grandfather    • No Known Problems Paternal Grandmother    • No Known Problems Paternal Grandfather    • No Known Problems Maternal Aunt    • No Known Problems Paternal Aunt        Social History     Occupational History   • Not on file   Tobacco Use   • Smoking status: Never   • Smokeless tobacco: Never   Vaping Use   • Vaping Use: Never used   Substance and Sexual Activity   • Alcohol use: Not Currently     Alcohol/week: 0 0 standard drinks     Comment: rarely   • Drug use: Never   • Sexual activity: Not Currently     Partners: Male         Current Outpatient Medications:   •  AMILoride-hydrochlorothiazide (MODURETIC) 5-50 mg per tablet, Take 1 tablet by mouth daily, Disp: 90 tablet, Rfl: 1  •  Ascorbic Acid (vitamin C) 1000 MG tablet, Take 1,000 mg by mouth daily, Disp: , Rfl:   •  Calcium Carbonate-Vitamin D3 600-400 MG-UNIT TABS, Take 2 tablets by mouth daily, Disp: , Rfl:   •  Collagen Hydrolysate POWD, 1 Scoop by Does not apply route daily , Disp: , Rfl:   •  Glucosamine-Chondroit-Vit C-Mn (GLUCOSAMINE-CHONDROITIN) TABS, Take 2 tablets by mouth daily, Disp: , Rfl:   •  levothyroxine 50 mcg tablet, Take 1 tablet (50 mcg total) by mouth daily Alt with 1 5 tab M,W,F, Disp: 108 tablet, Rfl: 3  •  Magnesium Carbonate POWD, 325 mg by Does not apply route daily, Disp: , Rfl:   •  Multiple Vitamin (MULTI-DAY) TABS, Take 2 tablets by mouth daily , Disp: , Rfl:   •  Multiple Vitamins-Minerals (ULTRA AYSHA PO), Take 2 tablets by mouth daily, Disp: , Rfl:   •  Omega-3 Fatty Acids (FISH OIL) 1200 MG CAPS, Take 2 capsules by mouth daily , Disp: , Rfl:     No Known Allergies         Vitals:    12/14/22 1347   BP: 125/82   Pulse: 80       Objective:                    Right Knee Exam     Muscle Strength   The patient has normal right knee strength  Tenderness   The patient is experiencing no tenderness       Range of Motion   Extension: normal   Flexion: 110     Tests   Varus: negative Valgus: negative    Other   Erythema: absent  Scars: absent  Sensation: normal  Pulse: present  Swelling: none  Effusion: no effusion present      Left Knee Exam     Muscle Strength   The patient has normal left knee strength  Tenderness   The patient is experiencing no tenderness  Range of Motion   Flexion: 110     Tests   Valgus: negative    Other   Erythema: absent  Scars: absent  Sensation: normal  Pulse: present  Swelling: none  Effusion: no effusion present            Diagnostics, reviewed and taken today if performed as documented:    None performed        Procedures, if performed today:    Large joint arthrocentesis: bilateral knee  Universal Protocol:  Procedure performed by: (Dr Edna Brunson )  Consent: Verbal consent obtained  Consent given by: patient  Patient understanding: patient states understanding of the procedure being performed  Patient identity confirmed: verbally with patient    Supporting Documentation  Indications: pain   Procedure Details  Location: knee - bilateral knee  Needle size: 22 G  Ultrasound guidance: no  Approach: anterolateral    Medications (Right): 30 mg sodium hyaluronate 30 mg/2 mLMedications (Left): 30 mg sodium hyaluronate 30 mg/2 mL; 4 mL bupivacaine 0 25 %   Patient tolerance: patient tolerated the procedure well with no immediate complications  Dressing:  Sterile dressing applied          Portions of the record may have been created with voice recognition software  Occasional wrong word or "sound a like" substitutions may have occurred due to the inherent limitations of voice recognition software  Read the chart carefully and recognize, using context, where substitutions have occurred

## 2022-12-20 ENCOUNTER — OFFICE VISIT (OUTPATIENT)
Dept: PAIN MEDICINE | Facility: CLINIC | Age: 67
End: 2022-12-20

## 2022-12-20 VITALS
RESPIRATION RATE: 18 BRPM | DIASTOLIC BLOOD PRESSURE: 75 MMHG | HEIGHT: 70 IN | BODY MASS INDEX: 24.39 KG/M2 | SYSTOLIC BLOOD PRESSURE: 129 MMHG | TEMPERATURE: 98.2 F | HEART RATE: 78 BPM

## 2022-12-20 DIAGNOSIS — M25.561 CHRONIC PAIN OF RIGHT KNEE: ICD-10-CM

## 2022-12-20 DIAGNOSIS — M17.11 PRIMARY OSTEOARTHRITIS OF RIGHT KNEE: ICD-10-CM

## 2022-12-20 DIAGNOSIS — M51.16 LUMBAR DISC DISEASE WITH RADICULOPATHY: Primary | ICD-10-CM

## 2022-12-20 DIAGNOSIS — M48.061 LUMBAR FORAMINAL STENOSIS: ICD-10-CM

## 2022-12-20 DIAGNOSIS — G89.29 CHRONIC PAIN OF RIGHT KNEE: ICD-10-CM

## 2022-12-20 DIAGNOSIS — M17.0 PRIMARY OSTEOARTHRITIS OF BOTH KNEES: ICD-10-CM

## 2022-12-20 RX ORDER — GABAPENTIN 300 MG/1
300 CAPSULE ORAL 3 TIMES DAILY
Qty: 60 CAPSULE | Refills: 1 | Status: SHIPPED | OUTPATIENT
Start: 2022-12-20

## 2022-12-20 NOTE — PROGRESS NOTES
Pain Medicine Follow-Up Note    Assessment:  1  Lumbar disc disease with radiculopathy    2  Lumbar foraminal stenosis    3  Primary osteoarthritis of both knees        Plan:  Ms Angely Resendez is a pleasant 50-year-old female who presents for follow-up and reevaluation regarding ongoing low back pain with rating symptoms into the right lower extremity  We previously performed an L3-L4 lumbar Badura start injection which provided significant relief in her pain for several weeks and her pain is gradually returned to previous level  In addition she continues to report pain in bilateral knees despite steroid injections and viscosupplementation  Did offer the patient genicular blocks and subsequent radiofrequency ablation and she now wishes to pursue this direction  For now we will  1  Plan for repeat lumbar epidural start injection from a lower approach and will target L4-L5 LESI  2  We will start gabapentin 300 mg and titrate up to twice a day as tolerated and necessary for neuropathic pain control  3  We will then plan for right genicular nerve block and subsequent radiofrequency ablation pending block results  4  Complete risks and benefits including bleeding, infection, tissue reaction, nerve injury and allergic reaction were discussed  The approach was demonstrated using models and literature was provided  Verbal and written consent was obtained  New Medications Ordered This Visit   Medications   • gabapentin (NEURONTIN) 300 mg capsule     Sig: Take 1 capsule (300 mg total) by mouth 3 (three) times a day     Dispense:  60 capsule     Refill:  1         History of Present Illness:    Radha Schmitz is a 75 y o  female who presents to [facility] for interval re-evaluation of the above stated pain complaints  The patient has a past medical and chronic pain history as outlined in the assessment section     Patient presents for follow-up and reevaluation regarding ongoing low back pain with rating symptoms into the right lower extremity  We previously performed an L3-L4 lumbar epidural steroid injection in October 2022 which the patient reports provided several weeks of significant greater than 50% relief in her pain  Her pain is gradually returned to previous level and is currently reporting 9 out of 10 pain that is described as a sharp, throbbing sensation that is worse in the morning and intermittent  Presents today for follow-up  Review of Systems:    Review of Systems   Constitutional: Negative for unexpected weight change  HENT: Negative for ear pain  Eyes: Negative for visual disturbance  Respiratory: Negative for shortness of breath and wheezing  Gastrointestinal: Negative for abdominal pain  Musculoskeletal: Positive for back pain, gait problem and joint swelling  Decreased ROM   Neurological: Positive for weakness  Negative for numbness  Psychiatric/Behavioral: Negative for decreased concentration           Past Medical History:   Diagnosis Date   • Benign essential HTN 9/27/2017   • Elevated BUN     resolved 3/10/17   • Fluid retention in legs     last assessed 5/12/15   • Hypothyroidism    • Lumbago with sciatica     last assessed 4/10/14   • Lumbar radiculopathy     last assessed 4/10/14   • Onychomycosis of toenail     last assessed 11/7/16   • Primary osteoarthritis of both knees 10/24/2022       Past Surgical History:   Procedure Laterality Date   • APPENDECTOMY     • BREAST CYST ASPIRATION Left 1993   • CATARACT EXTRACTION Right    • KNEE ARTHROSCOPY Left     therapeutic       Family History   Problem Relation Age of Onset   • Breast cancer Mother 80   • Hypertension Mother    • Ovarian cancer Maternal Aunt 34   • Ovarian cancer Cousin 54   • Prostate cancer Paternal Uncle 54   • No Known Problems Maternal Grandmother    • No Known Problems Maternal Grandfather    • No Known Problems Paternal Grandmother    • No Known Problems Paternal Grandfather    • No Known Problems Maternal Aunt    • No Known Problems Paternal Aunt        Social History     Occupational History   • Not on file   Tobacco Use   • Smoking status: Never   • Smokeless tobacco: Never   Vaping Use   • Vaping Use: Never used   Substance and Sexual Activity   • Alcohol use: Not Currently     Alcohol/week: 0 0 standard drinks     Comment: rarely   • Drug use: Never   • Sexual activity: Not Currently     Partners: Male         Current Outpatient Medications:   •  AMILoride-hydrochlorothiazide (MODURETIC) 5-50 mg per tablet, Take 1 tablet by mouth daily, Disp: 90 tablet, Rfl: 1  •  Ascorbic Acid (vitamin C) 1000 MG tablet, Take 1,000 mg by mouth daily, Disp: , Rfl:   •  Calcium Carbonate-Vitamin D3 600-400 MG-UNIT TABS, Take 2 tablets by mouth daily, Disp: , Rfl:   •  Collagen Hydrolysate POWD, 1 Scoop by Does not apply route daily , Disp: , Rfl:   •  gabapentin (NEURONTIN) 300 mg capsule, Take 1 capsule (300 mg total) by mouth 3 (three) times a day, Disp: 60 capsule, Rfl: 1  •  Glucosamine-Chondroit-Vit C-Mn (GLUCOSAMINE-CHONDROITIN) TABS, Take 2 tablets by mouth daily, Disp: , Rfl:   •  levothyroxine 50 mcg tablet, Take 1 tablet (50 mcg total) by mouth daily Alt with 1 5 tab M,W,F, Disp: 108 tablet, Rfl: 3  •  Magnesium Carbonate POWD, 325 mg by Does not apply route daily, Disp: , Rfl:   •  Multiple Vitamin (MULTI-DAY) TABS, Take 2 tablets by mouth daily , Disp: , Rfl:   •  Multiple Vitamins-Minerals (ULTRA AYSHA PO), Take 2 tablets by mouth daily, Disp: , Rfl:   •  Omega-3 Fatty Acids (FISH OIL) 1200 MG CAPS, Take 2 capsules by mouth daily , Disp: , Rfl:     No Known Allergies    Physical Exam:    /75   Pulse 78   Temp 98 2 °F (36 8 °C)   Resp 18   Ht 5' 10" (1 778 m)   BMI 24 39 kg/m²     Constitutional:normal, well developed, well nourished, alert, in no distress and non-toxic and no overt pain behavior    Eyes:anicteric  HEENT:grossly intact  Neck:supple, symmetric, trachea midline and no masses   Pulmonary:even and unlabored  Cardiovascular:No edema or pitting edema present  Skin:Normal without rashes or lesions and well hydrated  Psychiatric:Mood and affect appropriate  Neurologic:Cranial Nerves II-XII grossly intact  Musculoskeletal:antalgic, visible swelling and nonpitting edema bilateral knees, tenderness to palpation bilateral medial and lateral joint line knees, decreased active and passive range of motion with full flexion and extension right knee limited by pain, MMT unchanged from previous evaluation, positive straight leg raise in the seated position with radicular pain into the right leg      Imaging  No orders to display         No orders of the defined types were placed in this encounter

## 2022-12-29 ENCOUNTER — TELEPHONE (OUTPATIENT)
Age: 67
End: 2022-12-29

## 2022-12-29 NOTE — TELEPHONE ENCOUNTER
Caller: Radha    Doctor: Dr Rod Leon     Reason for call:Pt called in to say the Gabapentin is not working can she try something else        Call back#: [phone]

## 2022-12-30 NOTE — TELEPHONE ENCOUNTER
Dr Dionna Phillips pt  Pls send back response to [facility]   S/w pt, states she is still having a lot of pain on her right lb, and right knee and leg 8-9/10 stabbing pain  She was being started on gabapentin 300 mg BID 12/20  She only got about 20% relief with no s/e  Advised pt, it may take 2 full weeks for the full benefit of the medicine  Or do we need to increase frequency or dose of Gabapentin? Pls advise

## 2023-01-03 DIAGNOSIS — M51.16 LUMBAR DISC DISEASE WITH RADICULOPATHY: Primary | ICD-10-CM

## 2023-01-03 RX ORDER — GABAPENTIN 600 MG/1
600 TABLET ORAL 3 TIMES DAILY
Qty: 90 TABLET | Refills: 1 | Status: SHIPPED | OUTPATIENT
Start: 2023-01-03 | End: 2023-03-30

## 2023-01-12 ENCOUNTER — HOSPITAL ENCOUNTER (OUTPATIENT)
Dept: RADIOLOGY | Age: 68
Discharge: HOME | End: 2023-01-12

## 2023-01-12 DIAGNOSIS — M85.89 OSTEOPENIA OF MULTIPLE SITES: ICD-10-CM

## 2023-01-12 DIAGNOSIS — E03.9 ACQUIRED HYPOTHYROIDISM: ICD-10-CM

## 2023-01-12 RX ORDER — LEVOTHYROXINE SODIUM 0.05 MG/1
50 TABLET ORAL DAILY
Qty: 108 TABLET | Refills: 3 | Status: SHIPPED | OUTPATIENT
Start: 2023-01-12

## 2023-01-13 DIAGNOSIS — I10 ESSENTIAL HYPERTENSION: ICD-10-CM

## 2023-01-13 RX ORDER — AMILORIDE HYDROCHLORIDE AND HYDROCHLOROTHIAZIDE 5; 50 MG/1; MG/1
1 TABLET ORAL DAILY
Qty: 90 TABLET | Refills: 1 | Status: SHIPPED | OUTPATIENT
Start: 2023-01-13

## 2023-02-13 ENCOUNTER — HOSPITAL ENCOUNTER (OUTPATIENT)
Dept: RADIOLOGY | Facility: CLINIC | Age: 68
Discharge: HOME | End: 2023-02-13
Admitting: PHYSICAL MEDICINE & REHABILITATION

## 2023-02-13 VITALS
HEART RATE: 76 BPM | OXYGEN SATURATION: 96 % | RESPIRATION RATE: 18 BRPM | SYSTOLIC BLOOD PRESSURE: 148 MMHG | TEMPERATURE: 96.9 F | DIASTOLIC BLOOD PRESSURE: 90 MMHG

## 2023-02-13 DIAGNOSIS — M51.16 LUMBAR DISC DISEASE WITH RADICULOPATHY: ICD-10-CM

## 2023-02-13 RX ORDER — METHYLPREDNISOLONE ACETATE 80 MG/ML
80 INJECTION, SUSPENSION INTRA-ARTICULAR; INTRALESIONAL; INTRAMUSCULAR; PARENTERAL; SOFT TISSUE ONCE
Status: COMPLETED | OUTPATIENT
Start: 2023-02-13 | End: 2023-02-13

## 2023-02-13 RX ADMIN — IOHEXOL 1 ML: 300 INJECTION, SOLUTION INTRAVENOUS at 14:38

## 2023-02-13 RX ADMIN — METHYLPREDNISOLONE ACETATE 80 MG: 80 INJECTION, SUSPENSION INTRA-ARTICULAR; INTRALESIONAL; INTRAMUSCULAR; PARENTERAL; SOFT TISSUE at 14:38

## 2023-02-13 NOTE — H&P
History of Present Illness:  The patient is a 75 y o  female who presents with complaints of low back pain    Past Medical History:   Diagnosis Date   • Benign essential HTN 9/27/2017   • Elevated BUN     resolved 3/10/17   • Fluid retention in legs     last assessed 5/12/15   • Hypothyroidism    • Lumbago with sciatica     last assessed 4/10/14   • Lumbar radiculopathy     last assessed 4/10/14   • Onychomycosis of toenail     last assessed 11/7/16   • Primary osteoarthritis of both knees 10/24/2022       Past Surgical History:   Procedure Laterality Date   • APPENDECTOMY     • BREAST CYST ASPIRATION Left 1993   • CATARACT EXTRACTION Right    • KNEE ARTHROSCOPY Left     therapeutic         Current Outpatient Medications:   •  AMILoride-hydrochlorothiazide (MODURETIC) 5-50 mg per tablet, Take 1 tablet by mouth daily, Disp: 90 tablet, Rfl: 1  •  Ascorbic Acid (vitamin C) 1000 MG tablet, Take 1,000 mg by mouth daily, Disp: , Rfl:   •  Calcium Carbonate-Vitamin D3 600-400 MG-UNIT TABS, Take 2 tablets by mouth daily, Disp: , Rfl:   •  Collagen Hydrolysate POWD, 1 Scoop by Does not apply route daily , Disp: , Rfl:   •  gabapentin (Neurontin) 600 MG tablet, Take 1 tablet (600 mg total) by mouth 3 (three) times a day, Disp: 90 tablet, Rfl: 1  •  Glucosamine-Chondroit-Vit C-Mn (GLUCOSAMINE-CHONDROITIN) TABS, Take 2 tablets by mouth daily, Disp: , Rfl:   •  levothyroxine 50 mcg tablet, Take 1 tablet (50 mcg total) by mouth daily Alt with 1 5 tab M,W,F, Disp: 108 tablet, Rfl: 3  •  Magnesium Carbonate POWD, 325 mg by Does not apply route daily, Disp: , Rfl:   •  Multiple Vitamin (MULTI-DAY) TABS, Take 2 tablets by mouth daily , Disp: , Rfl:   •  Multiple Vitamins-Minerals (ULTRA AYSHA PO), Take 2 tablets by mouth daily, Disp: , Rfl:   •  Omega-3 Fatty Acids (FISH OIL) 1200 MG CAPS, Take 2 capsules by mouth daily , Disp: , Rfl:     Current Facility-Administered Medications:   •  iohexol (OMNIPAQUE) 300 mg/mL injection 50 mL, 50 mL, Epidural, Once, Tasha Tai, DO  •  methylPREDNISolone acetate (DEPO-MEDROL) injection 80 mg, 80 mg, Epidural, Once, Tasha Tai DO    No Known Allergies    Physical Exam: There were no vitals filed for this visit  General: Awake, Alert, Oriented x 3, Mood and affect appropriate  Respiratory: Respirations even and unlabored  Cardiovascular: Peripheral pulses intact; no edema  Musculoskeletal Exam: Tenderness palpation bilateral lumbar paraspinals    ASA Score: 2  Patient/Chart Verification  Patient ID Verified: Verbal  ID Band Applied: No  Consents Confirmed: To be obtained in the Pre-Procedure area, Procedural  H&P( within 30 days) Verified: To be obtained in the Pre-Procedure area  Interval H&P(within 24 hr) Complete (required for Outpatients and Surgery Admit only): To be obtained in the Pre-Procedure area  Allergies Reviewed: Yes  Anticoag/NSAID held?: NA  Currently on antibiotics?: No    Assessment:   1   Lumbar disc disease with radiculopathy        Plan: L4 L5 LESI

## 2023-02-13 NOTE — DISCHARGE INSTR - LAB
Epidural Steroid Injection   WHAT YOU NEED TO KNOW:   An epidural steroid injection (NEO) is a procedure to inject steroid medicine into the epidural space  The epidural space is between your spinal cord and vertebrae  Steroids reduce inflammation and fluid buildup in your spine that may be causing pain  You may be given pain medicine along with the steroids  ACTIVITY  Do not drive or operate machinery today  No strenuous activity today - bending, lifting, etc   You may resume normal activites starting tomorrow - start slowly and as tolerated  You may shower today, but no tub baths or hot tubs  You may have numbness for several hours from the local anesthetic  Please use caution and common sense, especially with weight-bearing activities  CARE OF THE INJECTION SITE  If you have soreness or pain, apply ice to the area today (20 minutes on/20 minutes off)  Starting tomorrow, you may use warm, moist heat or ice if needed  You may have an increase or change in your discomfort for 36-48 hours after your treatment  Apply ice and continue with any pain medication you have been prescribed  Notify the [facility] if you have any of the following: redness, drainage, swelling, headache, stiff neck or fever above 100°F     SPECIAL INSTRUCTIONS  Our office will contact you in approximately 7 days for a progress report  MEDICATIONS  Continue to take all routine medications  Our office may have instructed you to hold some medications  As no general anesthesia was used in today's procedure, you should not experience any side effects related to anesthesia  If you are diabetic, the steroids used in today's injection may temporarily increase your blood sugar levels after the first few days after your injection  Please keep a close eye on your sugars and alert the doctor who manages your diabetes if your sugars are significantly high from your baseline or you are symptomatic       If you have a problem specifically related to your procedure, please call our office at [phone]  Problems not related to your procedure should be directed to your primary care physician

## 2023-02-20 ENCOUNTER — TELEPHONE (OUTPATIENT)
Dept: RADIOLOGY | Facility: MEDICAL CENTER | Age: 68
End: 2023-02-20

## 2023-02-21 NOTE — TELEPHONE ENCOUNTER
Caller: Anastasia Yen  Doctor/office: Vera BRAY#:[phone]    % of improvement: 80-90  Pain Scale (1-10): 2-3

## 2023-02-27 ENCOUNTER — HOSPITAL ENCOUNTER (OUTPATIENT)
Dept: RADIOLOGY | Facility: CLINIC | Age: 68
Discharge: HOME | End: 2023-02-27
Admitting: PHYSICAL MEDICINE & REHABILITATION

## 2023-02-27 VITALS
DIASTOLIC BLOOD PRESSURE: 79 MMHG | OXYGEN SATURATION: 96 % | HEART RATE: 85 BPM | RESPIRATION RATE: 18 BRPM | SYSTOLIC BLOOD PRESSURE: 133 MMHG | TEMPERATURE: 98.4 F

## 2023-02-27 DIAGNOSIS — M17.11 PRIMARY OSTEOARTHRITIS OF RIGHT KNEE: ICD-10-CM

## 2023-02-27 DIAGNOSIS — G89.29 CHRONIC PAIN OF RIGHT KNEE: ICD-10-CM

## 2023-02-27 DIAGNOSIS — M25.561 CHRONIC PAIN OF RIGHT KNEE: ICD-10-CM

## 2023-02-27 RX ORDER — BUPIVACAINE HCL/PF 2.5 MG/ML
5 VIAL (ML) INJECTION ONCE
Status: COMPLETED | OUTPATIENT
Start: 2023-02-27 | End: 2023-02-27

## 2023-02-27 RX ORDER — 0.9 % SODIUM CHLORIDE 0.9 %
10 VIAL (ML) INJECTION ONCE
Status: COMPLETED | OUTPATIENT
Start: 2023-02-27 | End: 2023-02-27

## 2023-02-27 RX ADMIN — SODIUM CHLORIDE 1 ML: 9 INJECTION, SOLUTION INTRAMUSCULAR; INTRAVENOUS; SUBCUTANEOUS at 14:48

## 2023-02-27 RX ADMIN — BUPIVACAINE HYDROCHLORIDE 3 ML: 2.5 INJECTION, SOLUTION EPIDURAL; INFILTRATION; INTRACAUDAL at 14:48

## 2023-02-27 RX ADMIN — LIDOCAINE HYDROCHLORIDE 1 ML: 20 INJECTION, SOLUTION EPIDURAL; INFILTRATION; INTRACAUDAL; PERINEURAL at 14:48

## 2023-02-27 NOTE — H&P
History of Present Illness:  The patient is a 75 y o  female who presents with complaints of right knee pain    Past Medical History:   Diagnosis Date   • Benign essential HTN 9/27/2017   • Elevated BUN     resolved 3/10/17   • Fluid retention in legs     last assessed 5/12/15   • Hypothyroidism    • Lumbago with sciatica     last assessed 4/10/14   • Lumbar radiculopathy     last assessed 4/10/14   • Onychomycosis of toenail     last assessed 11/7/16   • Primary osteoarthritis of both knees 10/24/2022       Past Surgical History:   Procedure Laterality Date   • APPENDECTOMY     • BREAST CYST ASPIRATION Left 1993   • CATARACT EXTRACTION Right    • KNEE ARTHROSCOPY Left     therapeutic         Current Outpatient Medications:   •  AMILoride-hydrochlorothiazide (MODURETIC) 5-50 mg per tablet, Take 1 tablet by mouth daily, Disp: 90 tablet, Rfl: 1  •  Ascorbic Acid (vitamin C) 1000 MG tablet, Take 1,000 mg by mouth daily, Disp: , Rfl:   •  Calcium Carbonate-Vitamin D3 600-400 MG-UNIT TABS, Take 2 tablets by mouth daily, Disp: , Rfl:   •  Collagen Hydrolysate POWD, 1 Scoop by Does not apply route daily , Disp: , Rfl:   •  gabapentin (Neurontin) 600 MG tablet, Take 1 tablet (600 mg total) by mouth 3 (three) times a day, Disp: 90 tablet, Rfl: 1  •  Glucosamine-Chondroit-Vit C-Mn (GLUCOSAMINE-CHONDROITIN) TABS, Take 2 tablets by mouth daily, Disp: , Rfl:   •  levothyroxine 50 mcg tablet, Take 1 tablet (50 mcg total) by mouth daily Alt with 1 5 tab M,W,F, Disp: 108 tablet, Rfl: 3  •  Magnesium Carbonate POWD, 325 mg by Does not apply route daily, Disp: , Rfl:   •  Multiple Vitamin (MULTI-DAY) TABS, Take 2 tablets by mouth daily , Disp: , Rfl:   •  Multiple Vitamins-Minerals (ULTRA AYSHA PO), Take 2 tablets by mouth daily, Disp: , Rfl:   •  Omega-3 Fatty Acids (FISH OIL) 1200 MG CAPS, Take 2 capsules by mouth daily , Disp: , Rfl:     Current Facility-Administered Medications:   •  bupivacaine (PF) (MARCAINE) 0 25 % injection 5 mL, 5 mL, Perineural, Once, Estanislado Brittany, DO  •  lidocaine (PF) (XYLOCAINE-MPF) 2 % injection 10 mL, 10 mL, Perineural, Once, Estanislado Brittany, DO  •  sodium chloride (PF) 0 9 % injection 10 mL, 10 mL, Infiltration, Once, Estanislado Brittany, DO    No Known Allergies    Physical Exam:   Vitals:    02/27/23 1426   BP: 126/71   Pulse: 81   Resp: 18   Temp: 98 4 °F (36 9 °C)   SpO2: 97%     General: Awake, Alert, Oriented x 3, Mood and affect appropriate  Respiratory: Respirations even and unlabored  Cardiovascular: Peripheral pulses intact; no edema  Musculoskeletal Exam: Tenderness palpation right medial lateral joint line knee    ASA Score: 2    Patient/Chart Verification  Patient ID Verified: Verbal  ID Band Applied: No  Consents Confirmed: Procedural, To be obtained in the Pre-Procedure area  H&P( within 30 days) Verified: To be obtained in the Pre-Procedure area  Allergies Reviewed: Yes  Anticoag/NSAID held?: No  Currently on antibiotics?: No    Assessment:   1  Chronic pain of right knee    2   Primary osteoarthritis of right knee        Plan: Right knee Genicular block

## 2023-02-27 NOTE — DISCHARGE INSTR - LAB

## 2023-02-28 ENCOUNTER — TELEPHONE (OUTPATIENT)
Dept: PAIN MEDICINE | Facility: CLINIC | Age: 68
End: 2023-02-28

## 2023-02-28 NOTE — TELEPHONE ENCOUNTER
----- Message from Anita Salas DO sent at 2/28/2023 11:14 AM EST -----  Regarding: FW: Pain diary  Contact: [phone]  Significant relief from genicular block  Please proceed with right genicular radiofrequency ablation  Thank you  ----- Message -----  From: Ketan Lott RN  Sent: 2/28/2023  10:14 AM EST  To: Anita Crawley DO  Subject: FW: Pain diary                                   Pain diary showed significant pain relief   Do we move forward to do rfa?  ----- Message -----  From: Martha Styles RN  Sent: 2/28/2023   9:52 AM EST  To: [facility]  Subject: FW: Pain diary                                     ----- Message -----  From: Rashaun Cortez  Sent: 2/28/2023   9:44 AM EST  To: [facility]  Subject: Pain diary                                       Dear Dr Dionna Phillips  Attached is my Pain Diary

## 2023-02-28 NOTE — TELEPHONE ENCOUNTER
Caller: Radha (PT)    Doctor: Dr Diane Gusman     Reason for call: Pain diary has sent her pain diary via My Chart     Call back#: [phone]

## 2023-03-13 ENCOUNTER — HOSPITAL ENCOUNTER (OUTPATIENT)
Dept: RADIOLOGY | Facility: CLINIC | Age: 68
Discharge: HOME | End: 2023-03-13
Admitting: PHYSICAL MEDICINE & REHABILITATION

## 2023-03-13 ENCOUNTER — TELEPHONE (OUTPATIENT)
Dept: RADIOLOGY | Facility: CLINIC | Age: 68
End: 2023-03-13

## 2023-03-13 VITALS
DIASTOLIC BLOOD PRESSURE: 91 MMHG | RESPIRATION RATE: 18 BRPM | OXYGEN SATURATION: 98 % | TEMPERATURE: 98.5 F | SYSTOLIC BLOOD PRESSURE: 149 MMHG | HEART RATE: 75 BPM

## 2023-03-13 DIAGNOSIS — G89.29 CHRONIC KNEE PAIN, UNSPECIFIED LATERALITY: ICD-10-CM

## 2023-03-13 DIAGNOSIS — M17.9 OSTEOARTHRITIS OF KNEE, UNSPECIFIED LATERALITY, UNSPECIFIED OSTEOARTHRITIS TYPE: ICD-10-CM

## 2023-03-13 DIAGNOSIS — M25.569 CHRONIC KNEE PAIN, UNSPECIFIED LATERALITY: ICD-10-CM

## 2023-03-13 RX ORDER — BUPIVACAINE HCL/PF 2.5 MG/ML
5 VIAL (ML) INJECTION ONCE
Status: COMPLETED | OUTPATIENT
Start: 2023-03-13 | End: 2023-03-13

## 2023-03-13 RX ORDER — METHYLPREDNISOLONE ACETATE 40 MG/ML
40 INJECTION, SUSPENSION INTRA-ARTICULAR; INTRALESIONAL; INTRAMUSCULAR; PARENTERAL; SOFT TISSUE ONCE
Status: COMPLETED | OUTPATIENT
Start: 2023-03-13 | End: 2023-03-13

## 2023-03-13 RX ORDER — LIDOCAINE HYDROCHLORIDE 10 MG/ML
10 INJECTION, SOLUTION EPIDURAL; INFILTRATION; INTRACAUDAL; PERINEURAL ONCE
Status: COMPLETED | OUTPATIENT
Start: 2023-03-13 | End: 2023-03-13

## 2023-03-13 RX ADMIN — BUPIVACAINE HYDROCHLORIDE 3 ML: 2.5 INJECTION, SOLUTION EPIDURAL; INFILTRATION; INTRACAUDAL at 13:50

## 2023-03-13 RX ADMIN — METHYLPREDNISOLONE ACETATE 40 MG: 40 INJECTION, SUSPENSION INTRA-ARTICULAR; INTRALESIONAL; INTRAMUSCULAR; PARENTERAL; SOFT TISSUE at 13:50

## 2023-03-13 RX ADMIN — LIDOCAINE HYDROCHLORIDE 10 ML: 10 INJECTION, SOLUTION EPIDURAL; INFILTRATION; INTRACAUDAL at 13:37

## 2023-03-13 NOTE — H&P
History of Present Illness:  The patient is a 75 y o  female who presents with complaints of right knee pain  Past Medical History:   Diagnosis Date   • Benign essential HTN 9/27/2017   • Elevated BUN     resolved 3/10/17   • Fluid retention in legs     last assessed 5/12/15   • Hypothyroidism    • Lumbago with sciatica     last assessed 4/10/14   • Lumbar radiculopathy     last assessed 4/10/14   • Onychomycosis of toenail     last assessed 11/7/16   • Primary osteoarthritis of both knees 10/24/2022       Past Surgical History:   Procedure Laterality Date   • APPENDECTOMY     • BREAST CYST ASPIRATION Left 1993   • CATARACT EXTRACTION Right    • KNEE ARTHROSCOPY Left     therapeutic         Current Outpatient Medications:   •  AMILoride-hydrochlorothiazide (MODURETIC) 5-50 mg per tablet, Take 1 tablet by mouth daily, Disp: 90 tablet, Rfl: 1  •  Ascorbic Acid (vitamin C) 1000 MG tablet, Take 1,000 mg by mouth daily, Disp: , Rfl:   •  Calcium Carbonate-Vitamin D3 600-400 MG-UNIT TABS, Take 2 tablets by mouth daily, Disp: , Rfl:   •  Collagen Hydrolysate POWD, 1 Scoop by Does not apply route daily , Disp: , Rfl:   •  gabapentin (Neurontin) 600 MG tablet, Take 1 tablet (600 mg total) by mouth 3 (three) times a day, Disp: 90 tablet, Rfl: 1  •  Glucosamine-Chondroit-Vit C-Mn (GLUCOSAMINE-CHONDROITIN) TABS, Take 2 tablets by mouth daily, Disp: , Rfl:   •  levothyroxine 50 mcg tablet, Take 1 tablet (50 mcg total) by mouth daily Alt with 1 5 tab M,W,F, Disp: 108 tablet, Rfl: 3  •  Magnesium Carbonate POWD, 325 mg by Does not apply route daily, Disp: , Rfl:   •  Multiple Vitamin (MULTI-DAY) TABS, Take 2 tablets by mouth daily , Disp: , Rfl:   •  Multiple Vitamins-Minerals (ULTRA AYSHA PO), Take 2 tablets by mouth daily, Disp: , Rfl:   •  Omega-3 Fatty Acids (FISH OIL) 1200 MG CAPS, Take 2 capsules by mouth daily , Disp: , Rfl:     Current Facility-Administered Medications:   •  bupivacaine (PF) (MARCAINE) 0 25 % injection 5 mL, 5 mL, Perineural, Once, Saintrossy Pay, DO  •  lidocaine (PF) (XYLOCAINE-MPF) 1 % injection 10 mL, 10 mL, Infiltration, Once, Saintclair Pay, DO  •  methylPREDNISolone acetate (DEPO-MEDROL) injection 40 mg, 40 mg, Perineural, Once, Saintclair Pay, DO    No Known Allergies    Physical Exam:   Vitals:    03/13/23 1307   BP: 130/85   Pulse: 84   Resp: 18   Temp: 98 5 °F (36 9 °C)   SpO2: 97%     General: Awake, Alert, Oriented x 3, Mood and affect appropriate  Respiratory: Respirations even and unlabored  Cardiovascular: Peripheral pulses intact; no edema  Musculoskeletal Exam: Tenderness palpation right medial lateral joint line knee  ASA Score: 2    Patient/Chart Verification  Patient ID Verified: Verbal  ID Band Applied: No  Consents Confirmed: Procedural, To be obtained in the Pre-Procedure area  H&P( within 30 days) Verified: To be obtained in the Pre-Procedure area  Allergies Reviewed: Yes  Anticoag/NSAID held?: No  Currently on antibiotics?: No    Assessment:   1  Chronic knee pain, unspecified laterality    2   Osteoarthritis of knee, unspecified laterality, unspecified osteoarthritis type        Plan: right genicular radiofrequency ablation

## 2023-03-14 NOTE — TELEPHONE ENCOUNTER
Caller Radha    Doctor: Marixa Putnam    Reason for call: patient states she is better then yesterday thank you     She had no questions

## 2023-03-30 ENCOUNTER — OFFICE VISIT (OUTPATIENT)
Dept: PAIN MEDICINE | Facility: CLINIC | Age: 68
End: 2023-03-30

## 2023-03-30 VITALS — SYSTOLIC BLOOD PRESSURE: 141 MMHG | DIASTOLIC BLOOD PRESSURE: 85 MMHG | HEART RATE: 87 BPM

## 2023-03-30 DIAGNOSIS — M48.061 LUMBAR FORAMINAL STENOSIS: ICD-10-CM

## 2023-03-30 DIAGNOSIS — M51.16 LUMBAR DISC DISEASE WITH RADICULOPATHY: Primary | ICD-10-CM

## 2023-03-30 NOTE — PROGRESS NOTES
Assessment:  1  Lumbar disc disease with radiculopathy    2  Lumbar foraminal stenosis        Plan:  Ms Leslie Romano is a pleasant 57-year-old female who presents for follow-up and reevaluation regarding ongoing low back pain with radiating symptoms into the right lower extremity  We recently performed a genicular radiofrequency ablation and she reports continued pain, however, it has been only 2 weeks since the ablation and may take 4 to 6 weeks to see full benefit  Advised patient to give it more time prior to deciding if the ablation has been significantly helpful or not  In regards to her ongoing low back pain we have now attempted several epidural steroid injections with minimal improvements in her pain  She does have clinical and diagnostic evidence of lumbar radiculopathy with MRI findings of moderate to severe foraminal narrowing most notable at L3-L4  Given the lack of relief from injections, physical therapy, home exercises and medication management we will refer to neurosurgical evaluation with Dr Alva John for his considerations  We did also discuss neuromodulation if surgery is not an option but for now we will await neurosurgical considerations  My impressions and treatment recommendations were discussed in detail with the patient who verbalized understanding and had no further questions  Discharge instructions were provided  I personally saw and examined the patient and I agree with the above discussed plan of care      Orders Placed This Encounter   Procedures   • Ambulatory referral to Neurosurgery     Standing Status:   Future     Standing Expiration Date:   3/30/2024     Referral Priority:   Routine     Referral Type:   Consult - AMB     Referral Reason:   Specialty Services Required     Referred to Provider:   Horacio Dominique MD     Requested Specialty:   Neurosurgery     Number of Visits Requested:   1     Expiration Date:   3/30/2024     No orders of the defined types were placed in this encounter  History of Present Illness:  Lucia Schwarz is a 75 y o  female who presents for a follow up office visit in regards to Back Pain, Hip Pain, and Leg Pain (Right side)  The patient’s current symptoms include low back and right lower extremity pain currently rated 7 out of 10 and interfere with activities  Pain is described as a constant throbbing, shooting, aching, stabbing sensation  Presents today for follow-up and reevaluation  Recently performed a right genicular radiofrequency ablation on March 13, 2023  I have personally reviewed and/or updated the patient's past medical history, past surgical history, family history, social history, current medications, allergies, and vital signs today  Review of Systems   Respiratory: Negative for shortness of breath  Cardiovascular: Negative for chest pain  Gastrointestinal: Negative for constipation, diarrhea, nausea and vomiting  Musculoskeletal: Negative for arthralgias, gait problem, joint swelling and myalgias  Skin: Negative for rash  Neurological: Negative for dizziness, seizures and weakness  All other systems reviewed and are negative        Patient Active Problem List   Diagnosis   • Benign essential HTN   • Hypothyroidism   • Pelvic pain   • Vaginal cyst   • Chronic knee pain   • Traumatic injury of knee   • Age-related cataract of both eyes   • High serum high density lipoprotein (HDL)   • Fall from slipping   • Complex tear of medial meniscus of left knee as current injury   • Lumbar disc disease with radiculopathy   • Lumbar foraminal stenosis   • Primary osteoarthritis of both knees   • Osteoarthritis of knee       Past Medical History:   Diagnosis Date   • Benign essential HTN 9/27/2017   • Elevated BUN     resolved 3/10/17   • Fluid retention in legs     last assessed 5/12/15   • Hypothyroidism    • Lumbago with sciatica     last assessed 4/10/14   • Lumbar radiculopathy     last assessed 4/10/14   • Onychomycosis of toenail     last assessed 11/7/16   • Primary osteoarthritis of both knees 10/24/2022       Past Surgical History:   Procedure Laterality Date   • APPENDECTOMY     • BREAST CYST ASPIRATION Left 1993   • CATARACT EXTRACTION Right    • KNEE ARTHROSCOPY Left     therapeutic       Family History   Problem Relation Age of Onset   • Breast cancer Mother 80   • Hypertension Mother    • Ovarian cancer Maternal Aunt 34   • Ovarian cancer Cousin 54   • Prostate cancer Paternal Uncle 54   • No Known Problems Maternal Grandmother    • No Known Problems Maternal Grandfather    • No Known Problems Paternal Grandmother    • No Known Problems Paternal Grandfather    • No Known Problems Maternal Aunt    • No Known Problems Paternal Aunt        Social History     Occupational History   • Not on file   Tobacco Use   • Smoking status: Never   • Smokeless tobacco: Never   Vaping Use   • Vaping Use: Never used   Substance and Sexual Activity   • Alcohol use: Not Currently     Alcohol/week: 0 0 standard drinks     Comment: rarely   • Drug use: Never   • Sexual activity: Not Currently     Partners: Male       Current Outpatient Medications on File Prior to Visit   Medication Sig   • AMILoride-hydrochlorothiazide (MODURETIC) 5-50 mg per tablet Take 1 tablet by mouth daily   • Ascorbic Acid (vitamin C) 1000 MG tablet Take 1,000 mg by mouth daily   • Calcium Carbonate-Vitamin D3 600-400 MG-UNIT TABS Take 2 tablets by mouth daily   • Collagen Hydrolysate POWD 1 Scoop by Does not apply route daily    • Glucosamine-Chondroit-Vit C-Mn (GLUCOSAMINE-CHONDROITIN) TABS Take 2 tablets by mouth daily   • levothyroxine 50 mcg tablet Take 1 tablet (50 mcg total) by mouth daily Alt with 1 5 tab M,W,F   • Magnesium Carbonate POWD 325 mg by Does not apply route daily   • Multiple Vitamin (MULTI-DAY) TABS Take 2 tablets by mouth daily    • Multiple Vitamins-Minerals (ULTRA AYSHA PO) Take 2 tablets by mouth daily   • Omega-3 Fatty Acids (FISH OIL) 1200 MG CAPS Take 2 capsules by mouth daily    • [DISCONTINUED] gabapentin (Neurontin) 600 MG tablet Take 1 tablet (600 mg total) by mouth 3 (three) times a day     No current facility-administered medications on file prior to visit  No Known Allergies    Physical Exam:    /85   Pulse 87     Constitutional:normal, well developed, well nourished, alert, in no distress and non-toxic and no overt pain behavior    Eyes:anicteric  HEENT:grossly intact  Neck:supple, symmetric, trachea midline and no masses   Pulmonary:even and unlabored  Cardiovascular:No edema or pitting edema present  Skin:Normal without rashes or lesions and well hydrated  Psychiatric:Mood and affect appropriate  Neurologic:Cranial Nerves II-XII grossly intact  Musculoskeletal:antalgic    Imaging

## 2023-04-24 ENCOUNTER — OFFICE VISIT (OUTPATIENT)
Dept: INTERNAL MEDICINE CLINIC | Age: 68
End: 2023-04-24

## 2023-04-24 VITALS
HEIGHT: 70 IN | OXYGEN SATURATION: 98 % | SYSTOLIC BLOOD PRESSURE: 120 MMHG | HEART RATE: 81 BPM | BODY MASS INDEX: 23.77 KG/M2 | TEMPERATURE: 97.5 F | DIASTOLIC BLOOD PRESSURE: 72 MMHG | WEIGHT: 166 LBS

## 2023-04-24 DIAGNOSIS — M17.0 PRIMARY OSTEOARTHRITIS OF BOTH KNEES: ICD-10-CM

## 2023-04-24 DIAGNOSIS — I10 BENIGN ESSENTIAL HTN: ICD-10-CM

## 2023-04-24 DIAGNOSIS — M48.061 LUMBAR FORAMINAL STENOSIS: ICD-10-CM

## 2023-04-24 DIAGNOSIS — R79.89 HIGH SERUM HIGH DENSITY LIPOPROTEIN (HDL): Primary | ICD-10-CM

## 2023-04-24 DIAGNOSIS — E03.9 ACQUIRED HYPOTHYROIDISM: ICD-10-CM

## 2023-04-24 DIAGNOSIS — Z00.00 ENCOUNTER FOR ANNUAL WELLNESS VISIT (AWV) IN MEDICARE PATIENT: ICD-10-CM
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
## 2023-04-24 NOTE — PROGRESS NOTES
Assessment and Plan:     Problem List Items Addressed This Visit        Endocrine    Hypothyroidism       Cardiovascular and Mediastinum    Benign essential HTN       Musculoskeletal and Integument    Primary osteoarthritis of both knees       Other    High serum high density lipoprotein (HDL) - Primary    Encounter for annual wellness visit (AWV) in Medicare patient     Vaccines up-to-date  Bone density study done January 2023  Mammogram up-to-date and due August 2023  Dental visits up-to-date  Exam up-to-date  Pap not necessary   cologuard 2021  PAP 2021       Preventive health issues were discussed with patient, and age appropriate screening tests were ordered as noted in patient's After Visit Summary  Personalized health advice and appropriate referrals for health education or preventive services given if needed, as noted in patient's After Visit Summary       History of Present Illness:     Patient presents for a Medicare Wellness Visit    HPI   Patient Care Team:  Clara Barrett DO as PCP - General (Family Medicine)  Clara Barrett DO as PCP - PCP-[facility] Nelly Montalvo MD (Obstetrics and Gynecology)     Review of Systems:     Review of Systems     Problem List:     Patient Active Problem List   Diagnosis   • Benign essential HTN   • Hypothyroidism   • Pelvic pain   • Vaginal cyst   • Chronic knee pain   • Traumatic injury of knee   • Age-related cataract of both eyes   • High serum high density lipoprotein (HDL)   • Fall from slipping   • Complex tear of medial meniscus of left knee as current injury   • Lumbar disc disease with radiculopathy   • Lumbar foraminal stenosis   • Primary osteoarthritis of both knees   • Osteoarthritis of knee   • Encounter for annual wellness visit (AWV) in Medicare patient      Past Medical and Surgical History:     Past Medical History:   Diagnosis Date   • Arthritis 2022    Treatments didn’t work   • Benign essential HTN 09/27/2017   • Elevated BUN     resolved 3/10/17   • Fluid retention in legs     last assessed 5/12/15   • Hypothyroidism    • Lumbago with sciatica     last assessed 4/10/14   • Lumbar radiculopathy     last assessed 4/10/14   • Onychomycosis of toenail     last assessed 11/7/16   • Primary osteoarthritis of both knees 10/24/2022     Past Surgical History:   Procedure Laterality Date   • APPENDECTOMY     • BREAST CYST ASPIRATION Left 1993   • CATARACT EXTRACTION Right    • KNEE ARTHROSCOPY Left     therapeutic      Family History:     Family History   Problem Relation Age of Onset   • Breast cancer Mother    • Hypertension Mother    • Ovarian cancer Maternal Aunt 29   • Ovarian cancer Cousin 54   • Prostate cancer Paternal Uncle    • No Known Problems Maternal Grandmother    • No Known Problems Maternal Grandfather    • No Known Problems Paternal Grandmother    • No Known Problems Paternal Grandfather    • No Known Problems Maternal Aunt    • No Known Problems Paternal Aunt       Social History:     Social History     Socioeconomic History   • Marital status: /Civil Union     Spouse name: None   • Number of children: None   • Years of education: None   • Highest education level: None   Occupational History   • None   Tobacco Use   • Smoking status: Never   • Smokeless tobacco: Never   Vaping Use   • Vaping Use: Never used   Substance and Sexual Activity   • Alcohol use: Never     Comment: rarely   • Drug use: Never   • Sexual activity: Not Currently     Partners: Male   Other Topics Concern   • None   Social History Narrative    Pt visited [place] April-July 2014     Social Determinants of Health     Financial Resource Strain: Low Risk    • Difficulty of Paying Living Expenses: Not hard at all   Food Insecurity: Not on file   Transportation Needs: No Transportation Needs   • Lack of Transportation (Medical): No   • Lack of Transportation (Non-Medical):  No   Physical Activity: Not on file   Stress: Not on file   Social Connections: Not on file   Intimate Partner Violence: Not on file   Housing Stability: Not on file      Medications and Allergies:     Current Outpatient Medications   Medication Sig Dispense Refill   • Acetaminophen (TYLENOL PO) Take 325-500 mg by mouth as needed     • AMILoride-hydrochlorothiazide (MODURETIC) 5-50 mg per tablet Take 1 tablet by mouth daily 90 tablet 1   • Ascorbic Acid (vitamin C) 1000 MG tablet Take 1,000 mg by mouth daily     • Calcium Carbonate-Vitamin D3 600-400 MG-UNIT TABS Take 2 tablets by mouth daily     • Collagen Hydrolysate POWD 1 Scoop by Does not apply route daily      • gabapentin (Neurontin) 300 mg capsule Take 1 capsule (300 mg total) by mouth daily at bedtime for 7 days, THEN 2 capsules (600 mg total) daily at bedtime for 7 days, THEN 3 capsules (900 mg total) daily at bedtime  90 capsule 2   • Glucosamine-Chondroit-Vit C-Mn (GLUCOSAMINE-CHONDROITIN) TABS Take 2 tablets by mouth daily     • levothyroxine 50 mcg tablet Take 1 tablet (50 mcg total) by mouth daily Alt with 1 5 tab M,W,F 108 tablet 3   • Magnesium Carbonate POWD 325 mg by Does not apply route daily     • Multiple Vitamin (MULTI-DAY) TABS Take 2 tablets by mouth daily      • Multiple Vitamins-Minerals (ULTRA AYSHA PO) Take 2 tablets by mouth daily     • Omega-3 Fatty Acids (FISH OIL) 1200 MG CAPS Take 2 capsules by mouth daily        No current facility-administered medications for this visit       No Known Allergies   Immunizations:     Immunization History   Administered Date(s) Administered   • COVID-19 MODERNA VACC 0 5 ML IM 01/24/2021, 02/21/2021, 10/24/2021, 04/23/2022   • COVID-19, unspecified 01/24/2021, 02/21/2021   • INFLUENZA 11/04/2022   • Influenza, high dose seasonal 0 7 mL 10/06/2020, 12/07/2021, 11/04/2022   • Pneumococcal Conjugate 13-Valent 09/08/2020, 05/10/2022, 05/10/2022   • Tdap 09/08/2020      Health Maintenance:         Topic Date Due   • Breast Cancer Screening: Mammogram  08/22/2023   • Colorectal Cancer Screening  04/20/2024   • Hepatitis C Screening  Completed     There are no preventive care reminders to display for this patient  Medicare Screening Tests and Risk Assessments:     Melissa Griggs is here for her Subsequent Wellness visit  Health Risk Assessment:   Patient rates overall health as very good  Patient feels that their physical health rating is slightly worse  Patient is satisfied with their life  Eyesight was rated as same  Hearing was rated as same  Patient feels that their emotional and mental health rating is same  Patients states they are never, rarely angry  Patient states they are never, rarely unusually tired/fatigued  Pain experienced in the last 7 days has been some  Patient's pain rating has been 7/10  Patient states that she has experienced no weight loss or gain in last 6 months  Fall Risk Screening: In the past year, patient has experienced: history of falling in past year      Urinary Incontinence Screening:   Patient has not leaked urine accidently in the last six months  Home Safety:  Patient has trouble with stairs inside or outside of their home  Patient has working smoke alarms and has working carbon monoxide detector  Home safety hazards include: none  Nutrition:   Current diet is Regular  Medications:   Patient is currently taking over-the-counter supplements  OTC medications include: Vitamins  Patient is able to manage medications  Activities of Daily Living (ADLs)/Instrumental Activities of Daily Living (IADLs):   Walk and transfer into and out of bed and chair?: Yes  Dress and groom yourself?: Yes    Bathe or shower yourself?: Yes    Feed yourself?  Yes  Do your laundry/housekeeping?: Yes  Manage your money, pay your bills and track your expenses?: Yes  Make your own meals?: Yes    Do your own shopping?: Yes    Previous Hospitalizations:   Any hospitalizations or ED visits within the last 12 months?: No      Advance Care Planning:   Living will: No    Durable "POA for healthcare: No    Advanced directive: No      Comments: Her     Cognitive Screening:   Provider or family/friend/caregiver concerned regarding cognition?: No    PREVENTIVE SCREENINGS      Cardiovascular Screening:    General: Screening Current      Diabetes Screening:     General: Screening Current      Colorectal Cancer Screening:     General: Screening Current      Breast Cancer Screening:     General: Screening Current      Cervical Cancer Screening:    General: Screening Not Indicated      Lung Cancer Screening:     General: Screening Not Indicated      Hepatitis C Screening:    General: Screening Current    Screening, Brief Intervention, and Referral to Treatment (SBIRT)    Screening  Typical number of drinks in a day: 2  Typical number of drinks in a week: 2  Interpretation: Low risk drinking behavior  AUDIT-C Screenin) How often did you have a drink containing alcohol in the past year? monthly or less  2) How many drinks did you have on a typical day when you were drinking in the past year? 1 to 2  3) How often did you have 6 or more drinks on one occasion in the past year? never    AUDIT-C Score: 1  Interpretation: Score 0-2 (female): Negative screen for alcohol misuse    Single Item Drug Screening:  How often have you used an illegal drug (including marijuana) or a prescription medication for non-medical reasons in the past year? never    Single Item Drug Screen Score: 0  Interpretation: Negative screen for possible drug use disorder    Other Counseling Topics:   Car/seat belt/driving safety, skin self-exam, sunscreen and regular weightbearing exercise and calcium and vitamin D intake  No results found       Physical Exam:     /72 (BP Location: Left arm, Patient Position: Sitting, Cuff Size: Standard)   Pulse 81   Temp 97 5 °F (36 4 °C) (Temporal)   Ht 5' 10\" (1 778 m)   Wt 75 3 kg (166 lb)   SpO2 98%   BMI 23 82 kg/m²     Physical Exam     Bebeto Noland, DO  "

## 2023-04-24 NOTE — PATIENT INSTRUCTIONS
Medicare Preventive Visit Patient Instructions  Thank you for completing your Welcome to Medicare Visit or Medicare Annual Wellness Visit today  Your next wellness visit will be due in one year (4/24/2024)  The screening/preventive services that you may require over the next 5-10 years are detailed below  Some tests may not apply to you based off risk factors and/or age  Screening tests ordered at today's visit but not completed yet may show as past due  Also, please note that scanned in results may not display below  Preventive Screenings:  Service Recommendations Previous Testing/Comments   Colorectal Cancer Screening  * Colonoscopy    * Fecal Occult Blood Test (FOBT)/Fecal Immunochemical Test (FIT)  * Fecal DNA/Cologuard Test  * Flexible Sigmoidoscopy Age: 39-70 years old   Colonoscopy: every 10 years (may be performed more frequently if at higher risk)  OR  FOBT/FIT: every 1 year  OR  Cologuard: every 3 years  OR  Sigmoidoscopy: every 5 years  Screening may be recommended earlier than age 39 if at higher risk for colorectal cancer  Also, an individualized decision between you and your healthcare provider will decide whether screening between the ages of 74-80 would be appropriate  Colonoscopy: 01/01/2007  FOBT/FIT: Not on file  Cologuard: 04/20/2021  Sigmoidoscopy: Not on file    Screening Current     Breast Cancer Screening Age: 36 years old  Frequency: every 1-2 years  Not required if history of left and right mastectomy Mammogram: 08/22/2022    Screening Current   Cervical Cancer Screening Between the ages of 21-29, pap smear recommended once every 3 years  Between the ages of 33-67, can perform pap smear with HPV co-testing every 5 years     Recommendations may differ for women with a history of total hysterectomy, cervical cancer, or abnormal pap smears in past  Pap Smear: 09/27/2022    Screening Not Indicated   Hepatitis C Screening Once for adults born between 1945 and 1965  More frequently in patients at high risk for Hepatitis C Hep C Antibody: 07/01/2019    Screening Current   Diabetes Screening 1-2 times per year if you're at risk for diabetes or have pre-diabetes Fasting glucose: 104 mg/dL (4/18/2023)  A1C: No results in last 5 years (No results in last 5 years)  Screening Current   Cholesterol Screening Once every 5 years if you don't have a lipid disorder  May order more often based on risk factors  Lipid panel: 03/30/2022    Screening Current     Other Preventive Screenings Covered by Medicare:  1  Abdominal Aortic Aneurysm (AAA) Screening: covered once if your at risk  You're considered to be at risk if you have a family history of AAA  2  Lung Cancer Screening: covers low dose CT scan once per year if you meet all of the following conditions: (1) Age 50-69; (2) No signs or symptoms of lung cancer; (3) Current smoker or have quit smoking within the last 15 years; (4) You have a tobacco smoking history of at least 20 pack years (packs per day multiplied by number of years you smoked); (5) You get a written order from a healthcare provider  3  Glaucoma Screening: covered annually if you're considered high risk: (1) You have diabetes OR (2) Family history of glaucoma OR (3)  aged 48 and older OR (3)  American aged 72 and older  3  Osteoporosis Screening: covered every 2 years if you meet one of the following conditions: (1) You're estrogen deficient and at risk for osteoporosis based off medical history and other findings; (2) Have a vertebral abnormality; (3) On glucocorticoid therapy for more than 3 months; (4) Have primary hyperparathyroidism; (5) On osteoporosis medications and need to assess response to drug therapy  · Last bone density test (DXA Scan): 01/12/2023   5  HIV Screening: covered annually if you're between the age of 15-65  Also covered annually if you are younger than 13 and older than 72 with risk factors for HIV infection   For pregnant patients, it is covered up to 3 times per pregnancy  Immunizations:  Immunization Recommendations   Influenza Vaccine Annual influenza vaccination during flu season is recommended for all persons aged >= 6 months who do not have contraindications   Pneumococcal Vaccine   * Pneumococcal conjugate vaccine = PCV13 (Prevnar 13), PCV15 (Vaxneuvance), PCV20 (Prevnar 20)  * Pneumococcal polysaccharide vaccine = PPSV23 (Pneumovax) Adults 25-60 years old: 1-3 doses may be recommended based on certain risk factors  Adults 72 years old: 1-2 doses may be recommended based off what pneumonia vaccine you previously received   Hepatitis B Vaccine 3 dose series if at intermediate or high risk (ex: diabetes, end stage renal disease, liver disease)   Tetanus (Td) Vaccine - COST NOT COVERED BY MEDICARE PART B Following completion of primary series, a booster dose should be given every 10 years to maintain immunity against tetanus  Td may also be given as tetanus wound prophylaxis  Tdap Vaccine - COST NOT COVERED BY MEDICARE PART B Recommended at least once for all adults  For pregnant patients, recommended with each pregnancy  Shingles Vaccine (Shingrix) - COST NOT COVERED BY MEDICARE PART B  2 shot series recommended in those aged 48 and above     Health Maintenance Due:      Topic Date Due   • Breast Cancer Screening: Mammogram  08/22/2023   • Colorectal Cancer Screening  04/20/2024   • Hepatitis C Screening  Completed     Immunizations Due:  There are no preventive care reminders to display for this patient  Advance Directives   What are advance directives? Advance directives are legal documents that state your wishes and plans for medical care  These plans are made ahead of time in case you lose your ability to make decisions for yourself  Advance directives can apply to any medical decision, such as the treatments you want, and if you want to donate organs  What are the types of advance directives?   There are many types of advance directives, and each state has rules about how to use them  You may choose a combination of any of the following:  · Living will: This is a written record of the treatment you want  You can also choose which treatments you do not want, which to limit, and which to stop at a certain time  This includes surgery, medicine, IV fluid, and tube feedings  · Durable power of  for healthcare [facility]): This is a written record that states who you want to make healthcare choices for you when you are unable to make them for yourself  This person, called a proxy, is usually a family member or a friend  You may choose more than 1 proxy  · Do not resuscitate (DNR) order:  A DNR order is used in case your heart stops beating or you stop breathing  It is a request not to have certain forms of treatment, such as CPR  A DNR order may be included in other types of advance directives  · Medical directive: This covers the care that you want if you are in a coma, near death, or unable to make decisions for yourself  You can list the treatments you want for each condition  Treatment may include pain medicine, surgery, blood transfusions, dialysis, IV or tube feedings, and a ventilator (breathing machine)  · Values history: This document has questions about your views, beliefs, and how you feel and think about life  This information can help others choose the care that you would choose  Why are advance directives important? An advance directive helps you control your care  Although spoken wishes may be used, it is better to have your wishes written down  Spoken wishes can be misunderstood, or not followed  Treatments may be given even if you do not want them  An advance directive may make it easier for your family to make difficult choices about your care  Fall Prevention    Fall prevention  includes ways to make your home and other areas safer  It also includes ways you can move more carefully to prevent a fall   Health conditions that cause changes in your blood pressure, vision, or muscle strength and coordination may increase your risk for falls  Medicines may also increase your risk for falls if they make you dizzy, weak, or sleepy  Fall prevention tips:   · Stand or sit up slowly  · Use assistive devices as directed  · Wear shoes that fit well and have soles that   · Wear a personal alarm  · Stay active  · Manage your medical conditions  Home Safety Tips:  · Add items to prevent falls in the bathroom  · Keep paths clear  · Install bright lights in your home  · Keep items you use often on shelves within reach  · Paint or place reflective tape on the edges of your stairs  © Copyright [employer] 2018 Information is for End User's use only and may not be sold, redistributed or otherwise used for commercial purposes  All illustrations and images included in CareNotes® are the copyrighted property of [employer] , [employer]  or [facility] Prevention   AMBULATORY CARE:   Fall prevention  includes ways to make your home and other areas safer  It also includes ways you can move more carefully to prevent a fall  Health conditions that cause changes in your blood pressure, vision, or muscle strength and coordination may increase your risk for falls  Medicines may also increase your risk for falls if they make you dizzy, weak, or sleepy  Call your local emergency number (911 in the [address]) or have someone call if:   • You have fallen and are unconscious  • You have fallen and cannot move part of your body  Call your doctor if:   • You have fallen and have pain or a headache  • You have questions or concerns about your condition or care  Fall prevention tips:   • Stand or sit up slowly  This may help you keep your balance and prevent falls  • Use assistive devices as directed  Your healthcare provider may suggest that you use a cane or walker to help you keep your balance  You may need to have grab bars put in your bathroom near the toilet or in the shower  • Wear shoes that fit well and have soles that   Wear shoes both inside and outside  Use slippers with good   Do not wear shoes with high heels  • Wear a personal alarm  This is a device that allows you to call 911 if you fall and need help  Ask your healthcare provider for more information  • Stay active  Exercise can help strengthen your muscles and improve your balance  Your healthcare provider may recommend water aerobics or walking  He or she may also recommend physical therapy to improve your coordination  Never start an exercise program without talking to your healthcare provider first          • Manage your medical conditions  Keep all appointments with your healthcare providers  Visit your eye doctor as directed  Home safety tips:       • Add items to prevent falls in the bathroom  Put nonslip strips on your bath or shower floor to prevent you from slipping  Use a bath mat if you do not have carpet in the bathroom  This will prevent you from falling when you step out of the bath or shower  Use a shower seat so you do not need to stand while you shower  Sit on the toilet or a chair in your bathroom to dry yourself and put on clothing  This will prevent you from losing your balance from drying or dressing yourself while you are standing  • Keep paths clear  Remove books, shoes, and other objects from walkways and stairs  Place cords for telephones and lamps out of the way so that you do not need to walk over them  Tape them down if you cannot move them  Remove small rugs  If you cannot remove a rug, secure it with double-sided tape  This will prevent you from tripping  • Install bright lights in your home  Use night lights to help light paths to the bathroom or kitchen  Always turn on the light before you start walking  • Keep items you use often on shelves within reach    Do not use a step stool to help you reach an item  • Paint or place reflective tape on the edges of your stairs  This will help you see the stairs better  Follow up with your doctor as directed:  Write down your questions so you remember to ask them during your visits  © Copyright Mels[employer] Vas[employer] 2022 Information is for End User's use only and may not be sold, redistributed or otherwise used for commercial purposes  The above information is an  only  It is not intended as medical advice for individual conditions or treatments  Talk to your doctor, nurse or pharmacist before following any medical regimen to see if it is safe and effective for you

## 2023-04-24 NOTE — PROGRESS NOTES
Assessment/Plan:    1  High serum high density lipoprotein (HDL)    2  Acquired hypothyroidism    3  Benign essential HTN    4  Primary osteoarthritis of both knees    5  Encounter for annual wellness visit (AWV) in Medicare patient    6  Lumbar foraminal stenosis    Patient not able to travel due to her spinal stenosis and lumbar radiculopathy which is preventing her from doing several activities of daily living  She has canceled her trip this summer to [facility]  She has an appointment later this week with neurosurgery to finalize plans for her upcoming surgery  I have advised to ibuprofen and 1 exercise Tylenol with breakfast to help with the discomfort  She has not started gabapentin        Patient Instructions       Medicare Preventive Visit Patient Instructions  Thank you for completing your Welcome to Medicare Visit or Medicare Annual Wellness Visit today  Your next wellness visit will be due in one year (4/24/2024)  The screening/preventive services that you may require over the next 5-10 years are detailed below  Some tests may not apply to you based off risk factors and/or age  Screening tests ordered at today's visit but not completed yet may show as past due  Also, please note that scanned in results may not display below  Preventive Screenings:  Service Recommendations Previous Testing/Comments   Colorectal Cancer Screening  * Colonoscopy    * Fecal Occult Blood Test (FOBT)/Fecal Immunochemical Test (FIT)  * Fecal DNA/Cologuard Test  * Flexible Sigmoidoscopy Age: 39-70 years old   Colonoscopy: every 10 years (may be performed more frequently if at higher risk)  OR  FOBT/FIT: every 1 year  OR  Cologuard: every 3 years  OR  Sigmoidoscopy: every 5 years  Screening may be recommended earlier than age 39 if at higher risk for colorectal cancer  Also, an individualized decision between you and your healthcare provider will decide whether screening between the ages of 74-80 would be appropriate  Colonoscopy: 01/01/2007  FOBT/FIT: Not on file  Cologuard: 04/20/2021  Sigmoidoscopy: Not on file    Screening Current     Breast Cancer Screening Age: 36 years old  Frequency: every 1-2 years  Not required if history of left and right mastectomy Mammogram: 08/22/2022    Screening Current   Cervical Cancer Screening Between the ages of 21-29, pap smear recommended once every 3 years  Between the ages of 33-67, can perform pap smear with HPV co-testing every 5 years  Recommendations may differ for women with a history of total hysterectomy, cervical cancer, or abnormal pap smears in past  Pap Smear: 09/27/2022    Screening Not Indicated   Hepatitis C Screening Once for adults born between 1945 and 1965  More frequently in patients at high risk for Hepatitis C Hep C Antibody: 07/01/2019    Screening Current   Diabetes Screening 1-2 times per year if you're at risk for diabetes or have pre-diabetes Fasting glucose: 104 mg/dL (4/18/2023)  A1C: No results in last 5 years (No results in last 5 years)  Screening Current   Cholesterol Screening Once every 5 years if you don't have a lipid disorder  May order more often based on risk factors  Lipid panel: 03/30/2022    Screening Current     Other Preventive Screenings Covered by Medicare:  1  Abdominal Aortic Aneurysm (AAA) Screening: covered once if your at risk  You're considered to be at risk if you have a family history of AAA  2  Lung Cancer Screening: covers low dose CT scan once per year if you meet all of the following conditions: (1) Age 50-69; (2) No signs or symptoms of lung cancer; (3) Current smoker or have quit smoking within the last 15 years; (4) You have a tobacco smoking history of at least 20 pack years (packs per day multiplied by number of years you smoked); (5) You get a written order from a healthcare provider    3  Glaucoma Screening: covered annually if you're considered high risk: (1) You have diabetes OR (2) Family history of glaucoma OR (3)  aged 48 and older OR (3)  American aged 72 and older  3  Osteoporosis Screening: covered every 2 years if you meet one of the following conditions: (1) You're estrogen deficient and at risk for osteoporosis based off medical history and other findings; (2) Have a vertebral abnormality; (3) On glucocorticoid therapy for more than 3 months; (4) Have primary hyperparathyroidism; (5) On osteoporosis medications and need to assess response to drug therapy  · Last bone density test (DXA Scan): 01/12/2023   5  HIV Screening: covered annually if you're between the age of 15-65  Also covered annually if you are younger than 13 and older than 72 with risk factors for HIV infection  For pregnant patients, it is covered up to 3 times per pregnancy  Immunizations:  Immunization Recommendations   Influenza Vaccine Annual influenza vaccination during flu season is recommended for all persons aged >= 6 months who do not have contraindications   Pneumococcal Vaccine   * Pneumococcal conjugate vaccine = PCV13 (Prevnar 13), PCV15 (Vaxneuvance), PCV20 (Prevnar 20)  * Pneumococcal polysaccharide vaccine = PPSV23 (Pneumovax) Adults 25-60 years old: 1-3 doses may be recommended based on certain risk factors  Adults 72 years old: 1-2 doses may be recommended based off what pneumonia vaccine you previously received   Hepatitis B Vaccine 3 dose series if at intermediate or high risk (ex: diabetes, end stage renal disease, liver disease)   Tetanus (Td) Vaccine - COST NOT COVERED BY MEDICARE PART B Following completion of primary series, a booster dose should be given every 10 years to maintain immunity against tetanus  Td may also be given as tetanus wound prophylaxis  Tdap Vaccine - COST NOT COVERED BY MEDICARE PART B Recommended at least once for all adults  For pregnant patients, recommended with each pregnancy     Shingles Vaccine (Shingrix) - COST NOT COVERED BY MEDICARE PART B  2 shot series recommended in those aged 48 and above     Health Maintenance Due:      Topic Date Due   • Breast Cancer Screening: Mammogram  08/22/2023   • Colorectal Cancer Screening  04/20/2024   • Hepatitis C Screening  Completed     Immunizations Due:  There are no preventive care reminders to display for this patient  Advance Directives   What are advance directives? Advance directives are legal documents that state your wishes and plans for medical care  These plans are made ahead of time in case you lose your ability to make decisions for yourself  Advance directives can apply to any medical decision, such as the treatments you want, and if you want to donate organs  What are the types of advance directives? There are many types of advance directives, and each state has rules about how to use them  You may choose a combination of any of the following:  · Living will: This is a written record of the treatment you want  You can also choose which treatments you do not want, which to limit, and which to stop at a certain time  This includes surgery, medicine, IV fluid, and tube feedings  · Durable power of  for healthcare [facility]): This is a written record that states who you want to make healthcare choices for you when you are unable to make them for yourself  This person, called a proxy, is usually a family member or a friend  You may choose more than 1 proxy  · Do not resuscitate (DNR) order:  A DNR order is used in case your heart stops beating or you stop breathing  It is a request not to have certain forms of treatment, such as CPR  A DNR order may be included in other types of advance directives  · Medical directive: This covers the care that you want if you are in a coma, near death, or unable to make decisions for yourself  You can list the treatments you want for each condition   Treatment may include pain medicine, surgery, blood transfusions, dialysis, IV or tube feedings, and a ventilator (breathing machine)  · Values history: This document has questions about your views, beliefs, and how you feel and think about life  This information can help others choose the care that you would choose  Why are advance directives important? An advance directive helps you control your care  Although spoken wishes may be used, it is better to have your wishes written down  Spoken wishes can be misunderstood, or not followed  Treatments may be given even if you do not want them  An advance directive may make it easier for your family to make difficult choices about your care  Fall Prevention    Fall prevention  includes ways to make your home and other areas safer  It also includes ways you can move more carefully to prevent a fall  Health conditions that cause changes in your blood pressure, vision, or muscle strength and coordination may increase your risk for falls  Medicines may also increase your risk for falls if they make you dizzy, weak, or sleepy  Fall prevention tips:   · Stand or sit up slowly  · Use assistive devices as directed  · Wear shoes that fit well and have soles that   · Wear a personal alarm  · Stay active  · Manage your medical conditions  Home Safety Tips:  · Add items to prevent falls in the bathroom  · Keep paths clear  · Install bright lights in your home  · Keep items you use often on shelves within reach  · Paint or place reflective tape on the edges of your stairs  © Copyright [employer] 2018 Information is for End User's use only and may not be sold, redistributed or otherwise used for commercial purposes  All illustrations and images included in CareNotes® are the copyrighted property of [employer]  or [employer] [facility] Prevention   AMBULATORY CARE:   Fall prevention  includes ways to make your home and other areas safer  It also includes ways you can move more carefully to prevent a fall   Health conditions that cause changes in your blood pressure, vision, or muscle strength and coordination may increase your risk for falls  Medicines may also increase your risk for falls if they make you dizzy, weak, or sleepy  Call your local emergency number (911 in the [address]) or have someone call if:   • You have fallen and are unconscious  • You have fallen and cannot move part of your body  Call your doctor if:   • You have fallen and have pain or a headache  • You have questions or concerns about your condition or care  Fall prevention tips:   • Stand or sit up slowly  This may help you keep your balance and prevent falls  • Use assistive devices as directed  Your healthcare provider may suggest that you use a cane or walker to help you keep your balance  You may need to have grab bars put in your bathroom near the toilet or in the shower  • Wear shoes that fit well and have soles that   Wear shoes both inside and outside  Use slippers with good   Do not wear shoes with high heels  • Wear a personal alarm  This is a device that allows you to call 911 if you fall and need help  Ask your healthcare provider for more information  • Stay active  Exercise can help strengthen your muscles and improve your balance  Your healthcare provider may recommend water aerobics or walking  He or she may also recommend physical therapy to improve your coordination  Never start an exercise program without talking to your healthcare provider first          • Manage your medical conditions  Keep all appointments with your healthcare providers  Visit your eye doctor as directed  Home safety tips:       • Add items to prevent falls in the bathroom  Put nonslip strips on your bath or shower floor to prevent you from slipping  Use a bath mat if you do not have carpet in the bathroom  This will prevent you from falling when you step out of the bath or shower  Use a shower seat so you do not need to stand while you shower   Sit on the toilet or a chair in your bathroom to dry yourself and put on clothing  This will prevent you from losing your balance from drying or dressing yourself while you are standing  • Keep paths clear  Remove books, shoes, and other objects from walkways and stairs  Place cords for telephones and lamps out of the way so that you do not need to walk over them  Tape them down if you cannot move them  Remove small rugs  If you cannot remove a rug, secure it with double-sided tape  This will prevent you from tripping  • Install bright lights in your home  Use night lights to help light paths to the bathroom or kitchen  Always turn on the light before you start walking  • Keep items you use often on shelves within reach  Do not use a step stool to help you reach an item  • Paint or place reflective tape on the edges of your stairs  This will help you see the stairs better  Follow up with your doctor as directed:  Write down your questions so you remember to ask them during your visits  © [facility] 2022 Information is for End User's use only and may not be sold, redistributed or otherwise used for commercial purposes  The above information is an  only  It is not intended as medical advice for individual conditions or treatments  Talk to your doctor, nurse or pharmacist before following any medical regimen to see if it is safe and effective for you  Return in about 6 months (around 10/24/2023) for Recheck  Subjective:      Patient ID: Donovan Tavares is a 75 y o  female      Chief Complaint   Patient presents with   • Follow-up     FU- 6 month FU labs-4/18/23   • Medicare Wellness Visit     AWV-SUB       HPI        Answers for HPI/ROS submitted by the patient on 4/17/2023  How would you rate your overall health?: very good  Compared to last year, how is your physical health?: slightly worse  In general, how satisfied are you with your life?: satisfied  Compared to last year, how is your "eyesight?: same  Compared to last year, how is your hearing?: same  Compared to last year, how is your emotional/mental health?: same  How often is anger a problem for you?: never, rarely  How often do you feel unusually tired/fatigued?: never, rarely  In the past 7 days, how much pain have you experienced?: some  If you answered \"some\" or \"a lot\", please rate the severity of your pain on a scale of 1 to 10 (1 being the least severe pain and 10 being the most intense pain)  : 7/10  In the past 6 months, have you lost or gained 10 pounds without trying?: No  One or more falls in the last year: Yes  In the past 6 months, have you accidentally leaked urine?: No  Do you have trouble with the stairs inside or outside your home?: Yes  Does your home have working smoke alarms?: Yes  Does your home have a carbon monoxide monitor?: Yes  Which safety hazards (if any) have you experienced in your home? Please select all that apply : none  How would you describe your current diet?  Please select all that apply : Regular  In addition to prescription medications, are you taking any over-the-counter supplements?: Yes  If yes, what supplements are you taking?: Vitamins  Can you manage your medications?: Yes  Are you currently taking any opioid medications?: No  Can you walk and transfer into and out of your bed and chair?: Yes  Can you dress and groom yourself?: Yes  Can you bathe or shower yourself?: Yes  Can you feed yourself?: Yes  Can you do your laundry/ housekeeping?: Yes  Can you manage your money, pay your bills, and track your expenses?: Yes  Can you make your own meals?: Yes  Can you do your own shopping?: Yes  Within the last 12 months, have you had any hospitalizations or Emergency Department visits?: No  Do you have a living will?: No  Do you have a Durable POA (Power of ) for healthcare decisions?: No  Do you have an Advanced Directive for end of life decisions?: No  How often have you used an illegal drug " (including marijuana) or a prescription medication for non-medical reasons in the past year?: never  What is the typical number of drinks you consume in a day?: 2  What is the typical number of drinks you consume in a week?: 2  How often did you have a drink containing alcohol in the past year?: monthly or less  How many drinks did you have on a typical day  when you were drinking in the past year?: 1 to 2  How often did you have 6 or more drinks on one occasion in the past year?: never       Hypertension   On amiloride with hydrochlorothiazide that she is tolerating well   Checks blood pressure at home and is well controlled   No side effects and renal function is stable   September 2021, well controlled with medications  Lamonte Blue is compliant   Oct 2022: doing well, compliant with meds, BP well controlled   2023, well controlled with no issues    Hypothyroidism, on levothyroxine 50 mcg daily and is compliant   Thyroid levels are well controlled  April 2023, well controlled with no issues  Lumbar foraminal stenosis and radiculopathy, following with neurosurgery and has been recommended a discectomy which she will schedule  She has an appointment on Thursday for final details  She is not able to travel due to this issue right now and is in the process of changing her travel plans this summer for trip to [facility]    She has not started gabapentin but at night takes Tylenol and she thinks it is slightly helpful ,  And no falls    The following portions of the patient's history were reviewed and updated as appropriate: allergies, current medications, past family history, past medical history, past social history, past surgical history and problem list     Review of Systems      Constitutional:  Denies fever or chills   Eyes:  Denies double , blurry vision or eye pain  HENT:  Denies nasal congestion, sore throat or new hearing issues  Respiratory:  Denies cough or shortness of breath or wheezing  Cardiovascular:  Denies "palpitations or chest pain  GI:  Denies abdominal pain, nausea, or vomiting, no loose stools, no reflux  Integument:  Denies rash , no open areas  Neurologic:  Denies headache or focal weakness, no dizziness  : no dysuria, or hematuria      Current Outpatient Medications   Medication Sig Dispense Refill   • Acetaminophen (TYLENOL PO) Take 325-500 mg by mouth as needed     • AMILoride-hydrochlorothiazide (MODURETIC) 5-50 mg per tablet Take 1 tablet by mouth daily 90 tablet 1   • Ascorbic Acid (vitamin C) 1000 MG tablet Take 1,000 mg by mouth daily     • Calcium Carbonate-Vitamin D3 600-400 MG-UNIT TABS Take 2 tablets by mouth daily     • Collagen Hydrolysate POWD 1 Scoop by Does not apply route daily      • gabapentin (Neurontin) 300 mg capsule Take 1 capsule (300 mg total) by mouth daily at bedtime for 7 days, THEN 2 capsules (600 mg total) daily at bedtime for 7 days, THEN 3 capsules (900 mg total) daily at bedtime  90 capsule 2   • Glucosamine-Chondroit-Vit C-Mn (GLUCOSAMINE-CHONDROITIN) TABS Take 2 tablets by mouth daily     • levothyroxine 50 mcg tablet Take 1 tablet (50 mcg total) by mouth daily Alt with 1 5 tab M,W,F 108 tablet 3   • Magnesium Carbonate POWD 325 mg by Does not apply route daily     • Multiple Vitamin (MULTI-DAY) TABS Take 2 tablets by mouth daily      • Multiple Vitamins-Minerals (ULTRA AYSHA PO) Take 2 tablets by mouth daily     • Omega-3 Fatty Acids (FISH OIL) 1200 MG CAPS Take 2 capsules by mouth daily        No current facility-administered medications for this visit         Objective:    /72 (BP Location: Left arm, Patient Position: Sitting, Cuff Size: Standard)   Pulse 81   Temp 97 5 °F (36 4 °C) (Temporal)   Ht 5' 10\" (1 778 m)   Wt 75 3 kg (166 lb)   SpO2 98%   BMI 23 82 kg/m²        Physical Exam    Constitutional:  Well developed, well nourished, no acute distress, non-toxic appearance   Eyes:  PERRL, conjunctiva normal , non icteric sclera  HENT:  Atraumatic, oropharynx " moist  Neck-  supple   Respiratory:  CTA b/l, normal breath sounds, no rales, no wheezing   Cardiovascular:  RRR, no murmurs, no LE edema b/l  GI:  Soft, nondistended, normal bowel sounds x 4, nontender, no organomegaly, no mass, no rebound, no guarding   Neurologic:  no focal deficits noted   Psychiatric:  Speech and behavior appropriate , AAO x 3           Carle Cooks, DO  Falls Plan of Care: Balance, strength, and gait training instructions were provided

## 2023-04-27 ENCOUNTER — OFFICE VISIT (OUTPATIENT)
Dept: LAB | Facility: CLINIC | Age: 68
End: 2023-04-27

## 2023-04-27 ENCOUNTER — OFFICE VISIT (OUTPATIENT)
Dept: NEUROSURGERY | Facility: CLINIC | Age: 68
End: 2023-04-27

## 2023-04-27 ENCOUNTER — APPOINTMENT (OUTPATIENT)
Dept: LAB | Facility: CLINIC | Age: 68
End: 2023-04-27

## 2023-04-27 VITALS
DIASTOLIC BLOOD PRESSURE: 74 MMHG | WEIGHT: 166 LBS | OXYGEN SATURATION: 98 % | HEART RATE: 70 BPM | SYSTOLIC BLOOD PRESSURE: 134 MMHG | HEIGHT: 70 IN | BODY MASS INDEX: 23.77 KG/M2 | TEMPERATURE: 97.9 F

## 2023-04-27 DIAGNOSIS — Z01.818 PRE-PROCEDURAL EXAMINATION: ICD-10-CM

## 2023-04-27 DIAGNOSIS — M51.16 LUMBAR DISC DISEASE WITH RADICULOPATHY: Primary | ICD-10-CM

## 2023-04-27 DIAGNOSIS — M51.16 LUMBAR DISC DISEASE WITH RADICULOPATHY: ICD-10-CM

## 2023-04-27 LAB
ALBUMIN SERPL BCP-MCNC: 4.5 G/DL (ref 3.5–5)
ALP SERPL-CCNC: 47 U/L (ref 34–104)
ALT SERPL W P-5'-P-CCNC: 15 U/L (ref 7–52)
ANION GAP SERPL CALCULATED.3IONS-SCNC: 8 MMOL/L (ref 4–13)
APTT PPP: 33 SECONDS (ref 23–37)
AST SERPL W P-5'-P-CCNC: 18 U/L (ref 13–39)
ATRIAL RATE: 75 BPM
BASOPHILS # BLD AUTO: 0.06 THOUSANDS/ÂΜL (ref 0–0.1)
BASOPHILS NFR BLD AUTO: 1 % (ref 0–1)
BILIRUB SERPL-MCNC: 0.9 MG/DL (ref 0.2–1)
BUN SERPL-MCNC: 13 MG/DL (ref 5–25)
CALCIUM SERPL-MCNC: 9.9 MG/DL (ref 8.4–10.2)
CHLORIDE SERPL-SCNC: 94 MMOL/L (ref 96–108)
CO2 SERPL-SCNC: 31 MMOL/L (ref 21–32)
CREAT SERPL-MCNC: 0.68 MG/DL (ref 0.6–1.3)
EOSINOPHIL # BLD AUTO: 0.15 THOUSAND/ÂΜL (ref 0–0.61)
EOSINOPHIL NFR BLD AUTO: 2 % (ref 0–6)
ERYTHROCYTE [DISTWIDTH] IN BLOOD BY AUTOMATED COUNT: 13.2 % (ref 11.6–15.1)
GFR SERPL CREATININE-BSD FRML MDRD: 90 ML/MIN/1.73SQ M
GLUCOSE P FAST SERPL-MCNC: 104 MG/DL (ref 65–99)
HCT VFR BLD AUTO: 45.1 % (ref 34.8–46.1)
HGB BLD-MCNC: 14.9 G/DL (ref 11.5–15.4)
IMM GRANULOCYTES # BLD AUTO: 0.04 THOUSAND/UL (ref 0–0.2)
IMM GRANULOCYTES NFR BLD AUTO: 0 % (ref 0–2)
INR PPP: 0.91 (ref 0.84–1.19)
LYMPHOCYTES # BLD AUTO: 0.69 THOUSANDS/ÂΜL (ref 0.6–4.47)
LYMPHOCYTES NFR BLD AUTO: 7 % (ref 14–44)
MCH RBC QN AUTO: 29.6 PG (ref 26.8–34.3)
MCHC RBC AUTO-ENTMCNC: 33 G/DL (ref 31.4–37.4)
MCV RBC AUTO: 90 FL (ref 82–98)
MONOCYTES # BLD AUTO: 1.09 THOUSAND/ÂΜL (ref 0.17–1.22)
MONOCYTES NFR BLD AUTO: 11 % (ref 4–12)
NEUTROPHILS # BLD AUTO: 8.02 THOUSANDS/ÂΜL (ref 1.85–7.62)
NEUTS SEG NFR BLD AUTO: 79 % (ref 43–75)
NRBC BLD AUTO-RTO: 0 /100 WBCS
P AXIS: 85 DEGREES
PLATELET # BLD AUTO: 207 THOUSANDS/UL (ref 149–390)
PMV BLD AUTO: 10.5 FL (ref 8.9–12.7)
POTASSIUM SERPL-SCNC: 3.6 MMOL/L (ref 3.5–5.3)
PR INTERVAL: 162 MS
PROT SERPL-MCNC: 7 G/DL (ref 6.4–8.4)
PROTHROMBIN TIME: 12.5 SECONDS (ref 11.6–14.5)
QRS AXIS: 48 DEGREES
QRSD INTERVAL: 74 MS
QT INTERVAL: 388 MS
QTC INTERVAL: 433 MS
RBC # BLD AUTO: 5.03 MILLION/UL (ref 3.81–5.12)
SODIUM SERPL-SCNC: 133 MMOL/L (ref 135–147)
T WAVE AXIS: 54 DEGREES
VENTRICULAR RATE: 75 BPM
WBC # BLD AUTO: 10.05 THOUSAND/UL (ref 4.31–10.16)

## 2023-04-27 RX ORDER — CHLORHEXIDINE GLUCONATE 0.12 MG/ML
15 RINSE ORAL ONCE
OUTPATIENT
Start: 2023-04-27 | End: 2023-04-27

## 2023-04-27 RX ORDER — CEFAZOLIN SODIUM 2 G/50ML
2000 SOLUTION INTRAVENOUS ONCE
OUTPATIENT
Start: 2023-04-27 | End: 2023-04-27

## 2023-04-27 NOTE — PROGRESS NOTES
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 L3 radiculopathy  Pt decided to defer consideration of surgical intervention at that time, pending a trip to [facility]    She changed her mind about surgery and cancelled her trip    I discussed the nature of the procedure and reasonable expectations with the patient  The risks benefits and alternatives were explained to the patient, including but not limited to: general anesthesia, bleeding, infection, stroke, coma, death, CSF leak, nerve damage, brain damage, spinal cord damage, failure to improve, neurologic deficit including paralysis, need for reoperation  All questions were answered  No guarantees were given        OK to give her a letter to cancel her trip for medical reasons    20 minutes were spent caring for this patient

## 2023-04-27 NOTE — PROGRESS NOTES
Review of Systems   Constitutional: Negative  HENT: Negative  Eyes: Negative  Respiratory: Negative  Cardiovascular: Negative  Gastrointestinal: Negative  Endocrine: Negative  Genitourinary: Negative  Musculoskeletal: Positive for back pain (right sided back pain radiates to right hip , buttock and rigth lateral leg  Her pain is a constant, sharp, stabbing pain and she rates it an 8/10 today ) and myalgias  Right HNP  L3/4- Discuss Possible  L3/4 Discectomy  Lumbar disc disease with radiculopathy-S/p trip and fall in June 2022   MEds: Gabapentin   L/S MRI done 10/3/22   Dr Ashley Mask L4/5 LESI 80% relief  Patient denies any N/T/W on right leg  Has difficulty walking and going up/down steps     Skin: Negative  Allergic/Immunologic: Negative  Neurological: Negative  Negative for weakness and numbness  Hematological: Negative  Psychiatric/Behavioral: Negative  All other systems reviewed and are negative

## 2023-05-02 LAB
EST. AVERAGE GLUCOSE BLD GHB EST-MCNC: 126 MG/DL
HBA1C MFR BLD: 6 %

## 2023-05-11 ENCOUNTER — HOSPITAL ENCOUNTER (OUTPATIENT)
Dept: MRI IMAGING | Facility: HOSPITAL | Age: 68
Discharge: HOME | End: 2023-05-11

## 2023-05-11 DIAGNOSIS — M51.16 LUMBAR DISC DISEASE WITH RADICULOPATHY: ICD-10-CM

## 2023-05-12 ENCOUNTER — TELEPHONE (OUTPATIENT)
Dept: NEUROSURGERY | Facility: CLINIC | Age: 68
End: 2023-05-12

## 2023-05-15 ENCOUNTER — OFFICE VISIT (OUTPATIENT)
Dept: NEUROSURGERY | Facility: CLINIC | Age: 68
End: 2023-05-15

## 2023-05-15 VITALS
HEIGHT: 70 IN | TEMPERATURE: 97.7 F | DIASTOLIC BLOOD PRESSURE: 84 MMHG | WEIGHT: 166 LBS | HEART RATE: 90 BPM | BODY MASS INDEX: 23.77 KG/M2 | RESPIRATION RATE: 18 BRPM | SYSTOLIC BLOOD PRESSURE: 122 MMHG

## 2023-05-15 DIAGNOSIS — M51.16 LUMBAR DISC DISEASE WITH RADICULOPATHY: Primary | ICD-10-CM

## 2023-05-15 RX ORDER — IBUPROFEN 200 MG
400 TABLET ORAL EVERY 6 HOURS PRN
COMMUNITY

## 2023-05-15 NOTE — PROGRESS NOTES
Review of Systems   Musculoskeletal: Positive for back pain and gait problem  Lumbar disc disease with radiculopathy-S/p trip and fall in June 2022   Meds: Gabapentin   L/S MRI done   5/11/23   Dr Herminio Kauffman L4/5 LESI 80% relief  Rates pain today 8/10 Right Side LBP from Right HIP down Right Leg to Knee=- difficultly walking( unbalanced & unsteady- difficulty wiith steps    Neurological: Negative for weakness  All other systems reviewed and are negative

## 2023-05-15 NOTE — PROGRESS NOTES
MRI confirms right L3/4 HNP  I reviewed the images and the report  I think this can be best decompressed through a laminotomy, foramintomy and diskectomy instead of the far lateral approach  No change in symptoms of right leg pain into knee and not below  I discussed the nature of the procedure and reasonable expectations with the patient  The risks benefits and alternatives were explained to the patient, including but not limited to: general anesthesia, bleeding, infection, stroke, coma, death, CSF leak, nerve damage, brain damage, spinal cord damage, failure to improve, neurologic deficit including paralysis, need for reoperation  All questions were answered  No guarantees were given  All questions answered      She was referred to the proper place for further questions about her insurance coverage and nail polish in the OR      20 minutes were spent caring for this patient

## 2023-05-18 NOTE — PRE-PROCEDURE INSTRUCTIONS
Pre-Surgery Instructions:   Medication Instructions   • Acetaminophen (TYLENOL PO) Uses PRN- OK to take day of surgery   • AMILoride-hydrochlorothiazide (MODURETIC) 5-50 mg per tablet Hold day of surgery  • Ascorbic Acid (vitamin C) 1000 MG tablet Stop taking 7 days prior to surgery  • Calcium Carbonate-Vitamin D3 600-400 MG-UNIT TABS Stop taking 7 days prior to surgery  • Collagen Hydrolysate POWD Stop taking 7 days prior to surgery  • gabapentin (Neurontin) 300 mg capsule Take night before surgery   • Glucosamine-Chondroit-Vit C-Mn (GLUCOSAMINE-CHONDROITIN) TABS Stop taking 7 days prior to surgery  • ibuprofen (MOTRIN) 200 mg tablet Stop taking 7 days prior to surgery  • levothyroxine 50 mcg tablet Take day of surgery  • Magnesium Carbonate POWD Stop taking 7 days prior to surgery  • Multiple Vitamin (MULTI-DAY) TABS Stop taking 7 days prior to surgery  • Omega-3 Fatty Acids (FISH OIL) 1200 MG CAPS Stop taking 7 days prior to surgery  Medication instructions for day surgery reviewed  Please use only a sip of water to take your instructed medications  Avoid all over the counter vitamins, supplements and NSAIDS for one week prior to surgery per anesthesia guidelines  Tylenol is ok to take as needed  You will receive a call one business day prior to surgery with an arrival time and hospital directions  If your surgery is scheduled on a Monday, the hospital will be calling you on the Friday prior to your surgery  If you have not heard from anyone by 8pm, please call the hospital supervisor through the hospital  at [phone]  Titusia Camron [phone])  Do not eat or drink anything after midnight the night before your surgery, including candy, mints, lifesavers, or chewing gum  Do not drink alcohol 24hrs before your surgery  Try not to smoke at least 24hrs before your surgery         Follow the pre surgery showering instructions as listed in the [facility] Surgical Experience Booklet” or otherwise provided by your surgeon's office  Do not shave the surgical area 24 hours before surgery  Do not apply any lotions, creams, including makeup, cologne, deodorant, or perfumes after showering on the day of your surgery  No contact lenses, eye make-up, or artificial eyelashes  Remove nail polish, including gel polish, and any artificial, gel, or acrylic nails if possible  Remove all jewelry including rings and body piercing jewelry  Pt  reluctant to remove toe and fingernail polish  Encouraged to remove per [facility]'s policy and for safety  Wear causal clothing that is easy to take on and off  Consider your type of surgery  Keep any valuables, jewelry, piercings at home  Please bring any specially ordered equipment (sling, braces) if indicated  Arrange for a responsible person to drive you to and from the hospital on the day of your surgery  Visitor Guidelines discussed  Call the surgeon's office with any new illnesses, exposures, or additional questions prior to surgery  Please reference your [facility] and the [facility] Surgical Experience Booklet” for additional information to prepare for your upcoming surgery

## 2023-05-24 ENCOUNTER — CONSULT (OUTPATIENT)
Dept: INTERNAL MEDICINE CLINIC | Facility: CLINIC | Age: 68
End: 2023-05-24

## 2023-05-24 VITALS
SYSTOLIC BLOOD PRESSURE: 122 MMHG | WEIGHT: 163 LBS | TEMPERATURE: 98 F | HEART RATE: 76 BPM | HEIGHT: 70 IN | OXYGEN SATURATION: 98 % | DIASTOLIC BLOOD PRESSURE: 80 MMHG | BODY MASS INDEX: 23.34 KG/M2

## 2023-05-24 DIAGNOSIS — R73.01 ABNORMAL FASTING GLUCOSE: ICD-10-CM

## 2023-05-24 DIAGNOSIS — M51.16 LUMBAR DISC DISEASE WITH RADICULOPATHY: ICD-10-CM

## 2023-05-24 DIAGNOSIS — S83.232D COMPLEX TEAR OF MEDIAL MENISCUS OF LEFT KNEE AS CURRENT INJURY, SUBSEQUENT ENCOUNTER: ICD-10-CM

## 2023-05-24 DIAGNOSIS — I10 BENIGN ESSENTIAL HTN: ICD-10-CM

## 2023-05-24 DIAGNOSIS — M48.061 LUMBAR FORAMINAL STENOSIS: Primary | ICD-10-CM

## 2023-05-24 DIAGNOSIS — Z01.818 PRE-OPERATIVE CLEARANCE: ICD-10-CM

## 2023-05-24 NOTE — PROGRESS NOTES
Assessment/Plan:    1  Lumbar foraminal stenosis    2  Lumbar disc disease with radiculopathy    3  Benign essential HTN    4  Complex tear of medial meniscus of left knee as current injury, subsequent encounter    5  Abnormal fasting glucose  -     Comprehensive metabolic panel; Future  -     Hemoglobin A1C; Future    Patient cleared with minimal risk for upcoming surgery        The 10-year ASCVD risk score (Shantel EWING, et al , 2019) is: 9%    Values used to calculate the score:      Age: 70 years      Sex: Female      Is Non- : No      Diabetic: No      Tobacco smoker: No      Systolic Blood Pressure: 717 mmHg      Is BP treated: Yes      HDL Cholesterol: 82 mg/dL      Total Cholesterol: 171 mg/dL    There are no Patient Instructions on file for this visit  Return for Next scheduled follow up  Subjective:      Patient ID: Pam Hahn is a 70 y o  female  Chief Complaint   Patient presents with   • Pre-op Exam     Patient is scheduled for right 3/4 hemilaminectomy, foraminotomy discectomy lumbar spine on 5/31/23 by Dr Alen Barth at [facility]  HPI    Hypertension   On amiloride with hydrochlorothiazide that she is tolerating well   Checks blood pressure at home and is well controlled   No side effects and renal function is stable   September 2021, well controlled with medications  Jose Gill is compliant   Oct 2022: doing well, compliant with meds, BP well controlled   2023, well controlled with no issues  May 2023, blood pressure well controlled, no issues, doing well with medication     Hypothyroidism, on levothyroxine 50 mcg daily and is compliant   Thyroid levels are well controlled  April 2023, well controlled with no issues  May 2023, well controlled     Lumbar foraminal stenosis and radiculopathy, following with neurosurgery and has been recommended a discectomy which she will schedule  She has an appointment on Thursday for final details    She is not able to travel due to this issue right now and is in the process of changing her travel plans this summer for trip to [facility]  She has not started gabapentin but at night takes Tylenol and she thinks it is slightly helpful ,  And no falls  May 2023:Patient is scheduled for right 3/4 hemilaminectomy, foraminotomy discectomy lumbar spine on 5/31/23 by Dr Imtiaz Elmore at [facility] Tylenol occasionally but otherwise today walked 5 hours and does not let the pain bother her      The following portions of the patient's history were reviewed and updated as appropriate: allergies, current medications, past family history, past medical history, past social history, past surgical history and problem list     Review of Systems      Constitutional:  Denies fever or chills   Eyes:  Denies double , blurry vision or eye pain  HENT:  Denies nasal congestion, sore throat or new hearing issues  Respiratory:  Denies cough or shortness of breath or wheezing  Cardiovascular:  Denies palpitations or chest pain  GI:  Denies abdominal pain, nausea, or vomiting, no loose stools, no reflux  Integument:  Denies rash , no open areas  Neurologic:  Denies headache or focal weakness, no dizziness  : no dysuria, or hematuria      Current Outpatient Medications   Medication Sig Dispense Refill   • Acetaminophen (TYLENOL PO) Take 325-500 mg by mouth as needed     • AMILoride-hydrochlorothiazide (MODURETIC) 5-50 mg per tablet Take 1 tablet by mouth daily 90 tablet 1   • Ascorbic Acid (vitamin C) 1000 MG tablet Take 1,000 mg by mouth daily     • Calcium Carbonate-Vitamin D3 600-400 MG-UNIT TABS Take 2 tablets by mouth daily     • Collagen Hydrolysate POWD 1 Scoop by Does not apply route daily      • gabapentin (Neurontin) 300 mg capsule Take 1 capsule (300 mg total) by mouth daily at bedtime for 7 days, THEN 2 capsules (600 mg total) daily at bedtime for 7 days, THEN 3 capsules (900 mg total) daily at bedtime   90 capsule 2   • Glucosamine-Chondroit-Vit "C-Mn (GLUCOSAMINE-CHONDROITIN) TABS Take 2 tablets by mouth daily     • ibuprofen (MOTRIN) 200 mg tablet Take 400 mg by mouth every 6 (six) hours as needed for mild pain     • levothyroxine 50 mcg tablet Take 1 tablet (50 mcg total) by mouth daily Alt with 1 5 tab M,W,F 108 tablet 3   • Magnesium Carbonate POWD 325 mg by Does not apply route daily     • Multiple Vitamin (MULTI-DAY) TABS Take 2 tablets by mouth daily Ultra Aric     • Omega-3 Fatty Acids (FISH OIL) 1200 MG CAPS Take 2 capsules by mouth daily        No current facility-administered medications for this visit         Objective:    /80 (BP Location: Left arm, Patient Position: Sitting, Cuff Size: Standard)   Pulse 76   Temp 98 °F (36 7 °C) (Tympanic)   Ht 5' 10\" (1 778 m)   Wt 73 9 kg (163 lb)   SpO2 98%   BMI 23 39 kg/m²        Physical Exam    Constitutional:  Well developed, well nourished, no acute distress, non-toxic appearance   Eyes:  PERRL, conjunctiva normal , non icteric sclera  HENT:  Atraumatic, oropharynx moist  Neck-  supple   Respiratory:  CTA b/l, normal breath sounds, no rales, no wheezing   Cardiovascular:  RRR, no murmurs, no LE edema b/l  GI:  Soft, nondistended, normal bowel sounds x 4, nontender, no organomegaly, no mass, no rebound, no guarding   Neurologic:  no focal deficits noted   Psychiatric:  Speech and behavior appropriate , AAO x 3  Skill skeletal, gait slow but stable         Dottie Vidales DO  "

## 2023-05-24 NOTE — H&P (VIEW-ONLY)
Assessment/Plan:    1  Lumbar foraminal stenosis    2  Lumbar disc disease with radiculopathy    3  Benign essential HTN    4  Complex tear of medial meniscus of left knee as current injury, subsequent encounter    5  Abnormal fasting glucose  -     Comprehensive metabolic panel; Future  -     Hemoglobin A1C; Future    Patient cleared with minimal risk for upcoming surgery        The 10-year ASCVD risk score (Shantel EWING, et al , 2019) is: 9%    Values used to calculate the score:      Age: 70 years      Sex: Female      Is Non- : No      Diabetic: No      Tobacco smoker: No      Systolic Blood Pressure: 871 mmHg      Is BP treated: Yes      HDL Cholesterol: 82 mg/dL      Total Cholesterol: 171 mg/dL    There are no Patient Instructions on file for this visit  Return for Next scheduled follow up  Subjective:      Patient ID: Rochelle Guadarrama is a 70 y o  female  Chief Complaint   Patient presents with   • Pre-op Exam     Patient is scheduled for right 3/4 hemilaminectomy, foraminotomy discectomy lumbar spine on 5/31/23 by Dr Dale Turner at [facility]  HPI    Hypertension   On amiloride with hydrochlorothiazide that she is tolerating well   Checks blood pressure at home and is well controlled   No side effects and renal function is stable   September 2021, well controlled with medications  Killian Marlow is compliant   Oct 2022: doing well, compliant with meds, BP well controlled   2023, well controlled with no issues  May 2023, blood pressure well controlled, no issues, doing well with medication     Hypothyroidism, on levothyroxine 50 mcg daily and is compliant   Thyroid levels are well controlled  April 2023, well controlled with no issues  May 2023, well controlled     Lumbar foraminal stenosis and radiculopathy, following with neurosurgery and has been recommended a discectomy which she will schedule  She has an appointment on Thursday for final details    She is not able to travel due to this issue right now and is in the process of changing her travel plans this summer for trip to [facility]  She has not started gabapentin but at night takes Tylenol and she thinks it is slightly helpful ,  And no falls  May 2023:Patient is scheduled for right 3/4 hemilaminectomy, foraminotomy discectomy lumbar spine on 5/31/23 by Dr Kale Llamas at [facility] Tylenol occasionally but otherwise today walked 5 hours and does not let the pain bother her      The following portions of the patient's history were reviewed and updated as appropriate: allergies, current medications, past family history, past medical history, past social history, past surgical history and problem list     Review of Systems      Constitutional:  Denies fever or chills   Eyes:  Denies double , blurry vision or eye pain  HENT:  Denies nasal congestion, sore throat or new hearing issues  Respiratory:  Denies cough or shortness of breath or wheezing  Cardiovascular:  Denies palpitations or chest pain  GI:  Denies abdominal pain, nausea, or vomiting, no loose stools, no reflux  Integument:  Denies rash , no open areas  Neurologic:  Denies headache or focal weakness, no dizziness  : no dysuria, or hematuria      Current Outpatient Medications   Medication Sig Dispense Refill   • Acetaminophen (TYLENOL PO) Take 325-500 mg by mouth as needed     • AMILoride-hydrochlorothiazide (MODURETIC) 5-50 mg per tablet Take 1 tablet by mouth daily 90 tablet 1   • Ascorbic Acid (vitamin C) 1000 MG tablet Take 1,000 mg by mouth daily     • Calcium Carbonate-Vitamin D3 600-400 MG-UNIT TABS Take 2 tablets by mouth daily     • Collagen Hydrolysate POWD 1 Scoop by Does not apply route daily      • gabapentin (Neurontin) 300 mg capsule Take 1 capsule (300 mg total) by mouth daily at bedtime for 7 days, THEN 2 capsules (600 mg total) daily at bedtime for 7 days, THEN 3 capsules (900 mg total) daily at bedtime   90 capsule 2   • Glucosamine-Chondroit-Vit "C-Mn (GLUCOSAMINE-CHONDROITIN) TABS Take 2 tablets by mouth daily     • ibuprofen (MOTRIN) 200 mg tablet Take 400 mg by mouth every 6 (six) hours as needed for mild pain     • levothyroxine 50 mcg tablet Take 1 tablet (50 mcg total) by mouth daily Alt with 1 5 tab M,W,F 108 tablet 3   • Magnesium Carbonate POWD 325 mg by Does not apply route daily     • Multiple Vitamin (MULTI-DAY) TABS Take 2 tablets by mouth daily Ultra Aric     • Omega-3 Fatty Acids (FISH OIL) 1200 MG CAPS Take 2 capsules by mouth daily        No current facility-administered medications for this visit         Objective:    /80 (BP Location: Left arm, Patient Position: Sitting, Cuff Size: Standard)   Pulse 76   Temp 98 °F (36 7 °C) (Tympanic)   Ht 5' 10\" (1 778 m)   Wt 73 9 kg (163 lb)   SpO2 98%   BMI 23 39 kg/m²        Physical Exam    Constitutional:  Well developed, well nourished, no acute distress, non-toxic appearance   Eyes:  PERRL, conjunctiva normal , non icteric sclera  HENT:  Atraumatic, oropharynx moist  Neck-  supple   Respiratory:  CTA b/l, normal breath sounds, no rales, no wheezing   Cardiovascular:  RRR, no murmurs, no LE edema b/l  GI:  Soft, nondistended, normal bowel sounds x 4, nontender, no organomegaly, no mass, no rebound, no guarding   Neurologic:  no focal deficits noted   Psychiatric:  Speech and behavior appropriate , AAO x 3  Skill skeletal, gait slow but stable         Rosa Isela Medellin DO  "

## 2023-05-25 ENCOUNTER — TELEPHONE (OUTPATIENT)
Dept: INTERNAL MEDICINE CLINIC | Facility: CLINIC | Age: 68
End: 2023-05-25

## 2023-05-25 ENCOUNTER — TELEPHONE (OUTPATIENT)
Dept: INTERNAL MEDICINE CLINIC | Age: 68
End: 2023-05-25

## 2023-05-25 NOTE — TELEPHONE ENCOUNTER
Patient was seen yesterday for clearance but it is not mentioned in your note whether the patient is cleared for surgery or not  Please complete note  They will be checking update in Epic, nothing needs to be sent to the surgeon's office  MA's please see that Dr Therese Germain completes this please  I will be leaving in a few mins and will not be back until Tuesday  Thank you!

## 2023-05-25 NOTE — TELEPHONE ENCOUNTER
Received a phone call from Dr Sue Milian office  Stated the Pre-op consult notes needs to stated if patient is being cleared for surgery       Please advise thank you

## 2023-05-30 ENCOUNTER — ANESTHESIA EVENT (OUTPATIENT)
Dept: PERIOP | Facility: HOSPITAL | Age: 68
End: 2023-05-30

## 2023-05-30 NOTE — ANESTHESIA PREPROCEDURE EVALUATION
Procedure:  RIGHT L3/4 HEMILAMINECTOMY,FORAMINOTOMY, DISCECTOMY (Right: Spine Lumbar)    Relevant Problems   CARDIO   (+) Benign essential HTN      ENDO   (+) Hypothyroidism      Nervous and Auditory   (+) Lumbar disc disease with radiculopathy              Latest Reference Range & Units 04/27/23 11:00   Sodium 135 - 147 mmol/L 133 (L)   Potassium 3 5 - 5 3 mmol/L 3 6   Chloride 96 - 108 mmol/L 94 (L)   CO2 21 - 32 mmol/L 31   Anion Gap 4 - 13 mmol/L 8   BUN 5 - 25 mg/dL 13   Creatinine 0 60 - 1 30 mg/dL 0 68   GLUCOSE FASTING 65 - 99 mg/dL 104 (H)   Calcium 8 4 - 10 2 mg/dL 9 9   AST 13 - 39 U/L 18   ALT 7 - 52 U/L 15   Alkaline Phosphatase 34 - 104 U/L 47   Total Protein 6 4 - 8 4 g/dL 7 0   Albumin 3 5 - 5 0 g/dL 4 5   TOTAL BILIRUBIN 0 20 - 1 00 mg/dL 0 90   eGFR ml/min/1 73sq m 90   (L): Data is abnormally low  (H): Data is abnormally high       Latest Reference Range & Units 04/27/23 11:00   WBC 4 31 - 10 16 Thousand/uL 10 05   Red Blood Cell Count 3 81 - 5 12 Million/uL 5 03   Hemoglobin 11 5 - 15 4 g/dL 14 9   HCT 34 8 - 46 1 % 45 1   MCV 82 - 98 fL 90   MCH 26 8 - 34 3 pg 29 6   MCHC 31 4 - 37 4 g/dL 33 0   RDW 11 6 - 15 1 % 13 2   Platelet Count 684 - 390 Thousands/uL 207   MPV 8 9 - 12 7 fL 10 5   nRBC /100 WBCs 0      Latest Reference Range & Units 04/27/23 11:00   PROTIME 11 6 - 14 5 seconds 12 5   POCT INR 0 84 - 1 19  0 91   PTT 23 - 37 seconds 33       EKG (4/2023)  Normal sinus rhythm  Possible Left atrial enlargement  No previous ECGs available      Anesthesia Plan  ASA Score- 2     Anesthesia Type- general with ASA Monitors  Additional Monitors:   Airway Plan: ETT  Comment: NPO after MN    Medical optimization note reviewed - patient appropriate for procedure  Plan Factors-    Chart reviewed  EKG reviewed  Existing labs reviewed  Patient summary reviewed  Induction- intravenous  Postoperative Plan- Plan for postoperative opioid use   Planned trial extubation    Informed Consent- Anesthetic plan and risks discussed with patient  I personally reviewed this patient with the CRNA  Discussed and agreed on the Anesthesia Plan with the CRNA  Kar Arguelles

## 2023-05-31 ENCOUNTER — HOSPITAL ENCOUNTER (OUTPATIENT)
Facility: HOSPITAL | Age: 68
Setting detail: OUTPATIENT SURGERY
Discharge: HOME | End: 2023-05-31
Attending: NEUROLOGICAL SURGERY | Admitting: NEUROLOGICAL SURGERY

## 2023-05-31 ENCOUNTER — HOSPITAL ENCOUNTER (OUTPATIENT)
Dept: RADIOLOGY | Facility: HOSPITAL | Age: 68
Setting detail: OUTPATIENT SURGERY
Discharge: HOME | End: 2023-05-31

## 2023-05-31 ENCOUNTER — ANESTHESIA (OUTPATIENT)
Dept: PERIOP | Facility: HOSPITAL | Age: 68
End: 2023-05-31

## 2023-05-31 VITALS
WEIGHT: 158 LBS | HEIGHT: 70 IN | HEART RATE: 55 BPM | SYSTOLIC BLOOD PRESSURE: 144 MMHG | OXYGEN SATURATION: 98 % | TEMPERATURE: 96.9 F | BODY MASS INDEX: 22.62 KG/M2 | DIASTOLIC BLOOD PRESSURE: 93 MMHG | RESPIRATION RATE: 15 BRPM

## 2023-05-31 DIAGNOSIS — M51.16 LUMBAR DISC DISEASE WITH RADICULOPATHY: ICD-10-CM

## 2023-05-31 DIAGNOSIS — M48.061 LUMBAR FORAMINAL STENOSIS: Primary | ICD-10-CM

## 2023-05-31 RX ORDER — GLYCOPYRROLATE 0.2 MG/ML
INJECTION INTRAMUSCULAR; INTRAVENOUS AS NEEDED
Status: DISCONTINUED | OUTPATIENT
Start: 2023-05-31 | End: 2023-05-31

## 2023-05-31 RX ORDER — CHLORHEXIDINE GLUCONATE 0.12 MG/ML
15 RINSE ORAL ONCE
Status: COMPLETED | OUTPATIENT
Start: 2023-05-31 | End: 2023-05-31

## 2023-05-31 RX ORDER — FENTANYL CITRATE 50 UG/ML
INJECTION, SOLUTION INTRAMUSCULAR; INTRAVENOUS AS NEEDED
Status: DISCONTINUED | OUTPATIENT
Start: 2023-05-31 | End: 2023-05-31

## 2023-05-31 RX ORDER — ONDANSETRON 4 MG/1
4 TABLET, ORALLY DISINTEGRATING ORAL EVERY 6 HOURS PRN
Status: DISCONTINUED | OUTPATIENT
Start: 2023-05-31 | End: 2023-05-31 | Stop reason: HOSPADM

## 2023-05-31 RX ORDER — CEFAZOLIN SODIUM 2 G/50ML
2000 SOLUTION INTRAVENOUS ONCE
Status: DISCONTINUED | OUTPATIENT
Start: 2023-05-31 | End: 2023-05-31 | Stop reason: HOSPADM

## 2023-05-31 RX ORDER — ROCURONIUM BROMIDE 10 MG/ML
INJECTION, SOLUTION INTRAVENOUS AS NEEDED
Status: DISCONTINUED | OUTPATIENT
Start: 2023-05-31 | End: 2023-05-31

## 2023-05-31 RX ORDER — SODIUM CHLORIDE, SODIUM LACTATE, POTASSIUM CHLORIDE, CALCIUM CHLORIDE 600; 310; 30; 20 MG/100ML; MG/100ML; MG/100ML; MG/100ML
INJECTION, SOLUTION INTRAVENOUS CONTINUOUS PRN
Status: DISCONTINUED | OUTPATIENT
Start: 2023-05-31 | End: 2023-05-31

## 2023-05-31 RX ORDER — DEXAMETHASONE SODIUM PHOSPHATE 10 MG/ML
INJECTION, SOLUTION INTRAMUSCULAR; INTRAVENOUS AS NEEDED
Status: DISCONTINUED | OUTPATIENT
Start: 2023-05-31 | End: 2023-05-31

## 2023-05-31 RX ORDER — PROPOFOL 10 MG/ML
INJECTION, EMULSION INTRAVENOUS AS NEEDED
Status: DISCONTINUED | OUTPATIENT
Start: 2023-05-31 | End: 2023-05-31

## 2023-05-31 RX ORDER — BUPIVACAINE HYDROCHLORIDE 2.5 MG/ML
INJECTION, SOLUTION EPIDURAL; INFILTRATION; INTRACAUDAL AS NEEDED
Status: DISCONTINUED | OUTPATIENT
Start: 2023-05-31 | End: 2023-05-31 | Stop reason: HOSPADM

## 2023-05-31 RX ORDER — ONDANSETRON 2 MG/ML
INJECTION INTRAMUSCULAR; INTRAVENOUS AS NEEDED
Status: DISCONTINUED | OUTPATIENT
Start: 2023-05-31 | End: 2023-05-31

## 2023-05-31 RX ORDER — METHOCARBAMOL 500 MG/1
500 TABLET, FILM COATED ORAL 4 TIMES DAILY
Qty: 28 TABLET | Refills: 0 | Status: SHIPPED | OUTPATIENT
Start: 2023-05-31 | End: 2023-06-09 | Stop reason: SDUPTHER

## 2023-05-31 RX ORDER — SODIUM CHLORIDE, SODIUM LACTATE, POTASSIUM CHLORIDE, CALCIUM CHLORIDE 600; 310; 30; 20 MG/100ML; MG/100ML; MG/100ML; MG/100ML
125 INJECTION, SOLUTION INTRAVENOUS CONTINUOUS
Status: DISCONTINUED | OUTPATIENT
Start: 2023-05-31 | End: 2023-05-31 | Stop reason: HOSPADM

## 2023-05-31 RX ORDER — OXYCODONE HYDROCHLORIDE 5 MG/1
5 TABLET ORAL EVERY 6 HOURS PRN
Qty: 20 TABLET | Refills: 0 | Status: SHIPPED | OUTPATIENT
Start: 2023-05-31 | End: 2023-06-05

## 2023-05-31 RX ORDER — MIDAZOLAM HYDROCHLORIDE 2 MG/2ML
INJECTION, SOLUTION INTRAMUSCULAR; INTRAVENOUS AS NEEDED
Status: DISCONTINUED | OUTPATIENT
Start: 2023-05-31 | End: 2023-05-31

## 2023-05-31 RX ORDER — NEOSTIGMINE METHYLSULFATE 1 MG/ML
INJECTION INTRAVENOUS AS NEEDED
Status: DISCONTINUED | OUTPATIENT
Start: 2023-05-31 | End: 2023-05-31

## 2023-05-31 RX ORDER — CEFAZOLIN SODIUM 1 G/3ML
INJECTION, POWDER, FOR SOLUTION INTRAMUSCULAR; INTRAVENOUS AS NEEDED
Status: DISCONTINUED | OUTPATIENT
Start: 2023-05-31 | End: 2023-05-31

## 2023-05-31 RX ORDER — FENTANYL CITRATE/PF 50 MCG/ML
25 SYRINGE (ML) INJECTION
Status: COMPLETED | OUTPATIENT
Start: 2023-05-31 | End: 2023-05-31

## 2023-05-31 RX ORDER — ONDANSETRON 2 MG/ML
4 INJECTION INTRAMUSCULAR; INTRAVENOUS ONCE AS NEEDED
Status: DISCONTINUED | OUTPATIENT
Start: 2023-05-31 | End: 2023-05-31 | Stop reason: HOSPADM

## 2023-05-31 RX ORDER — LIDOCAINE HYDROCHLORIDE 10 MG/ML
INJECTION, SOLUTION EPIDURAL; INFILTRATION; INTRACAUDAL; PERINEURAL AS NEEDED
Status: DISCONTINUED | OUTPATIENT
Start: 2023-05-31 | End: 2023-05-31

## 2023-05-31 RX ORDER — SODIUM CHLORIDE, SODIUM LACTATE, POTASSIUM CHLORIDE, CALCIUM CHLORIDE 600; 310; 30; 20 MG/100ML; MG/100ML; MG/100ML; MG/100ML
75 INJECTION, SOLUTION INTRAVENOUS CONTINUOUS
Status: DISCONTINUED | OUTPATIENT
Start: 2023-05-31 | End: 2023-05-31 | Stop reason: HOSPADM

## 2023-05-31 RX ORDER — OXYCODONE HYDROCHLORIDE AND ACETAMINOPHEN 5; 325 MG/1; MG/1
1 TABLET ORAL EVERY 4 HOURS PRN
Status: DISCONTINUED | OUTPATIENT
Start: 2023-05-31 | End: 2023-05-31 | Stop reason: HOSPADM

## 2023-05-31 RX ADMIN — PROPOFOL 200 MG: 10 INJECTION, EMULSION INTRAVENOUS at 14:20

## 2023-05-31 RX ADMIN — ROCURONIUM BROMIDE 50 MG: 10 INJECTION, SOLUTION INTRAVENOUS at 14:20

## 2023-05-31 RX ADMIN — MIDAZOLAM 2 MG: 1 INJECTION INTRAMUSCULAR; INTRAVENOUS at 14:14

## 2023-05-31 RX ADMIN — CHLORHEXIDINE GLUCONATE 15 ML: 1.2 SOLUTION ORAL at 13:33

## 2023-05-31 RX ADMIN — ONDANSETRON 4 MG: 2 INJECTION INTRAMUSCULAR; INTRAVENOUS at 15:46

## 2023-05-31 RX ADMIN — LIDOCAINE HYDROCHLORIDE 50 MG: 10 INJECTION, SOLUTION EPIDURAL; INFILTRATION; INTRACAUDAL; PERINEURAL at 14:20

## 2023-05-31 RX ADMIN — FENTANYL CITRATE 25 MCG: 50 INJECTION, SOLUTION INTRAMUSCULAR; INTRAVENOUS at 16:19

## 2023-05-31 RX ADMIN — DEXAMETHASONE SODIUM PHOSPHATE 10 MG: 10 INJECTION, SOLUTION INTRAMUSCULAR; INTRAVENOUS at 14:53

## 2023-05-31 RX ADMIN — SODIUM CHLORIDE, SODIUM LACTATE, POTASSIUM CHLORIDE, AND CALCIUM CHLORIDE: .6; .31; .03; .02 INJECTION, SOLUTION INTRAVENOUS at 16:03

## 2023-05-31 RX ADMIN — CEFAZOLIN 1000 MG: 1 INJECTION, POWDER, FOR SOLUTION INTRAMUSCULAR; INTRAVENOUS at 14:34

## 2023-05-31 RX ADMIN — FENTANYL CITRATE 25 MCG: 50 INJECTION, SOLUTION INTRAMUSCULAR; INTRAVENOUS at 16:29

## 2023-05-31 RX ADMIN — SODIUM CHLORIDE, SODIUM LACTATE, POTASSIUM CHLORIDE, AND CALCIUM CHLORIDE 125 ML/HR: .6; .31; .03; .02 INJECTION, SOLUTION INTRAVENOUS at 13:58

## 2023-05-31 RX ADMIN — FENTANYL CITRATE 50 MCG: 50 INJECTION, SOLUTION INTRAMUSCULAR; INTRAVENOUS at 14:20

## 2023-05-31 RX ADMIN — FENTANYL CITRATE 25 MCG: 50 INJECTION, SOLUTION INTRAMUSCULAR; INTRAVENOUS at 16:40

## 2023-05-31 RX ADMIN — FENTANYL CITRATE 50 MCG: 50 INJECTION, SOLUTION INTRAMUSCULAR; INTRAVENOUS at 15:57

## 2023-05-31 RX ADMIN — GLYCOPYRROLATE 0.4 MG: 0.2 INJECTION, SOLUTION INTRAMUSCULAR; INTRAVENOUS at 15:57

## 2023-05-31 RX ADMIN — NEOSTIGMINE METHYLSULFATE 2 MG: 1 INJECTION INTRAVENOUS at 15:58

## 2023-05-31 RX ADMIN — SODIUM CHLORIDE, SODIUM LACTATE, POTASSIUM CHLORIDE, AND CALCIUM CHLORIDE: .6; .31; .03; .02 INJECTION, SOLUTION INTRAVENOUS at 14:14

## 2023-05-31 RX ADMIN — OXYCODONE HYDROCHLORIDE AND ACETAMINOPHEN 1 TABLET: 5; 325 TABLET ORAL at 17:15

## 2023-05-31 RX ADMIN — FENTANYL CITRATE 25 MCG: 50 INJECTION, SOLUTION INTRAMUSCULAR; INTRAVENOUS at 16:34

## 2023-05-31 NOTE — PROGRESS NOTES
Post op check  Emerging from anesthesia  Moving all extremities at baseline  Wound: CDI  A/P Doing well         Mobilize    I spoke with her family

## 2023-05-31 NOTE — OP NOTE
OPERATIVE REPORT  PATIENT NAME: Elie Houser    :  1955  MRN: [id number]  Pt Location: [facility] OR ROOM 17    SURGERY DATE: 2023    Surgeon(s) and Role:     * Kathy Valdivia MD - Primary     * Mika Boss PA-C - Assisting    Preop Diagnosis:  Lumbar disc disease with radiculopathy [M51 16]    Post-Op Diagnosis Codes:     * Lumbar disc disease with radiculopathy [M51 16]    Procedure(s):  Right - RIGHT L3/4 HEMILAMINECTOMY  FORAMINOTOMY  DISCECTOMY    Specimen(s):  * No specimens in log *    Estimated Blood Loss:   Minimal    Drains:  * No LDAs found *    Anesthesia Type:   General    Operative Indications:  Lumbar disc disease with radiculopathy, right L3/4      Operative Findings:  Right L3/4 hemilaminotomy, foraminotomy, diskectomy performed  Disk material removed from lateral    Complications:   None    Procedure and Technique:  Findings: General endotracheal anesthesia was induced  Appropriate intravenous antibiotics were given  Serial compression devices were applied to the legs and the patient was placed prone on the Britton Come table with all pressure points padded  The back was prepped and draped in the usual sterile fashion  Timeout was performed per protocol  A linear incision in the midline over the spinous processes of L3 and L4 was opened up using a #10 blade  The incision was carried down to the dorsal lumbodorsal fascia  Hemostasis was obtained  Fascia was incised using Bovie cautery and the subperiosteal plane was dissected over the right spinous processes  Self-retaining retractors were inserted  X-ray confirmation of the appropriate level was obtained  A radiographic timeout was performed per protocol  Laminotomy was performed using Midas Yimi drill and Kerrison rongeurs  Bone edges were waxed  Origin of the ligamentum flavum was visualized and elevated using a dental tool and removed using Kerrison  Thecal sac and descending nerve root were well visualized    The nerve root was dissected free in the foramen and a foraminotomy was performed using Kerrison  The lateral aspect of the nerve root was then gently dissected and retracted medially using a nerve root retractor  With the nerve root protected, the disc herniation was incised using a #11 blade  Downward medial pressure was used to express disc material   Further disc material was removed using down-biting curettes and pituitary rongeurs from lateral   The disc space was entered and some additional disc material was removed in the hopes of preventing recurrence  Valsalva was performed  No evidence of CSF leak was noted  Hemostasis was obtained  Morphine nerve paste was applied  The wound was closed in layers of 0 Vicryl, 2-0 Vicryl, 3-0 Monocryl and glue at the skin  Prior to complete closure approximately 10cc of 0 25% Marcaine was instilled in the musculature  Disposition: The patient tolerated the procedure well  The patient was extubated in the OR  The patient went to PACU  Condition: hemodynamically stable  Counts were correct for needles, sponges, and cottonoids  I was present for the entire procedure , A qualified resident physician was not available  and A physician assistant was required during the procedure for retraction, tissue handling, dissection and suturing      Patient Disposition:  PACU  and extubated and stable        SIGNATURE: Lucas Bailey MD  DATE: May 31, 2023  TIME: 4:00 PM

## 2023-05-31 NOTE — INTERVAL H&P NOTE
H&P reviewed  After examining the patient I find no changes in the patients condition since the H&P had been written      Vitals:    05/31/23 1310   BP: (!) 188/108   Pulse: 89   Resp: 18   Temp: (!) 95 9 °F (35 5 °C)   SpO2: 98%

## 2023-05-31 NOTE — ANESTHESIA POSTPROCEDURE EVALUATION
Post-Op Assessment Note    CV Status:  Stable    Pain management: adequate     Mental Status:  Alert and awake   Hydration Status:  Euvolemic   PONV Controlled:  Controlled   Airway Patency:  Patent      Post Op Vitals Reviewed: Yes      Staff: CRNA         No notable events documented      BP  139/93   Temp     Pulse  64   Resp   12   SpO2   100

## 2023-06-05 ENCOUNTER — TELEPHONE (OUTPATIENT)
Dept: NEUROSURGERY | Facility: CLINIC | Age: 68
End: 2023-06-05

## 2023-06-05 NOTE — TELEPHONE ENCOUNTER
Received a call back from patient to complete postop call  Patient reports she is doing well overall and denies any incisional issues or fevers  Patient is able to ambulate around the house and complete ADLs  Educated the patient about the importance of preventing blood clots and provided measures how to prevent them  Patient has moved her bowels since the surgery  Encouraged patient to take an over the counter stool softener, if she is taking narcotic pain medication  Encouraged fiber intake and fluids  Reviewed incision care with the patient  Advised that after three days she may take a shower and gently wash the surgical site with soap and water  Use clean wash cloth, towels, and clothing  Do not submerge in water until cleared by the surgeon  Do not apply any creams, ointments, or lotions to the site  Patient is aware to call the office if any redness, swelling, drainage, dehiscence of incision, or fever >100 F occurs  Patient is aware to call the office if any concerns or questions may arise  Reminded patient of her upcoming appointments with the date/time/location  Patient was appreciative for the call

## 2023-06-05 NOTE — TELEPHONE ENCOUNTER
1st attempt- attempted to reach patient on preferred number to check in on her progress after being discharged from the hospital and to review post operative instructions  No answer, left a voicemail with callback instructions  Will make another attempt if no call back is received

## 2023-06-08 ENCOUNTER — NURSE TRIAGE (OUTPATIENT)
Dept: OTHER | Facility: OTHER | Age: 68
End: 2023-06-08

## 2023-06-08 DIAGNOSIS — M48.061 LUMBAR FORAMINAL STENOSIS: ICD-10-CM

## 2023-06-08 DIAGNOSIS — M51.16 LUMBAR DISC DISEASE WITH RADICULOPATHY: ICD-10-CM

## 2023-06-08 NOTE — TELEPHONE ENCOUNTER
Reason for Disposition  • [1] Caller requests to speak ONLY to PCP AND [2] URGENT question    Answer Assessment - Initial Assessment Questions  1  REASON FOR CALL or QUESTION:    Call paged out to appropriate  provider on call for Neuro surgery  Dr Bert Puente      Protocols used: PCP CALL - NO TRIAGE-ADULT-

## 2023-06-08 NOTE — TELEPHONE ENCOUNTER
"Regarding: pain, right leg, upper backk, knee  ----- Message from Romie Meneses sent at 6/8/2023  6:55 PM EDT -----  \" I have pain on my right leg and upper back  I need to know if I can have pain medication  \"    "

## 2023-06-08 NOTE — TELEPHONE ENCOUNTER
Pt would like to speak with on call from Neuro surgery   She had surgery on 5/31/2023 and now experiencing leg and upper back pain  On call Dr Radha Stanley paged with pt information

## 2023-06-09 RX ORDER — OXYCODONE HYDROCHLORIDE 5 MG/1
5 TABLET ORAL EVERY 8 HOURS PRN
Qty: 21 TABLET | Refills: 0 | Status: SHIPPED | OUTPATIENT
Start: 2023-06-09 | End: 2023-06-16

## 2023-06-09 RX ORDER — METHOCARBAMOL 500 MG/1
500 TABLET, FILM COATED ORAL 4 TIMES DAILY
Qty: 56 TABLET | Refills: 0 | Status: SHIPPED | OUTPATIENT
Start: 2023-06-09 | End: 2023-06-23

## 2023-06-09 NOTE — TELEPHONE ENCOUNTER
Reached out to patient to see how she is doing and place refill orders for medications  Patient stated she is having significant pain and is out of the oxycodone and robaxin  Advised this RN will place an order for refills  Confirmed her preferred pharmacy  Directed her that the oxy will be ordered as every 8 hours rather than every 6  Also advised to adjust the gabapentin from 900 mg HS to 300 mg TID  She stated an understanding and was appreciative of the call back

## 2023-06-09 NOTE — TELEPHONE ENCOUNTER
Notified at 1946 that patient requesting call regarding pain  S/p right L3-4 hemilaminectomy, foraminotomy, discectomy 5/31/23 with Dr Tj Valdez  She has run out of robaxin and oxycodone  Requesting a refill as she is having incisional pain when laying on her back as well as ongoing RLE pain to the knee, same as pre-op  She has gabapentin that she is taking nightly  Advised that the Rx can be sent to her pharmacy in AM     Advised that we can refill the robaxin  States that she was given oxycodone to take every 6 hours, but I don't see it on her med list    Can you please confirm this medication? May be weaning down on the oxycodone from q6h to q8h  She is only taking gabapentin at night, would try and take during the day as well  Can you please speak with her pain and send the rx to me to sign?

## 2023-06-12 DIAGNOSIS — M51.16 LUMBAR DISC DISEASE WITH RADICULOPATHY: Primary | ICD-10-CM

## 2023-06-12 DIAGNOSIS — G89.18 ACUTE POST-OPERATIVE PAIN: ICD-10-CM

## 2023-06-12 RX ORDER — METHYLPREDNISOLONE 4 MG/1
TABLET ORAL
Qty: 1 EACH | Refills: 0 | Status: SHIPPED | OUTPATIENT
Start: 2023-06-12

## 2023-06-14 ENCOUNTER — CLINICAL SUPPORT (OUTPATIENT)
Dept: NEUROSURGERY | Facility: CLINIC | Age: 68
End: 2023-06-14

## 2023-06-14 VITALS
OXYGEN SATURATION: 99 % | TEMPERATURE: 97.6 F | WEIGHT: 158 LBS | HEART RATE: 76 BPM | BODY MASS INDEX: 22.62 KG/M2 | HEIGHT: 70 IN | SYSTOLIC BLOOD PRESSURE: 122 MMHG | RESPIRATION RATE: 16 BRPM | DIASTOLIC BLOOD PRESSURE: 86 MMHG

## 2023-06-14 DIAGNOSIS — Z48.89 ENCOUNTER FOR POST SURGICAL WOUND CHECK: ICD-10-CM

## 2023-06-14 DIAGNOSIS — Z98.890 STATUS POST SURGERY: Primary | ICD-10-CM

## 2023-06-14 PROCEDURE — 99024 POSTOP FOLLOW-UP VISIT: CPT

## 2023-06-14 NOTE — PROGRESS NOTES
"                                                                             Post-Op Visit- Neurosurgery    Radha Mendez 70 y o  female MRN: [id number]    Chief Complaint:   Patient presents post: Right L3/4 Hemilaminectomy,foraminotomy, Discectomy - Right    History of Present Illness:  Patient presents for 2 week POV for incision check accompanied by spouse and ambulating without an assistive device  Patient reports she is doing okay overall and denies any incisional issues or fevers  She denies any new weakness, numbness or tingling since the surgery and denies any new issues with her bowel or bladder  Patient admits to mild incisional pain at this time but her main concern is the pain that travels into her right buttock and down the outside of her right leg down to her knee  She sent a [employer] message earlier in the week regarding her pain and Dr David Cross started her on  Medrol dose jannet  She reports some relief after starting this medication  Patient is currently taking Robaxin, Gabapentin, Tylenol, Oxycodone, and Medrol dose jannet with some relief of pain symptoms  Assessment:  Vitals:    06/14/23 1414   BP: 122/86   Pulse: 76   Resp: 16   Temp: 97 6 °F (36 4 °C)   SpO2: 99%   Weight: 71 7 kg (158 lb)   Height: 5' 10\" (1 778 m)        Wound Exam: Incision well approximated  No erythema or drainage present  Small seroma noted at the superior pole of the incision  Location: low back  Complications: None  Incision CASSIE  Discussion/Summary:  Reviewed incision care with patient including daily observation for s/s infection including: increased erythema, edema, drainage, dehiscence of incision or fever >101  Should these be observed, she understands that she is to call and/or return immediately for reassessment  Advised patient to continue cleansing area with mild soap and water and pat dry  Not to apply any lotions, creams, or ointments, & not to submerge in any water for two more weeks   She is to " maintain activity restrictions until cleared by the surgeon  Activity levels were also reviewed with the patient in detail, she is to slowly increase her level of activity and ambulation is encouraged as tolerated  She may apply ice/heat to the area as well for 20 minutes at a time  Verified date/time/location of upcoming POV and reminded her to call the office with any further questions or concerns, or if any incisional issues or fevers would arise  Patient encouraged to call if her pain returns or worsens as she is weaning off of the medrol dose jannet  Discussed her symptoms with EDUIN Godoy in our office and he is in agreement with her current plan of care/medication regimen

## 2023-06-23 PROBLEM — Z00.00 ENCOUNTER FOR ANNUAL WELLNESS VISIT (AWV) IN MEDICARE PATIENT: Status: RESOLVED | Noted: 2023-04-24 | Resolved: 2023-06-23

## 2023-06-28 DIAGNOSIS — M48.061 LUMBAR FORAMINAL STENOSIS: ICD-10-CM

## 2023-06-28 DIAGNOSIS — M51.16 LUMBAR DISC DISEASE WITH RADICULOPATHY: ICD-10-CM

## 2023-06-28 DIAGNOSIS — G89.18 ACUTE POST-OPERATIVE PAIN: ICD-10-CM

## 2023-06-28 DIAGNOSIS — M62.838 MUSCLE SPASM: Primary | ICD-10-CM

## 2023-06-28 RX ORDER — METHOCARBAMOL 500 MG/1
500 TABLET, FILM COATED ORAL EVERY 6 HOURS PRN
Qty: 56 TABLET | Refills: 0 | Status: SHIPPED | OUTPATIENT
Start: 2023-06-28 | End: 2023-07-12

## 2023-06-28 NOTE — TELEPHONE ENCOUNTER
Regarding: Gabapentin 300 mg capsule  Contact: [phone]  Good Morning   I’ll be running out of Methocarbamol today  My pain is continuing with the same intensity  If you think I should continue to take Methocarbamol, I may need a refill please    Thank you

## 2023-06-28 NOTE — TELEPHONE ENCOUNTER
Patient called requesting a refill of: Robaxin  She is 4 weeks s/p: Right L3/4 Hemilaminectomy,foraminotomy, Discectomy - Right by Dr Chantel Solis  She is currently taking robaxin 500mg Q6 prn  Okay to refill this medications at this time?

## 2023-07-11 DIAGNOSIS — I10 ESSENTIAL HYPERTENSION: ICD-10-CM

## 2023-07-11 RX ORDER — AMILORIDE HYDROCHLORIDE AND HYDROCHLOROTHIAZIDE 5; 50 MG/1; MG/1
1 TABLET ORAL DAILY
Qty: 90 TABLET | Refills: 1 | Status: SHIPPED | OUTPATIENT
Start: 2023-07-11

## 2023-07-13 ENCOUNTER — OFFICE VISIT (OUTPATIENT)
Dept: NEUROSURGERY | Facility: CLINIC | Age: 68
End: 2023-07-13

## 2023-07-13 VITALS
DIASTOLIC BLOOD PRESSURE: 98 MMHG | SYSTOLIC BLOOD PRESSURE: 138 MMHG | TEMPERATURE: 98.1 F | WEIGHT: 158 LBS | HEART RATE: 90 BPM | OXYGEN SATURATION: 97 % | BODY MASS INDEX: 22.62 KG/M2 | HEIGHT: 70 IN

## 2023-07-13 DIAGNOSIS — Z98.890 STATUS POST SURGERY: ICD-10-CM

## 2023-07-13 DIAGNOSIS — M51.16 LUMBAR DISC DISEASE WITH RADICULOPATHY: Primary | ICD-10-CM

## 2023-07-13 PROCEDURE — 99024 POSTOP FOLLOW-UP VISIT: CPT | Performed by: NEUROLOGICAL SURGERY

## 2023-07-13 NOTE — PROGRESS NOTES
43 status post Right L3/4 Hemilaminectomy,foraminotomy, Discectomy - Right on 5/31/2023     Wound: healed    C/o residual right leg radiculopathy  PT: not started, unclear why that has not already been prescribed for her at the initial post op visit    Medications: muscle relaxant, tylenol, gabapentin    A/P Doing well  She should continue gabapentin and the other medications as prescribed  I explained to the patient that nerves heal at approximately 1 mm/day or 1 inch per month.   This means that it can take a year or more to reach maximum healing  Follow up in 6 weeks or as needed

## 2023-07-13 NOTE — PROGRESS NOTES
Review of Systems   Constitutional: Negative. HENT: Negative. Eyes: Negative. Respiratory: Negative. Cardiovascular: Negative. Gastrointestinal: Negative. Endocrine: Negative. Genitourinary: Negative. Musculoskeletal: Positive for back pain and myalgias. Skin: Negative. Allergic/Immunologic: Negative. Neurological: Negative. Hematological: Negative. Psychiatric/Behavioral: Negative. All other systems reviewed and are negative.

## 2023-07-17 DIAGNOSIS — M62.838 MUSCLE SPASM: ICD-10-CM

## 2023-07-17 DIAGNOSIS — G89.18 ACUTE POST-OPERATIVE PAIN: ICD-10-CM

## 2023-07-17 RX ORDER — METHOCARBAMOL 500 MG/1
500 TABLET, FILM COATED ORAL EVERY 6 HOURS PRN
Qty: 56 TABLET | Refills: 0 | Status: SHIPPED | OUTPATIENT
Start: 2023-07-17 | End: 2023-07-31

## 2023-07-17 NOTE — TELEPHONE ENCOUNTER
----- Message from SHELBY MADDEN sent at 7/16/2023  5:27 PM EDT -----  Regarding: Running out of Methocarbamol 500 mg tablets   Contact: [phone]  Dear Dr. Bertha Scott be running out of Methocarbamol tablets by tomorrow. As I am still having pain, and if you think I should continue to take Methocarbamol, would you please call in another prescription by Monday, July 17th?    Thank you, and Best Regards   Radha Mendez

## 2023-07-17 NOTE — TELEPHONE ENCOUNTER
Okay to send one more refill of her muscle relaxer to her pharmacy? I will inform her future refills need to come from PCP/pain mgmt. She is s/p: Right L3/4 Hemilaminectomy,foraminotomy, Discectomy= on 5/31 by BIRGIT.

## 2023-07-18 ENCOUNTER — TELEPHONE (OUTPATIENT)
Dept: PAIN MEDICINE | Facility: CLINIC | Age: 68
End: 2023-07-18

## 2023-07-18 NOTE — TELEPHONE ENCOUNTER
.Caller: pt    Doctor/Alyse Pan    Callback#: [phone]        Patient is requesting a transfer of care for the following reason: Pt would like a second opinion. She is still having a lot of pain in the knee area. Doctor: Betzaida Pan    Would you release patient from your care? Doctor: Masoud Capps     Would you take patient on as a patient? Please advise,   Thank you.

## 2023-07-18 NOTE — TELEPHONE ENCOUNTER
.Caller: Pt    Doctor: Yan Leung    Reason for call: pt changed her mind she doesn't want to change providers at this time     Call back#: [phone]

## 2023-07-19 ENCOUNTER — EVALUATION (OUTPATIENT)
Dept: PHYSICAL THERAPY | Age: 68
End: 2023-07-19
Payer: MEDICARE

## 2023-07-19 DIAGNOSIS — M51.16 LUMBAR DISC DISEASE WITH RADICULOPATHY: Primary | ICD-10-CM

## 2023-07-19 DIAGNOSIS — Z98.890 STATUS POST SURGERY: ICD-10-CM

## 2023-07-19 PROCEDURE — 97161 PT EVAL LOW COMPLEX 20 MIN: CPT | Performed by: PHYSICAL THERAPIST

## 2023-07-19 PROCEDURE — 97140 MANUAL THERAPY 1/> REGIONS: CPT | Performed by: PHYSICAL THERAPIST

## 2023-07-19 PROCEDURE — 97110 THERAPEUTIC EXERCISES: CPT | Performed by: PHYSICAL THERAPIST

## 2023-07-19 NOTE — PROGRESS NOTES
PT Evaluation     Today's date: 2023  Patient name: Dejon Mark  : 1955  MRN: [id number]  Referring provider: Anny Sun  Dx:   Encounter Diagnosis     ICD-10-CM    1. Lumbar disc disease with radiculopathy  M51.16 Ambulatory referral to Physical Therapy      2. Status post surgery  Z98.890 Ambulatory referral to Physical Therapy          Start Time: 1700  Stop Time: 1800  Total time in clinic (min): 60 minutes    Assessment/Plan     Radha Hoyos is a 70 y.o. female who was referred to physical therapy for management of pain/dysfunction s/p lumbar surgery. She presents with expected post-surgical deficits including gait abnormality, decreased lower extremity strength, and report of pain. Consequently, patient has difficulty completing ADLs including ambulation, driving, bed mobility. Jacquelin Gaucher would benefit from skilled intervention to address all deficits and improve functional capability. Patient is a good candidate for therapy, pending compliance with HEP and consistent participation in physical therapy. Thank you for the referral and please do not hesitate to contact me with any questions or concerns regarding Radha’s care! Plan  Frequency: 2x per week   Duration in weeks: 6  POC start date: 23  POC end date: 23   Therapeutic exercise/activity, neuromuscular reeducation, manual therapy, aquatic therapy, and modalities. Patient understands and agrees to plan of care. Goals  Short Term--4 weeks  1. Patient will demonstrate 2 point decrease in pain levels. 2. Patient will demonstrate 1/2 point increase in all deficient MMT scores. 3. Patient will decrease TUG time by 5 seconds. Long Term--By Discharge  1. Patient will achieve expected FOTO score. 2. Patient will ambulate for 30 minutes with normalized gait pattern and minimal to no onset of pain. 3. Patient will be able to sit without limitation. 4. Patient will be independent with all ADLs.   Patient's Goal: Get back to walking in her neighborhood. Subjective  History   Date of Surgery: 5/31/23 Description: Right L3/4 hemilaminectomy, foraminotomy, discectomy     Symptoms  Constant pain and stiffness in her lower back and right leg. Pain can be sharp, shooting if she moves the "wrong way." Patient not using AD, but heavily relies on her  in order to walk safely. She has a lot of difficulty with basic completing basic ADLs such as dressing, bathing, etc and bed mobility/sit to stands are also very challenging/painful. Pain at best: 5  Pain at worst: [address] lives with her  in a multistory home. Objective     GAIT: Stiff trunk, no hip extension in terminal stance, decreased step length--bilaterally    SLS: RLE: TBA,   LLE: TBA    Timed Up And Go: 20.31 seconds    *Not assessed 7/19/23  Lumbar  % of normal   Flex. Extn. SB Left    SB Right    ROT Left    ROT Right    Hip ROM: Flexion, ABD, ER, IR WNL bilaterally        MMT    Hip       L       R   Flex. 4+ 3-   Extn. NT NT   Abd. 4- 3-                 MMT    Knee         L        R   Flex. 5 4+   Extn. 5 4+                MMT    Ankle       L        R   PF 5 4   DF. 5 4   Inv. 5 4   Ever. 5 4       Slump test: L= NEG    R=  POS               Transverse abdominis: Bent knee fall out= Poor          Precautions: 10# lifting restriction. Access Code: [email or web address]  URL: [email or web address]  Date: 07/19/2023  Prepared by: Yolanda Marlow    Exercises  - Supine Piriformis Stretch with Foot on Ground  - 2 x daily - 3 reps - 30 sec hold  - Hooklying Single Knee to Chest Stretch  - 2 x daily - 3 reps - 30 hold  - Supine ITB Stretch with Strap  - 2 x daily - 3 reps - 30 sec hold  - Modified Edvin Stretch  - 2 x daily - 3 reps - 30 sec hold    Manuals 7/10            Quad, piriformis, HF stretch JAB                                                   Neuro Re-Ed             Seated sciatic nerve SLIDES 10x ea Add to HEP**           TVA              TVA + PB press             TVA+ hip ADD             TVA+ hip ABD                                       Ther Ex             HEP 15'            NuStep             HR/TR             Mini-squats             3-way hip kicks                                                    Ther Activity             Ant step ups             Lat step ups             Gait Training                                       Modalities             Heat PRN

## 2023-07-25 ENCOUNTER — OFFICE VISIT (OUTPATIENT)
Dept: PHYSICAL THERAPY | Age: 68
End: 2023-07-25
Payer: MEDICARE

## 2023-07-25 DIAGNOSIS — M51.16 LUMBAR DISC DISEASE WITH RADICULOPATHY: Primary | ICD-10-CM

## 2023-07-25 DIAGNOSIS — Z98.890 STATUS POST SURGERY: ICD-10-CM

## 2023-07-25 PROCEDURE — 97140 MANUAL THERAPY 1/> REGIONS: CPT | Performed by: PHYSICAL THERAPIST

## 2023-07-25 PROCEDURE — 97110 THERAPEUTIC EXERCISES: CPT | Performed by: PHYSICAL THERAPIST

## 2023-07-25 NOTE — PROGRESS NOTES
Daily Note     Today's date: 2023  Patient name: Mario Figueroa  : 1955  MRN: [id number]  Referring provider: Fer Roa,*  Dx:   Encounter Diagnosis     ICD-10-CM    1. Lumbar disc disease with radiculopathy  M51.16       2. Status post surgery  Z98.890                      Subjective: Patient reported right anterior and lateral thigh pain persists between 5-7 of 10. Objective: See treatment diary below. Assessment: Tolerated treatment fair. Patient demonstrated fatigue post treatment. Patient presents with severe muscle guarding / soreness / tightness persisting and limiting all arom, prom and weight baring activities. Patient presented with abdominal bracing as pain reducing as well as manual therapy techniques promoting short term pain reduction. Patient provided with new written and text message of new and existing hep. Thus, PT is warranted to facilitate to decrease in right lower extremity symptoms and promote functional mobility. Plan: Continue per plan of care. Precautions: 10# lifting restriction. Access Code: [email or web address]  URL: [email or web address]  Date: 2023  Prepared by: Raúl Hannah Due    Exercises  - Seated Slump Nerve Glide  - 2 x daily - 7 x weekly - 2 sets - 10 reps  - Supine Piriformis Stretch with Foot on Ground  - 2 x daily - 7 x weekly - 1 sets - 3 reps - 30 seconds hold  - Hooklying Single Knee to Chest  - 2 x daily - 7 x weekly - 1 sets - 3 reps - 30 seconds hold  - Supine ITB Stretch with Strap  - 2 x daily - 7 x weekly - 1 sets - 3 reps - 30 seconds hold  - Modified Edvin Stretch  - 2 x daily - 7 x weekly - 1 sets - 3 reps - 30 seconds hold  - Supine Transversus Abdominis Bracing - Hands on Stomach  - 2 x daily - 7 x weekly - 2 sets - 10 reps - 3 seconds hold  - Supine Hip Adduction Isometric with Ball  - 2 x daily - 7 x weekly - 2 sets - 10 reps - 3 seconds hold  - Hooklying Isometric Hip Abduction with Belt  - 2 x daily - 7 x weekly - 2 sets - 10 reps - 3 seconds hold        Manuals 7/10 7/25           Quad, piriformis, HF stretch JAB 15 min total           STM to right thigh and hip in hook lying  15 min total                                     Neuro Re-Ed             Seated sciatic nerve SLIDES 10x ea 2 x 10           TVA   3 sec x 20           TVA + PB press             TVA+ hip ADD  2 x 10           TVA+ hip ABD  2 x 10                                     Ther Ex             HEP 15'            NuStep  8 min seat 12 bilateral arm rest 13.            HR/TR             Mini-squats             3-way hip kicks                                                    Ther Activity             Ant step ups             Lat step ups             Gait Training                                       Modalities             Heat PRN  To right anterior / lateral hip and thigh in hook lying x 10 minutes

## 2023-07-27 ENCOUNTER — OFFICE VISIT (OUTPATIENT)
Dept: PHYSICAL THERAPY | Age: 68
End: 2023-07-27
Payer: MEDICARE

## 2023-07-27 DIAGNOSIS — Z98.890 STATUS POST SURGERY: ICD-10-CM

## 2023-07-27 DIAGNOSIS — M51.16 LUMBAR DISC DISEASE WITH RADICULOPATHY: Primary | ICD-10-CM

## 2023-07-27 PROCEDURE — 97110 THERAPEUTIC EXERCISES: CPT | Performed by: PHYSICAL THERAPIST

## 2023-07-27 PROCEDURE — 97140 MANUAL THERAPY 1/> REGIONS: CPT | Performed by: PHYSICAL THERAPIST

## 2023-07-27 NOTE — PROGRESS NOTES
Daily Note     Today's date: 2023  Patient name: Lulu Dumont  : 1955  MRN: [id number]  Referring provider: Arthur Patino,*  Dx:   Encounter Diagnosis     ICD-10-CM    1. Lumbar disc disease with radiculopathy  M51.16       2. Status post surgery  Z98.890                      Subjective: Patient reported she had a days relief of right anterior and lateral thigh pain after 23 PT visit but pain increased today and she noted pain is between 5-7 of 10. Objective: See treatment diary below. Assessment: Patient presents with short term pain reduction in right anterior / lateral thigh pain with combination of abdominal bracing, manual therapy techniques and therapeutic exercises in a shorted arc of motion. But, she lacks short term symptom reduction with right lower extremity weight based activities. Patient exhibits decrease in right quad contraction with stance phase due to pain aggrvation. Patient and spouse were educated on use of kinesio taping to bilateral patella in a "U" pattern to reduce knee pain and improve stability with stance phase. Thus, PT is warranted to facilitate to decrease in right lower extremity symptoms and promote functional mobility. Plan: Continue per plan of care. Precautions: 10# lifting restriction. Access Code: [email or web address]  URL: [email or web address]  Date: 2023  Prepared by: Woody Bustamante Due    Exercises  - Seated Slump Nerve Glide  - 2 x daily - 7 x weekly - 2 sets - 10 reps  - Supine Piriformis Stretch with Foot on Ground  - 2 x daily - 7 x weekly - 1 sets - 3 reps - 30 seconds hold  - Hooklying Single Knee to Chest  - 2 x daily - 7 x weekly - 1 sets - 3 reps - 30 seconds hold  - Supine ITB Stretch with Strap  - 2 x daily - 7 x weekly - 1 sets - 3 reps - 30 seconds hold  - Modified Edvin Stretch  - 2 x daily - 7 x weekly - 1 sets - 3 reps - 30 seconds hold  - Supine Transversus Abdominis Bracing - Hands on Stomach  - 2 x daily - 7 x weekly - 2 sets - 10 reps - 3 seconds hold  - Supine Hip Adduction Isometric with Ball  - 2 x daily - 7 x weekly - 2 sets - 10 reps - 3 seconds hold  - Hooklying Isometric Hip Abduction with Belt  - 2 x daily - 7 x weekly - 2 sets - 10 reps - 3 seconds hold      Access Code: [email or web address]  URL: [email or web address]  Date: 07/27/2023  Prepared by: Robyn Villafana Due     Access Code: [email or web address]  URL: [email or web address]  Date: 07/27/2023  Prepared by: Robyn Villafana Due    Exercises  - Bent Knee Fallouts  - 1 x daily - 7 x weekly - 3 sets - 10 reps  - Hooklying Isometric Clamshell  - 1 x daily - 7 x weekly - 3 sets - 10 reps  - Supine Hip Adduction Isometric with Ball  - 1 x daily - 7 x weekly - 3 sets - 10 reps  - Supine Bridge  - 1 x daily - 7 x weekly - 3 sets - 10 reps  - Hooklying Heel Slide  - 1 x daily - 7 x weekly - 3 sets - 10 reps      Manuals 7/10 7/25 7/27          Right Quad, piriformis, HF stretch JAB 15 min total 15 min total          STM to right thigh and hip in hook lying  15 min total 15 min                                    Neuro Re-Ed             Seated sciatic nerve SLIDES 10x ea 2 x 10 2 x 10          TVA   3 sec x 20 2 x 10          TVA + PB press   NT          TVA+ hip ADD  2 x 10 2 x 10          TVA+ hip ABD  2 x 10 2 x 10          Bent knee fall outs   2 x 10          Heel slides:R   2 x 10          Bridges   2 x 10                                                 Ther Ex             HEP 15'            NuStep  8 min seat 12 bilateral arm rest 13. 10 min seat 12 and arm rests 13          HR/TR             Mini-squats             3-way hip kicks                                                    Ther Activity             Ant step ups             Lat step ups             Gait Training                                       Modalities             Heat PRN  To right anterior / lateral hip and thigh in hook lying x 10 minutes To right anterior / lateral hip and thigh in hook lying x 10 minutes

## 2023-07-31 ENCOUNTER — OFFICE VISIT (OUTPATIENT)
Dept: PHYSICAL THERAPY | Age: 68
End: 2023-07-31
Payer: MEDICARE

## 2023-07-31 DIAGNOSIS — M51.16 LUMBAR DISC DISEASE WITH RADICULOPATHY: Primary | ICD-10-CM

## 2023-07-31 DIAGNOSIS — Z98.890 STATUS POST SURGERY: ICD-10-CM

## 2023-07-31 PROCEDURE — 97140 MANUAL THERAPY 1/> REGIONS: CPT | Performed by: PHYSICAL THERAPIST

## 2023-07-31 PROCEDURE — 97110 THERAPEUTIC EXERCISES: CPT | Performed by: PHYSICAL THERAPIST

## 2023-07-31 NOTE — PROGRESS NOTES
Daily Note     Today's date: 2023  Patient name: Lulu Dumont  : 1955  MRN: [id number]  Referring provider: Arthur Patino,*  Dx:   Encounter Diagnosis     ICD-10-CM    1. Lumbar disc disease with radiculopathy  M51.16       2. Status post surgery  Z98.890                      Subjective: Patient reported she continues to receive 2-3 days relief of right anterior and lateral thigh pain after PT visit but when her pain relief wears off she reports her right anterior and lateral thigh pain increases back to a 5-7 of 10. Patient reported she felt bilateral patellofemoral stability with use of Kinesio taping. Objective: See treatment diary below. Assessment: Patient presents with short term pain reduction in right anterior / lateral thigh pain with combination of abdominal bracing, manual therapy techniques and therapeutic exercises in a shorted arc of motion. Patient was able to add closed kinetic chain activities during PT treatment session, but continued emphasis on right lower extremity weight baring > 50 % continues to aggravate pain thus closed kinetic chain activities promoted right lower extremity mobility and control. Also, patient exhibits improved gait after PT treatment session with regards to right stance phase tolerance. But, she continues to limit right sided weight shift and exhibits decrease in right stance phase to control pain presentation in right thigh region. Thus, PT is warranted to facilitate to decrease in right lower extremity symptoms and promote functional mobility. Plan: Continue per plan of care. Precautions: 10# lifting restriction. Access Code: [email or web address]  URL: [email or web address]  Date: 2023  Prepared by: Woody Donis    Exercises  - Standing Heel Raises  - 1 x daily - 7 x weekly - 2 sets - 10 reps  - Standing Ankle Dorsiflexion with Table Support  - 1 x daily - 7 x weekly - 2 sets - 10 reps  - Standing March with Counter Support  - 1 x daily - 7 x weekly - 2 sets - 10 reps  - Standing Hip Abduction with Counter Support  - 1 x daily - 7 x weekly - 2 sets - 10 reps  - Standing Hip Extension with Counter Support  - 1 x daily - 7 x weekly - 2 sets - 10 reps  - Standing Knee Flexion with Counter Support  - 1 x daily - 7 x weekly - 2 sets - 10 reps  - Mini Squat with Counter Support  - 1 x daily - 7 x weekly - 2 sets - 10 reps      Manuals 7/10 7/25 7/27 7/31         Right Quad, piriformis, HF stretch JAB 15 min total 15 min total 15 min total         STM to right thigh and hip in hook lying  15 min total 15 min 15 min total         Kinesio taping to bilateral knee is a "U" pattern                          Neuro Re-Ed             Seated sciatic nerve SLIDES 10x ea 2 x 10 2 x 10          TVA   3 sec x 20 2 x 10          TVA + PB press   NT          TVA+ hip ADD  2 x 10 2 x 10          TVA+ hip ABD  2 x 10 2 x 10          Bent knee fall outs   2 x 10          Heel slides:R   2 x 10          Bridges   2 x 10          Supine hip abduction via heel slide:R             SAQ;B:                          Ther Ex             HEP 15'            NuStep  8 min seat 12 bilateral arm rest 13. 10 min seat 12 and arm rests 13 10 min seate 12 and arms 13         HR/TR    2 x 10         Mini-squats    NT         3-way hip kicks    2 x 10 right sided only                                                Ther Activity             Ant step ups             Lat step ups             Gait Training                                       Modalities             Heat PRN  To right anterior / lateral hip and thigh in hook lying x 10 minutes To right anterior / lateral hip and thigh in hook lying x 10 minutes

## 2023-08-03 ENCOUNTER — OFFICE VISIT (OUTPATIENT)
Dept: PHYSICAL THERAPY | Age: 68
End: 2023-08-03
Payer: MEDICARE

## 2023-08-03 DIAGNOSIS — Z98.890 STATUS POST SURGERY: ICD-10-CM

## 2023-08-03 DIAGNOSIS — M51.16 LUMBAR DISC DISEASE WITH RADICULOPATHY: Primary | ICD-10-CM

## 2023-08-03 PROCEDURE — 97110 THERAPEUTIC EXERCISES: CPT | Performed by: PHYSICAL THERAPIST

## 2023-08-03 PROCEDURE — 97140 MANUAL THERAPY 1/> REGIONS: CPT | Performed by: PHYSICAL THERAPIST

## 2023-08-03 NOTE — PROGRESS NOTES
Daily Note     Today's date: 8/3/2023  Patient name: Lucy Bo  : 1955  MRN: [id number]  Referring provider: Lindsey Justice,*  Dx:   Encounter Diagnosis     ICD-10-CM    1. Lumbar disc disease with radiculopathy  M51.16       2. Status post surgery  Z98.890                      Subjective: Patient reported she continues to experience right anterior and lateral thigh pain up to a 7 of 10. Patient reported her spouse she felt bilateral patellofemoral stability with use of Kinesio taping. Objective: See treatment diary below. Assessment: Patient presents ability to improve right hip mobility, right lower extremity weight baring activities since onset of PT. Patient continues to exhibit right sided antalgic gait and decrease in lumbar spine rotation with bilateral ue swing with gait due to right lower extremity pain aggravation. But, she continues to limit right sided weight shift and exhibits decrease in right stance phase and decrease in right sided weight shift in right lower extremity weight baring to control pain presentation in right thigh region. Thus, PT is warranted to facilitate to decrease in right lower extremity symptoms and promote functional mobility. Plan: Continue per plan of care. Precautions: 10# lifting restriction. New comprehensive hep. Access Code: [email or web address]  URL: [email or web address]  Date: 2023  Prepared by: Stanley Donis    Exercises  - Seated Slump Nerve Glide  - 2 x daily - 7 x weekly - 2 sets - 10 reps  - Supine Piriformis Stretch with Foot on Ground  - 2 x daily - 7 x weekly - 1 sets - 3 reps - 30 seconds hold  - Hooklying Single Knee to Chest Stretch  - 2 x daily - 7 x weekly - 1 sets - 3 reps - 30 seconds hold  - Supine ITB Stretch with Strap  - 2 x daily - 7 x weekly - 1 sets - 3 reps - 30 seconds hold  - Modified Edvin Stretch  - 2 x daily - 7 x weekly - 1 sets - 3 reps - 30 seconds hold  - Supine Transversus Abdominis Bracing - Hands on Stomach  - 2 x daily - 7 x weekly - 2 sets - 10 reps  - Supine Hip Adduction Isometric with Ball  - 2 x daily - 7 x weekly - 2 sets - 10 reps - 3 seconds hold  - Hooklying Isometric Hip Abduction with Belt  - 2 x daily - 7 x weekly - 2 sets - 10 reps - 3 seconds hold  - Bent Knee Fallouts  - 2 x daily - 7 x weekly - 2 sets - 10 reps  - Supine Bridge  - 2 x daily - 7 x weekly - 2 sets - 10 reps  - Supine Heel Slide  - 2 x daily - 7 x weekly - 2 sets - 10 reps  - Standing Heel Raises  - 2 x daily - 7 x weekly - 2 sets - 10 reps  - Standing Ankle Dorsiflexion with Table Support  - 2 x daily - 7 x weekly - 2 sets - 10 reps  - Standing March with Counter Support  - 2 x daily - 7 x weekly - 2 sets - 10 reps  - Standing Hip Abduction with Counter Support  - 2 x daily - 7 x weekly - 2 sets - 10 reps  - Standing Hip Extension with Counter Support  - 2 x daily - 7 x weekly - 2 sets - 10 reps  - Standing Knee Flexion with Counter Support  - 2 x daily - 7 x weekly - 2 sets - 10 reps  - Mini Squat with Counter Support  - 2 x daily - 7 x weekly - 2 sets - 10 reps      Manuals 7/10 7/25 7/27 7/31 8/3        Right Quad, piriformis, HF stretch JAB 15 min total 15 min total 15 min total 10 min        STM to right thigh and hip in hook lying  15 min total 15 min 15 min total         Kinesio taping to bilateral knee is a "U" pattern                          Neuro Re-Ed             Seated sciatic nerve SLIDES 10x ea 2 x 10 2 x 10 2 x 10 2 x 10        TVA   3 sec x 20 2 x 10  2 x 10        TVA + PB press   NT  NT        TVA+ hip ADD  2 x 10 2 x 10  2 x 10        TVA+ hip ABD  2 x 10 2 x 10  2 x 10        Bent knee fall outs   2 x 10  NT        Heel slides:R   2 x 10  NT        Bridges   2 x 10  2 x 10        Supine hip abduction via heel slide:R             SAQ;B:                          Ther Ex             HEP 15'            NuStep  8 min seat 12 bilateral arm rest 13. 10 min seat 12 and arm rests 13 10 min seate 12 and arms 13 10 min seat 12 and arms 13        HR/TR    2 x 10 2 x 10        Mini-squats    NT 2 x 10        3-way hip kicks    2 x 10 right sided only B x 20        Hamstring curls:B:     B x 20                     Side stepping              Tandem ambulation             Fwd step ups:B:             Lateral step ups:B:                          Ther Activity             Ant step ups             Lat step ups             Gait Training                                       Modalities             Heat PRN  To right anterior / lateral hip and thigh in hook lying x 10 minutes To right anterior / lateral hip and thigh in hook lying x 10 minutes To right anterior / lateral hip and thigh in hook lying x 10 minutes To right anterior / lateral hip and thigh in hook lying x 10 minutes

## 2023-08-07 ENCOUNTER — OFFICE VISIT (OUTPATIENT)
Dept: PHYSICAL THERAPY | Age: 68
End: 2023-08-07
Payer: MEDICARE

## 2023-08-07 ENCOUNTER — TELEPHONE (OUTPATIENT)
Dept: NEUROSURGERY | Facility: CLINIC | Age: 68
End: 2023-08-07

## 2023-08-07 DIAGNOSIS — M51.16 LUMBAR DISC DISEASE WITH RADICULOPATHY: Primary | ICD-10-CM

## 2023-08-07 DIAGNOSIS — Z98.890 STATUS POST SURGERY: ICD-10-CM

## 2023-08-07 PROCEDURE — 97110 THERAPEUTIC EXERCISES: CPT | Performed by: PHYSICAL THERAPIST

## 2023-08-07 PROCEDURE — 97140 MANUAL THERAPY 1/> REGIONS: CPT | Performed by: PHYSICAL THERAPIST

## 2023-08-07 NOTE — PROGRESS NOTES
Daily Note     Today's date: 2023  Patient name: Polo Patel  : 1955  MRN: [id number]  Referring provider: Kymberly Melendez,*  Dx:   Encounter Diagnosis     ICD-10-CM    1. Lumbar disc disease with radiculopathy  M51.16       2. Status post surgery  Z98.890                      Subjective: Patient reported she continues to experience right anterior and lateral thigh pain, right knee and buttock regio pain is at a 6- 7 of 10. Patient noted oral medication makes her feel tired and she noted she does not have much energy due to medication. Patient noted all self iadls remain limited by pain aggravation. Objective: See treatment diary below. Assessment: Patient presents with movement after prolonged static positions significantly limited by right thigh, buttock and knee pain. Patient continues to present with + right neural tension signs with + slump test persisting that limits transfers, walking and stair climbing activities. Patient presents with sitting and transfer bias towards pain aggravation and thus limiting to prolonged sitting and repeated transfers. But, once patient is able to perform repeated bilateral lower extremity activity as a warm up her standing and walking mobility improves significantly. Thus, PT is warranted to facilitate to decrease in right lower extremity symptoms and promote functional mobility. Plan: Continue per plan of care. Precautions: 10# lifting restriction. New comprehensive hep. Access Code: [email or web address]  URL: [email or web address]  Date: 2023  Prepared by: Carlos A Spivey Due    Exercises  - Seated Slump Nerve Glide  - 2 x daily - 7 x weekly - 2 sets - 10 reps  - Supine Piriformis Stretch with Foot on Ground  - 2 x daily - 7 x weekly - 1 sets - 3 reps - 30 seconds hold  - Hooklying Single Knee to Chest Stretch  - 2 x daily - 7 x weekly - 1 sets - 3 reps - 30 seconds hold  - Supine ITB Stretch with Strap  - 2 x daily - 7 x weekly - 1 sets - 3 reps - 30 seconds hold  - Modified Edvin Stretch  - 2 x daily - 7 x weekly - 1 sets - 3 reps - 30 seconds hold  - Supine Transversus Abdominis Bracing - Hands on Stomach  - 2 x daily - 7 x weekly - 2 sets - 10 reps  - Supine Hip Adduction Isometric with Ball  - 2 x daily - 7 x weekly - 2 sets - 10 reps - 3 seconds hold  - Hooklying Isometric Hip Abduction with Belt  - 2 x daily - 7 x weekly - 2 sets - 10 reps - 3 seconds hold  - Bent Knee Fallouts  - 2 x daily - 7 x weekly - 2 sets - 10 reps  - Supine Bridge  - 2 x daily - 7 x weekly - 2 sets - 10 reps  - Supine Heel Slide  - 2 x daily - 7 x weekly - 2 sets - 10 reps  - Standing Heel Raises  - 2 x daily - 7 x weekly - 2 sets - 10 reps  - Standing Ankle Dorsiflexion with Table Support  - 2 x daily - 7 x weekly - 2 sets - 10 reps  - Standing March with Counter Support  - 2 x daily - 7 x weekly - 2 sets - 10 reps  - Standing Hip Abduction with Counter Support  - 2 x daily - 7 x weekly - 2 sets - 10 reps  - Standing Hip Extension with Counter Support  - 2 x daily - 7 x weekly - 2 sets - 10 reps  - Standing Knee Flexion with Counter Support  - 2 x daily - 7 x weekly - 2 sets - 10 reps  - Mini Squat with Counter Support  - 2 x daily - 7 x weekly - 2 sets - 10 reps      Manuals 7/10 7/25 7/27 7/31 8/3 8/7       Right Quad, piriformis, HF stretch JAB 15 min total 15 min total 15 min total         STM to right thigh and hip in hook lying  15 min total 15 min 15 min total 10 min 10 min       Kinesio taping to bilateral knee is a "U" pattern                          Neuro Re-Ed             Seated sciatic nerve SLIDES 10x ea 2 x 10 2 x 10 2 x 10 2 x 10 2 x 10       TVA   3 sec x 20 2 x 10  2 x 10 3 sec x 20       TVA + PB press   NT  NT NT       TVA+ hip ADD  2 x 10 2 x 10  2 x 10 2 x 10       TVA+ hip ABD  2 x 10 2 x 10  2 x 10 2 x 10       Bent knee fall outs   2 x 10  NT 2 x 10 with TVA       Heel slides:R   2 x 10  NT NT       Bridges   2 x 10  2 x 10 2 x 10 Supine hip abduction via heel slide:R             SAQ;B:                          Ther Ex             HEP 15'            NuStep  8 min seat 12 bilateral arm rest 13. 10 min seat 12 and arm rests 13 10 min seate 12 and arms 13 10 min seat 12 and arms 13 11 min seat 12 and arms 13       HR/TR    2 x 10 2 x 10 2 x 10       Mini-squats    NT 2 x 10 2 x 10       3-way hip kicks    2 x 10 right sided only B x 20 B x 20       Hamstring curls:B:     B x 20 B x 20                    Side stepping       At [employer] bar 10 feet x 3 in each direction       Tandem ambulation      NT       Fwd step ups:B:      NT assess      Lateral step ups:B:      NT                    Ther Activity             Ant step ups             Lat step ups             Gait Training                                       Modalities             Heat PRN  To right anterior / lateral hip and thigh in hook lying x 10 minutes To right anterior / lateral hip and thigh in hook lying x 10 minutes To right anterior / lateral hip and thigh in hook lying x 10 minutes To right anterior / lateral hip and thigh in hook lying x 10 minutes To right anterior / lateral hip and thigh in hook lying x 10 minutes

## 2023-08-07 NOTE — TELEPHONE ENCOUNTER
Received a call from patient requesting a call back. Returned call to patient who stated her pharmacy sent a refill request of the gabapentin and it was not done. Advised a refill request was never received from her pharmacy. Patient stated she still has about a week left of the medication. Will route to her PM since patient is past the 6 week postop period, our typical prescribing period.

## 2023-08-08 NOTE — TELEPHONE ENCOUNTER
Received a call from patient stating she would like to come off of the gabapentin. Returned call to patient and provided her with a wean regimen of BID and then daily over the next week. She stated an understanding and was appreciative of the call back.

## 2023-08-08 NOTE — TELEPHONE ENCOUNTER
--ELINORI--    S/W pt. Advised pt of previous tasks. Pt is taking gabapentin 300 mg, takes 1 capsule q 8 hrs. Pt stated it makes her tired and dizzy. Pt stated she wants to stop taking it. She is not sure if it is helping the pain. She has 5 days worth of pills. Pt stated she is calling Dr. Jose Carlos Schreiber office b/c they ordered it. Advised her again what they said. Pt to C/B if need anything.

## 2023-08-10 ENCOUNTER — OFFICE VISIT (OUTPATIENT)
Dept: PHYSICAL THERAPY | Age: 68
End: 2023-08-10
Payer: MEDICARE

## 2023-08-10 DIAGNOSIS — Z98.890 STATUS POST SURGERY: ICD-10-CM

## 2023-08-10 DIAGNOSIS — M51.16 LUMBAR DISC DISEASE WITH RADICULOPATHY: Primary | ICD-10-CM

## 2023-08-10 PROCEDURE — 97110 THERAPEUTIC EXERCISES: CPT | Performed by: PHYSICAL THERAPIST

## 2023-08-10 PROCEDURE — 97140 MANUAL THERAPY 1/> REGIONS: CPT | Performed by: PHYSICAL THERAPIST

## 2023-08-10 NOTE — PROGRESS NOTES
Daily Note     Today's date: 8/10/2023  Patient name: Dejon Mark  : 1955  MRN: [id number]  Referring provider: Anny Sun,*  Dx:   Encounter Diagnosis     ICD-10-CM    1. Lumbar disc disease with radiculopathy  M51.16       2. Status post surgery  Z98.890                      Subjective: Patient reported she continues to experience right anterior and lateral thigh pain, right knee and buttock regio pain is at a 6- 7 of 10. Patient noted oral medication makes her feel tired and she noted she does not have much energy due to medication. Patient noted all self iadls remain limited by pain aggravation. Patient noted she reduced her oral pain medication from three x per day to 2 x per day. Objective: See treatment diary below. Assessment: Patient presents with an increase in right quad and iliopsoas muscle spasm, guarding today that limited her transfers and ambulation. Thus, patient and spouse educated in use of kinesio taping in a basket weave pattern to address muscle spasms. Patient exhibits right hip flexion and knee extension with right swing and stance phases limited by weakness due to pain aggravation with both movements. This painful presentation increases her fall risk with right stance phase. Patient presents with all right hip and knee arom limited by pain aggravation that mimics transfers, stairs and ambulation deficits. Thus, PT is warranted to facilitate to decrease in right lower extremity symptoms and promote functional mobility. Plan: Continue per plan of care. Patient reported she returns to her surgeon on 23. Precautions: 10# lifting restriction. New comprehensive hep. Access Code: [email or web address]  URL: [email or web address]  Date: 2023  Prepared by: Ting Terrazas Due    Exercises  - Seated Slump Nerve Glide  - 2 x daily - 7 x weekly - 2 sets - 10 reps  - Supine Piriformis Stretch with Foot on Ground  - 2 x daily - 7 x weekly - 1 sets - 3 reps - 30 seconds hold  - Hooklying Single Knee to Chest Stretch  - 2 x daily - 7 x weekly - 1 sets - 3 reps - 30 seconds hold  - Supine ITB Stretch with Strap  - 2 x daily - 7 x weekly - 1 sets - 3 reps - 30 seconds hold  - Modified Edvin Stretch  - 2 x daily - 7 x weekly - 1 sets - 3 reps - 30 seconds hold  - Supine Transversus Abdominis Bracing - Hands on Stomach  - 2 x daily - 7 x weekly - 2 sets - 10 reps  - Supine Hip Adduction Isometric with Ball  - 2 x daily - 7 x weekly - 2 sets - 10 reps - 3 seconds hold  - Hooklying Isometric Hip Abduction with Belt  - 2 x daily - 7 x weekly - 2 sets - 10 reps - 3 seconds hold  - Bent Knee Fallouts  - 2 x daily - 7 x weekly - 2 sets - 10 reps  - Supine Bridge  - 2 x daily - 7 x weekly - 2 sets - 10 reps  - Supine Heel Slide  - 2 x daily - 7 x weekly - 2 sets - 10 reps  - Standing Heel Raises  - 2 x daily - 7 x weekly - 2 sets - 10 reps  - Standing Ankle Dorsiflexion with Table Support  - 2 x daily - 7 x weekly - 2 sets - 10 reps  - Standing March with Counter Support  - 2 x daily - 7 x weekly - 2 sets - 10 reps  - Standing Hip Abduction with Counter Support  - 2 x daily - 7 x weekly - 2 sets - 10 reps  - Standing Hip Extension with Counter Support  - 2 x daily - 7 x weekly - 2 sets - 10 reps  - Standing Knee Flexion with Counter Support  - 2 x daily - 7 x weekly - 2 sets - 10 reps  - Mini Squat with Counter Support  - 2 x daily - 7 x weekly - 2 sets - 10 reps      Manuals 7/10 7/25 7/27 7/31 8/3 8/7 8/10      Right Quad, piriformis, HF stretch JAB 15 min total 15 min total 15 min total         STM to right thigh and hip in hook lying  15 min total 15 min 15 min total 10 min 10 min 10 min      Kinesio taping to bilateral knee is a "U" pattern       Educated on right thigh basket weave pattern                   Neuro Re-Ed             Seated sciatic nerve SLIDES 10x ea 2 x 10 2 x 10 2 x 10 2 x 10 2 x 10 NT      TVA   3 sec x 20 2 x 10  2 x 10 3 sec x 20 3 sec x 20 TVA + PB press   NT  NT NT NT      TVA+ hip ADD  2 x 10 2 x 10  2 x 10 2 x 10 NT      TVA+ hip ABD  2 x 10 2 x 10  2 x 10 2 x 10 NT      Bent knee fall outs   2 x 10  NT 2 x 10 with TVA 2 x 10      Heel slides:R   2 x 10  NT NT 2 x 10      Bridges   2 x 10  2 x 10 2 x 10 2 x 10      Supine hip abduction via heel slide:R             SAQ;B:                          Ther Ex             HEP 15'            NuStep  8 min seat 12 bilateral arm rest 13. 10 min seat 12 and arm rests 13 10 min seate 12 and arms 13 10 min seat 12 and arms 13 11 min seat 12 and arms 13 11 min seat 12 and arms at 13      HR/TR    2 x 10 2 x 10 2 x 10 2 x 10      Mini-squats    NT 2 x 10 2 x 10 2 x 10      3-way hip kicks    2 x 10 right sided only B x 20 B x 20 B x 20      Hamstring curls:B:     B x 20 B x 20 B x 20                   Side stepping       At ballet bar 10 feet x 3 in each direction At ballet bar 10 feet x 3 in each direction      Tandem ambulation      NT Parallel bars x 10 feet x 3      Fwd step ups:B:      NT 4" x 20      Lateral step ups:B:      NT NT add                  Ther Activity             Ant step ups             Lat step ups             Gait Training                                       Modalities             Heat PRN  To right anterior / lateral hip and thigh in hook lying x 10 minutes To right anterior / lateral hip and thigh in hook lying x 10 minutes To right anterior / lateral hip and thigh in hook lying x 10 minutes To right anterior / lateral hip and thigh in hook lying x 10 minutes To right anterior / lateral hip and thigh in hook lying x 10 minutes To right anterior / lateral hip and thigh in hook lying x 10 minutes

## 2023-08-14 ENCOUNTER — OFFICE VISIT (OUTPATIENT)
Dept: PHYSICAL THERAPY | Age: 68
End: 2023-08-14
Payer: MEDICARE

## 2023-08-14 DIAGNOSIS — Z98.890 STATUS POST SURGERY: Primary | ICD-10-CM

## 2023-08-14 DIAGNOSIS — M51.16 LUMBAR DISC DISEASE WITH RADICULOPATHY: ICD-10-CM

## 2023-08-14 PROCEDURE — 97140 MANUAL THERAPY 1/> REGIONS: CPT

## 2023-08-14 PROCEDURE — 97110 THERAPEUTIC EXERCISES: CPT

## 2023-08-14 NOTE — PROGRESS NOTES
Daily Note     Today's date: 2023  Patient name: Zandra Goncalves  : 1955  MRN: [id number]  Referring provider: Jared Victor  Dx:   Encounter Diagnosis     ICD-10-CM    1. Status post surgery  Z98.890       2. Lumbar disc disease with radiculopathy  M51.16                      Subjective: Patient reported that she is feeling a bit better after taking tylenol this am.       Objective: See treatment diary below. Assessment: Fair tolerance to exercises. Trial of IASTM to R LE with good tolerance. Plan: Continue per plan of care. Patient reported she returns to her surgeon on 23. Precautions: 10# lifting restriction. New comprehensive hep. Access Code: [email or web address]  URL: [email or web address]  Date: 2023  Prepared by: Prosper Donis    Exercises  - Seated Slump Nerve Glide  - 2 x daily - 7 x weekly - 2 sets - 10 reps  - Supine Piriformis Stretch with Foot on Ground  - 2 x daily - 7 x weekly - 1 sets - 3 reps - 30 seconds hold  - Hooklying Single Knee to Chest Stretch  - 2 x daily - 7 x weekly - 1 sets - 3 reps - 30 seconds hold  - Supine ITB Stretch with Strap  - 2 x daily - 7 x weekly - 1 sets - 3 reps - 30 seconds hold  - Modified Edvin Stretch  - 2 x daily - 7 x weekly - 1 sets - 3 reps - 30 seconds hold  - Supine Transversus Abdominis Bracing - Hands on Stomach  - 2 x daily - 7 x weekly - 2 sets - 10 reps  - Supine Hip Adduction Isometric with Ball  - 2 x daily - 7 x weekly - 2 sets - 10 reps - 3 seconds hold  - Hooklying Isometric Hip Abduction with Belt  - 2 x daily - 7 x weekly - 2 sets - 10 reps - 3 seconds hold  - Bent Knee Fallouts  - 2 x daily - 7 x weekly - 2 sets - 10 reps  - Supine Bridge  - 2 x daily - 7 x weekly - 2 sets - 10 reps  - Supine Heel Slide  - 2 x daily - 7 x weekly - 2 sets - 10 reps  - Standing Heel Raises  - 2 x daily - 7 x weekly - 2 sets - 10 reps  - Standing Ankle Dorsiflexion with Table Support  - 2 x daily - 7 x weekly - 2 sets - 10 reps  - Standing March with Counter Support  - 2 x daily - 7 x weekly - 2 sets - 10 reps  - Standing Hip Abduction with Counter Support  - 2 x daily - 7 x weekly - 2 sets - 10 reps  - Standing Hip Extension with Counter Support  - 2 x daily - 7 x weekly - 2 sets - 10 reps  - Standing Knee Flexion with Counter Support  - 2 x daily - 7 x weekly - 2 sets - 10 reps  - Mini Squat with Counter Support  - 2 x daily - 7 x weekly - 2 sets - 10 reps      Manuals 7/10 7/25 7/27 7/31 8/3 8/7 8/10 8/14     Right Quad, piriformis, HF stretch JAB 15 min total 15 min total 15 min total         STM to right thigh and hip in hook lying    Trial IASTM   15 min total 15 min 15 min total 10 min 10 min 10 min 10 min      Kinesio taping to bilateral knee is a "U" pattern       Educated on right thigh basket weave pattern                   Neuro Re-Ed             Seated sciatic nerve SLIDES 10x ea 2 x 10 2 x 10 2 x 10 2 x 10 2 x 10 NT NT     TVA   3 sec x 20 2 x 10  2 x 10 3 sec x 20 3 sec x 20 20x 3sec      TVA + PB press   NT  NT NT NT NT     TVA+ hip ADD  2 x 10 2 x 10  2 x 10 2 x 10 NT 20X BALL 2SEC      TVA+ hip ABD  2 x 10 2 x 10  2 x 10 2 x 10 NT BTB 20X 2SEC      Bent knee fall outs   2 x 10  NT 2 x 10 with TVA 2 x 10 NT     Heel slides:R   2 x 10  NT NT 2 x 10 20X 2SEC      Bridges   2 x 10  2 x 10 2 x 10 2 x 10 20X      Supine hip abduction via heel slide:R        NT     SAQ;B:        20X 2SEC                    Ther Ex             HEP 15'            NuStep  8 min seat 12 bilateral arm rest 13. 10 min seat 12 and arm rests 13 10 min seate 12 and arms 13 10 min seat 12 and arms 13 11 min seat 12 and arms 13 11 min seat 12 and arms at 13 10 MIN     13 arms  12 seat      HR/TR    2 x 10 2 x 10 2 x 10 2 x 10 20X      Mini-squats    NT 2 x 10 2 x 10 2 x 10 NT     3-way hip kicks    2 x 10 right sided only B x 20 B x 20 B x 20 20X      Hamstring curls:B:     B x 20 B x 20 B x 20 20X                   Side stepping       At ballet bar 10 feet x 3 in each direction At ballet bar 10 feet x 3 in each direction 6X      Tandem ambulation      NT Parallel bars x 10 feet x 3 6X      Fwd step ups:B:      NT 4" x 20 NT     Lateral step ups:B:      NT NT NT                  Ther Activity             Ant step ups             Lat step ups             Gait Training                                       Modalities             Heat PRN  To right anterior / lateral hip and thigh in hook lying x 10 minutes To right anterior / lateral hip and thigh in hook lying x 10 minutes To right anterior / lateral hip and thigh in hook lying x 10 minutes To right anterior / lateral hip and thigh in hook lying x 10 minutes To right anterior / lateral hip and thigh in hook lying x 10 minutes To right anterior / lateral hip and thigh in hook lying x 10 minutes 10 min

## 2023-08-17 ENCOUNTER — OFFICE VISIT (OUTPATIENT)
Dept: PHYSICAL THERAPY | Age: 68
End: 2023-08-17
Payer: MEDICARE

## 2023-08-17 DIAGNOSIS — Z98.890 STATUS POST SURGERY: Primary | ICD-10-CM

## 2023-08-17 DIAGNOSIS — M51.16 LUMBAR DISC DISEASE WITH RADICULOPATHY: ICD-10-CM

## 2023-08-17 PROCEDURE — 97110 THERAPEUTIC EXERCISES: CPT | Performed by: PHYSICAL THERAPIST

## 2023-08-17 PROCEDURE — 97140 MANUAL THERAPY 1/> REGIONS: CPT | Performed by: PHYSICAL THERAPIST

## 2023-08-17 NOTE — PROGRESS NOTES
Daily Note     Today's date: 2023  Patient name: Myrtle Pedersen  : 1955  MRN: [id number]  Referring provider: Eleanor Root  Dx:   Encounter Diagnosis     ICD-10-CM    1. Status post surgery  Z98.890       2. Lumbar disc disease with radiculopathy  M51.16                      Subjective: Patient reported that she weaned off Neurontin and now taking tylenol for symptom modulation. Patient reported she is forcing herself to perform more standing based functional and fitness activities. Objective: See treatment diary below. Assessment: Patient presents with prone lying and prone press ups resolving right thigh pain, while standing lumbar spine extension not as effective. Patient educated to continue use of prone lying and prone press ups 2-3 x per day to promote longer periods of pain reduction with education on how to get on and off the bed / home plinth. Thus, PT is warranted to facilitate long term pain reduction and functional progress. Plan: Continue per plan of care. Patient reported she returns to her surgeon on 23. Precautions: 10# lifting restriction. New comprehensive hep. Access Code: [email or web address]  URL: [email or web address]  Date: 2023  Prepared by: Kevin Donis    Exercises  - Seated Slump Nerve Glide  - 2 x daily - 7 x weekly - 2 sets - 10 reps  - Supine Piriformis Stretch with Foot on Ground  - 2 x daily - 7 x weekly - 1 sets - 3 reps - 30 seconds hold  - Hooklying Single Knee to Chest Stretch  - 2 x daily - 7 x weekly - 1 sets - 3 reps - 30 seconds hold  - Supine ITB Stretch with Strap  - 2 x daily - 7 x weekly - 1 sets - 3 reps - 30 seconds hold  - Modified Edvin Stretch  - 2 x daily - 7 x weekly - 1 sets - 3 reps - 30 seconds hold  - Supine Transversus Abdominis Bracing - Hands on Stomach  - 2 x daily - 7 x weekly - 2 sets - 10 reps  - Supine Hip Adduction Isometric with Ball  - 2 x daily - 7 x weekly - 2 sets - 10 reps - 3 seconds hold  - Hooklying Isometric Hip Abduction with Belt  - 2 x daily - 7 x weekly - 2 sets - 10 reps - 3 seconds hold  - Bent Knee Fallouts  - 2 x daily - 7 x weekly - 2 sets - 10 reps  - Supine Bridge  - 2 x daily - 7 x weekly - 2 sets - 10 reps  - Supine Heel Slide  - 2 x daily - 7 x weekly - 2 sets - 10 reps  - Standing Heel Raises  - 2 x daily - 7 x weekly - 2 sets - 10 reps  - Standing Ankle Dorsiflexion with Table Support  - 2 x daily - 7 x weekly - 2 sets - 10 reps  - Standing March with Counter Support  - 2 x daily - 7 x weekly - 2 sets - 10 reps  - Standing Hip Abduction with Counter Support  - 2 x daily - 7 x weekly - 2 sets - 10 reps  - Standing Hip Extension with Counter Support  - 2 x daily - 7 x weekly - 2 sets - 10 reps  - Standing Knee Flexion with Counter Support  - 2 x daily - 7 x weekly - 2 sets - 10 reps  - Mini Squat with Counter Support  - 2 x daily - 7 x weekly - 2 sets - 10 reps      Manuals 7/10 7/25 7/27 7/31 8/3 8/7 8/10 8/14 8/17    Right Quad, piriformis, HF stretch JAB 15 min total 15 min total 15 min total         STM to right thigh and hip in hook lying    Trial IASTM   15 min total 15 min 15 min total 10 min 10 min 10 min 10 min  10 min    Kinesio taping to bilateral knee is a "U" pattern       Educated on right thigh basket weave pattern                   Neuro Re-Ed                                       Standing lumbar spine extension against counter top         2 x 10                 Seated sciatic nerve SLIDES 10x ea 2 x 10 2 x 10 2 x 10 2 x 10 2 x 10 NT NT NT    TVA   3 sec x 20 2 x 10  2 x 10 3 sec x 20 3 sec x 20 20x 3sec  3 sec x 20    TVA + PB press   NT  NT NT NT NT NT    TVA+ hip ADD  2 x 10 2 x 10  2 x 10 2 x 10 NT 20X BALL 2SEC  3 sec x 20    TVA+ hip ABD  2 x 10 2 x 10  2 x 10 2 x 10 NT BTB 20X 2SEC  3 sec x 20    Bent knee fall outs   2 x 10  NT 2 x 10 with TVA 2 x 10 NT 2 x 10    Heel slides:R   2 x 10  NT NT 2 x 10 20X 2SEC  2 x 10    Bridges   2 x 10  2 x 10 2 x 10 2 x 10 20X  2 x 10    Supine hip abduction via heel slide:R        NT     SAQ;B:        20X 2SEC                   Prone lying         2 min    Prone press ups         5 x                                Ther Ex             HEP 15'            NuStep  8 min seat 12 bilateral arm rest 13. 10 min seat 12 and arm rests 13 10 min seate 12 and arms 13 10 min seat 12 and arms 13 11 min seat 12 and arms 13 11 min seat 12 and arms at 13 10 MIN     13 arms  12 seat  10 min    HR/TR    2 x 10 2 x 10 2 x 10 2 x 10 20X  2 x 10    Mini-squats    NT 2 x 10 2 x 10 2 x 10 NT NT    3-way hip kicks    2 x 10 right sided only B x 20 B x 20 B x 20 20X  2 x 10    Hamstring curls:B:     B x 20 B x 20 B x 20 20X  2 x 10                 Side stepping       At ballet bar 10 feet x 3 in each direction At ballet bar 10 feet x 3 in each direction 6X  8 x     Tandem ambulation      NT Parallel bars x 10 feet x 3 6X  8 x     Fwd step ups:B:      NT 4" x 20 NT 4" x 20    Lateral step ups:B:      NT NT NT 4" x 20    Step downs:B         4" x 20                                       4" x 20    Ther Activity             Ant step ups             Lat step ups             Gait Training                                       Modalities             Heat PRN  To right anterior / lateral hip and thigh in hook lying x 10 minutes To right anterior / lateral hip and thigh in hook lying x 10 minutes To right anterior / lateral hip and thigh in hook lying x 10 minutes To right anterior / lateral hip and thigh in hook lying x 10 minutes To right anterior / lateral hip and thigh in hook lying x 10 minutes To right anterior / lateral hip and thigh in hook lying x 10 minutes 10 min  10 min

## 2023-08-21 ENCOUNTER — EVALUATION (OUTPATIENT)
Dept: PHYSICAL THERAPY | Age: 68
End: 2023-08-21
Payer: MEDICARE

## 2023-08-21 DIAGNOSIS — M51.16 LUMBAR DISC DISEASE WITH RADICULOPATHY: ICD-10-CM

## 2023-08-21 DIAGNOSIS — Z98.890 STATUS POST SURGERY: Primary | ICD-10-CM

## 2023-08-21 PROCEDURE — 97110 THERAPEUTIC EXERCISES: CPT | Performed by: PHYSICAL THERAPIST

## 2023-08-21 PROCEDURE — 97140 MANUAL THERAPY 1/> REGIONS: CPT | Performed by: PHYSICAL THERAPIST

## 2023-08-21 NOTE — PROGRESS NOTES
PT Evaluation     Today's date: 2023  Patient name: Bianka Pereira  : 1955  MRN: [id number]  Referring provider: Osmin Arnett  Dx:   Encounter Diagnosis     ICD-10-CM    1. Lumbar disc disease with radiculopathy  M51.16 Ambulatory referral to Physical Therapy      2. Status post surgery  Z98.890 Ambulatory referral to Physical Therapy          Start Time: 1700  Stop Time: 1800  Total time in clinic (min): 60 minutes    Assessment/Plan     PT Reassessment: 2023. Patient reported the following progress since onset of PT: able to increase outside walking to 20 minutes, getting in and out of bed, moving her bilateral lower extremity and lumbar spine better, standing activities, self dressing requires less assistance of spouse and no longer taking gabapentin. But, she noted the following deficits that still persists: right thigh pain, prolonged walking, using tylenol for symptom reduction, prolonged sitting, prolonged riding in the car, dressing, right patellofemoral region pain. Also, patient exhibits moderate to severe pain aggravation with active hip flexion which limits stair climbing to non reciprocal pattern and leading with left lower extremity. Plus, all attempts of stair climbing remain non reciprocal due to pain and weakness and right hip and 4" simulation of stair climbing patient exhibits circumduction due to lack of pain free flexion. 23: PT IE:  Bianka Pereira is a 70 y.o. female who was referred to physical therapy for management of pain/dysfunction s/p lumbar surgery. She presents with expected post-surgical deficits including gait abnormality, decreased lower extremity strength, and report of pain. Consequently, patient has difficulty completing ADLs including ambulation, driving, bed mobility. Mick Espinal would benefit from skilled intervention to address all deficits and improve functional capability.   Patient is a good candidate for therapy, pending compliance with HEP and consistent participation in physical therapy. Thank you for the referral and please do not hesitate to contact me with any questions or concerns regarding Radha’s care! Plan  Frequency: 2x per week   Duration in weeks: 12  POC start date: 7/19/23  POC end date: 10/11/23   Therapeutic exercise/activity, neuromuscular reeducation, manual therapy, aquatic therapy, and modalities. Patient understands and agrees to plan of care. Goals  Short Term--4 weeks  1. Patient will demonstrate 2 point decrease in pain levels. MET. 2. Patient will demonstrate 1/2 point increase in all deficient MMT scores. MET. 3. Patient will decrease TUG time by 5 seconds. Partially MET. Long Term--By Discharge  1. Patient will achieve expected FOTO score. Partially MET. 2. Patient will ambulate for 30 minutes with normalized gait pattern and minimal to no onset of pain. Partially MET. 3. Patient will be able to sit without limitation. Partially MET. 4. Patient will be independent with all ADLs. Partially MET. Patient's Goal: Get back to walking in her neighborhood. NOT MET. Patient presents with the following progress since onset of PT: decrease in right hip and thigh pain, increase in lumbar spine mobility, increase in right lower extremity strength, increase in lumbar spine mobility, gait and balance improvements, transfer improvements, self functional progress. But, she presents with the following deficits that still persists that warranted continued PT to facilitate impairment and functional progress: right hip and thigh pain, decrease in lumbar spine mobility, decrease in right lower extremity mobility and strength, gait and transfer dysfunctions, balance deficits, and self functional deficits.   Thus, patient would benefit under the following PT POC to facilitate functional and impairments: therapeutic exercises and activities to facilitate lumbar spine and right hip / thigh pain reduction, gait and transfer training, balance activities, DLS and abdominal strengthening, IASTM techniques, Kinesio taping techniques and a hep. Subjective  History   Date of Surgery: 5/31/23 Description: Right L3/4 hemilaminectomy, foraminotomy, discectomy     Symptoms  Constant pain and stiffness in her lower back and right leg. Pain can be sharp, shooting if she moves the "wrong way." Patient not using AD, but heavily relies on her  in order to walk safely. She has a lot of difficulty with basic completing basic ADLs such as dressing, bathing, etc and bed mobility/sit to stands are also very challenging/painful. Right Thigh and knee region  Pain at best: 3  Pain at worst: [address] lives with her  in a multistory home. Objective     GAIT: Stiff trunk with severe muscle guarding at lumbar spine erector spinae, decrease in bilateral step length, noreduced hip extension in terminal stance, decreased step length--bilaterally, decrease in pace, decrease in bilateral upper extremity swing. Patient is moderate TTP at right quadriceps musculature. Foto: PT IE at 27 and now at 39. Timed Up And Go: 20.31 seconds on PT IE and now at 17.90 seconds. Lumbar  Degrees   Flex. 80   Extn. 28   SB Left 20   SB Right 20   ROT Left 52   ROT Right 58           MMT    Hip       L       R   Flex. 4+ 3+   Extn. NT NT   Abd. 4+ 4-                 MMT    Knee         L        R   Flex. 5 4+   Extn. 5 4+                MMT    Ankle       L        R   PF 5 4   DF. 5 4+   Inv. 5 4+   Ever.  5 4       Slump test: L= NEG    R=  POS               Transverse abdominis: Bent knee fall out= good         Manuals 7/10 7/25 7/27 7/31 8/3 8/7 8/10 8/14 8/17 8/21    Right Quad, piriformis, HF stretch JAB 15 min total 15 min total 15 min total          STM to right thigh and hip in hook lying    Trial IASTM   15 min total 15 min 15 min total 10 min 10 min 10 min 10 min  10 min 10 min    Kinesio taping to bilateral knee is a "U" pattern       Educated on right thigh basket weave pattern                     Neuro Re-Ed                                          Standing lumbar spine extension against counter top         2 x 10                   Seated sciatic nerve SLIDES 10x ea 2 x 10 2 x 10 2 x 10 2 x 10 2 x 10 NT NT NT NT    TVA   3 sec x 20 2 x 10  2 x 10 3 sec x 20 3 sec x 20 20x 3sec  3 sec x 20     TVA + PB press   NT  NT NT NT NT NT     TVA+ hip ADD  2 x 10 2 x 10  2 x 10 2 x 10 NT 20X BALL 2SEC  3 sec x 20     TVA+ hip ABD  2 x 10 2 x 10  2 x 10 2 x 10 NT BTB 20X 2SEC  3 sec x 20     Bent knee fall outs   2 x 10  NT 2 x 10 with TVA 2 x 10 NT 2 x 10     Heel slides:R   2 x 10  NT NT 2 x 10 20X 2SEC  2 x 10     Bridges   2 x 10  2 x 10 2 x 10 2 x 10 20X  2 x 10     Supine hip abduction via heel slide:R        NT      SAQ;B:        20X 2SEC                     Prone lying         2 min     Prone press ups         5 x                                   Ther Ex              HEP 15'             NuStep  8 min seat 12 bilateral arm rest 13. 10 min seat 12 and arm rests 13 10 min seate 12 and arms 13 10 min seat 12 and arms 13 11 min seat 12 and arms 13 11 min seat 12 and arms at 13 10 MIN     13 arms  12 seat  10 min 11 min 10 min   HR/TR    2 x 10 2 x 10 2 x 10 2 x 10 20X  2 x 10 2 x 10    Mini-squats    NT 2 x 10 2 x 10 2 x 10 NT NT 2 x 10    3-way hip kicks    2 x 10 right sided only B x 20 B x 20 B x 20 20X  2 x 10 2 x 10    Hamstring curls:B:     B x 20 B x 20 B x 20 20X  2 x 10 2 x 10                  Side stepping       At ballet bar 10 feet x 3 in each direction At ballet bar 10 feet x 3 in each direction 6X  8 x  Parallel bars x 8    Tandem ambulation      NT Parallel bars x 10 feet x 3 6X  8 x  Parallel bars x 8    Fwd step ups:B:      NT 4" x 20 NT 4" x 20 4" x 20    Lateral step ups:B:      NT NT NT 4" x 20 4" x 20    Step downs:B         4" x 20 NT    Lunges on foam:B:          2 x 10                  Ther Activity              Ant step ups              Lat step ups              Gait Training                                          Modalities              Heat PRN  To right anterior / lateral hip and thigh in hook lying x 10 minutes To right anterior / lateral hip and thigh in hook lying x 10 minutes To right anterior / lateral hip and thigh in hook lying x 10 minutes To right anterior / lateral hip and thigh in hook lying x 10 minutes To right anterior / lateral hip and thigh in hook lying x 10 minutes To right anterior / lateral hip and thigh in hook lying x 10 minutes 10 min  10 min 10 min

## 2023-08-24 ENCOUNTER — OFFICE VISIT (OUTPATIENT)
Dept: NEUROSURGERY | Facility: CLINIC | Age: 68
End: 2023-08-24

## 2023-08-24 ENCOUNTER — OFFICE VISIT (OUTPATIENT)
Dept: PHYSICAL THERAPY | Age: 68
End: 2023-08-24
Payer: MEDICARE

## 2023-08-24 VITALS
DIASTOLIC BLOOD PRESSURE: 80 MMHG | HEART RATE: 80 BPM | HEIGHT: 70 IN | SYSTOLIC BLOOD PRESSURE: 118 MMHG | TEMPERATURE: 97.9 F | OXYGEN SATURATION: 97 % | WEIGHT: 158 LBS | BODY MASS INDEX: 22.62 KG/M2

## 2023-08-24 DIAGNOSIS — M51.16 LUMBAR DISC DISEASE WITH RADICULOPATHY: ICD-10-CM

## 2023-08-24 DIAGNOSIS — M48.061 LUMBAR FORAMINAL STENOSIS: ICD-10-CM

## 2023-08-24 DIAGNOSIS — Z98.890 STATUS POST SURGERY: Primary | ICD-10-CM

## 2023-08-24 PROCEDURE — 99024 POSTOP FOLLOW-UP VISIT: CPT | Performed by: NEUROLOGICAL SURGERY

## 2023-08-24 PROCEDURE — 97140 MANUAL THERAPY 1/> REGIONS: CPT

## 2023-08-24 PROCEDURE — 97110 THERAPEUTIC EXERCISES: CPT

## 2023-08-24 NOTE — PROGRESS NOTES
Daily Note     Today's date: 2023  Patient name: Brit Mabry  : 1955  MRN: [id number]  Referring provider: Meghna Allen  Dx:   Encounter Diagnosis     ICD-10-CM    1. Status post surgery  Z98.890       2. Lumbar disc disease with radiculopathy  M51.16                      Subjective: Patient reported pain at R LE with prolonged standing and walking. Objective: See treatment diary below. Assessment: Challenged with progression due to right lower extremity pain. Pt presents with increased pain with Right knee and right hip flexion. Plan: Continue per plan of care. Patient reported she returns to her surgeon on 23. Precautions: 10# lifting restriction. New comprehensive hep. Access Code: [email or web address]  URL: [email or web address]  Date: 2023  Prepared by: Amina Calvert Due    Exercises  - Seated Slump Nerve Glide  - 2 x daily - 7 x weekly - 2 sets - 10 reps  - Supine Piriformis Stretch with Foot on Ground  - 2 x daily - 7 x weekly - 1 sets - 3 reps - 30 seconds hold  - Hooklying Single Knee to Chest Stretch  - 2 x daily - 7 x weekly - 1 sets - 3 reps - 30 seconds hold  - Supine ITB Stretch with Strap  - 2 x daily - 7 x weekly - 1 sets - 3 reps - 30 seconds hold  - Modified Edvin Stretch  - 2 x daily - 7 x weekly - 1 sets - 3 reps - 30 seconds hold  - Supine Transversus Abdominis Bracing - Hands on Stomach  - 2 x daily - 7 x weekly - 2 sets - 10 reps  - Supine Hip Adduction Isometric with Ball  - 2 x daily - 7 x weekly - 2 sets - 10 reps - 3 seconds hold  - Hooklying Isometric Hip Abduction with Belt  - 2 x daily - 7 x weekly - 2 sets - 10 reps - 3 seconds hold  - Bent Knee Fallouts  - 2 x daily - 7 x weekly - 2 sets - 10 reps  - Supine Bridge  - 2 x daily - 7 x weekly - 2 sets - 10 reps  - Supine Heel Slide  - 2 x daily - 7 x weekly - 2 sets - 10 reps  - Standing Heel Raises  - 2 x daily - 7 x weekly - 2 sets - 10 reps  - Standing Ankle Dorsiflexion with Table Support  - 2 x daily - 7 x weekly - 2 sets - 10 reps  - Standing March with Counter Support  - 2 x daily - 7 x weekly - 2 sets - 10 reps  - Standing Hip Abduction with Counter Support  - 2 x daily - 7 x weekly - 2 sets - 10 reps  - Standing Hip Extension with Counter Support  - 2 x daily - 7 x weekly - 2 sets - 10 reps  - Standing Knee Flexion with Counter Support  - 2 x daily - 7 x weekly - 2 sets - 10 reps  - Mini Squat with Counter Support  - 2 x daily - 7 x weekly - 2 sets - 10 reps      Manuals 7/10 7/25 7/27 7/31 8/3 8/7 8/10 8/14 8/17 8/24   Right Quad, piriformis, HF stretch JAB 15 min total 15 min total 15 min total         STM to right thigh and hip in hook lying    Trial IASTM   15 min total 15 min 15 min total 10 min 10 min 10 min 10 min  10 min 10 min   Kinesio taping to bilateral knee is a "U" pattern       Educated on right thigh basket weave pattern                   Neuro Re-Ed                                       Standing lumbar spine extension against counter top         2 x 10 NT                Seated sciatic nerve SLIDES 10x ea 2 x 10 2 x 10 2 x 10 2 x 10 2 x 10 NT NT NT NT   TVA   3 sec x 20 2 x 10  2 x 10 3 sec x 20 3 sec x 20 20x 3sec  3 sec x 20 NT   TVA + PB press   NT  NT NT NT NT NT NT   TVA+ hip ADD  2 x 10 2 x 10  2 x 10 2 x 10 NT 20X BALL 2SEC  3 sec x 20 20X 3SEC 1.5#    TVA+ hip ABD  2 x 10 2 x 10  2 x 10 2 x 10 NT BTB 20X 2SEC  3 sec x 20 20X 3SEC 1.5#    Bent knee fall outs   2 x 10  NT 2 x 10 with TVA 2 x 10 NT 2 x 10 20X    Heel slides:R   2 x 10  NT NT 2 x 10 20X 2SEC  2 x 10 NT   Bridges   2 x 10  2 x 10 2 x 10 2 x 10 20X  2 x 10 20X    Supine hip abduction via heel slide:R        NT     SAQ;B:    Blue or Green bolster         20X 2SEC   20X 2SEC 1.5#                   Prone lying         2 min NT   Prone press ups         5 x  NT                              Ther Ex             L/C           10 MIN    HEP 15'            NuStep  8 min seat 12 bilateral arm rest 13. 10 min seat 12 and arm rests 13 10 min seate 12 and arms 13 10 min seat 12 and arms 13 11 min seat 12 and arms 13 11 min seat 12 and arms at 13 10 MIN     13 arms  12 seat  10 min NT   HR/TR    2 x 10 2 x 10 2 x 10 2 x 10 20X  2 x 10 20X    Mini-squats    NT 2 x 10 2 x 10 2 x 10 NT NT 20X   3-way hip kicks    2 x 10 right sided only B x 20 B x 20 B x 20 20X  2 x 10 20X 1.5#    Hamstring curls:B:     B x 20 B x 20 B x 20 20X  2 x 10 20X 1.5#                 Side stepping       At ballet bar 10 feet x 3 in each direction At ballet bar 10 feet x 3 in each direction 6X  8 x  6X at mirror   1.5#    Tandem ambulation  At mirror       NT Parallel bars x 10 feet x 3 6X  8 x  6x 1.5#    Fwd step ups:B:      NT 4" x 20 NT 4" x 20 NT   Lateral step ups:B:      NT NT NT 4" x 20 NT   Step downs:B         4" x 20 NT                                           Ther Activity             Ant step ups          NT   Lat step ups          NT   Gait Training                                       Modalities             Heat PRN  To right anterior / lateral hip and thigh in hook lying x 10 minutes To right anterior / lateral hip and thigh in hook lying x 10 minutes To right anterior / lateral hip and thigh in hook lying x 10 minutes To right anterior / lateral hip and thigh in hook lying x 10 minutes To right anterior / lateral hip and thigh in hook lying x 10 minutes To right anterior / lateral hip and thigh in hook lying x 10 minutes 10 min  10 min 5 MIN   CP

## 2023-08-24 NOTE — PROGRESS NOTES
Review of Systems   Constitutional: Negative. HENT: Negative. Eyes: Negative. Respiratory: Negative. Cardiovascular: Negative. Gastrointestinal: Negative. Endocrine: Negative. Genitourinary: Negative. Musculoskeletal: Negative for back pain. C/o right leg pain   Skin: Negative. Allergic/Immunologic: Negative. Neurological: Negative. Hematological: Negative. Psychiatric/Behavioral: Negative. All other systems reviewed and are negative.          Current Outpatient Medications:   •  AMILoride-hydrochlorothiazide (MODURETIC) 5-50 mg per tablet, Take 1 tablet by mouth daily, Disp: 90 tablet, Rfl: 1  •  Ascorbic Acid (vitamin C) 1000 MG tablet, Take 1,000 mg by mouth daily, Disp: , Rfl:   •  Calcium Carbonate-Vitamin D3 600-400 MG-UNIT TABS, Take 2 tablets by mouth daily, Disp: , Rfl:   •  Collagen Hydrolysate POWD, 1 Scoop by Does not apply route daily , Disp: , Rfl:   •  Glucosamine-Chondroit-Vit C-Mn (GLUCOSAMINE-CHONDROITIN) TABS, Take 2 tablets by mouth daily, Disp: , Rfl:   •  levothyroxine 50 mcg tablet, Take 1 tablet (50 mcg total) by mouth daily Alt with 1.5 tab M,W,F, Disp: 108 tablet, Rfl: 3  •  Magnesium Carbonate POWD, 325 mg by Does not apply route daily, Disp: , Rfl:   •  Multiple Vitamin (MULTI-DAY) TABS, Take 2 tablets by mouth daily Ultra Aric, Disp: , Rfl:   •  Acetaminophen (TYLENOL PO), Take 325-500 mg by mouth as needed, Disp: , Rfl:

## 2023-08-24 NOTE — PROGRESS NOTES
84 status post Right L3/4 Hemilaminectomy,foraminotomy, Discectomy - Right on 5/31/2023     Wound: healed    Medications for right leg: only tylenol. Off gabapentin entirely  Walking better  Continues PT exercises at home  Massage table at home helps    Right knee pain continues. She will start to focus on the knee    A/P Doing well. She is walking 1 mile per day. She walked 2 miles today. PT therapy is going well. Right leg complaints worse with sitting, time of day, timing of medication  I explained to the patient that nerves heal at approximately 1 mm/day or 1 inch per month. This means that it can take a year or more to reach maximum healing. Her symptoms were present for almost a year prior to surgery, so the healing process can often be slower and less complete  She will see Dr Qi Jimenez about her knee. She may be a candidate for further treatment or orthopedic evaluation.   OK to start swimming  OK to   All questions answered

## 2023-08-28 ENCOUNTER — HOSPITAL ENCOUNTER (OUTPATIENT)
Dept: RADIOLOGY | Age: 68
Discharge: HOME | End: 2023-08-28
Payer: MEDICARE

## 2023-08-28 ENCOUNTER — OFFICE VISIT (OUTPATIENT)
Dept: PHYSICAL THERAPY | Age: 68
End: 2023-08-28
Payer: MEDICARE

## 2023-08-28 VITALS — HEIGHT: 70 IN | BODY MASS INDEX: 22.62 KG/M2 | WEIGHT: 158 LBS

## 2023-08-28 DIAGNOSIS — Z98.890 STATUS POST SURGERY: Primary | ICD-10-CM

## 2023-08-28 DIAGNOSIS — Z12.31 ENCOUNTER FOR SCREENING MAMMOGRAM FOR MALIGNANT NEOPLASM OF BREAST: ICD-10-CM

## 2023-08-28 DIAGNOSIS — M51.16 LUMBAR DISC DISEASE WITH RADICULOPATHY: ICD-10-CM

## 2023-08-28 PROCEDURE — 77063 BREAST TOMOSYNTHESIS BI: CPT

## 2023-08-28 PROCEDURE — 97110 THERAPEUTIC EXERCISES: CPT | Performed by: PHYSICAL THERAPIST

## 2023-08-28 PROCEDURE — 77067 SCR MAMMO BI INCL CAD: CPT

## 2023-08-28 PROCEDURE — 97140 MANUAL THERAPY 1/> REGIONS: CPT | Performed by: PHYSICAL THERAPIST

## 2023-08-28 NOTE — PROGRESS NOTES
Daily Note     Today's date: 2023  Patient name: Shane Waterman  : 1955  MRN: [id number]  Referring provider: Torey Saldivar  Dx:   Encounter Diagnosis     ICD-10-CM    1. Status post surgery  Z98.890       2. Lumbar disc disease with radiculopathy  M51.16                      Subjective: Patient reported she followed up with surgeon who reported to patient that her current symptom presentation is normal after her type of surgery. Patient noted her right knee pain is significantly increased which is limits all her mobility activities today. Patient noted she returns to pain management this coming Thursday, 23. Patient reported      Objective: See treatment diary below. Assessment: Patient's right knee pain aggravation limited her ability to perform resistive activities, repeated knee flexion activities on the bike or nu step. But, she presents with ability to move bilateral lower extremity without aggravating lumbar spine but right knee pain aggravation limitations. This pain aggravation mimics her standing based functional activities. Thus, PT is warranted to facilitate bilateral lower extremity mobility and strength, lumbar spine and bilateral knee and right thigh pain reduction to promote functional progress with self and house hold iadls. Plan: Continue per plan of care. Patient reported she returns to her surgeon on 23. Precautions: 10# lifting restriction. New comprehensive hep. Access Code: [email or web address]  URL: [email or web address]  Date: 2023  Prepared by: Claudeen Dad Due    Exercises  - Seated Slump Nerve Glide  - 2 x daily - 7 x weekly - 2 sets - 10 reps  - Supine Piriformis Stretch with Foot on Ground  - 2 x daily - 7 x weekly - 1 sets - 3 reps - 30 seconds hold  - Hooklying Single Knee to Chest Stretch  - 2 x daily - 7 x weekly - 1 sets - 3 reps - 30 seconds hold  - Supine ITB Stretch with Strap  - 2 x daily - 7 x weekly - 1 sets - 3 reps - 30 seconds hold  - Modified Edvin Stretch  - 2 x daily - 7 x weekly - 1 sets - 3 reps - 30 seconds hold  - Supine Transversus Abdominis Bracing - Hands on Stomach  - 2 x daily - 7 x weekly - 2 sets - 10 reps  - Supine Hip Adduction Isometric with Ball  - 2 x daily - 7 x weekly - 2 sets - 10 reps - 3 seconds hold  - Hooklying Isometric Hip Abduction with Belt  - 2 x daily - 7 x weekly - 2 sets - 10 reps - 3 seconds hold  - Bent Knee Fallouts  - 2 x daily - 7 x weekly - 2 sets - 10 reps  - Supine Bridge  - 2 x daily - 7 x weekly - 2 sets - 10 reps  - Supine Heel Slide  - 2 x daily - 7 x weekly - 2 sets - 10 reps  - Standing Heel Raises  - 2 x daily - 7 x weekly - 2 sets - 10 reps  - Standing Ankle Dorsiflexion with Table Support  - 2 x daily - 7 x weekly - 2 sets - 10 reps  - Standing March with Counter Support  - 2 x daily - 7 x weekly - 2 sets - 10 reps  - Standing Hip Abduction with Counter Support  - 2 x daily - 7 x weekly - 2 sets - 10 reps  - Standing Hip Extension with Counter Support  - 2 x daily - 7 x weekly - 2 sets - 10 reps  - Standing Knee Flexion with Counter Support  - 2 x daily - 7 x weekly - 2 sets - 10 reps  - Mini Squat with Counter Support  - 2 x daily - 7 x weekly - 2 sets - 10 reps      Manuals 8/28 7/25 7/27 7/31 8/3 8/7 8/10 8/14 8/17 8/24   Right Quad, piriformis, HF stretch  15 min total 15 min total 15 min total         STM to right thigh and hip in hook lying    Trial IASTM  10 min 15 min total 15 min 15 min total 10 min 10 min 10 min 10 min  10 min 10 min   Kinesio taping to bilateral knee is a "U" pattern       Educated on right thigh basket weave pattern                   Neuro Re-Ed                                       Standing lumbar spine extension against counter top 10 x 2        2 x 10 NT                Seated sciatic nerve SLIDES D/C 2 x 10 2 x 10 2 x 10 2 x 10 2 x 10 NT NT NT NT   TVA  NT 3 sec x 20 2 x 10  2 x 10 3 sec x 20 3 sec x 20 20x 3sec  3 sec x 20 NT   TVA + PB press NT  NT  NT NT NT NT NT NT   TVA+ hip ADD 10 x 2 with ball 2 x 10 2 x 10  2 x 10 2 x 10 NT 20X BALL 2SEC  3 sec x 20 20X 3SEC   TVA+ hip ABD 10 x 2 with blue tband 2 x 10 2 x 10  2 x 10 2 x 10 NT BTB 20X 2SEC  3 sec x 20 20X 3SEC 1.5#    Bent knee fall outs 2 x 10  2 x 10  NT 2 x 10 with TVA 2 x 10 NT 2 x 10 20X    Heel slides:R 2 x 10  2 x 10  NT NT 2 x 10 20X 2SEC  2 x 10 NT   Bridges 3 x 10  2 x 10  2 x 10 2 x 10 2 x 10 20X  2 x 10 20X    Supine hip abduction via heel slide:R 2 x 10       NT     SAQ;B:    Blue or Green bolster  NT       20X 2SEC   20X 2SEC 1.5#                   Prone lying  resume       2 min NT   MALIK  assess           Prone press ups  assess       5 x  NT                              Ther Ex             L/C           10 MIN    HEP 15'            NuStep Held today due to right knee pain aggravation 8 min seat 12 bilateral arm rest 13. 10 min seat 12 and arm rests 13 10 min seate 12 and arms 13 10 min seat 12 and arms 13 11 min seat 12 and arms 13 11 min seat 12 and arms at 13 10 MIN     13 arms  12 seat  10 min NT   HR/TR 2 x 10   2 x 10 2 x 10 2 x 10 2 x 10 20X  2 x 10 20X    Mini-squats 2 x 10   NT 2 x 10 2 x 10 2 x 10 NT NT 20X   3-way hip kicks 2 x 10   2 x 10 right sided only B x 20 B x 20 B x 20 20X  2 x 10 20X 1.5#    Hamstring curls:B: 2 x 10    B x 20 B x 20 B x 20 20X  2 x 10 20X 1.5#                 Side stepping  6 x      At ballet bar 10 feet x 3 in each direction At ballet bar 10 feet x 3 in each direction 6X  8 x  6X at mirror   1.5#    Tandem ambulation  At mirror  6 x      NT Parallel bars x 10 feet x 3 6X  8 x  6x 1.5#    Fwd step ups:B: 4" x 20     NT 4" x 20 NT 4" x 20 NT   Lateral step ups:B: 4" x 20     NT NT NT 4" x 20 NT   Step downs:B         4" x 20 NT                                           Ther Activity             Ant step ups          NT   Lat step ups          NT   Gait Training                                       Modalities             Heat PRN  To right anterior / lateral hip and thigh in hook lying x 10 minutes To right anterior / lateral hip and thigh in hook lying x 10 minutes To right anterior / lateral hip and thigh in hook lying x 10 minutes To right anterior / lateral hip and thigh in hook lying x 10 minutes To right anterior / lateral hip and thigh in hook lying x 10 minutes To right anterior / lateral hip and thigh in hook lying x 10 minutes 10 min  10 min 5 MIN   CP

## 2023-08-31 ENCOUNTER — APPOINTMENT (OUTPATIENT)
Dept: RADIOLOGY | Age: 68
End: 2023-08-31
Payer: MEDICARE

## 2023-08-31 ENCOUNTER — OFFICE VISIT (OUTPATIENT)
Dept: PHYSICAL THERAPY | Age: 68
End: 2023-08-31
Payer: MEDICARE

## 2023-08-31 ENCOUNTER — TELEPHONE (OUTPATIENT)
Age: 68
End: 2023-08-31

## 2023-08-31 ENCOUNTER — OFFICE VISIT (OUTPATIENT)
Dept: PAIN MEDICINE | Facility: CLINIC | Age: 68
End: 2023-08-31
Payer: MEDICARE

## 2023-08-31 VITALS
HEART RATE: 97 BPM | DIASTOLIC BLOOD PRESSURE: 93 MMHG | SYSTOLIC BLOOD PRESSURE: 141 MMHG | BODY MASS INDEX: 22.67 KG/M2 | WEIGHT: 158 LBS

## 2023-08-31 DIAGNOSIS — G89.29 CHRONIC PAIN OF RIGHT KNEE: Primary | ICD-10-CM

## 2023-08-31 DIAGNOSIS — M17.10 ARTHRITIS OF KNEE: ICD-10-CM

## 2023-08-31 DIAGNOSIS — M25.561 CHRONIC PAIN OF RIGHT KNEE: Primary | ICD-10-CM

## 2023-08-31 DIAGNOSIS — M51.16 LUMBAR DISC DISEASE WITH RADICULOPATHY: ICD-10-CM

## 2023-08-31 DIAGNOSIS — M62.838 MUSCLE SPASM: ICD-10-CM

## 2023-08-31 DIAGNOSIS — G89.29 CHRONIC PAIN OF RIGHT KNEE: ICD-10-CM

## 2023-08-31 DIAGNOSIS — M19.90 ARTHRITIS: ICD-10-CM

## 2023-08-31 DIAGNOSIS — M17.0 PRIMARY OSTEOARTHRITIS OF BOTH KNEES: ICD-10-CM

## 2023-08-31 DIAGNOSIS — Z98.890 STATUS POST SURGERY: Primary | ICD-10-CM

## 2023-08-31 DIAGNOSIS — M25.561 CHRONIC PAIN OF RIGHT KNEE: ICD-10-CM

## 2023-08-31 DIAGNOSIS — M48.061 LUMBAR FORAMINAL STENOSIS: ICD-10-CM

## 2023-08-31 PROCEDURE — 99214 OFFICE O/P EST MOD 30 MIN: CPT | Performed by: PHYSICAL MEDICINE & REHABILITATION

## 2023-08-31 PROCEDURE — 73562 X-RAY EXAM OF KNEE 3: CPT

## 2023-08-31 PROCEDURE — 97110 THERAPEUTIC EXERCISES: CPT

## 2023-08-31 PROCEDURE — 97140 MANUAL THERAPY 1/> REGIONS: CPT

## 2023-08-31 RX ORDER — DICLOFENAC SODIUM 75 MG/1
75 TABLET, DELAYED RELEASE ORAL 2 TIMES DAILY
Qty: 60 TABLET | Refills: 1 | Status: SHIPPED | OUTPATIENT
Start: 2023-08-31

## 2023-08-31 RX ORDER — METHOCARBAMOL 750 MG/1
750 TABLET, FILM COATED ORAL 2 TIMES DAILY PRN
Qty: 60 TABLET | Refills: 0 | Status: SHIPPED | OUTPATIENT
Start: 2023-08-31

## 2023-08-31 RX ORDER — LIDOCAINE 50 MG/G
1 PATCH TOPICAL DAILY
Qty: 30 PATCH | Refills: 1 | Status: SHIPPED | OUTPATIENT
Start: 2023-08-31

## 2023-08-31 NOTE — PROGRESS NOTES
Daily Note     Today's date: 2023  Patient name: Ginger Larson  : 1955  MRN: [id number]  Referring provider: Reese Chen  Dx:   Encounter Diagnosis     ICD-10-CM    1. Status post surgery  Z98.890       2. Lumbar disc disease with radiculopathy  M51.16                      Subjective: Patient reported "I have had a lot more pain over the last few days, I am having trouble walking" 8/10 at R LB, R hip and R LE     Objective: See treatment diary below. Assessment: Decreased pain after modalities and exercises to 5/10. Pt advised to use SPC to off load R LE temporally. Plan: Continue per plan of care. Patient reported she returns to her surgeon on 23. Precautions: 10# lifting restriction. New comprehensive hep. Access Code: [email or web address]  URL: [email or web address]  Date: 2023  Prepared by: Violeta Willson Due    Exercises  - Seated Slump Nerve Glide  - 2 x daily - 7 x weekly - 2 sets - 10 reps  - Supine Piriformis Stretch with Foot on Ground  - 2 x daily - 7 x weekly - 1 sets - 3 reps - 30 seconds hold  - Hooklying Single Knee to Chest Stretch  - 2 x daily - 7 x weekly - 1 sets - 3 reps - 30 seconds hold  - Supine ITB Stretch with Strap  - 2 x daily - 7 x weekly - 1 sets - 3 reps - 30 seconds hold  - Modified Edvin Stretch  - 2 x daily - 7 x weekly - 1 sets - 3 reps - 30 seconds hold  - Supine Transversus Abdominis Bracing - Hands on Stomach  - 2 x daily - 7 x weekly - 2 sets - 10 reps  - Supine Hip Adduction Isometric with Ball  - 2 x daily - 7 x weekly - 2 sets - 10 reps - 3 seconds hold  - Hooklying Isometric Hip Abduction with Belt  - 2 x daily - 7 x weekly - 2 sets - 10 reps - 3 seconds hold  - Bent Knee Fallouts  - 2 x daily - 7 x weekly - 2 sets - 10 reps  - Supine Bridge  - 2 x daily - 7 x weekly - 2 sets - 10 reps  - Supine Heel Slide  - 2 x daily - 7 x weekly - 2 sets - 10 reps  - Standing Heel Raises  - 2 x daily - 7 x weekly - 2 sets - 10 reps  - Standing Ankle Dorsiflexion with Table Support  - 2 x daily - 7 x weekly - 2 sets - 10 reps  - Standing March with Counter Support  - 2 x daily - 7 x weekly - 2 sets - 10 reps  - Standing Hip Abduction with Counter Support  - 2 x daily - 7 x weekly - 2 sets - 10 reps  - Standing Hip Extension with Counter Support  - 2 x daily - 7 x weekly - 2 sets - 10 reps  - Standing Knee Flexion with Counter Support  - 2 x daily - 7 x weekly - 2 sets - 10 reps  - Mini Squat with Counter Support  - 2 x daily - 7 x weekly - 2 sets - 10 reps      Manuals 8/28  8/31 7/27 7/31 8/3 8/7 8/10 8/14 8/17 8/24   Right Quad, piriformis, HF stretch   15 min total 15 min total         STM To R hip R ITB and R quad with laser    10 min 10 min     11W     Supine with bolster  15 min 15 min total 10 min 10 min 10 min 10 min  10 min 10 min   Kinesio taping to bilateral knee is a "U" pattern       Educated on right thigh basket weave pattern                   Neuro Re-Ed                                       Standing lumbar spine extension against counter top 10 x 2 20x        2 x 10 NT                Seated sciatic nerve SLIDES D/C   2 x 10 2 x 10 2 x 10 2 x 10 NT NT NT NT   TVA  NT NT 2 x 10  2 x 10 3 sec x 20 3 sec x 20 20x 3sec  3 sec x 20 NT   TVA + PB press NT NT NT  NT NT NT NT NT NT   TVA+ hip ADD 10 x 2 with ball 20X 3SEC  2 x 10  2 x 10 2 x 10 NT 20X BALL 2SEC  3 sec x 20 20X 3SEC   TVA+ hip ABD 10 x 2 with blue tband 20X 2SEC BTB  2 x 10  2 x 10 2 x 10 NT BTB 20X 2SEC  3 sec x 20 20X 3SEC 1.5#    Bent knee fall outs 2 x 10 NT 2 x 10  NT 2 x 10 with TVA 2 x 10 NT 2 x 10 20X    Heel slides:R 2 x 10 NT 2 x 10  NT NT 2 x 10 20X 2SEC  2 x 10 NT   Bridges 3 x 10 20X 2SEC  2 x 10  2 x 10 2 x 10 2 x 10 20X  2 x 10 20X    Supine hip abduction via heel slide:R 2 x 10 NT      NT     SAQ;B:    Blue or Green bolster  NT 20X 3SEC       20X 2SEC   20X 2SEC 1.5#                   Prone lying   NT       2 min NT   MALIK  NT           Prone press ups  NT 5 x  NT                              Ther Ex             L/C           10 MIN    HEP 15'            NuStep Held today due to right knee pain aggravation  NT 10 min seat 12 and arm rests 13 10 min seate 12 and arms 13 10 min seat 12 and arms 13 11 min seat 12 and arms 13 11 min seat 12 and arms at 13 10 MIN     13 arms  12 seat  10 min NT   HR/TR 2 x 10 NT  2 x 10 2 x 10 2 x 10 2 x 10 20X  2 x 10 20X    Mini-squats 2 x 10 NT  NT 2 x 10 2 x 10 2 x 10 NT NT 20X   3-way hip kicks 2 x 10 NT  2 x 10 right sided only B x 20 B x 20 B x 20 20X  2 x 10 20X 1.5#    Hamstring curls:B: 2 x 10 NT   B x 20 B x 20 B x 20 20X  2 x 10 20X 1.5#                 Side stepping  6 x  NT    At ballet bar 10 feet x 3 in each direction At ballet bar 10 feet x 3 in each direction 6X  8 x  6X at mirror   1.5#    Tandem ambulation  At mirror  6 x  NT    NT Parallel bars x 10 feet x 3 6X  8 x  6x 1.5#    Fwd step ups:B: 4" x 20 NT    NT 4" x 20 NT 4" x 20 NT   Lateral step ups:B: 4" x 20 NT    NT NT NT 4" x 20 NT   Step downs:B  NT       4" x 20 NT                                           Ther Activity             Ant step ups          NT   Lat step ups          NT   Gait Training                                       Modalities             Heat PRN   NT To right anterior / lateral hip and thigh in hook lying x 10 minutes To right anterior / lateral hip and thigh in hook lying x 10 minutes To right anterior / lateral hip and thigh in hook lying x 10 minutes To right anterior / lateral hip and thigh in hook lying x 10 minutes To right anterior / lateral hip and thigh in hook lying x 10 minutes 10 min  10 min 5 MIN   CP

## 2023-08-31 NOTE — TELEPHONE ENCOUNTER
Caller: karina    Doctor: pablo    Reason for call: pt did not receive her lidocaine patches. It looks like it needs auth. Pt is upset.     Call back#: [phone]

## 2023-08-31 NOTE — PROGRESS NOTES
Assessment:  1. Chronic pain of right knee    2. Arthritis    3. Arthritis of knee    4. Primary osteoarthritis of both knees    5. Lumbar disc disease with radiculopathy    6. Lumbar foraminal stenosis    7. Muscle spasm        Plan:  Ms. Gosia Spears is a pleasant 44-year-old female who presents to [facility] for follow-up and reevaluation regarding ongoing right-sided knee pain and intermittent radiating symptoms into her right leg. She is status post right-sided L3-L4 hemilaminectomy foraminotomy and discectomy but she continues to report ongoing knee and intermittent thigh pain right worse than left-sided. At this time we will order new x-ray of the right knee, refer to Dr. Kalen Londono and start the patient on Lidoderm patches 5% 12 hours on 12 hours off to the right knee, Robaxin as needed for muscle spasms and pain and diclofenac 75 mg twice a day. We attempted right genicular radiofrequency ablation which unfortunately did not provide significant relief in her pain. All questions answered, patient is agreeable with plan. My impressions and treatment recommendations were discussed in detail with the patient who verbalized understanding and had no further questions. Discharge instructions were provided. I personally saw and examined the patient and I agree with the above discussed plan of care. Orders Placed This Encounter   Procedures   • XR knee 3 vw right non injury     Standing Status:   Future     Standing Expiration Date:   8/31/2027     Scheduling Instructions:      Bring along any outside films relating to this procedure.          • Ambulatory referral to Orthopedic Surgery     Standing Status:   Future     Standing Expiration Date:   8/31/2024     Referral Priority:   Routine     Referral Type:   Consult - AMB     Referral Reason:   Specialty Services Required     Referred to Provider:   Marianne Bejarano DO     Requested Specialty:   Orthopedic Surgery     Number of Visits Requested:   1     Expiration Date:   8/31/2024     New Medications Ordered This Visit   Medications   • lidocaine (Lidoderm) 5 %     Sig: Apply 1 patch topically over 12 hours daily Remove & Discard patch within 12 hours or as directed by MD     Dispense:  30 patch     Refill:  1   • diclofenac (VOLTAREN) 75 mg EC tablet     Sig: Take 1 tablet (75 mg total) by mouth 2 (two) times a day     Dispense:  60 tablet     Refill:  1   • methocarbamol (Robaxin-750) 750 mg tablet     Sig: Take 1 tablet (750 mg total) by mouth 2 (two) times a day as needed for muscle spasms     Dispense:  60 tablet     Refill:  0       History of Present Illness:  Claire Jorge is a 70 y.o. female who presents for a follow up office visit in regards to Knee Pain (Right side/). The patient’s current symptoms include isolated right knee pain currently rated 6-8 out of 10 and interfere with activities. Pain is described as a constant burning, sharp, shooting pain that is worse in the morning in the evening. Presents today for follow-up and reevaluation. Of note, patient is status post right L3-L4 hemilaminectomy foraminotomy discectomy on May 31, 2023      I have personally reviewed and/or updated the patient's past medical history, past surgical history, family history, social history, current medications, allergies, and vital signs today. Review of Systems   Respiratory: Negative for shortness of breath. Cardiovascular: Negative for chest pain. Gastrointestinal: Negative for constipation, diarrhea, nausea and vomiting. Musculoskeletal: Positive for gait problem and joint swelling. Negative for arthralgias and myalgias. Skin: Negative for rash. Neurological: Negative for dizziness, seizures and weakness. All other systems reviewed and are negative.       Patient Active Problem List   Diagnosis   • Benign essential HTN   • Hypothyroidism   • Pelvic pain   • Vaginal cyst   • Chronic knee pain   • Traumatic injury of knee • Age-related cataract of both eyes   • High serum high density lipoprotein (HDL)   • Complex tear of medial meniscus of left knee as current injury   • Lumbar disc disease with radiculopathy   • Lumbar foraminal stenosis   • Primary osteoarthritis of both knees   • Osteoarthritis of knee   • Abnormal fasting glucose       Past Medical History:   Diagnosis Date   • Arthritis 2022    Treatments didn’t work   • Benign essential HTN 09/27/2017   • Chronic pain disorder    • Elevated BUN     resolved 3/10/17   • Fall from slipping 04/15/2022   • Fluid retention in legs     last assessed 5/12/15   • Hypothyroidism    • Lumbago with sciatica     last assessed 4/10/14   • Lumbar radiculopathy     last assessed 4/10/14   • Onychomycosis of toenail     last assessed 11/7/16   • Primary osteoarthritis of both knees 10/24/2022       Past Surgical History:   Procedure Laterality Date   • APPENDECTOMY     • BREAST CYST ASPIRATION Left 1993   • CATARACT EXTRACTION Right    • KNEE ARTHROSCOPY Left     therapeutic "30 years ago"   • MO LAMNOTMY INCL W/DCMPRSN NRV ROOT 1 INTRSPC LUMBR Right 5/31/2023    Procedure: RIGHT L3/4 HEMILAMINECTOMY,FORAMINOTOMY, DISCECTOMY;  Surgeon: Alize Reed MD;  Location: [facility];  Service: Neurosurgery       Family History   Problem Relation Age of Onset   • Breast cancer Mother 80   • Hypertension Mother    • No Known Problems Maternal Grandmother    • No Known Problems Maternal Grandfather    • No Known Problems Paternal Grandmother    • No Known Problems Paternal Grandfather    • Ovarian cancer Maternal Aunt 34   • No Known Problems Maternal Aunt    • No Known Problems Paternal Aunt    • Prostate cancer Paternal Uncle    • Ovarian cancer Cousin 54       Social History     Occupational History   • Not on file   Tobacco Use   • Smoking status: Never   • Smokeless tobacco: Never   Vaping Use   • Vaping Use: Never used   Substance and Sexual Activity   • Alcohol use: Yes     Comment: once a month   • Drug use: Never   • Sexual activity: Not Currently     Partners: Male       Current Outpatient Medications on File Prior to Visit   Medication Sig   • Acetaminophen (TYLENOL PO) Take 325-500 mg by mouth as needed   • AMILoride-hydrochlorothiazide (MODURETIC) 5-50 mg per tablet Take 1 tablet by mouth daily   • Ascorbic Acid (vitamin C) 1000 MG tablet Take 1,000 mg by mouth daily   • Calcium Carbonate-Vitamin D3 600-400 MG-UNIT TABS Take 2 tablets by mouth daily   • Collagen Hydrolysate POWD 1 Scoop by Does not apply route daily    • Glucosamine-Chondroit-Vit C-Mn (GLUCOSAMINE-CHONDROITIN) TABS Take 2 tablets by mouth daily   • levothyroxine 50 mcg tablet Take 1 tablet (50 mcg total) by mouth daily Alt with 1.5 tab M,W,F   • Magnesium Carbonate POWD 325 mg by Does not apply route daily   • Multiple Vitamin (MULTI-DAY) TABS Take 2 tablets by mouth daily Ultra Aric     No current facility-administered medications on file prior to visit. No Known Allergies    Physical Exam:    /93   Pulse 97   Wt 71.7 kg (158 lb)   BMI 22.67 kg/m²     Constitutional:normal, well developed, well nourished, alert, in no distress and non-toxic and no overt pain behavior.   Eyes:anicteric  HEENT:grossly intact  Neck:supple, symmetric, trachea midline and no masses   Pulmonary:even and unlabored  Cardiovascular:No edema or pitting edema present  Skin:Normal without rashes or lesions and well hydrated  Psychiatric:Mood and affect appropriate  Neurologic:Cranial Nerves II-XII grossly intact  Musculoskeletal:antalgic    Imaging

## 2023-09-06 ENCOUNTER — APPOINTMENT (OUTPATIENT)
Dept: PHYSICAL THERAPY | Age: 68
End: 2023-09-06
Payer: MEDICARE

## 2023-09-08 ENCOUNTER — OFFICE VISIT (OUTPATIENT)
Dept: PHYSICAL THERAPY | Age: 68
End: 2023-09-08
Payer: MEDICARE

## 2023-09-08 DIAGNOSIS — M51.16 LUMBAR DISC DISEASE WITH RADICULOPATHY: ICD-10-CM

## 2023-09-08 DIAGNOSIS — Z98.890 STATUS POST SURGERY: Primary | ICD-10-CM

## 2023-09-08 PROCEDURE — 97110 THERAPEUTIC EXERCISES: CPT | Performed by: PHYSICAL THERAPIST

## 2023-09-08 PROCEDURE — 97112 NEUROMUSCULAR REEDUCATION: CPT | Performed by: PHYSICAL THERAPIST

## 2023-09-08 NOTE — PROGRESS NOTES
Daily Note     Today's date: 2023  Patient name: Inés Lee  : 1955  MRN: [id number]  Referring provider: Dennis Awad  Dx:   Encounter Diagnosis     ICD-10-CM    1. Status post surgery  Z98.890       2. Lumbar disc disease with radiculopathy  M51.16                      Subjective: Patient reported she saw pain management for her right knee, which she noted he referred her to Orthopedic specialist.  Patient noted right knee pain is now more limiting to her functional mobility than right thigh, hip and lumbar spine symptoms with right knee pain at moderate to severe levels. Objective: See treatment diary below. Assessment: Patient exhibits short term pain reduction with use of Laser and closed kinetic chain activities which is exhibited by gait pace, lumbar spine rotation and bilateral step length improved. But, she lacks long term pain reduction primarily in right knee and secondarily in right anterior / lateral hip that limits standing, walking, transfers and stair climbing activities. Thus, PT is warranted to facilitate lumbar spine and right lower extremity mobility and pain reduction along with right lower extremity strength to promote full functional progress with self iadls, house hold chores and recreational activities. Plan: Continue per plan of care. Patient reported she returns to her surgeon on 23. Precautions: 10# lifting restriction.        Manuals 8/28  8/31 9/8 7/31 8/3 8/7 8/10 8/14 8/17 8/24   Right Quad, piriformis, HF stretch    15 min total         STM To R hip R ITB and R quad with laser    10 min Laser: 10 min     11W     Supine with bolster  Laser: 10 min     11W     Supine with bolster  15 min total 10 min 10 min 10 min 10 min  10 min 10 min   Kinesio taping to bilateral knee is a "U" pattern       Educated on right thigh basket weave pattern                   Neuro Re-Ed                                       Standing lumbar spine extension against counter top 10 x 2 20x  2 x 10      2 x 10 NT                Seated sciatic nerve SLIDES D/C   NT 2 x 10 2 x 10 2 x 10 NT NT NT NT   TVA  NT NT NT  2 x 10 3 sec x 20 3 sec x 20 20x 3sec  3 sec x 20 NT   TVA + PB press NT NT NT  NT NT NT NT NT NT   TVA+ hip ADD 10 x 2 with ball 20X 3SEC  NT  2 x 10 2 x 10 NT 20X BALL 2SEC  3 sec x 20 20X 3SEC   TVA+ hip ABD 10 x 2 with blue tband 20X 2SEC BTB  NT  2 x 10 2 x 10 NT BTB 20X 2SEC  3 sec x 20 20X 3SEC 1.5#    Bent knee fall outs 2 x 10 NT NT  NT 2 x 10 with TVA 2 x 10 NT 2 x 10 20X    Heel slides:R 2 x 10 NT NT  NT NT 2 x 10 20X 2SEC  2 x 10 NT   Bridges 3 x 10 20X 2SEC  NT  2 x 10 2 x 10 2 x 10 20X  2 x 10 20X    Supine hip abduction via heel slide:R 2 x 10 NT NT     NT     SAQ;B:    Blue or Green bolster  NT 20X 3SEC  NT     20X 2SEC   20X 2SEC 1.5#                   Prone lying   NT NT      2 min NT   MALIK  NT NT          Prone press ups  NT NT      5 x  NT                              Ther Ex             L/C           10 MIN    HEP 15'            NuStep Held today due to right knee pain aggravation  NT 10 min seat 12 and arm rests 13 10 min seate 12 and arms 13 10 min seat 12 and arms 13 11 min seat 12 and arms 13 11 min seat 12 and arms at 13 10 MIN     13 arms  12 seat  10 min NT   Standing HR/TR 2 x 10 NT 2 x 10 2 x 10 2 x 10 2 x 10 2 x 10 20X  2 x 10 20X    Mini-squats 2 x 10 NT 2 x 10 NT 2 x 10 2 x 10 2 x 10 NT NT 20X   3-way hip kicks 2 x 10 NT 2 x 10 2 x 10 right sided only B x 20 B x 20 B x 20 20X  2 x 10 20X 1.5#    Hamstring curls:B: 2 x 10 NT NT  B x 20 B x 20 B x 20 20X  2 x 10 20X 1.5#                 Side stepping  6 x  NT 6 x   At ballet bar 10 feet x 3 in each direction At ballet bar 10 feet x 3 in each direction 6X  8 x  6X at mirror   1.5#    Tandem ambulation  At mirror  6 x  NT 6 x    NT Parallel bars x 10 feet x 3 6X  8 x  6x 1.5#    Fwd step ups:B: 4" x 20 NT NT   NT 4" x 20 NT 4" x 20 NT   Lateral step ups:B: 4" x 20 NT NT   NT NT NT 4" x 20 NT   Step downs:B  NT NT      4" x 20 NT                                           Ther Activity             Ant step ups          NT   Lat step ups          NT   Gait Training                                       Modalities             Heat PRN   NT NT To right anterior / lateral hip and thigh in hook lying x 10 minutes To right anterior / lateral hip and thigh in hook lying x 10 minutes To right anterior / lateral hip and thigh in hook lying x 10 minutes To right anterior / lateral hip and thigh in hook lying x 10 minutes 10 min  10 min 5 MIN   CP

## 2023-09-11 ENCOUNTER — OFFICE VISIT (OUTPATIENT)
Dept: PHYSICAL THERAPY | Age: 68
End: 2023-09-11
Payer: MEDICARE

## 2023-09-11 DIAGNOSIS — M51.16 LUMBAR DISC DISEASE WITH RADICULOPATHY: ICD-10-CM

## 2023-09-11 DIAGNOSIS — Z98.890 STATUS POST SURGERY: Primary | ICD-10-CM

## 2023-09-11 PROCEDURE — 97112 NEUROMUSCULAR REEDUCATION: CPT | Performed by: PHYSICAL THERAPIST

## 2023-09-11 PROCEDURE — 97110 THERAPEUTIC EXERCISES: CPT | Performed by: PHYSICAL THERAPIST

## 2023-09-11 NOTE — PROGRESS NOTES
Daily Note     Today's date: 2023  Patient name: Billy Chung  : 1955  MRN: [id number]  Referring provider: Tushar Carranza  Dx:   Encounter Diagnosis     ICD-10-CM    1. Status post surgery  Z98.890       2. Lumbar disc disease with radiculopathy  M51.16                      Subjective: Patient had an x-ray of her right knee which was - for bony patholgy. Patient noted right knee pain is now more limiting to her functional mobility than right thigh, hip and lumbar spine symptoms with right knee pain at moderate to severe levels. Plus, she reported she is able to walk further with outside walking, but right knee and hip pain will limit her distance ambulation. Patient reported she feels use of Laser treatment on right thigh and hip musculature has created pain reduction that she is getting more pain reduction overall since lumbar spine surgical intervention. Objective: See treatment diary below. Assessment: Patient exhibits short term pain reduction with use of Laser and closed kinetic chain activities which is exhibited by short term gait pace, lumbar spine rotation and bilateral step length improved as well as long term her distance walking has improved which she is up to 2 miles of outside walking. Patient exhibits active hip flexion as her primary functional movement that limits walking, stair climbing and transfers. Also, standing lumbar spine extension currently aggravated right anterior / lateral hip and thigh pain. But, she lacks long term pain reduction primarily in right knee and secondarily in right anterior / lateral hip that limits standing, walking, transfers and stair climbing activities. Thus, PT is warranted to facilitate lumbar spine and right lower extremity mobility and pain reduction along with right lower extremity strength to promote full functional progress with self iadls, house hold chores and recreational activities. Plan: Continue per plan of care. Patient reported she returns to her surgeon on 8/24/23. Precautions: 10# lifting restriction.        Manuals 8/28  8/31 9/8 9/11 8/3 8/7 8/10 8/14 8/17 8/24   Right Quad, piriformis, HF stretch             STM To R hip R ITB and R quad with laser    10 min Laser: 10 min     11W     Supine with bolster  Laser: 10 min     11W     Supine with bolster  10 min laser 11W     Supine with bolster 10 min 10 min 10 min 10 min  10 min 10 min   Kinesio taping to bilateral knee is a "U" pattern       Educated on right thigh basket weave pattern                   Neuro Re-Ed                                       Standing lumbar spine extension against counter top 10 x 2 20x  2 x 10      2 x 10 NT                Seated sciatic nerve SLIDES D/C   NT NT 2 x 10 2 x 10 NT NT NT NT   TVA  NT NT NT NT 2 x 10 3 sec x 20 3 sec x 20 20x 3sec  3 sec x 20 NT   TVA + PB press NT NT NT NT NT NT NT NT NT NT   TVA+ hip ADD 10 x 2 with ball 20X 3SEC  NT NT 2 x 10 2 x 10 NT 20X BALL 2SEC  3 sec x 20 20X 3SEC   TVA+ hip ABD 10 x 2 with blue tband 20X 2SEC BTB  NT NT 2 x 10 2 x 10 NT BTB 20X 2SEC  3 sec x 20 20X 3SEC 1.5#    Bent knee fall outs 2 x 10 NT NT NT NT 2 x 10 with TVA 2 x 10 NT 2 x 10 20X    Heel slides:R 2 x 10 NT NT NT NT NT 2 x 10 20X 2SEC  2 x 10 NT   Bridges 3 x 10 20X 2SEC  NT NT 2 x 10 2 x 10 2 x 10 20X  2 x 10 20X    Supine hip abduction via heel slide:R 2 x 10 NT NT NT    NT     SAQ;B:    Blue or Green bolster  NT 20X 3SEC  NT NT    20X 2SEC   20X 2SEC 1.5#                   Prone lying   NT NT NT     2 min NT   MALIK  NT NT NT         Prone press ups  NT NT NT     5 x  NT                              Ther Ex             L/C           10 MIN    HEP 15'            NuStep Held today due to right knee pain aggravation  NT 10 min seat 12 and arm rests 13 10 min seate 12 and arms 13 10 min seat 12 and arms 13 11 min seat 12 and arms 13 11 min seat 12 and arms at 13 10 MIN     13 arms  12 seat  10 min NT   Standing HR/TR 2 x 10 NT 2 x 10 2 x 10 2 x 10 2 x 10 2 x 10 20X  2 x 10 20X    Mini-squats 2 x 10 NT 2 x 10 2 x 10 2 x 10 2 x 10 2 x 10 NT NT 20X   3-way hip kicks 2 x 10 NT 2 x 10 2 x 10 with 2# B x 20 B x 20 B x 20 20X  2 x 10 20X 1.5#    Hamstring curls:B: 2 x 10 NT NT 2 x 20 with 2# B x 20 B x 20 B x 20 20X  2 x 10 20X 1.5#                 Side stepping  6 x  NT 6 x 6 x with 2#  At ballet bar 10 feet x 3 in each direction At ballet bar 10 feet x 3 in each direction 6X  8 x  6X at mirror   1.5#    Tandem ambulation  At mirror  6 x  NT 6 x  6 x with 2#  NT Parallel bars x 10 feet x 3 6X  8 x  6x 1.5#    Fwd step ups:B: 4" x 20 NT NT NT  NT 4" x 20 NT 4" x 20 NT   Lateral step ups:B: 4" x 20 NT NT NT  NT NT NT 4" x 20 NT   Step downs:B  NT NT NT     4" x 20 NT   SLS and tandem stance on foam:B:    30 sec x 2                                    Ther Activity             Ant step ups          NT   Lat step ups          NT   Gait Training                                       Modalities             Heat PRN   NT NT To right anterior / lateral hip and thigh in hook lying x 10 minutes To right anterior / lateral hip and thigh in hook lying x 10 minutes To right anterior / lateral hip and thigh in hook lying x 10 minutes To right anterior / lateral hip and thigh in hook lying x 10 minutes 10 min  10 min 5 MIN   CP

## 2023-09-14 ENCOUNTER — OFFICE VISIT (OUTPATIENT)
Dept: PHYSICAL THERAPY | Age: 68
End: 2023-09-14
Payer: MEDICARE

## 2023-09-14 DIAGNOSIS — Z98.890 STATUS POST SURGERY: Primary | ICD-10-CM

## 2023-09-14 DIAGNOSIS — M51.16 LUMBAR DISC DISEASE WITH RADICULOPATHY: ICD-10-CM

## 2023-09-14 PROCEDURE — 97110 THERAPEUTIC EXERCISES: CPT | Performed by: PHYSICAL THERAPIST

## 2023-09-14 PROCEDURE — 97140 MANUAL THERAPY 1/> REGIONS: CPT | Performed by: PHYSICAL THERAPIST

## 2023-09-14 PROCEDURE — 97112 NEUROMUSCULAR REEDUCATION: CPT | Performed by: PHYSICAL THERAPIST

## 2023-09-14 NOTE — PROGRESS NOTES
Daily Note     Today's date: 2023  Patient name: Hiram Alex  : 1955  MRN: [id number]  Referring provider: Stephenie Morales  Dx:   Encounter Diagnosis     ICD-10-CM    1. Status post surgery  Z98.890       2. Lumbar disc disease with radiculopathy  M51.16                      Subjective: Patient reported right lateral hip, anterior thigh and bilateral knee at moderate levels with all weight baring activities and transfers limited by pain aggravation. Objective: See treatment diary below. Assessment: Patient presents with ability to perform closed kinetic activities with bilateral knee extended without pain aggravation. Plus, she is able to perform closed kinetic chain knee flexion at 60 degrees of mobility or less without pain aggravation, but knee flexion at > 60 degrees is pain aggravating. Thus, PT is warranted to facilitate lumbar spine and right lower extremity mobility and pain reduction along with right lower extremity strength to promote full functional progress with self iadls, house hold chores and recreational activities. Plan: Continue per plan of care. Patient reported she returns to her surgeon on 23. Precautions: 10# lifting restriction.        Manuals 8/28  8/31 9/8 9/11 9/14 8/7 8/10 8/14 8/17 8/24   Right Quad, piriformis, HF stretch             STM To R hip R ITB and R quad with laser    10 min Laser: 10 min     11W     Supine with bolster  Laser: 10 min     11W     Supine with bolster  10 min laser 11W     Supine with bolster 10 min 10 min 10 min 10 min  10 min 10 min   Kinesio taping to bilateral knee is a "U" pattern       Educated on right thigh basket weave pattern                   Neuro Re-Ed                                       Standing lumbar spine extension against counter top 10 x 2 20x  2 x 10      2 x 10 NT                Seated sciatic nerve SLIDES D/C   NT NT 2 x 10 2 x 10 NT NT NT NT   TVA  NT NT NT NT 2 x 10 3 sec x 20 3 sec x 20 20x 3sec  3 sec x 20 NT   TVA + PB press NT NT NT NT NT NT NT NT NT NT   TVA+ hip ADD 10 x 2 with ball 20X 3SEC  NT NT 2 x 10 2 x 10 NT 20X BALL 2SEC  3 sec x 20 20X 3SEC   TVA+ hip ABD 10 x 2 with blue tband 20X 2SEC BTB  NT NT 2 x 10 2 x 10 NT BTB 20X 2SEC  3 sec x 20 20X 3SEC 1.5#    Bent knee fall outs 2 x 10 NT NT NT NT 2 x 10 with TVA 2 x 10 NT 2 x 10 20X    Heel slides:R 2 x 10 NT NT NT NT NT 2 x 10 20X 2SEC  2 x 10 NT   Bridges 3 x 10 20X 2SEC  NT NT 2 x 10 2 x 10 2 x 10 20X  2 x 10 20X    Supine hip abduction via heel slide:R 2 x 10 NT NT NT    NT     SAQ;B:    Blue or Green bolster  NT 20X 3SEC  NT NT    20X 2SEC   20X 2SEC 1.5#                   Prone lying   NT NT NT     2 min NT   MALIK  NT NT NT         Prone press ups  NT NT NT     5 x  NT                              Ther Ex             L/C           10 MIN    HEP 15'            NuStep Held today due to right knee pain aggravation  NT 10 min seat 12 and arm rests 13 10 min seate 12 and arms 13 10 min seat 12 and arms 13 11 min seat 12 and arms 13 11 min seat 12 and arms at 13 10 MIN     13 arms  12 seat  10 min NT   Standing HR/TR 2 x 10 NT 2 x 10 2 x 10 2 x 10 2 x 10 2 x 10 20X  2 x 10 20X    Mini-squats 2 x 10 NT 2 x 10 2 x 10 2 x 10 2 x 10 2 x 10 NT NT 20X   3-way hip kicks 2 x 10 NT 2 x 10 2 x 10 with 2# B x 20 with 2#  B x 20 B x 20 20X  2 x 10 20X 1.5#    Hamstring curls:B: 2 x 10 NT NT 2 x 20 with 2# B x 20 with 2# B x 20 B x 20 20X  2 x 10 20X 1.5#    Lunges:B: on foam     2 x 10 with 2#        Side stepping  6 x  NT 6 x 6 x with 2# 6 x with 2# At ballet bar 10 feet x 3 in each direction At ballet bar 10 feet x 3 in each direction 6X  8 x  6X at mirror   1.5#    Tandem ambulation  At mirror  6 x  NT 6 x  6 x with 2# 6 x with 2# NT Parallel bars x 10 feet x 3 6X  8 x  6x 1.5#    Fwd step ups:B: 4" x 20 NT NT NT 6" x 10 with 2#:B: NT 4" x 20 NT 4" x 20 NT   Lateral step ups:B: 4" x 20 NT NT NT 6" x 10 with 2#:B: NT NT NT 4" x 20 NT   Step downs:B  NT NT NT 4" x 20 with 2#    4" x 20 NT   SLS and tandem stance on foam:B:    30 sec x 2 30 sec x 2                                   Ther Activity             Ant step ups          NT   Lat step ups          NT   Gait Training                                       Modalities             Heat PRN   NT NT To right anterior / lateral hip and thigh in hook lying x 10 minutes To right anterior / lateral hip and thigh in hook lying x 10 minutes To right anterior / lateral hip and thigh in hook lying x 10 minutes To right anterior / lateral hip and thigh in hook lying x 10 minutes 10 min  10 min 5 MIN   CP

## 2023-09-15 DIAGNOSIS — G89.29 CHRONIC PAIN OF RIGHT KNEE: ICD-10-CM

## 2023-09-15 DIAGNOSIS — M17.0 PRIMARY OSTEOARTHRITIS OF BOTH KNEES: ICD-10-CM

## 2023-09-15 DIAGNOSIS — M51.16 LUMBAR DISC DISEASE WITH RADICULOPATHY: ICD-10-CM

## 2023-09-15 DIAGNOSIS — M62.838 MUSCLE SPASM: ICD-10-CM

## 2023-09-15 DIAGNOSIS — M25.561 CHRONIC PAIN OF RIGHT KNEE: ICD-10-CM

## 2023-09-15 DIAGNOSIS — M48.061 LUMBAR FORAMINAL STENOSIS: ICD-10-CM

## 2023-09-15 DIAGNOSIS — M17.10 ARTHRITIS OF KNEE: ICD-10-CM

## 2023-09-15 DIAGNOSIS — M19.90 ARTHRITIS: ICD-10-CM

## 2023-09-15 RX ORDER — METHOCARBAMOL 750 MG/1
TABLET, FILM COATED ORAL
Qty: 60 TABLET | Refills: 0 | OUTPATIENT
Start: 2023-09-15

## 2023-09-15 NOTE — TELEPHONE ENCOUNTER
Please refuse medication request with note "Patient needs to contact office for refill", request came from pharmacy.       Rx was just given 8/31 for 60 qty

## 2023-09-18 ENCOUNTER — TELEPHONE (OUTPATIENT)
Dept: INTERNAL MEDICINE CLINIC | Age: 68
End: 2023-09-18

## 2023-09-18 ENCOUNTER — OFFICE VISIT (OUTPATIENT)
Dept: PHYSICAL THERAPY | Age: 68
End: 2023-09-18
Payer: MEDICARE

## 2023-09-18 DIAGNOSIS — M51.16 LUMBAR DISC DISEASE WITH RADICULOPATHY: ICD-10-CM

## 2023-09-18 DIAGNOSIS — Z98.890 STATUS POST SURGERY: Primary | ICD-10-CM

## 2023-09-18 PROCEDURE — 97110 THERAPEUTIC EXERCISES: CPT

## 2023-09-18 PROCEDURE — 97140 MANUAL THERAPY 1/> REGIONS: CPT

## 2023-09-18 NOTE — PROGRESS NOTES
Daily Note     Today's date: 2023  Patient name: Luba Scherer  : 1955  MRN: [id number]  Referring provider: Sam Doyle  Dx:   Encounter Diagnosis     ICD-10-CM    1. Status post surgery  Z98.890       2. Lumbar disc disease with radiculopathy  M51.16                      Subjective: Patient reported "I am feeling stronger I started doing exercises in the pool"          Objective: See treatment diary below. Assessment: Improving slowly. R LE soreness with manual work to [employer]. Progress as able     Plan: Continue per plan of care. Patient reported she returns to her surgeon on 23. Precautions: 10# lifting restriction.        Manuals    Right Quad, piriformis, HF stretch             STM To R hip R ITB and R quad with laser    10 min Laser: 10 min     11W     Supine with bolster  Laser: 10 min     11W     Supine with bolster  10 min laser 11W     Supine with bolster 10 min 10 min    10 min 10 min   Kinesio taping to bilateral knee is a "U" pattern                           Neuro Re-Ed                                       Standing lumbar spine extension against counter top 10 x 2 20x  2 x 10      2 x 10 NT                 Seated sciatic nerve SLIDES D/C   NT NT 2 x 10 NT   NT NT   TVA  NT NT NT NT 2 x 10 NT   3 sec x 20 NT   TVA + PB press NT NT NT NT NT NT   NT NT   TVA+ hip ADD 10 x 2 with ball 20X 3SEC  NT NT 2 x 10 NT   3 sec x 20 20X 3SEC   TVA+ hip ABD 10 x 2 with blue tband 20X 2SEC BTB  NT NT 2 x 10  NT    3 sec x 20 20X 3SEC 1.5#    Bent knee fall outs 2 x 10 NT NT NT NT NT   2 x 10 20X    Heel slides:R 2 x 10 NT NT NT NT NT   2 x 10 NT   Bridges 3 x 10 20X 2SEC  NT NT 2 x 10 20X 3SEC    2 x 10 20X    Supine hip abduction via heel slide:R 2 x 10 NT NT NT  NT       SAQ;B:    Blue or Green bolster  NT 20X 3SEC  NT NT  20X 3# 3SEC      20X 2SEC 1.5#                   Prone lying   NT NT NT  NT   2 min NT   MALIK  NT NT NT  NT       Prone press ups  NT NT NT  NT   5 x  NT                              Ther Ex             L/C       NT    10 MIN      15'            NuStep Held today due to right knee pain aggravation  NT 10 min seat 12 and arm rests 13 10 min seate 12 and arms 13 10 min seat 12 and arms 13  10 min    10 min NT   Standing HR/TR 2 x 10 NT 2 x 10 2 x 10 2 x 10 30X    2 x 10 20X    Mini-squats 2 x 10 NT 2 x 10 2 x 10 2 x 10    NT 20X   3-way hip kicks B 2 x 10 NT 2 x 10 2 x 10 with 2# B x 20 with 2#  20X 2.5#     2 x 10 20X 1.5#    Hamstring curls:B: 2 x 10 NT NT 2 x 20 with 2# B x 20 with 2# 20X 2.5#    2 x 10 20X 1.5#    Lunges:B: on foam     2 x 10 with 2# 20X        Side stepping  6 x  NT 6 x 6 x with 2# 6 x with 2#  6X 2.5#    8 x  6X at mirror   1.5#    Tandem ambulation  At mirror  6 x  NT 6 x  6 x with 2# 6 x with 2# 6X 2.5#    8 x  6x 1.5#    Fwd step ups:B: 4" x 20 NT NT NT 6" x 10 with 2#:B: NT   4" x 20 NT   Lateral step ups:B: 4" x 20 NT NT NT 6" x 10 with 2#:B: NT   4" x 20 NT   Step downs:B  NT NT NT 4" x 20 with 2# NT   4" x 20 NT   SLS and tandem stance on foam:B:    30 sec x 2 30 sec x 2 30SEC 5X                     Standing march B       20x 2.5#         Ther Activity             Ant step ups          NT   Lat step ups          NT   Gait Training                                       Modalities             Heat PRN   NT NT To right anterior / lateral hip and thigh in hook lying x 10 minutes To right anterior / lateral hip and thigh in hook lying x 10 minutes  MHP to R LE   Quad/ and abdomen    CP to R knee    10 min 5 MIN   CP

## 2023-09-18 NOTE — TELEPHONE ENCOUNTER
Patient called to have a temporary parking placard filled out by Dr Julia Arriaga. Dr Julia Arriaga will be in the [facility] for a quick appointment on 9/19. Please have Dr Julia Arriaga fill out the reason for the placard and sign it. Call the patient to  at [phone] and patient is aware there is a 5$ form fee.

## 2023-09-20 ENCOUNTER — RA CDI HCC (OUTPATIENT)
Dept: OTHER | Facility: HOSPITAL | Age: 68
End: 2023-09-20

## 2023-09-21 ENCOUNTER — EVALUATION (OUTPATIENT)
Dept: PHYSICAL THERAPY | Age: 68
End: 2023-09-21
Payer: MEDICARE

## 2023-09-21 DIAGNOSIS — M51.16 LUMBAR DISC DISEASE WITH RADICULOPATHY: Primary | ICD-10-CM

## 2023-09-21 DIAGNOSIS — Z98.890 STATUS POST SURGERY: ICD-10-CM

## 2023-09-21 PROCEDURE — 97110 THERAPEUTIC EXERCISES: CPT | Performed by: PHYSICAL THERAPIST

## 2023-09-21 PROCEDURE — 97140 MANUAL THERAPY 1/> REGIONS: CPT | Performed by: PHYSICAL THERAPIST

## 2023-09-21 NOTE — PROGRESS NOTES
PT Evaluation / PT Reassessment    Today's date: 2023  Patient name: Yung Koenig  : 1955  MRN: [id number]  Referring provider: Wicho Turcios  Dx:   Encounter Diagnosis     ICD-10-CM    1. Lumbar disc disease with radiculopathy  M51.16 Ambulatory referral to Physical Therapy      2. Status post surgery  Z98.890 Ambulatory referral to Physical Therapy          Start Time: 1700  Stop Time: 1800  Total time in clinic (min): 60 minutes    Assessment/Plan   PT Reassessment: 23. Patient reported the following progress since onset of PT: decrease in right hip, thigh and knee pain, walking, right lower extremity and lumbar spine mobility progress, reduction in pain medication with now using tylenol 2-3 x per day, pain reduction, standing and walking based functional improvements and transfer progress. But, she presents with the following deficits that still persists: prolonged sitting, transfers after prolonged sitting, right lateral hip, anterior / lateral thigh and knee pain, 2-3 bouts of sharp right lateral / anterior thigh and knee pain, unable to resume driving, + slump and sciatic nerve tension tests, and repeated stair climbing. PT Reassessment: 2023. Patient reported the following progress since onset of PT: able to increase outside walking to 20 minutes, getting in and out of bed, moving her bilateral lower extremity and lumbar spine better, standing activities, self dressing requires less assistance of spouse and no longer taking gabapentin. But, she noted the following deficits that still persists: right thigh pain, prolonged walking, using tylenol for symptom reduction, prolonged sitting, prolonged riding in the car, dressing, right patellofemoral region pain. Also, patient exhibits moderate to severe pain aggravation with active hip flexion which limits stair climbing to non reciprocal pattern and leading with left lower extremity.   Plus, all attempts of stair climbing remain non reciprocal due to pain and weakness and right hip and 4" simulation of stair climbing patient exhibits circumduction due to lack of pain free flexion. 07/19/23: PT IE:  Radha Alvarez is a 70 y.o. female who was referred to physical therapy for management of pain/dysfunction s/p lumbar surgery. She presents with expected post-surgical deficits including gait abnormality, decreased lower extremity strength, and report of pain. Consequently, patient has difficulty completing ADLs including ambulation, driving, bed mobility. Jt Shadow would benefit from skilled intervention to address all deficits and improve functional capability. Patient is a good candidate for therapy, pending compliance with HEP and consistent participation in physical therapy. Thank you for the referral and please do not hesitate to contact me with any questions or concerns regarding Radha’s care! Plan  Frequency: 2x per week   Duration in weeks: 12  POC start date: 7/19/23  POC end date: 10/11/23   Therapeutic exercise/activity, neuromuscular reeducation, manual therapy, aquatic therapy, and modalities. Patient understands and agrees to plan of care. Goals  Short Term--4 weeks  1. Patient will demonstrate 2 point decrease in pain levels. MET. 2. Patient will demonstrate 1/2 point increase in all deficient MMT scores. MET. 3. Patient will decrease TUG time by 5 seconds. Partially MET. Long Term--By Discharge  1. Patient will achieve expected FOTO score. Partially MET. 2. Patient will ambulate for 30 minutes with normalized gait pattern and minimal to no onset of pain. Partially MET. 3. Patient will be able to sit without limitation. Partially MET. 4. Patient will be independent with all ADLs. Partially MET. Patient's Goal: Get back to walking in her neighborhood. MET.     Patient presents with the following progress since onset of PT: decrease in right hip and thigh pain, increase in lumbar spine mobility, increase in right lower extremity strength, increase in lumbar spine mobility, gait and balance improvements, transfer improvements, self functional progress. But, she presents with the following deficits that still persists that warranted continued PT to facilitate impairment and functional progress: right hip and thigh pain, decrease in lumbar spine mobility, decrease in right lower extremity mobility and strength, gait and transfer dysfunctions, balance deficits, and self functional deficits. Thus, patient would benefit under the following PT POC to facilitate functional and impairments: therapeutic exercises and activities to facilitate lumbar spine and right hip / thigh pain reduction, gait and transfer training, balance activities, DLS and abdominal strengthening, IASTM techniques, Kinesio taping techniques and a hep. Subjective  History   Date of Surgery: 5/31/23 Description: Right L3/4 hemilaminectomy, foraminotomy, discectomy     Symptoms  Constant pain and stiffness in her lower back and right leg. Pain can be sharp, shooting if she moves the "wrong way." Patient not using AD, but heavily relies on her  in order to walk safely. She has a lot of difficulty with basic completing basic ADLs such as dressing, bathing, etc and bed mobility/sit to stands are also very challenging/painful. Right Thigh and knee region  Pain at best: 3  Pain at worst: [id number] [address] lives with her  in a multistory home. Objective     GAIT: Stiff trunk with severe muscle guarding at lumbar spine erector spinae, decrease in bilateral step length, decreased right hip extension in terminal stance, decreased step length--bilaterally, decrease in pace, decrease in bilateral upper extremity swing. Stair climbing: non reciprocal pattern with bilateral upper extremity support. Patient is moderate TTP at right quadriceps musculature. Foto: PT IE at 27 and now at 39.     Timed Up And Go: 20.31 seconds on PT IE and at 17.90 seconds on 23 reassessment and now at 13.56 seconds on 23 PT Reassessment. Lumbar  Degrees   Flex. 88 now at 80   Extn. 28 now at 28   SB Left 20 now at 20   SB Right 20 now at 20   ROT Left 52 now at 61   ROT Right 58 now at 59           MMT    Hip       L       R   Flex. 4+ 3+ now 4   Extn. 4 4   Abd. 4+ 4- now 4                 MMT    Knee         L        R   Flex. 5 4+   Extn. 5 4+                MMT    Ankle       L        R   PF 5 4   DF. 5 4+ now at 5   Inv. 5 4+ now at 5   Ever. 5 4 now at 4+       Slump test: L= NEG    R=  POS               Transverse abdominis: Bent knee fall out= good       Daily Note     Today's date: 2023  Patient name: Hansa Richardson  : 1955  MRN: [id number]  Referring provider: Morena Rothman,*  Dx:   Encounter Diagnosis     ICD-10-CM    1. Lumbar disc disease with radiculopathy  M51.16       2. Status post surgery  Z98.890                      Subjective: Patient reported the following progress since onset of PT: decrease in right hip, thigh and knee pain, walking, right lower extremity and lumbar spine mobility progress, reduction in pain medication with now using tylenol 2-3 x per day, pain reduction, standing and walking based functional improvements and transfer progress. But, she presents with the following deficits that still persists: prolonged sitting, transfers after prolonged sitting, right lateral hip, anterior / lateral thigh and knee pain, 2-3 bouts of sharp right lateral / anterior thigh and knee pain, unable to resume driving, + slump and sciatic nerve tension tests, and repeated stair climbing. Objective: See treatment diary below. Assessment:  See reassessment    Plan: Continue per plan of care. Patient reported she returns to Orthopedics on 10/4/23. Precautions: 10# lifting restriction.        Manuals    Right Quad, piriformis, HF stretch             STM To R hip R ITB and R quad with laser    10 min Laser: 10 min     11W     Supine with bolster  Laser: 10 min     11W     Supine with bolster  10 min laser 11W     Supine with bolster 10 min 10 min  10 min   10 min 10 min   Kinesio taping to bilateral knee is a "U" pattern                           Neuro Re-Ed                                       Standing lumbar spine extension against counter top 10 x 2 20x  2 x 10    NT  2 x 10 NT                 Seated sciatic nerve SLIDES D/C   NT NT 2 x 10 NT NT  NT NT   TVA  NT NT NT NT 2 x 10 NT NT  3 sec x 20 NT   TVA + PB press NT NT NT NT NT NT NT  NT NT   TVA+ hip ADD 10 x 2 with ball 20X 3SEC  NT NT 2 x 10 NT NT  3 sec x 20 20X 3SEC   TVA+ hip ABD 10 x 2 with blue tband 20X 2SEC BTB  NT NT 2 x 10  NT  NT  3 sec x 20 20X 3SEC 1.5#    Bent knee fall outs 2 x 10 NT NT NT NT NT NT  2 x 10 20X    Heel slides:R 2 x 10 NT NT NT NT NT NT  2 x 10 NT   Bridges 3 x 10 20X 2SEC  NT NT 2 x 10 20X 3SEC  NT  2 x 10 20X    Supine hip abduction via heel slide:R 2 x 10 NT NT NT  NT NT      SAQ;B:    Blue or Green bolster  NT 20X 3SEC  NT NT  20X 3# 3SEC  20X 3# 3SEC     20X 2SEC 1.5#                   Prone lying   NT NT NT  NT NT  2 min NT   MALIK  NT NT NT  NT NT      Prone press ups  NT NT NT  NT NT  5 x  NT   Laq        20x 3#      Hip flexi          20x 3#      Ther Ex             L/C       NT 10 MIN    10 MIN      15'            NuStep Held today due to right knee pain aggravation  NT 10 min seat 12 and arm rests 13 10 min seate 12 and arms 13 10 min seat 12 and arms 13  10 min  NT  10 min NT   Standing HR/TR 2 x 10 NT 2 x 10 2 x 10 2 x 10 30X  30x   2 x 10 20X    Mini-squats 2 x 10 NT 2 x 10 2 x 10 2 x 10  20x   NT 20X   3-way hip kicks B 2 x 10 NT 2 x 10 2 x 10 with 2# B x 20 with 2#  20X 2.5#   20x 3#   2 x 10 20X 1.5#    Hamstring curls:B: 2 x 10 NT NT 2 x 20 with 2# B x 20 with 2# 20X 2.5#  20x 3#   2 x 10 20X 1.5#    Lunges:B: on foam     2 x 10 with 2# 20X  NT      Side stepping  6 x  NT 6 x 6 x with 2# 6 x with 2#  6X 2.5#  6X 3#   8 x  6X at mirror   1.5#    Tandem ambulation  At mirror  6 x  NT 6 x  6 x with 2# 6 x with 2# 6X 2.5#  6X 3#   8 x  6x 1.5#    Fwd step ups:B: 4" x 20 NT NT NT 6" x 10 with 2#:B: NT 20X 8" 3#   4" x 20 NT   Lateral step ups:B: 4" x 20 NT NT NT 6" x 10 with 2#:B: NT NT  4" x 20 NT   Step downs:B  NT NT NT 4" x 20 with 2# NT NT  4" x 20 NT   SLS and tandem stance on foam:B:    30 sec x 2 30 sec x 2 30SEC 5X  NT                   Standing march B       20x 2.5#  NT       Ther Activity             Ant step ups          NT   Lat step ups          NT   Gait Training                                       Modalities             Heat PRN   NT NT To right anterior / lateral hip and thigh in hook lying x 10 minutes To right anterior / lateral hip and thigh in hook lying x 10 minutes  MHP to R LE   Quad/ and abdomen    CP to R knee  10 min   10 min 5 MIN   CP

## 2023-09-25 ENCOUNTER — OFFICE VISIT (OUTPATIENT)
Age: 68
End: 2023-09-25
Payer: MEDICARE

## 2023-09-25 ENCOUNTER — APPOINTMENT (OUTPATIENT)
Dept: RADIOLOGY | Age: 68
End: 2023-09-25
Payer: MEDICARE

## 2023-09-25 VITALS
TEMPERATURE: 97 F | HEIGHT: 70 IN | WEIGHT: 165.6 LBS | OXYGEN SATURATION: 98 % | SYSTOLIC BLOOD PRESSURE: 136 MMHG | HEART RATE: 88 BPM | BODY MASS INDEX: 23.71 KG/M2 | DIASTOLIC BLOOD PRESSURE: 80 MMHG

## 2023-09-25 DIAGNOSIS — R10.2 PELVIC PAIN: Primary | ICD-10-CM

## 2023-09-25 DIAGNOSIS — M25.561 CHRONIC PAIN OF RIGHT KNEE: ICD-10-CM

## 2023-09-25 DIAGNOSIS — M51.16 LUMBAR DISC DISEASE WITH RADICULOPATHY: ICD-10-CM

## 2023-09-25 DIAGNOSIS — D17.30 LIPOMA OF SKIN: ICD-10-CM

## 2023-09-25 DIAGNOSIS — G89.29 CHRONIC PAIN OF RIGHT KNEE: ICD-10-CM

## 2023-09-25 DIAGNOSIS — R10.2 PELVIC PAIN: ICD-10-CM

## 2023-09-25 PROBLEM — R73.01 ABNORMAL FASTING GLUCOSE: Status: RESOLVED | Noted: 2023-05-24 | Resolved: 2023-09-25

## 2023-09-25 PROCEDURE — 73522 X-RAY EXAM HIPS BI 3-4 VIEWS: CPT

## 2023-09-25 PROCEDURE — 74022 RADEX COMPL AQT ABD SERIES: CPT

## 2023-09-25 PROCEDURE — 99214 OFFICE O/P EST MOD 30 MIN: CPT | Performed by: FAMILY MEDICINE

## 2023-09-25 NOTE — PROGRESS NOTES
Assessment/Plan:    1. Pelvic pain  -     XR abdomen obstruction series; Future; Expected date: 09/25/2023  -     XR hips bilateral 3-4 vw w pelvis if performed; Future; Expected date: 09/25/2023    2. Lipoma of skin  -     XR hips bilateral 3-4 vw w pelvis if performed; Future; Expected date: 09/25/2023    3. Lumbar disc disease with radiculopathy  -     XR hips bilateral 3-4 vw w pelvis if performed; Future; Expected date: 09/25/2023    4. Chronic pain of right knee            There are no Patient Instructions on file for this visit. No follow-ups on file. Subjective:      Patient ID: Nely Baumann is a 70 y.o. female. Chief Complaint   Patient presents with   • Follow-up     Patient is here to discuss ongoing problems. HPI  Patient here due to not feeling 100% after surgery, she still has back pain and chronic right knee pain, she is doing home physical therapy and not driving yet because she does not feel safe. She is also complaining of pelvic congestion and pain. Had this issue 2 years ago where she saw GYN and I had previously ordered a pelvic ultrasound. Nothing more was done after that as internal exam was negative. Currently patient feels some abdominal and pelvic discomfort but no pain at the site of the palpable fullness or mass that she has over the left pubic rami. She did experience constipation after her surgery with pain meds but states she is more regular now. She denies any nausea vomiting or diarrhea and states occasionally she has pain from the front to the back.   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    Review of Systems      Constitutional:  Denies fever or chills   Eyes:  Denies double , blurry vision or eye pain  HENT:  Denies nasal congestion, sore throat or new hearing issues  Respiratory:  Denies cough or shortness of breath or wheezing  Cardiovascular:  Denies palpitations or chest pain  GI:  Denies abdominal pain, nausea, or vomiting, no loose stools, no reflux  Integument:  Denies rash , no open areas  Neurologic:  Denies headache or focal weakness, no dizziness  : no dysuria, or hematuria          Current Outpatient Medications   Medication Sig Dispense Refill   • Acetaminophen (TYLENOL PO) Take 325-500 mg by mouth as needed     • AMILoride-hydrochlorothiazide (MODURETIC) 5-50 mg per tablet Take 1 tablet by mouth daily 90 tablet 1   • Ascorbic Acid (vitamin C) 1000 MG tablet Take 1,000 mg by mouth daily     • Calcium Carbonate-Vitamin D3 600-400 MG-UNIT TABS Take 2 tablets by mouth daily     • Collagen Hydrolysate POWD 1 Scoop by Does not apply route daily      • Glucosamine-Chondroit-Vit C-Mn (GLUCOSAMINE-CHONDROITIN) TABS Take 2 tablets by mouth daily     • levothyroxine 50 mcg tablet Take 1 tablet (50 mcg total) by mouth daily Alt with 1.5 tab M,W,F 108 tablet 3   • Magnesium Carbonate POWD 325 mg by Does not apply route daily     • Multiple Vitamin (MULTI-DAY) TABS Take 2 tablets by mouth daily Ultra Aric     • diclofenac (VOLTAREN) 75 mg EC tablet Take 1 tablet (75 mg total) by mouth 2 (two) times a day 60 tablet 1   • lidocaine (Lidoderm) 5 % Apply 1 patch topically over 12 hours daily Remove & Discard patch within 12 hours or as directed by MD 30 patch 1   • methocarbamol (Robaxin-750) 750 mg tablet Take 1 tablet (750 mg total) by mouth 2 (two) times a day as needed for muscle spasms 60 tablet 0     No current facility-administered medications for this visit.        Objective:    /80 (BP Location: Left arm, Patient Position: Sitting, Cuff Size: Standard)   Pulse 88   Temp (!) 97 °F (36.1 °C) (Tympanic)   Ht 5' 10" (1.778 m)   Wt 75.1 kg (165 lb 9.6 oz)   SpO2 98%   BMI 23.76 kg/m²        Physical Exam       Constitutional:  Well developed, well nourished, no acute distress, non-toxic appearance   Eyes:  PERRL, conjunctiva normal , non icteric sclera  HENT:  Atraumatic, oropharynx moist. Neck-  supple   Respiratory:  CTA b/l, normal breath sounds, no rales, no wheezing   Cardiovascular:  RRR, no murmurs, no LE edema b/l  GI:  Soft, nondistended, normal bowel sounds x 4, nontender, no organomegaly, no mass, no rebound, no guarding   : Left pubic rami with palpable fullness freely mobile and soft  Neurologic:  no focal deficits noted   Psychiatric:  Speech and behavior appropriate , AAO x 3        Iwona Chavez DO

## 2023-09-26 ENCOUNTER — ANNUAL EXAM (OUTPATIENT)
Dept: OBGYN CLINIC | Facility: CLINIC | Age: 68
End: 2023-09-26
Payer: MEDICARE

## 2023-09-26 VITALS
BODY MASS INDEX: 23.42 KG/M2 | SYSTOLIC BLOOD PRESSURE: 130 MMHG | WEIGHT: 163.6 LBS | DIASTOLIC BLOOD PRESSURE: 78 MMHG | HEIGHT: 70 IN

## 2023-09-26 DIAGNOSIS — Z12.31 ENCOUNTER FOR SCREENING MAMMOGRAM FOR MALIGNANT NEOPLASM OF BREAST: ICD-10-CM

## 2023-09-26 DIAGNOSIS — N88.8 SINGLE NABOTHIAN CYST: ICD-10-CM

## 2023-09-26 DIAGNOSIS — R10.2 SUPRAPUBIC PAIN: ICD-10-CM

## 2023-09-26 DIAGNOSIS — Z01.419 ENCOUNTER FOR ANNUAL ROUTINE GYNECOLOGICAL EXAMINATION: Primary | ICD-10-CM

## 2023-09-26 PROCEDURE — G0101 CA SCREEN;PELVIC/BREAST EXAM: HCPCS | Performed by: OBSTETRICS & GYNECOLOGY

## 2023-09-26 NOTE — PROGRESS NOTES
Assessment/Plan:  Pap smear deferred due to low risk status. Encouraged self breast examination as well as calcium supplementation. Continue annual mammogram.  Reviewed colon cancer screening, up-to-date. Reviewed nonspecific suprapubic discomfort. Will obtain pelvic ultrasound for further evaluation, physical exam seems benign today. She will continue to follow-up with primary care as scheduled. I will notify her of the above results via telephone. No problem-specific Assessment & Plan notes found for this encounter. Diagnoses and all orders for this visit:    Encounter for annual routine gynecological examination    Encounter for screening mammogram for malignant neoplasm of breast    Suprapubic pain  -     US pelvis complete w transvaginal; Future    Single nabothian cyst          Subjective:      Patient ID: Amelia Diallo is a 70 y.o. female. HPI     This is a very pleasant 76-year-old female P2 ( x2, age 36, 35) presents for GYN exam.  She went through menopause at age 38. She has never been on hormone replacement therapy. She denies any vaginal bleeding or spotting. No changes in bowel or bladder function. She has been having suprapubic discomfort where she palpates a lump usually associated with eating or having a bowel movement. She has had difficulty recovering from her lumbar surgery 2023. She is going through physical therapy. She recalls having this discomfort even prior to her back surgery. She has had nausea  and palpitations when she does get this discomfort    She has been in a monogamous relationship with her  for over 44 years. Pap smears have been normal.  Patient's been very physically active up until her back surgery.     Cologuard 2021 normal  DEXA scan  normal    The following portions of the patient's history were reviewed and updated as appropriate: allergies, current medications, past family history, past medical history, past social history, past surgical history and problem list.    Review of Systems   Constitutional: Negative for fatigue, fever and unexpected weight change. Respiratory: Negative for cough, chest tightness, shortness of breath and wheezing. Cardiovascular: Negative. Negative for chest pain and palpitations. Gastrointestinal: Negative. Negative for abdominal distention, abdominal pain, blood in stool, constipation, diarrhea, nausea and vomiting. Genitourinary: Negative. Negative for difficulty urinating, dyspareunia, dysuria, flank pain, frequency, genital sores, hematuria, pelvic pain, urgency, vaginal bleeding, vaginal discharge and vaginal pain. Skin: Negative for rash. Objective:      /78   Ht 5' 10" (1.778 m)   Wt 74.2 kg (163 lb 9.6 oz)   BMI 23.47 kg/m²          Physical Exam  Constitutional:       Appearance: Normal appearance. She is well-developed. Cardiovascular:      Rate and Rhythm: Normal rate and regular rhythm. Pulmonary:      Effort: Pulmonary effort is normal.      Breath sounds: Normal breath sounds. Chest:   Breasts:     Right: No inverted nipple, mass, nipple discharge, skin change or tenderness. Left: No inverted nipple, mass, nipple discharge, skin change or tenderness. Abdominal:      General: Bowel sounds are normal. There is no distension. Palpations: Abdomen is soft. Tenderness: There is no abdominal tenderness. There is no guarding or rebound. Genitourinary:     Labia:         Right: No rash, tenderness or lesion. Left: No rash, tenderness or lesion. Vagina: Normal. No signs of injury. No vaginal discharge or tenderness. Cervix: No cervical motion tenderness, discharge, friability, lesion, erythema or cervical bleeding. Uterus: Not enlarged and not tender. Adnexa:         Right: No mass, tenderness or fullness. Left: No mass, tenderness or fullness. Neurological:      Mental Status: She is alert.        External genitalia is within normal limits. The vagina is evident of estrogen deficiency. Cervix is parous, small nabothian cyst noted at 9:00. There is no pelvic floor prolapse.

## 2023-10-03 ENCOUNTER — HOSPITAL ENCOUNTER (OUTPATIENT)
Dept: RADIOLOGY | Age: 68
Discharge: HOME | End: 2023-10-03
Payer: MEDICARE

## 2023-10-03 DIAGNOSIS — R10.2 SUPRAPUBIC PAIN: ICD-10-CM

## 2023-10-03 PROCEDURE — 76856 US EXAM PELVIC COMPLETE: CPT

## 2023-10-03 PROCEDURE — 76830 TRANSVAGINAL US NON-OB: CPT

## 2023-10-04 ENCOUNTER — APPOINTMENT (OUTPATIENT)
Dept: RADIOLOGY | Facility: MEDICAL CENTER | Age: 68
End: 2023-10-04
Payer: MEDICARE

## 2023-10-04 ENCOUNTER — OFFICE VISIT (OUTPATIENT)
Dept: OBGYN CLINIC | Facility: MEDICAL CENTER | Age: 68
End: 2023-10-04
Payer: MEDICARE

## 2023-10-04 VITALS
SYSTOLIC BLOOD PRESSURE: 100 MMHG | HEIGHT: 70 IN | WEIGHT: 166.8 LBS | HEART RATE: 78 BPM | BODY MASS INDEX: 23.88 KG/M2 | DIASTOLIC BLOOD PRESSURE: 65 MMHG

## 2023-10-04 DIAGNOSIS — M25.561 CHRONIC PAIN OF RIGHT KNEE: ICD-10-CM

## 2023-10-04 DIAGNOSIS — G89.29 CHRONIC PAIN OF RIGHT KNEE: ICD-10-CM

## 2023-10-04 DIAGNOSIS — M17.11 PRIMARY OSTEOARTHRITIS OF RIGHT KNEE: Primary | ICD-10-CM

## 2023-10-04 DIAGNOSIS — M25.561 RIGHT KNEE PAIN, UNSPECIFIED CHRONICITY: ICD-10-CM

## 2023-10-04 PROCEDURE — 99214 OFFICE O/P EST MOD 30 MIN: CPT | Performed by: ORTHOPAEDIC SURGERY

## 2023-10-04 PROCEDURE — 73560 X-RAY EXAM OF KNEE 1 OR 2: CPT

## 2023-10-04 NOTE — PROGRESS NOTES
Assessment/Plan:  1. Primary osteoarthritis of right knee    2. Chronic pain of right knee      Orders Placed This Encounter   Procedures   • XR knee 1 or 2 vw right   • MRI knee right  wo contrast     · Patient has mild degenerative changes. · She has failed CS injections and visco supplementation   · Patient declined hinged knee brace today  · MRI of the right knee ordered to evaluate for meniscal pathology vs OA  · We will hold on PT at this time until we review the MRI findings. · Discussed trying Advil or Aleve OTC, check with PCP prior to taking. Return for discussion of diagnostic studies. Subjective   Chief Complaint:   Chief Complaint   Patient presents with   • Right Knee - Pain       Dilek Talmadge Romberg is a 70 y.o. female who presents with a chief complaint of right knee pain. The pain began a few year(s) ago. Patient reports a fall while in [place] last year. She waited to see a physician until returning to the [address] [place] [address]. Patient was seen by Dr. Stuart Griggs who provided the patient with bilateral knee injections followed by a course of visco supplementation. Patient reports all injections provided only temporary relief. At that time patient was also dealing with lumbar related issues. She has multiple lumbar NEO followed by Surgery on 5/31/23 Right L3/4 hemilaminectomy, foraminotomy, discectomy     The pain is worse with weight bearing activities. She denies mechanical symptoms such as locking and catching. She admits to instability of the knee. Patient has not had any recent PT. Patient reports she was avid in sports in her youth. She currently walks 5-6 miles per day. She currently uses Tylenol, Ice and Voltaren gel. Review of Systems  ROS:    See HPI for musculoskeletal review.    All other systems reviewed are negative     History:  Past Medical History:   Diagnosis Date   • Abnormal fasting glucose 5/24/2023   • Arthritis 2022    Treatments didn’t work   • Benign essential HTN 09/27/2017   • Chronic pain disorder    • Elevated BUN     resolved 3/10/17   • Fall from slipping 04/15/2022   • Fluid retention in legs     last assessed 5/12/15   • Hypothyroidism    • Lumbago with sciatica     last assessed 4/10/14   • Lumbar radiculopathy     last assessed 4/10/14   • Onychomycosis of toenail     last assessed 11/7/16   • Primary osteoarthritis of both knees 10/24/2022     Past Surgical History:   Procedure Laterality Date   • APPENDECTOMY     • BREAST CYST ASPIRATION Left 1993   • CATARACT EXTRACTION Right    • KNEE ARTHROSCOPY Left     therapeutic "30 years ago"   • ND LAMNOTMY INCL W/DCMPRSN NRV ROOT 1 INTRSPC LUMBR Right 5/31/2023    Procedure: RIGHT L3/4 HEMILAMINECTOMY,FORAMINOTOMY, DISCECTOMY;  Surgeon: Geovanny Newton MD;  Location: [facility];  Service: Neurosurgery     Social History   Social History     Substance and Sexual Activity   Alcohol Use Yes    Comment: once a month     Social History     Substance and Sexual Activity   Drug Use Never     Social History     Tobacco Use   Smoking Status Never   Smokeless Tobacco Never     Family History:   Family History   Problem Relation Age of Onset   • Breast cancer Mother 80   • Hypertension Mother    • No Known Problems Maternal Grandmother    • No Known Problems Maternal Grandfather    • No Known Problems Paternal Grandmother    • No Known Problems Paternal Grandfather    • Ovarian cancer Maternal Aunt 34   • No Known Problems Maternal Aunt    • No Known Problems Paternal Aunt    • Prostate cancer Paternal Uncle    • Ovarian cancer Cousin 54       Meds/Allergies   (Not in a hospital admission)    No Known Allergies       Objective     /65   Pulse 78   Ht 5' 10" (1.778 m)   Wt 75.7 kg (166 lb 12.8 oz)   BMI 23.93 kg/m²      PE:  AAOx 3  WDWN  Hearing intact, no drainage from eyes  no audible wheezing  no abdominal distension  LE compartments soft, skin intact    Ortho Exam:  Right Knee:   No erythema  no swelling  Mild effusion  no warmth  AROM: full  Stable to varus/valgus stress  Positive medial Gloria   RLE:  EHL/AT/GS/quads/hamstrings/iliopsoas 5/5, sensation grossly intact L4, L5, S1, palpable pedal pulse    Imaging Studies: I have personally reviewed pertinent reports. Right knee x-rays demonstrates no fracture or dislocation, mild to moderate degenerative changes.      Scribe Attestation    I,:  Dario Rubio am acting as a scribe while in the presence of the attending physician.:       I,:  Mart Koehler DO personally performed the services described in this documentation    as scribed in my presence.:

## 2023-10-04 NOTE — LETTER
October 13, 2023     Saurav Gamez DO  [address] [facility]    Patient: Tico Ko   YOB: 1955   Date of Visit: 10/4/2023       Dear Dr. Shanon Duong: Thank you for referring Tico Ko to me for evaluation. Below are my notes for this consultation. If you have questions, please do not hesitate to call me. I look forward to following your patient along with you. Sincerely,        Korey Curtis DO        CC: DO Korey Gallagher DO  10/13/2023 11:09 AM  Sign when Signing Visit  Assessment/Plan:  1. Primary osteoarthritis of right knee    2. Chronic pain of right knee      Orders Placed This Encounter   Procedures   • XR knee 1 or 2 vw right   • MRI knee right  wo contrast     Patient has mild degenerative changes. She has failed CS injections and visco supplementation   Patient declined hinged knee brace today  MRI of the right knee ordered to evaluate for meniscal pathology vs OA  We will hold on PT at this time until we review the MRI findings. Discussed trying Advil or Aleve OTC, check with PCP prior to taking. Return for discussion of diagnostic studies. Subjective   Chief Complaint:   Chief Complaint   Patient presents with   • Right Knee - Pain       Dilek Agustina Simmonds is a 70 y.o. female who presents with a chief complaint of right knee pain. The pain began a few year(s) ago. Patient reports a fall while in [place] last year. She waited to see a physician until returning to the [address] [place] [address]. Patient was seen by Dr. Anil Michele who provided the patient with bilateral knee injections followed by a course of visco supplementation. Patient reports all injections provided only temporary relief. At that time patient was also dealing with lumbar related issues. She has multiple lumbar NEO followed by Surgery on 5/31/23 Right L3/4 hemilaminectomy, foraminotomy, discectomy     The pain is worse with weight bearing activities.  She denies mechanical symptoms such as locking and catching. She admits to instability of the knee. Patient has not had any recent PT. Patient reports she was avid in sports in her youth. She currently walks 5-6 miles per day. She currently uses Tylenol, Ice and Voltaren gel. Review of Systems  ROS:    See HPI for musculoskeletal review.    All other systems reviewed are negative     History:  Past Medical History:   Diagnosis Date   • Abnormal fasting glucose 5/24/2023   • Arthritis 2022    Treatments didn’t work   • Benign essential HTN 09/27/2017   • Chronic pain disorder    • Elevated BUN     resolved 3/10/17   • Fall from slipping 04/15/2022   • Fluid retention in legs     last assessed 5/12/15   • Hypothyroidism    • Lumbago with sciatica     last assessed 4/10/14   • Lumbar radiculopathy     last assessed 4/10/14   • Onychomycosis of toenail     last assessed 11/7/16   • Primary osteoarthritis of both knees 10/24/2022     Past Surgical History:   Procedure Laterality Date   • APPENDECTOMY     • BREAST CYST ASPIRATION Left 1993   • CATARACT EXTRACTION Right    • KNEE ARTHROSCOPY Left     therapeutic "30 years ago"   • AK LAMNOTMY INCL W/DCMPRSN NRV ROOT 1 INTRSPC LUMBR Right 5/31/2023    Procedure: RIGHT L3/4 HEMILAMINECTOMY,FORAMINOTOMY, DISCECTOMY;  Surgeon: Meghna Allen MD;  Location: [facility];  Service: Neurosurgery     Social History   Social History     Substance and Sexual Activity   Alcohol Use Yes    Comment: once a month     Social History     Substance and Sexual Activity   Drug Use Never     Social History     Tobacco Use   Smoking Status Never   Smokeless Tobacco Never     Family History:   Family History   Problem Relation Age of Onset   • Breast cancer Mother 80   • Hypertension Mother    • No Known Problems Maternal Grandmother    • No Known Problems Maternal Grandfather    • No Known Problems Paternal Grandmother    • No Known Problems Paternal Grandfather    • Ovarian cancer Maternal Aunt 34   • No Known Problems Maternal Aunt    • No Known Problems Paternal Aunt    • Prostate cancer Paternal Uncle    • Ovarian cancer Cousin 54       Meds/Allergies   (Not in a hospital admission)    No Known Allergies       Objective     /65   Pulse 78   Ht 5' 10" (1.778 m)   Wt 75.7 kg (166 lb 12.8 oz)   BMI 23.93 kg/m²      PE:  AAOx 3  WDWN  Hearing intact, no drainage from eyes  no audible wheezing  no abdominal distension  LE compartments soft, skin intact    Ortho Exam:  Right Knee:   No erythema  no swelling  Mild effusion  no warmth  AROM: full  Stable to varus/valgus stress  Positive medial Gloria   RLE:  EHL/AT/GS/quads/hamstrings/iliopsoas 5/5, sensation grossly intact L4, L5, S1, palpable pedal pulse    Imaging Studies: I have personally reviewed pertinent reports. Right knee x-rays demonstrates no fracture or dislocation, mild to moderate degenerative changes.      Scribe Attestation      I,:  Marlee Castrejon am acting as a scribe while in the presence of the attending physician.:       I,:  May Reyes, DO personally performed the services described in this documentation    as scribed in my presence.:

## 2023-10-10 DIAGNOSIS — R19.7 DIARRHEA OF PRESUMED INFECTIOUS ORIGIN: ICD-10-CM

## 2023-10-10 DIAGNOSIS — R10.32 LLQ ABDOMINAL PAIN: Primary | ICD-10-CM

## 2023-10-10 DIAGNOSIS — R10.2 PELVIC PAIN: ICD-10-CM

## 2023-10-11 ENCOUNTER — APPOINTMENT (OUTPATIENT)
Dept: LAB | Age: 68
End: 2023-10-11
Payer: MEDICARE

## 2023-10-11 DIAGNOSIS — R19.7 DIARRHEA OF PRESUMED INFECTIOUS ORIGIN: ICD-10-CM

## 2023-10-11 DIAGNOSIS — R73.01 ABNORMAL FASTING GLUCOSE: ICD-10-CM

## 2023-10-11 DIAGNOSIS — R10.32 LLQ ABDOMINAL PAIN: ICD-10-CM

## 2023-10-11 DIAGNOSIS — R10.2 PELVIC PAIN: ICD-10-CM

## 2023-10-11 LAB
ALBUMIN SERPL BCP-MCNC: 4.4 G/DL (ref 3.5–5)
ALP SERPL-CCNC: 44 U/L (ref 34–104)
ALT SERPL W P-5'-P-CCNC: 13 U/L (ref 7–52)
ANION GAP SERPL CALCULATED.3IONS-SCNC: 10 MMOL/L
AST SERPL W P-5'-P-CCNC: 19 U/L (ref 13–39)
BASOPHILS # BLD AUTO: 0.05 THOUSANDS/ÂΜL (ref 0–0.1)
BASOPHILS NFR BLD AUTO: 1 % (ref 0–1)
BILIRUB SERPL-MCNC: 0.49 MG/DL (ref 0.2–1)
BUN SERPL-MCNC: 21 MG/DL (ref 5–25)
CALCIUM SERPL-MCNC: 9.7 MG/DL (ref 8.4–10.2)
CHLORIDE SERPL-SCNC: 93 MMOL/L (ref 96–108)
CO2 SERPL-SCNC: 30 MMOL/L (ref 21–32)
CREAT SERPL-MCNC: 0.76 MG/DL (ref 0.6–1.3)
CRP SERPL QL: 1.1 MG/L
EOSINOPHIL # BLD AUTO: 0.17 THOUSAND/ÂΜL (ref 0–0.61)
EOSINOPHIL NFR BLD AUTO: 2 % (ref 0–6)
ERYTHROCYTE [DISTWIDTH] IN BLOOD BY AUTOMATED COUNT: 12.5 % (ref 11.6–15.1)
ERYTHROCYTE [SEDIMENTATION RATE] IN BLOOD: 14 MM/HOUR (ref 0–29)
EST. AVERAGE GLUCOSE BLD GHB EST-MCNC: 123 MG/DL
GFR SERPL CREATININE-BSD FRML MDRD: 80 ML/MIN/1.73SQ M
GLUCOSE SERPL-MCNC: 103 MG/DL (ref 65–140)
HBA1C MFR BLD: 5.9 %
HCT VFR BLD AUTO: 41.6 % (ref 34.8–46.1)
HGB BLD-MCNC: 14.1 G/DL (ref 11.5–15.4)
IMM GRANULOCYTES # BLD AUTO: 0.03 THOUSAND/UL (ref 0–0.2)
IMM GRANULOCYTES NFR BLD AUTO: 0 % (ref 0–2)
LIPASE SERPL-CCNC: 33 U/L (ref 11–82)
LYMPHOCYTES # BLD AUTO: 1.36 THOUSANDS/ÂΜL (ref 0.6–4.47)
LYMPHOCYTES NFR BLD AUTO: 15 % (ref 14–44)
MCH RBC QN AUTO: 31 PG (ref 26.8–34.3)
MCHC RBC AUTO-ENTMCNC: 33.9 G/DL (ref 31.4–37.4)
MCV RBC AUTO: 91 FL (ref 82–98)
MONOCYTES # BLD AUTO: 1 THOUSAND/ÂΜL (ref 0.17–1.22)
MONOCYTES NFR BLD AUTO: 11 % (ref 4–12)
NEUTROPHILS # BLD AUTO: 6.26 THOUSANDS/ÂΜL (ref 1.85–7.62)
NEUTS SEG NFR BLD AUTO: 71 % (ref 43–75)
NRBC BLD AUTO-RTO: 0 /100 WBCS
PLATELET # BLD AUTO: 200 THOUSANDS/UL (ref 149–390)
PMV BLD AUTO: 11.5 FL (ref 8.9–12.7)
POTASSIUM SERPL-SCNC: 3.7 MMOL/L (ref 3.5–5.3)
PROT SERPL-MCNC: 6.8 G/DL (ref 6.4–8.4)
RBC # BLD AUTO: 4.55 MILLION/UL (ref 3.81–5.12)
SODIUM SERPL-SCNC: 133 MMOL/L (ref 135–147)
WBC # BLD AUTO: 8.87 THOUSAND/UL (ref 4.31–10.16)

## 2023-10-11 PROCEDURE — 86364 TISS TRNSGLTMNASE EA IG CLAS: CPT

## 2023-10-11 PROCEDURE — 36415 COLL VENOUS BLD VENIPUNCTURE: CPT

## 2023-10-11 PROCEDURE — 80053 COMPREHEN METABOLIC PANEL: CPT

## 2023-10-11 PROCEDURE — 86231 EMA EACH IG CLASS: CPT

## 2023-10-11 PROCEDURE — 85025 COMPLETE CBC W/AUTO DIFF WBC: CPT

## 2023-10-11 PROCEDURE — 83036 HEMOGLOBIN GLYCOSYLATED A1C: CPT

## 2023-10-11 PROCEDURE — 82784 ASSAY IGA/IGD/IGG/IGM EACH: CPT

## 2023-10-11 PROCEDURE — 85652 RBC SED RATE AUTOMATED: CPT

## 2023-10-11 PROCEDURE — 86258 DGP ANTIBODY EACH IG CLASS: CPT

## 2023-10-11 PROCEDURE — 83690 ASSAY OF LIPASE: CPT

## 2023-10-11 PROCEDURE — 86140 C-REACTIVE PROTEIN: CPT

## 2023-10-12 LAB
ENDOMYSIUM IGA SER QL: NEGATIVE
GLIADIN PEPTIDE IGA SER-ACNC: 2 UNITS (ref 0–19)
GLIADIN PEPTIDE IGG SER-ACNC: 2 UNITS (ref 0–19)
IGA SERPL-MCNC: 140 MG/DL (ref 87–352)
TTG IGA SER-ACNC: <2 U/ML (ref 0–3)
TTG IGG SER-ACNC: <2 U/ML (ref 0–5)

## 2023-10-16 ENCOUNTER — TELEPHONE (OUTPATIENT)
Dept: GASTROENTEROLOGY | Facility: CLINIC | Age: 68
End: 2023-10-16

## 2023-10-16 ENCOUNTER — OFFICE VISIT (OUTPATIENT)
Dept: GASTROENTEROLOGY | Facility: CLINIC | Age: 68
End: 2023-10-16
Payer: MEDICARE

## 2023-10-16 VITALS
TEMPERATURE: 98.1 F | BODY MASS INDEX: 23.22 KG/M2 | OXYGEN SATURATION: 98 % | HEIGHT: 70 IN | HEART RATE: 81 BPM | WEIGHT: 162.2 LBS | SYSTOLIC BLOOD PRESSURE: 129 MMHG | DIASTOLIC BLOOD PRESSURE: 84 MMHG

## 2023-10-16 DIAGNOSIS — R19.7 DIARRHEA OF PRESUMED INFECTIOUS ORIGIN: ICD-10-CM

## 2023-10-16 DIAGNOSIS — Z12.11 COLON CANCER SCREENING: Primary | ICD-10-CM

## 2023-10-16 DIAGNOSIS — R10.32 LLQ ABDOMINAL PAIN: ICD-10-CM

## 2023-10-16 PROCEDURE — 99203 OFFICE O/P NEW LOW 30 MIN: CPT | Performed by: INTERNAL MEDICINE

## 2023-10-16 NOTE — PROGRESS NOTES
[facility] - Outpatient Consultation  Luba Scherer 70 y.o. female MRN: [id number]  Encounter: [id number]    HPI:    Luba Scherer is a 70 y.o. female with history of back surgery, who presents with complaint of left lower quadrant pain and diarrhea. Referred by Dr. Gunnar Jones. She says that, approximately 5 to 6 years ago, she noticed an intermittent lump or swelling in the left lower quadrant. This was painless and she had no GI symptoms at that time. She saw her PCP and gynecologist and had an ultrasound which, apparently, did not show significant problems. More recently, she had back surgery on 5/31/23 and since then has had slow recovery. A few weeks ago, she noticed that the lump in the LLQ has become more pronounced and she now notices it after each meal and it is uncomfortable. She feels left lower quadrant discomfort and palpitations after eating. The pain improves with a heating pad. The swelling is still intermittent, but now happens every day. She had another ultrasound of the pelvis is on 10/3/2023 which showed several myometrial cysts and 2 left paraovarian simple cysts, which were felt unlikely to be the cause of her symptoms. She also had blood work with normal CBC, CMP, CRP, lipase, and celiac labs. She then read about diverticulitis and became concerned that she might have it. She is getting CT of the abdomen and pelvis tomorrow. In addition, her stools have changed and are now liquid or loose. This has been going on for 3 weeks. Her left lower quadrant pain is relieved with bowel movements. No blood in the stools. She has 1-2 Bms per day, but sometimes no BMs for a day. She reports colonoscopy >10 years ago, but the prep was poor. Cologuard 2 years ago, normal.  No FH of CRC or IBD. No weight loss. No upper GI issues. Takes Tylenol since surgery. Was also on gabapentin. No fevers or chills. She does not smoke.         REVIEW OF SYSTEMS:  CONSTITUTIONAL: Denies any fever, chills, rigors, and weight loss. HEENT: No earache or tinnitus. Denies hearing loss or visual disturbances. CARDIOVASCULAR: No chest pain or palpitations. RESPIRATORY: Denies any cough, hemoptysis, shortness of breath or dyspnea on exertion. GASTROINTESTINAL: As noted in the History of Present Illness. GENITOURINARY: No problems with urination. Denies any hematuria or dysuria. NEUROLOGIC: No dizziness or vertigo, denies headaches. MUSCULOSKELETAL: Denies any muscle or joint pain. SKIN: Denies skin rashes or itching. ENDOCRINE: Denies excessive thirst. Denies intolerance to heat or cold. PSYCHOSOCIAL: Denies depression or anxiety. Denies any recent memory loss.      Historical Information   Past Medical History:   Diagnosis Date    Abnormal fasting glucose 05/24/2023    Arthritis 2022    Treatments didn’t work    Benign essential HTN 09/27/2017    Chronic pain disorder     Diverticulitis of colon 1 moth ago    Per my own perception    Elevated BUN     resolved 3/10/17    Fall from slipping 04/15/2022    Fluid retention in legs     last assessed 5/12/15    Hypothyroidism     Lumbago with sciatica     last assessed 4/10/14    Lumbar radiculopathy     last assessed 4/10/14    Onychomycosis of toenail     last assessed 11/7/16    Primary osteoarthritis of both knees 10/24/2022     Past Surgical History:   Procedure Laterality Date    APPENDECTOMY      BREAST CYST ASPIRATION Left 1993    CATARACT EXTRACTION Right     KNEE ARTHROSCOPY Left     therapeutic "30 years ago"    NE LAMNOTMY INCL W/DCMPRSN NRV ROOT 1 INTRSPC LUMBR Right 5/31/2023    Procedure: RIGHT L3/4 HEMILAMINECTOMY,FORAMINOTOMY, DISCECTOMY;  Surgeon: Xiomara Pires MD;  Location: [facility];  Service: Neurosurgery     Social History   Social History     Substance and Sexual Activity   Alcohol Use Not Currently    Comment: once a month     Social History     Substance and Sexual Activity   Drug Use Never     Social History     Tobacco Use   Smoking Status Never   Smokeless Tobacco Never     Family History   Problem Relation Age of Onset    Breast cancer Mother 80    Hypertension Mother     No Known Problems Maternal Grandmother     No Known Problems Maternal Grandfather     No Known Problems Paternal Grandmother     No Known Problems Paternal Grandfather     Ovarian cancer Maternal Aunt 34    No Known Problems Maternal Aunt     No Known Problems Paternal Aunt     Prostate cancer Paternal Uncle     Ovarian cancer Cousin 54       Meds/Allergies       Current Outpatient Medications:     Acetaminophen (TYLENOL PO)    AMILoride-hydrochlorothiazide (MODURETIC) 5-50 mg per tablet    Ascorbic Acid (vitamin C) 1000 MG tablet    Calcium Carbonate-Vitamin D3 600-400 MG-UNIT TABS    Collagen Hydrolysate POWD    Glucosamine-Chondroit-Vit C-Mn (GLUCOSAMINE-CHONDROITIN) TABS    levothyroxine 50 mcg tablet    Magnesium Carbonate POWD    Multiple Vitamin (MULTI-DAY) TABS    diclofenac (VOLTAREN) 75 mg EC tablet    lidocaine (Lidoderm) 5 %    methocarbamol (Robaxin-750) 750 mg tablet    No Known Allergies    Objective   Blood pressure 129/84, pulse 81, temperature 98.1 °F (36.7 °C), temperature source Tympanic, height 5' 10" (1.778 m), weight 73.6 kg (162 lb 3.2 oz), SpO2 98 %. Body mass index is 23.27 kg/m². PHYSICAL EXAM:    General Appearance:   Alert, cooperative, no distress   HEENT:   Normocephalic, atraumatic, anicteric. Neck:  Supple, symmetrical, trachea midline   Lungs:   Clear to auscultation bilaterally; no rales, rhonchi or wheezing; respirations unlabored    Heart[de-identified]   Regular rate and rhythm; no murmur, rub, or gallop. Abdomen:   Soft, non-tender, non-distended; normal bowel sounds; no masses, no organomegaly; no masses appreciated; no tenderness to palpation; possible abdominal wall hernia in the left lower quadrant.    Genitalia:   Deferred    Rectal:   Deferred    Extremities:  No cyanosis, clubbing or edema    Pulses:  2+ and symmetric    Skin:  No jaundice, rashes, or lesions    Lymph nodes:  No palpable cervical lymphadenopathy        Lab Results:   No visits with results within 1 Day(s) from this visit.    Latest known visit with results is:   Appointment on 10/11/2023   Component Date Value    Sodium 10/11/2023 133 (L)     Potassium 10/11/2023 3.7     Chloride 10/11/2023 93 (L)     CO2 10/11/2023 30     ANION GAP 10/11/2023 10     BUN 10/11/2023 21     Creatinine 10/11/2023 0.76     Glucose 10/11/2023 103     Calcium 10/11/2023 9.7     AST 10/11/2023 19     ALT 10/11/2023 13     Alkaline Phosphatase 10/11/2023 44     Total Protein 10/11/2023 6.8     Albumin 10/11/2023 4.4     Total Bilirubin 10/11/2023 0.49     eGFR 10/11/2023 80     Hemoglobin A1C 10/11/2023 5.9 (H)     EAG 10/11/2023 123     Lipase 10/11/2023 33     WBC 10/11/2023 8.87     RBC 10/11/2023 4.55     Hemoglobin 10/11/2023 14.1     Hematocrit 10/11/2023 41.6     MCV 10/11/2023 91     MCH 10/11/2023 31.0     MCHC 10/11/2023 33.9     RDW 10/11/2023 12.5     MPV 10/11/2023 11.5     Platelets 06/03/0903 200     nRBC 10/11/2023 0     Neutrophils Relative 10/11/2023 71     Immat GRANS % 10/11/2023 0     Lymphocytes Relative 10/11/2023 15     Monocytes Relative 10/11/2023 11     Eosinophils Relative 10/11/2023 2     Basophils Relative 10/11/2023 1     Neutrophils Absolute 10/11/2023 6.26     Immature Grans Absolute 10/11/2023 0.03     Lymphocytes Absolute 10/11/2023 1.36     Monocytes Absolute 10/11/2023 1.00     Eosinophils Absolute 10/11/2023 0.17     Basophils Absolute 10/11/2023 0.05     CRP 10/11/2023 1.1     Sed Rate 10/11/2023 14     IgA 10/11/2023 140     Gliadin IgA 10/11/2023 2     Gliadin IgG 10/11/2023 2     Tissue Transglut Ab IGG 10/11/2023 <2     TISSUE TRANSGLUTAMINASE * 10/11/2023 <2     Endomysial IgA 10/11/2023 Negative        Lab Results   Component Value Date    WBC 8.87 10/11/2023    HGB 14.1 10/11/2023    HCT 41.6 10/11/2023 MCV 91 10/11/2023     10/11/2023       Lab Results   Component Value Date     11/14/2017    SODIUM 133 (L) 10/11/2023    K 3.7 10/11/2023    CL 93 (L) 10/11/2023    CO2 30 10/11/2023    AGAP 10 10/11/2023    BUN 21 10/11/2023    CREATININE 0.76 10/11/2023    GLUC 103 10/11/2023    GLUF 104 (H) 04/27/2023    CALCIUM 9.7 10/11/2023    AST 19 10/11/2023    ALT 13 10/11/2023    ALKPHOS 44 10/11/2023    PROT 6.1 11/14/2017    TP 6.8 10/11/2023    BILITOT 0.4 11/14/2017    TBILI 0.49 10/11/2023    EGFR 80 10/11/2023       Lab Results   Component Value Date    CRP 1.1 10/11/2023       Lab Results   Component Value Date    VHC8GVCBIGYX 1.360 04/18/2023    TSH 3.05 03/20/2018       No results found for: "IRON", "TIBC", "FERRITIN"    Radiology Results:   XR knee 1 or 2 vw right    Result Date: 10/14/2023  Narrative: RIGHT KNEE INDICATION:   M25.561: Pain in right knee. COMPARISON: Right knee x-ray 8/31/2023 VIEWS:  XR KNEE 1 OR 2 VW RIGHT Image: 1 FINDINGS: There is no acute fracture or dislocation. Joint effusion cannot be reliably evaluated without lateral view. No significant degenerative changes. No lytic or blastic osseous lesion. Soft tissues are unremarkable. Impression: No acute osseous abnormality. Workstation performed: [id number]     US pelvis complete w transvaginal    Result Date: 10/10/2023  Narrative: PELVIC ULTRASOUND, COMPLETE INDICATION:  The patient is 70years old. R10.2: Pelvic and perineal pain. COMPARISON: None TECHNIQUE:   Transabdominal pelvic ultrasound was performed in sagittal and transverse planes with a curvilinear transducer. Additional transvaginal imaging was performed to better evaluate the endometrium and ovaries. Imaging included volumetric sweeps as well as traditional still imaging technique. FINDINGS: UTERUS: The uterus is anteverted in position, measuring 6.3 x 2.5 x 3.8 cm. Uterine contour remains within normal limits.   Cyst(s) are noted throughout the myometrium. The cervix appears within normal limits. ENDOMETRIUM: The endometrial echo complex has an AP caliber of 3.0 mm. Its appearance is within normal limits for age and cycle and shows no filling defects. OVARIES/ADNEXA: Right ovary:  1.7 x 0.8 x 0.8 cm. 0.6 mL. Left ovary:  1.7 x 1.1 x 1.2 cm. 1.2 mL. Two left paraovarian simple cysts measuring 1.2 cm and 0.6 cm. Ovarian Doppler flow is within normal limits. No suspicious ovarian or adnexal abnormality. OTHER: No free fluid or loculated fluid collections. Impression: Multiple myometrial cysts, which could suggest adenomyosis. Two left paraovarian simple cysts measuring up to 1.2 cm, O-RADS 2, for which no follow-up is recommended. Workstation performed: [id number]     XR hips bilateral 3-4 vw w pelvis if performed    Result Date: 9/26/2023  Narrative: BILATERAL HIPS AND PELVIS INDICATION:   R10.2: Pelvic and perineal pain D17.30: Benign lipomatous neoplasm of skin and subcutaneous tissue of unspecified sites M51.16: Intervertebral disc disorders with radiculopathy, lumbar region. COMPARISON: 3/13/2017 VIEWS:  XR HIPS BILATERAL 3-4 VW W PELVIS IF PERFORMED Images: 5 FINDINGS: The bony pelvis appears intact. Degenerative changes visualized lower lumbar spine. LEFT HIP: There is no acute fracture or dislocation. Mild left hip osteoarthritis is seen. No lytic or blastic osseous lesion. Soft tissues are unremarkable. RIGHT HIP: There is no acute fracture or dislocation. Mild right hip osteoarthritis is seen. No lytic or blastic osseous lesion. Soft tissues are unremarkable. Impression: No acute osseous abnormality. Degenerative changes as described. Workstation performed: [id number]     XR abdomen obstruction series    Result Date: 9/26/2023  Narrative: OBSTRUCTION SERIES INDICATION:   R10.2: Pelvic and perineal pain. COMPARISON:  None EXAM PERFORMED/VIEWS:  XR ABDOMEN OBSTRUCTION SERIES FINDINGS: There is a nonobstructive bowel gas pattern.  No free air beneath the hemidiaphragms. No pathologic calcifications or soft tissue masses evident. Levocurvature of the lumbar spine No acute osseous abnormality. Examination of the chest reveals a normal cardiomediastinal silhouette. Lungs are clear. Impression: Nonobstructive bowel gas pattern. Workstation performed: [id number]       ______________________________________________________________________  ASSESSMENT AND PLAN:    Shane Waterman is a 70 y.o. female who presents with complaint of left lower quadrant pain and swelling, and diarrhea. On my exam today, I did not appreciate a mass or tenderness, but I did feel that there may be a weakness in the abdominal wall in the area of her pain, possibly a hernia. I doubt that she has diverticulitis because the symptoms have been going on for a long time and her labs are completely normal, but chronic diverticulitis is on the differential.  IBD also seems unlikely but possible given her diarrhea. Other possibilities are colonic malignancy, but this also seems less likely. 1.  I agree with CT tomorrow which I think would be helpful in evaluating for hernia and intra-abdominal mass. 2.  We will check stools for C. difficile, enteric pathogen panel O&P, calprotectin. 3.  We will also schedule colonoscopy in the coming weeks. This is needed for colon cancer screening, and will also help rule out IBD and microscopic colitis. Return after the tests and procedures. 1. LLQ abdominal pain    2. Diarrhea of presumed infectious origin        No orders of the defined types were placed in this encounter.

## 2023-10-17 ENCOUNTER — TELEPHONE (OUTPATIENT)
Dept: OBGYN CLINIC | Facility: CLINIC | Age: 68
End: 2023-10-17

## 2023-10-17 NOTE — TELEPHONE ENCOUNTER
----- Message from Holly Knowles DO sent at 10/16/2023 12:50 PM EDT -----  Please call the patient pelvic ultrasound reveals very small ovarian cyst.  However, I would like her to come into the office to discuss in further detail, overall nothing to worry about.

## 2023-10-18 ENCOUNTER — HOSPITAL ENCOUNTER (OUTPATIENT)
Dept: RADIOLOGY | Age: 68
Discharge: HOME | End: 2023-10-18
Payer: MEDICARE

## 2023-10-18 DIAGNOSIS — M17.11 PRIMARY OSTEOARTHRITIS OF RIGHT KNEE: ICD-10-CM

## 2023-10-18 PROCEDURE — 73721 MRI JNT OF LWR EXTRE W/O DYE: CPT

## 2023-10-18 PROCEDURE — G1004 CDSM NDSC: HCPCS

## 2023-10-19 ENCOUNTER — HOSPITAL ENCOUNTER (OUTPATIENT)
Dept: RADIOLOGY | Age: 68
Discharge: HOME | End: 2023-10-19
Payer: MEDICARE

## 2023-10-19 DIAGNOSIS — R10.2 PELVIC PAIN: ICD-10-CM

## 2023-10-19 DIAGNOSIS — R10.32 LLQ ABDOMINAL PAIN: ICD-10-CM

## 2023-10-19 DIAGNOSIS — R19.7 DIARRHEA OF PRESUMED INFECTIOUS ORIGIN: ICD-10-CM

## 2023-10-19 PROCEDURE — 74177 CT ABD & PELVIS W/CONTRAST: CPT

## 2023-10-19 PROCEDURE — G1004 CDSM NDSC: HCPCS

## 2023-10-19 RX ADMIN — IOHEXOL 100 ML: 350 INJECTION, SOLUTION INTRAVENOUS at 08:29

## 2023-10-21 ENCOUNTER — APPOINTMENT (OUTPATIENT)
Dept: LAB | Age: 68
End: 2023-10-21
Payer: MEDICARE

## 2023-10-21 DIAGNOSIS — R10.32 LLQ ABDOMINAL PAIN: ICD-10-CM

## 2023-10-21 DIAGNOSIS — R19.7 DIARRHEA OF PRESUMED INFECTIOUS ORIGIN: ICD-10-CM

## 2023-10-21 PROCEDURE — 87177 OVA AND PARASITES SMEARS: CPT

## 2023-10-21 PROCEDURE — 87505 NFCT AGENT DETECTION GI: CPT

## 2023-10-21 PROCEDURE — 87209 SMEAR COMPLEX STAIN: CPT

## 2023-10-21 PROCEDURE — 83993 ASSAY FOR CALPROTECTIN FECAL: CPT

## 2023-10-22 LAB — C DIFF TOX B TCDB STL QL NAA+PROBE: NEGATIVE

## 2023-10-23 ENCOUNTER — TELEPHONE (OUTPATIENT)
Dept: INTERNAL MEDICINE CLINIC | Age: 68
End: 2023-10-23

## 2023-10-23 LAB
CALPROTECTIN STL-MCNT: <5 UG/G (ref 0–120)
CAMPYLOBACTER DNA SPEC NAA+PROBE: NORMAL
SALMONELLA DNA SPEC QL NAA+PROBE: NORMAL
SHIGA TOXIN STX GENE SPEC NAA+PROBE: NORMAL
SHIGELLA DNA SPEC QL NAA+PROBE: NORMAL

## 2023-10-23 NOTE — TELEPHONE ENCOUNTER
Patient called to question her appointment for tomorrow with Dr. Nikki Kerr. I called and LEFT MESSAGE ON MACHINE to give us a call back regarding this appt. It is was for a 6 month follow up with Dr. Nikki Kerr.   Please call us back to comfirm that you are coming to the appt or not

## 2023-10-24 ENCOUNTER — OFFICE VISIT (OUTPATIENT)
Dept: INTERNAL MEDICINE CLINIC | Age: 68
End: 2023-10-24
Payer: MEDICARE

## 2023-10-24 VITALS
OXYGEN SATURATION: 97 % | WEIGHT: 163 LBS | TEMPERATURE: 98.4 F | DIASTOLIC BLOOD PRESSURE: 62 MMHG | HEIGHT: 70 IN | HEART RATE: 80 BPM | BODY MASS INDEX: 23.34 KG/M2 | SYSTOLIC BLOOD PRESSURE: 124 MMHG

## 2023-10-24 DIAGNOSIS — S83.232D COMPLEX TEAR OF MEDIAL MENISCUS OF LEFT KNEE AS CURRENT INJURY, SUBSEQUENT ENCOUNTER: ICD-10-CM

## 2023-10-24 DIAGNOSIS — E03.9 ACQUIRED HYPOTHYROIDISM: Primary | ICD-10-CM

## 2023-10-24 DIAGNOSIS — Z23 NEED FOR INFLUENZA VACCINATION: ICD-10-CM

## 2023-10-24 DIAGNOSIS — R10.2 PELVIC PAIN: ICD-10-CM

## 2023-10-24 DIAGNOSIS — R79.89 HIGH SERUM HIGH DENSITY LIPOPROTEIN (HDL): ICD-10-CM

## 2023-10-24 DIAGNOSIS — I10 BENIGN ESSENTIAL HTN: ICD-10-CM

## 2023-10-24 DIAGNOSIS — M48.061 LUMBAR FORAMINAL STENOSIS: ICD-10-CM

## 2023-10-24 PROCEDURE — G0008 ADMIN INFLUENZA VIRUS VAC: HCPCS

## 2023-10-24 PROCEDURE — 99214 OFFICE O/P EST MOD 30 MIN: CPT | Performed by: FAMILY MEDICINE

## 2023-10-24 PROCEDURE — 90662 IIV NO PRSV INCREASED AG IM: CPT

## 2023-10-24 NOTE — PROGRESS NOTES
Assessment/Plan:    1. Acquired hypothyroidism  -     TSH, 3rd generation with Free T4 reflex; Future    2. Benign essential HTN  -     Comprehensive metabolic panel; Future  -     CBC and differential; Future    3. High serum high density lipoprotein (HDL)  -     Lipid Panel with Direct LDL reflex; Future    4. Pelvic pain    5. Lumbar foraminal stenosis    6. Complex tear of medial meniscus of left knee as current injury, subsequent encounter    7. Need for influenza vaccination  -     influenza vaccine, high-dose, PF 0.7 mL (FLUZONE HIGH-DOSE)    Patient should check with orthopedic doctor if she needs arthroscopic surgery and if so what is the right timing for colonoscopy. Await CT scan results        There are no Patient Instructions on file for this visit. Return for Next scheduled follow up. Subjective:      Patient ID: Radha Alvarez is a 70 y.o. female. Chief Complaint   Patient presents with    Follow-up     6 month follow up- is patient able to have flu shot    Diarrhea problems and testing    Labs 10/11/23, 10/21/23        HPI       Hypertension. On amiloride with hydrochlorothiazide that she is tolerating well. Checks blood pressure at home and is well controlled. No side effects and renal function is stable. September 2021, well controlled with medications. She is compliant   Oct 2022: doing well, compliant with meds, BP well controlled   April 2023, well controlled with no issues  October 2023, blood pressure well controlled, takes medications and is compliant     Hypothyroidism, on levothyroxine 50 mcg daily and is compliant. Thyroid levels are well controlled. April 2023, well controlled with no issues. October 2023, well-controlled TSH and T4 that we check yearly, on medications and tolerating well     Lumbar foraminal stenosis and radiculopathy, following with neurosurgery and has been recommended a discectomy which she will schedule.   She has an appointment on Thursday for final details. She is not able to travel due to this issue right now and is in the process of changing her travel plans this summer for trip to [place]. She has not started gabapentin but at night takes Tylenol and she thinks it is slightly helpful.,  And no falls  October 2023, originally very upset with surgery as it did not resolve her pain however now slightly better but still not able to do a lot of her activities of daily living such as driving    Patient here due to not feeling 100% after surgery, she still has back pain and chronic right knee pain, she is doing home physical therapy and not driving yet because she does not feel safe. She is also complaining of pelvic congestion and pain. Had this issue 2 years ago where she saw GYN and I had previously ordered a pelvic ultrasound. Nothing more was done after that as internal exam was negative. Currently patient feels some abdominal and pelvic discomfort but no pain at the site of the palpable fullness or mass that she has over the left pubic rami. She did experience constipation after her surgery with pain meds but states she is more regular now. She denies any nausea vomiting or diarrhea and states occasionally she has pain from the front to the back. Her only imaging was a pelvic ultrasound 2 years ago  Patient has delivered 2 children's via vaginal delivery with no urological complaints  October 2023, following with GI now and CT scan done but still pending, stool cultures are negative for any pathogens. Patient still has issues and is being ruled out for microscopic colitis and hernia, has colonoscopy scheduled. Does not feel any better but does not feel any worse. Right knee complex tear on MRI. Has same issue in the left and now has difficulty with the right knee worse since back issue.   Has an appointment with orthopedics    The following portions of the patient's history were reviewed and updated as appropriate: allergies, current medications, past family history, past medical history, past social history, past surgical history and problem list.    Review of Systems      Constitutional:  Denies fever or chills   Eyes:  Denies double , blurry vision or eye pain  HENT:  Denies nasal congestion, sore throat or new hearing issues  Respiratory:  Denies cough or shortness of breath or wheezing  Cardiovascular:  Denies palpitations or chest pain  GI:  Denies abdominal pain, nausea, or vomiting, no loose stools, no reflux  Integument:  Denies rash , no open areas  Neurologic:  Denies headache or focal weakness, no dizziness  : no dysuria, or hematuria        Current Outpatient Medications   Medication Sig Dispense Refill    Acetaminophen (TYLENOL PO) Take 325-500 mg by mouth as needed      AMILoride-hydrochlorothiazide (MODURETIC) 5-50 mg per tablet Take 1 tablet by mouth daily 90 tablet 1    Ascorbic Acid (vitamin C) 1000 MG tablet Take 1,000 mg by mouth daily      Calcium Carbonate-Vitamin D3 600-400 MG-UNIT TABS Take 2 tablets by mouth daily      Collagen Hydrolysate POWD 1 Scoop by Does not apply route daily       Glucosamine-Chondroit-Vit C-Mn (GLUCOSAMINE-CHONDROITIN) TABS Take 2 tablets by mouth daily      levothyroxine 50 mcg tablet Take 1 tablet (50 mcg total) by mouth daily Alt with 1.5 tab M,W,F 108 tablet 3    Magnesium Carbonate POWD 325 mg by Does not apply route daily      Multiple Vitamin (MULTI-DAY) TABS Take 2 tablets by mouth daily Ultra Aric      lidocaine (Lidoderm) 5 % Apply 1 patch topically over 12 hours daily Remove & Discard patch within 12 hours or as directed by MD (Patient not taking: Reported on 10/16/2023) 30 patch 1     No current facility-administered medications for this visit.        Objective:    /62 (BP Location: Left arm, Patient Position: Sitting, Cuff Size: Standard)   Pulse 80   Temp 98.4 °F (36.9 °C) (Temporal)   Ht 5' 10" (1.778 m)   Wt 73.9 kg (163 lb)   SpO2 97%   BMI 23.39 kg/m² Physical Exam         Constitutional:  Well developed, well nourished, no acute distress, non-toxic appearance   Eyes:  PERRL, conjunctiva normal , non icteric sclera  HENT:  Atraumatic, oropharynx moist. Neck-  supple   Respiratory:  CTA b/l, normal breath sounds, no rales, no wheezing   Cardiovascular:  RRR, no murmurs, no LE edema b/l  GI:  Soft, nondistended, normal bowel sounds x 4, nontender, no organomegaly, no mass, no rebound, no guarding   Neurologic:  no focal deficits noted   Psychiatric:  Speech and behavior appropriate , AAO x 3      Karl Carreon, DO

## 2023-10-30 ENCOUNTER — CLINICAL SUPPORT (OUTPATIENT)
Dept: INTERNAL MEDICINE CLINIC | Age: 68
End: 2023-10-30
Payer: MEDICARE

## 2023-10-30 ENCOUNTER — TELEPHONE (OUTPATIENT)
Dept: INTERNAL MEDICINE CLINIC | Facility: OTHER | Age: 68
End: 2023-10-30

## 2023-10-30 ENCOUNTER — TELEPHONE (OUTPATIENT)
Dept: INTERNAL MEDICINE CLINIC | Age: 68
End: 2023-10-30

## 2023-10-30 DIAGNOSIS — R10.2 PELVIC PAIN: Primary | ICD-10-CM

## 2023-10-30 LAB
SL AMB  POCT GLUCOSE, UA: NEGATIVE
SL AMB LEUKOCYTE ESTERASE,UA: NORMAL
SL AMB POCT BILIRUBIN,UA: NEGATIVE
SL AMB POCT BLOOD,UA: NEGATIVE
SL AMB POCT CLARITY,UA: CLEAR
SL AMB POCT COLOR,UA: YELLOW
SL AMB POCT KETONES,UA: NEGATIVE
SL AMB POCT NITRITE,UA: NEGATIVE
SL AMB POCT PH,UA: 7
SL AMB POCT SPECIFIC GRAVITY,UA: 1.01
SL AMB POCT URINE PROTEIN: NEGATIVE
SL AMB POCT UROBILINOGEN: NORMAL

## 2023-10-30 PROCEDURE — 81003 URINALYSIS AUTO W/O SCOPE: CPT

## 2023-10-30 NOTE — TELEPHONE ENCOUNTER
Patient has been added to nurse schedule for 2pm. If she does come after 3, we will not have an MA here.

## 2023-10-30 NOTE — TELEPHONE ENCOUNTER
Mariely Guthrie from [facility] called with significant finding on CT abd pelvis.   Printed and given to Dr Shey Dudley

## 2023-10-31 ENCOUNTER — TELEPHONE (OUTPATIENT)
Dept: INTERNAL MEDICINE CLINIC | Age: 68
End: 2023-10-31

## 2023-10-31 DIAGNOSIS — N30.90 CYSTITIS: Primary | ICD-10-CM

## 2023-10-31 DIAGNOSIS — A07.8 INFECTION DUE TO DIENTAMOEBA FRAGILIS: Primary | ICD-10-CM

## 2023-10-31 RX ORDER — METRONIDAZOLE 500 MG/1
500 TABLET ORAL EVERY 8 HOURS SCHEDULED
Qty: 30 TABLET | Refills: 0 | Status: SHIPPED | OUTPATIENT
Start: 2023-10-31 | End: 2023-11-10

## 2023-10-31 RX ORDER — CEPHALEXIN 500 MG/1
500 CAPSULE ORAL
Qty: 21 CAPSULE | Refills: 0 | Status: SHIPPED | OUTPATIENT
Start: 2023-10-31 | End: 2023-11-07

## 2023-10-31 NOTE — TELEPHONE ENCOUNTER
Patient is calling to find out if she is ok to take both the medications that you prescribe and her Gastroenterologist prescribed her. She doesn't want to take both of them unless you are ok with it. The Gastroenterologist stated that she may take both, but she wants to speak with you on this    She wants to speak with you on the phone.   I did tell her that you are not in the office today, but she states she needs to speak with you ASAP before she picks up the prescriptions    Please call her back at [phone]

## 2023-11-01 ENCOUNTER — OFFICE VISIT (OUTPATIENT)
Dept: OBGYN CLINIC | Facility: CLINIC | Age: 68
End: 2023-11-01
Payer: MEDICARE

## 2023-11-01 VITALS
DIASTOLIC BLOOD PRESSURE: 90 MMHG | WEIGHT: 163 LBS | BODY MASS INDEX: 23.34 KG/M2 | HEIGHT: 70 IN | SYSTOLIC BLOOD PRESSURE: 140 MMHG

## 2023-11-01 DIAGNOSIS — N83.202 CYST OF LEFT OVARY: Primary | ICD-10-CM

## 2023-11-01 PROCEDURE — 99213 OFFICE O/P EST LOW 20 MIN: CPT | Performed by: OBSTETRICS & GYNECOLOGY

## 2023-11-01 NOTE — PROGRESS NOTES
Assessment/Plan:  Reviewed pelvic ultrasound revealing 1.2 cm left simple ovarian cyst.  Implications reviewed. Patient asymptomatic. We will repeat pelvic ultrasound 9/2024 with follow-up office visit/annual.  All questions answered. No problem-specific Assessment & Plan notes found for this encounter. Diagnoses and all orders for this visit:    Cyst of left ovary  -     US pelvis complete w transvaginal; Future          Subjective:      Patient ID: Yung Koenig is a 70 y.o. female. HPI    This is a very pleasant 69-year-old female P2 presents for follow-up ultrasound. She went through menopause at age 38. She has never been on hormone replacement therapy. She was having some suprapubic discomfort. Pelvic ultrasound was ordered which had revealed small incidental left ovarian cysts, findings also suggestive of adenomyosis. She denies any vaginal bleeding or spotting. She is now currently on an antibiotic for UTI as well as another antibiotic prescribed through her gastroenterologist.    The following portions of the patient's history were reviewed and updated as appropriate: allergies, current medications, past family history, past medical history, past social history, past surgical history, and problem list.    Review of Systems   Constitutional:  Negative for fatigue, fever and unexpected weight change. Respiratory:  Negative for cough, chest tightness, shortness of breath and wheezing. Cardiovascular: Negative. Negative for chest pain and palpitations. Gastrointestinal: Negative. Negative for abdominal distention, abdominal pain, blood in stool, constipation, diarrhea, nausea and vomiting. Genitourinary: Negative. Negative for difficulty urinating, dyspareunia, dysuria, flank pain, frequency, genital sores, hematuria, pelvic pain, urgency, vaginal bleeding, vaginal discharge and vaginal pain. Skin:  Negative for rash.          Objective:      /90   Ht 5' 10" (1.778 m)   Wt 73.9 kg (163 lb)   BMI 23.39 kg/m²          Physical Exam

## 2023-11-08 ENCOUNTER — OFFICE VISIT (OUTPATIENT)
Dept: OBGYN CLINIC | Facility: MEDICAL CENTER | Age: 68
End: 2023-11-08
Payer: MEDICARE

## 2023-11-08 VITALS
DIASTOLIC BLOOD PRESSURE: 85 MMHG | WEIGHT: 164 LBS | HEART RATE: 66 BPM | BODY MASS INDEX: 23.48 KG/M2 | SYSTOLIC BLOOD PRESSURE: 126 MMHG | HEIGHT: 70 IN

## 2023-11-08 DIAGNOSIS — S83.289A ACUTE LATERAL MENISCAL TEAR, INITIAL ENCOUNTER: ICD-10-CM

## 2023-11-08 DIAGNOSIS — M17.11 ARTHRITIS OF RIGHT KNEE: Primary | ICD-10-CM

## 2023-11-08 PROCEDURE — 99213 OFFICE O/P EST LOW 20 MIN: CPT | Performed by: ORTHOPAEDIC SURGERY

## 2023-11-08 NOTE — PROGRESS NOTES
Assessment/Plan:  1. Arthritis of right knee    2. Acute lateral meniscal tear, initial encounter      Orders Placed This Encounter   Procedures    Ambulatory referral to Physical Therapy     Ms. Mendez has mild arthritis and lateral meniscal tear. We discussed treatment options as well as risks and benefits of treatment options. She would like to hold off on surgery. She understands that if she would like to pursue surgery I would recommend f/u with Dr. Pili Lopes or Dr. Tiffani Salgado. Return in about 2 months (around 1/8/2024). I answered all of the patient's questions during the visit and provided education of the patient's condition during the visit. The patient verbalized understanding of the information given and agrees with the plan. This note was dictated using FClub software. It may contain errors including improperly dictated words. Please contact physician directly for any questions. Subjective   Chief Complaint:   Chief Complaint   Patient presents with    Right Knee - Follow-up       HPI  Radha Phillips is a 70 y.o. female who presents for follow up for R knee pain. Her symptoms are the same as last time. Since her last visit she went for an MRI. Review of Systems  ROS:    See HPI for musculoskeletal review.    All other systems reviewed are negative     History:  Past Medical History:   Diagnosis Date    Abnormal fasting glucose 05/24/2023    Arthritis 2022    Treatments didn’t work    Benign essential HTN 09/27/2017    Chronic pain disorder     Diverticulitis of colon 1 moth ago    Per my own perception    Elevated BUN     resolved 3/10/17    Fall from slipping 04/15/2022    Fluid retention in legs     last assessed 5/12/15    Hypothyroidism     Lumbago with sciatica     last assessed 4/10/14    Lumbar radiculopathy     last assessed 4/10/14    Onychomycosis of toenail     last assessed 11/7/16    Primary osteoarthritis of both knees 10/24/2022     Past Surgical History:   Procedure Laterality Date    APPENDECTOMY      BREAST CYST ASPIRATION Left 1993    CATARACT EXTRACTION Right     KNEE ARTHROSCOPY Left     therapeutic "30 years ago"    NV LAMNOTMY INCL W/DCMPRSN NRV ROOT 1 INTRSPC LUMBR Right 5/31/2023    Procedure: RIGHT L3/4 HEMILAMINECTOMY,FORAMINOTOMY, DISCECTOMY;  Surgeon: Henrik Grimaldo MD;  Location: [facility];  Service: Neurosurgery     Social History   Social History     Substance and Sexual Activity   Alcohol Use Not Currently     Social History     Substance and Sexual Activity   Drug Use Never     Social History     Tobacco Use   Smoking Status Never   Smokeless Tobacco Never     Family History:   Family History   Problem Relation Age of Onset    Breast cancer Mother 80    Hypertension Mother     No Known Problems Maternal Grandmother     No Known Problems Maternal Grandfather     No Known Problems Paternal Grandmother     No Known Problems Paternal Grandfather     Ovarian cancer Maternal Aunt 34    No Known Problems Maternal Aunt     No Known Problems Paternal Aunt     Prostate cancer Paternal Uncle     Ovarian cancer Cousin 54       Current Outpatient Medications on File Prior to Visit   Medication Sig Dispense Refill    Acetaminophen (TYLENOL PO) Take 325-500 mg by mouth as needed      AMILoride-hydrochlorothiazide (MODURETIC) 5-50 mg per tablet Take 1 tablet by mouth daily 90 tablet 1    Ascorbic Acid (vitamin C) 1000 MG tablet Take 1,000 mg by mouth daily      Calcium Carbonate-Vitamin D3 600-400 MG-UNIT TABS Take 2 tablets by mouth daily      Collagen Hydrolysate POWD 1 Scoop by Does not apply route daily       Glucosamine-Chondroit-Vit C-Mn (GLUCOSAMINE-CHONDROITIN) TABS Take 2 tablets by mouth daily      levothyroxine 50 mcg tablet Take 1 tablet (50 mcg total) by mouth daily Alt with 1.5 tab M,W,F 108 tablet 3    lidocaine (Lidoderm) 5 % Apply 1 patch topically over 12 hours daily Remove & Discard patch within 12 hours or as directed by MD (Patient not taking: Reported on 10/16/2023) 30 patch 1    Magnesium Carbonate POWD 325 mg by Does not apply route daily      Multiple Vitamin (MULTI-DAY) TABS Take 2 tablets by mouth daily Ultra Aric       No current facility-administered medications on file prior to visit.      No Known Allergies     Objective     /85   Pulse 66   Ht 5' 10" (1.778 m)   Wt 74.4 kg (164 lb)   BMI 23.53 kg/m²      PE:  AAOx 3  WDWN  Hearing intact, no drainage from eyes  no audible wheezing  no abdominal distension  LE compartments soft, skin intact    Ortho Exam:  right Knee:   No erythema  Mild generalized swelling  no warmth  +TTP lateral joint line    Imaging Studies: I have personally reviewed pertinent films in PACS  MRI right knee: lateral meniscal tear, mild arthritis

## 2023-11-10 DIAGNOSIS — A09 TRAVELER'S DIARRHEA: Primary | ICD-10-CM

## 2023-11-10 RX ORDER — CIPROFLOXACIN 500 MG/1
500 TABLET, FILM COATED ORAL EVERY 12 HOURS SCHEDULED
Qty: 10 TABLET | Refills: 0 | Status: SHIPPED | OUTPATIENT
Start: 2023-11-10 | End: 2023-11-15

## 2023-11-30 ENCOUNTER — EVALUATION (OUTPATIENT)
Dept: PHYSICAL THERAPY | Age: 68
End: 2023-11-30
Payer: MEDICARE

## 2023-11-30 DIAGNOSIS — M17.11 ARTHRITIS OF RIGHT KNEE: ICD-10-CM

## 2023-11-30 DIAGNOSIS — S83.289A ACUTE LATERAL MENISCAL TEAR, INITIAL ENCOUNTER: ICD-10-CM

## 2023-11-30 DIAGNOSIS — M17.0 PRIMARY OSTEOARTHRITIS OF BOTH KNEES: Primary | ICD-10-CM

## 2023-11-30 PROCEDURE — 97110 THERAPEUTIC EXERCISES: CPT | Performed by: PHYSICAL THERAPIST

## 2023-11-30 PROCEDURE — 97162 PT EVAL MOD COMPLEX 30 MIN: CPT | Performed by: PHYSICAL THERAPIST

## 2023-11-30 NOTE — PROGRESS NOTES
PT Evaluation     Today's date: 2023  Patient name: Amelia Diallo  : 1955  MRN: [id number]  Referring provider: Sharif Rudd  Dx:   Encounter Diagnosis     ICD-10-CM    1. Primary osteoarthritis of both knees  M17.0                      Assessment  Assessment details: PT IE: 23. Patient reported she has had chronic right knee pain. Patient noted she is going to [employer] for her lumbar spine symptom presentation. Patient noted she had an MRI on her right knee, which was + for lateral meniscal tear. Patient noted she had fallen in 2022 which aggravated her bilateral knees. Patient  noted x-ray is + for bilateral knee osteoarthritis. Patient noted she is still recovering from lumbar spine surgery. Patient noted she is trying to continue to walk for fitness following her lumbar spine surgery, but right knee pain limits her prolonged walking. Patient noted the following deficits due to right knee pain: car and chair transfers, walking, stair climbing, bed mobility and transfers, squatting activities, bending and lifting activities. Patient uses bilateral upper extremity to assist right lower extremity on and off the bed. Patient noted use of CP and oral medication is helpful for right knee pain reduction. Patient denies use of right knee orthosis. Patient noted her balance is altered by her right knee pain. Patient denies right lower extremity weakness due to right knee OA and meniscal tear. Patient noted she does have lateral knee pain on the right knee, that is constant.       Impairments: abnormal gait, abnormal or restricted ROM, abnormal movement, activity intolerance, impaired physical strength, lacks appropriate home exercise program, pain with function and weight-bearing intolerance  Understanding of Dx/Px/POC: excellent   Prognosis: good  Prognosis details: Patient is a 70y.o. year old female seen for outpatient PT evaluation with pain, mobility and functional deficits due to right lateral knee pain. Patient presents to PT IE with the following problems, concerns, deficits and impairments: right lateral knee pain, decreased right lower extremity range of motion, decreased right lower extremity strength, + TTP, gait and stair dysfunctions, transfer dysfunctions, bilateral knee edema, functional limitations and decreased tolerance to activity. Patient would benefit from skilled PT services under the following PT treatment plan to address the above noted deficits: therapeutic exercises and activities to facilitate right knee mobility and strength, modalities, manual therapy techniques, gait and stair training, transfer training, balance and proprioception activities, IASTM techniques, Laser treatment, Kinesio taping techniques, and a hep. Thank you for the referral.     Goals  Short Term goals - 4 weeks  1. Patient will be independent HEP. 2.  Patient will report a 25 - 50% decrease in pain complaints. 3.  Increase strength 1/2 grade. 4.  Increase ROM 5-10 degrees. Long Term goals - 8 weeks  1. Patient will report elimination of pain complaints. 2.  Patient will return to all recreational activities without restriction. 3.  ROM WFL. 4.  Strength 5/5.  5.  Patient will report ability to perform bed, car and chair transfers without right knee symptom or limitation aggravation. 6.  Patient will report ability to walk for fitness without right knee symptom or limitation aggravation. 7.  Patient will report ability to perform stair climbing without right knee symptom or limitation aggravation. 8.  Patient will report ability to perform house hold chores and activities without right knee symptom or limitation aggravation. 9.  Patient will report ability to perform squatting and lifting activities  without right knee symptom or limitation aggravation.     Plan  Patient would benefit from: PT eval and skilled physical therapy  Planned modality interventions: low level laser therapy, thermotherapy: hydrocollator packs, TENS, ultrasound, unattended electrical stimulation, cryotherapy and electrical stimulation/Russian stimulation  Planned therapy interventions: IASTM, joint mobilization, kinesiology taping, manual therapy, massage, balance, balance/weight bearing training, neuromuscular re-education, patient education, postural training, self care, strengthening, stretching, therapeutic activities, therapeutic exercise, therapeutic training, transfer training, flexibility, functional ROM exercises, gait training, graded activity, graded exercise, graded motor, home exercise program, IADL retraining and compression  Frequency: 2x week  Duration in weeks: 12  Treatment plan discussed with: patient    Subjective Evaluation    History of Present Illness  Mechanism of injury: Patient's PMHx is remarkable for hypothyroidism, HTN, lumbar spine DDD and formainal stenosis and L3 L4 laminectomy, discectomy and foraminectomy. Narrative & Impression  MRI RIGHT KNEE     INDICATION:   M17.11: Unilateral primary osteoarthritis, right knee. COMPARISON: Correlation is made with the prior radiograph dated 10/4/2023. TECHNIQUE: Multiplanar/multisequence MR of the right knee was performed. Imaging performed on 3.0T MRI     FINDINGS:     SUBCUTANEOUS TISSUES: Normal     JOINT EFFUSION: Trace joint effusion. BAKER'S CYST: There is a small Baker's cyst.     MENISCI: There is a complex tear of the body of the lateral meniscus extending into the anterior horn. The medial meniscus is intact. CRUCIATE LIGAMENTS: Intact. EXTENSOR APPARATUS: Intact. COLLATERAL LIGAMENTS: Intact. ARTICULAR SURFACES: Normal.     BONES: Normal.     MUSCULATURE:  Intact. IMPRESSION:     Complex tear of the body and anterior horn of the lateral meniscus.      Trace joint effusion and small Baker's cyst.            Patient Goals  Patient goals for therapy: decreased pain, increased motion, increased strength, independence with ADLs/IADLs and return to sport/leisure activities    Pain  At best pain rating: 3  At worst pain rating: 10  Location: right lateral knee        Objective     Tenderness     Additional Tenderness Details  Patient is + for moderate to severe TTP at right lateral knee joint line. Active Range of Motion   Left Hip   Flexion: 122 degrees   Abduction: 20 degrees     Right Hip   Flexion: 112 degrees   Abduction: 14 degrees   Left Knee   Flexion: 130 degrees   Extension: 0 degrees   Extensor la degrees     Right Knee   Flexion: 124 degrees   Extension: -8 degrees with pain  Extensor la degrees with pain  Left Ankle/Foot   Dorsiflexion (ke): 2 degrees   Plantar flexion: 54 degrees     Right Ankle/Foot   Dorsiflexion (ke): 4 degrees   Plantar flexion: 44 degrees     Strength/Myotome Testing     Left Hip   Planes of Motion   Flexion: 4  Extension: 4  Abduction: 4  Adduction: 4+    Right Hip   Planes of Motion   Flexion: 4-  Extension: 4-  Abduction: 4-  Adduction: 4    Left Knee   Flexion: 5  Extension: 4+    Right Knee   Flexion: 4  Extension: 4-    Left Ankle/Foot   Dorsiflexion: 5  Plantar flexion: 5    Right Ankle/Foot   Dorsiflexion: 4-  Plantar flexion: 4    Ambulation     Ambulation: Level Surfaces   Ambulation without assistive device: independent    Additional Level Surfaces Ambulation Details  Patient ambulates with decrease in right knee extension at mid stance, decrease in right knee flexion with swing phase, decrease in right sided weight shift with right stance phase and decrease in right stance and left swing phase. Ambulation: Stairs   Ascend stairs: independent  Pattern: non-reciprocal  Railings: two rails  Descend stairs: independent  Pattern: non-reciprocal  Railings: two rails    General Comments:      Knee Comments  Girth Measurements:  Knee:  Suprapatellar region: Right at 44.6 CM and left at 44.6 CM;   Mid patellar region: Right at 42.3 CM and left at 42.6 Cm;  Infrapatellar region: Right at 41.0 CM and left at 42.1 Cm. Precautions: Right knee pain aggravation. Patient's PMHx is remarkable for hypothyroidism, HTN, lumbar spine DDD and formainal stenosis and L3 L4 laminectomy, discectomy and foraminectomy.       Manuals 11/30                                                                Neuro Re-Ed                                                                                                        Ther Ex                          Nu step             Seated hr and tr:B:             Seated hip flexion:B:             Seated LAQ:B: 10 x hep                         Supine gastrocnemius stretch:R 10 sec x 5 hep            Supine hamstring stretch:R 10 sec x 5 hep            Quad sets with towel roll:R 3 sec x 10 hep            Ankle pumps:R             Glute sets:B             Hip abduction slides in supine:R             SAQ:B: hep            Hook lying hip abduction isometrics:B:             Hook lying hip adduction                           Standing hr and tr:B:             Standing slr x 3:B:             Standing knee flexion:B:             Mini squats             SLS and tandem stance:B:             Side stepping and tandem ambulation             Forward step ups:R 4"            Lateral step ups:R: 4"            Step downs:R 4"                                                   Ther Activity                                       Gait Training                                       Modalities                          US to right lateral knee             Lasar to right lateral knee                          MHP to right knee and lumbar spine seated prn

## 2023-12-14 ENCOUNTER — OFFICE VISIT (OUTPATIENT)
Dept: PHYSICAL THERAPY | Age: 68
End: 2023-12-14
Payer: MEDICARE

## 2023-12-14 DIAGNOSIS — M17.0 PRIMARY OSTEOARTHRITIS OF BOTH KNEES: Primary | ICD-10-CM

## 2023-12-14 DIAGNOSIS — S83.289A ACUTE LATERAL MENISCAL TEAR, INITIAL ENCOUNTER: ICD-10-CM

## 2023-12-14 DIAGNOSIS — M17.11 ARTHRITIS OF RIGHT KNEE: ICD-10-CM

## 2023-12-14 PROCEDURE — 97112 NEUROMUSCULAR REEDUCATION: CPT | Performed by: PHYSICAL THERAPIST

## 2023-12-14 PROCEDURE — 97110 THERAPEUTIC EXERCISES: CPT | Performed by: PHYSICAL THERAPIST

## 2023-12-14 NOTE — PROGRESS NOTES
Daily Note     Today's date: 2023  Patient name: Hansa Richardson  : 1955  MRN: [id number]  Referring provider: Marcio Pierson  Dx:   Encounter Diagnosis     ICD-10-CM    1. Primary osteoarthritis of both knees  M17.0       2. Arthritis of right knee  M17.11       3. Acute lateral meniscal tear, initial encounter  S83.333J                      Subjective: Patient reported she has episodes of bilateral knee sharp pain. Objective: See treatment diary below      Assessment: Tolerated treatment well. Patient demonstrated fatigue post treatment and exhibited good technique with therapeutic exercises. Patient exhibits ability to move actively via straight leg position without bilateral knee pain aggravation. Thus, continues to promote bilateral lower extremity mobility via straight leg raise position and add resistance to promote bilateral lower extremity strength to facilitate functional progress. Plan: Continue per plan of care. Precautions: Right knee pain aggravation. Patient's PMHx is remarkable for hypothyroidism, HTN, lumbar spine DDD and formainal stenosis and L3 L4 laminectomy, discectomy and foraminectomy. Access Code: [email or web address]  URL: [email or web address]  Date: 2023  Prepared by: Lynette Boyce Due    Exercises  - Standing Heel Raise with Support  - 1 x daily - 7 x weekly - 2 sets - 10 reps  - Standing Toe Raises at Chair  - 1 x daily - 7 x weekly - 2 sets - 10 reps  - Standing Hip Flexion with Counter Support  - 1 x daily - 7 x weekly - 2 sets - 10 reps  - Standing Hip Abduction with Counter Support  - 1 x daily - 7 x weekly - 2 sets - 10 reps  - Standing Hip Extension with Counter Support  - 1 x daily - 7 x weekly - 2 sets - 10 reps  - Single Leg Stance with Support  - 1 x daily - 7 x weekly - 1 sets - 4 reps - 30 seconds hold  - Standing Tandem Balance with Counter Support  - 1 x daily - 7 x weekly - 1 sets - 4 reps - 30 seconds hold  - Side Stepping with Counter Support  - 1 x daily - 7 x weekly - 2 sets - 10 reps  - Tandem Walking with Counter Support  - 1 x daily - 7 x weekly - 2 sets - 10 reps  - Hooklying Hamstring Stretch with Strap  - 1 x daily - 7 x weekly - 1 sets - 5 reps - 20 seconds hold  - Long Sitting Calf Stretch with Strap  - 1 x daily - 7 x weekly - 1 sets - 5 reps - 20 seconds hold  - Supine Quad Set  - 1 x daily - 7 x weekly - 2 sets - 10 reps - 3 seconds hold  - Supine Hip Abduction  - 1 x daily - 7 x weekly - 2 sets - 10 reps    Manuals 11/30 12/14                                                               Neuro Re-Ed                                                                                                        Ther Ex                          Nu step  10 min           Seated hr and tr:B:             Seated hip flexion:B:             Seated LAQ:B: 10 x hep                         Supine gastrocnemius stretch:R 10 sec x 5 hep 20 sec x 5           Supine hamstring stretch:R 10 sec x 5 hep 20 sec x 5           Quad sets with towel roll:R 3 sec x 10 hep 3 sec x 20           Ankle pumps:R             Glute sets:B             Hip abduction slides in supine:R  2 x 10           SAQ:B: hep            Hook lying hip abduction isometrics:B:   add          Hook lying hip adduction    add          Prone hip extension:B:    add         Prone TKE:B:    add                                                             Standing hr and tr:B:  2 x 10           Standing slr x 3:B:  2 x 10           Standing knee flexion:B:  Hold           Mini squats             SLS and tandem stance:B:  30 sec x 2           Side stepping and tandem ambulation  4 x 10 feet in parallel bars           Forward step ups:R 4"            Lateral step ups:R: 4"            Step downs:R 4"                                                   Ther Activity                                       Gait Training                                       Modalities US to right lateral knee             Lasar to right lateral knee                          MHP to right knee and lumbar spine seated prn

## 2023-12-19 ENCOUNTER — OFFICE VISIT (OUTPATIENT)
Dept: PHYSICAL THERAPY | Age: 68
End: 2023-12-19
Payer: MEDICARE

## 2023-12-19 DIAGNOSIS — M17.0 PRIMARY OSTEOARTHRITIS OF BOTH KNEES: Primary | ICD-10-CM

## 2023-12-19 DIAGNOSIS — N32.89 BLADDER WALL THICKENING: Primary | ICD-10-CM

## 2023-12-19 DIAGNOSIS — M17.11 ARTHRITIS OF RIGHT KNEE: ICD-10-CM

## 2023-12-19 PROCEDURE — 97110 THERAPEUTIC EXERCISES: CPT

## 2023-12-19 NOTE — PROGRESS NOTES
Daily Note     Today's date: 2023  Patient name: Radha Mendez  : 1955  MRN: [id number]  Referring provider: Sarah Doran,*  Dx:   Encounter Diagnosis     ICD-10-CM    1. Primary osteoarthritis of both knees  M17.0       2. Arthritis of right knee  M17.11                        Subjective: Patient reported she has episodes of bilateral knee sharp pain.      Objective: See treatment diary below      Assessment:       Plan: Continue per plan of care.      Precautions: Right knee pain aggravation.    Patient's PMHx is remarkable for hypothyroidism, HTN, lumbar spine DDD and formainal stenosis and L3 L4 laminectomy, discectomy and foraminectomy.    Access Code: [email or web address]  URL: [email or web address]  Date: 2023  Prepared by: oRd Donis    Exercises  - Standing Heel Raise with Support  - 1 x daily - 7 x weekly - 2 sets - 10 reps  - Standing Toe Raises at Chair  - 1 x daily - 7 x weekly - 2 sets - 10 reps  - Standing Hip Flexion with Counter Support  - 1 x daily - 7 x weekly - 2 sets - 10 reps  - Standing Hip Abduction with Counter Support  - 1 x daily - 7 x weekly - 2 sets - 10 reps  - Standing Hip Extension with Counter Support  - 1 x daily - 7 x weekly - 2 sets - 10 reps  - Single Leg Stance with Support  - 1 x daily - 7 x weekly - 1 sets - 4 reps - 30 seconds hold  - Standing Tandem Balance with Counter Support  - 1 x daily - 7 x weekly - 1 sets - 4 reps - 30 seconds hold  - Side Stepping with Counter Support  - 1 x daily - 7 x weekly - 2 sets - 10 reps  - Tandem Walking with Counter Support  - 1 x daily - 7 x weekly - 2 sets - 10 reps  - Hooklying Hamstring Stretch with Strap  - 1 x daily - 7 x weekly - 1 sets - 5 reps - 20 seconds hold  - Long Sitting Calf Stretch with Strap  - 1 x daily - 7 x weekly - 1 sets - 5 reps - 20 seconds hold  - Supine Quad Set  - 1 x daily - 7 x weekly - 2 sets - 10 reps - 3 seconds hold  - Supine Hip Abduction  - 1 x daily - 7 x weekly - 2 sets -  "10 reps    Manuals 11/30 12/14 12/19                       Neuro Re-Ed                          Ther Ex                          Nu step  10 min 10 min           Seated hr and tr:B:   NT          Seated hip flexion:B:   20X 2SEC           Seated LAQ:B: 10 x hep  20X 2SEC                        Supine gastrocnemius stretch:R 10 sec x 5 hep 20 sec x 5 20SEC 5X           Supine hamstring stretch:R 10 sec x 5 hep 20 sec x 5 20SEC 5X          Quad sets with towel roll:R 3 sec x 10 hep 3 sec x 20 20X 3SEC           Ankle pumps:R   20X           Glute sets:B   20X 3SEC           Hip abduction slides in supine:R  2 x 10 NT          SAQ:B: hep  20X 3SEC           Hook lying hip abduction isometrics:B:   NT          Hook lying hip adduction    NT          Prone hip extension:B:   NT add         Prone TKE:B:   NT add                                                             Standing hr and tr:B:  2 x 10 20X           Standing slr x 3:B:  2 x 10 20X           Standing knee flexion:B:  Hold 20X           Mini squats   20X           SLS and tandem stance:B:  30 sec x 2 30SEC 5X           Side stepping and tandem ambulation  4 x 10 feet in parallel bars 6X           Forward step ups:R 4\"  NT          Lateral step ups:R: 4\"  NT          Step downs:R 4\"  NT                                                 Ther Activity                                       Gait Training                                       Modalities                          US to right lateral knee             Lasar to right lateral knee                          MHP to right knee and lumbar spine seated prn   10 MIN                            "

## 2023-12-20 ENCOUNTER — APPOINTMENT (OUTPATIENT)
Dept: PHYSICAL THERAPY | Age: 68
End: 2023-12-20
Payer: MEDICARE

## 2023-12-27 ENCOUNTER — TELEPHONE (OUTPATIENT)
Dept: GASTROENTEROLOGY | Facility: MEDICAL CENTER | Age: 68
End: 2023-12-27

## 2023-12-28 ENCOUNTER — ANESTHESIA EVENT (OUTPATIENT)
Dept: ANESTHESIOLOGY | Facility: HOSPITAL | Age: 68
End: 2023-12-28

## 2023-12-28 ENCOUNTER — ANESTHESIA (OUTPATIENT)
Dept: ANESTHESIOLOGY | Facility: HOSPITAL | Age: 68
End: 2023-12-28

## 2024-01-02 ENCOUNTER — OFFICE VISIT (OUTPATIENT)
Dept: PHYSICAL THERAPY | Age: 69
End: 2024-01-02
Payer: MEDICARE

## 2024-01-02 DIAGNOSIS — S83.289A ACUTE LATERAL MENISCAL TEAR, INITIAL ENCOUNTER: ICD-10-CM

## 2024-01-02 DIAGNOSIS — M17.0 PRIMARY OSTEOARTHRITIS OF BOTH KNEES: Primary | ICD-10-CM

## 2024-01-02 DIAGNOSIS — M17.11 ARTHRITIS OF RIGHT KNEE: ICD-10-CM

## 2024-01-02 PROCEDURE — 97112 NEUROMUSCULAR REEDUCATION: CPT | Performed by: PHYSICAL THERAPIST

## 2024-01-02 PROCEDURE — 97110 THERAPEUTIC EXERCISES: CPT | Performed by: PHYSICAL THERAPIST

## 2024-01-02 NOTE — PROGRESS NOTES
Daily Note     Today's date: 2024  Patient name: Radha Mendez  : 1955  MRN: [id number]  Referring provider: Sarah Doran,*  Dx:   Encounter Diagnosis     ICD-10-CM    1. Primary osteoarthritis of both knees  M17.0       2. Arthritis of right knee  M17.11       3. Acute lateral meniscal tear, initial encounter  S83.193Q                        Subjective: Patient reported she has intermittent episodes of bilateral knee sharp pain, which is due to knee flexion based weight baring and non weight baring activities.      Objective: See treatment diary below      Assessment:   Patient presents with all open and closed kinetic chain activities performed with a knee extension bias without pain aggravation, but knee flexion based functional movements during transfers, stairs and walking remains pain aggravating.        Plan: Continue per plan of care.      Precautions: Right knee pain aggravation.    Patient's PMHx is remarkable for hypothyroidism, HTN, lumbar spine DDD and formainal stenosis and L3 L4 laminectomy, discectomy and foraminectomy.    Access Code: [email or web address]  URL: [email or web address]  Date: 2023  Prepared by: Rod Donis    Exercises  - Standing Heel Raise with Support  - 1 x daily - 7 x weekly - 2 sets - 10 reps  - Standing Toe Raises at Chair  - 1 x daily - 7 x weekly - 2 sets - 10 reps  - Standing Hip Flexion with Counter Support  - 1 x daily - 7 x weekly - 2 sets - 10 reps  - Standing Hip Abduction with Counter Support  - 1 x daily - 7 x weekly - 2 sets - 10 reps  - Standing Hip Extension with Counter Support  - 1 x daily - 7 x weekly - 2 sets - 10 reps  - Single Leg Stance with Support  - 1 x daily - 7 x weekly - 1 sets - 4 reps - 30 seconds hold  - Standing Tandem Balance with Counter Support  - 1 x daily - 7 x weekly - 1 sets - 4 reps - 30 seconds hold  - Side Stepping with Counter Support  - 1 x daily - 7 x weekly - 2 sets - 10 reps  - Tandem Walking with Counter  "Support  - 1 x daily - 7 x weekly - 2 sets - 10 reps  - Hooklying Hamstring Stretch with Strap  - 1 x daily - 7 x weekly - 1 sets - 5 reps - 20 seconds hold  - Long Sitting Calf Stretch with Strap  - 1 x daily - 7 x weekly - 1 sets - 5 reps - 20 seconds hold  - Supine Quad Set  - 1 x daily - 7 x weekly - 2 sets - 10 reps - 3 seconds hold  - Supine Hip Abduction  - 1 x daily - 7 x weekly - 2 sets - 10 reps    Manuals 11/30 12/14 12/19 1/2/24                      Neuro Re-Ed                          Ther Ex                          Nu step  10 min 10 min  10 min         Seated hr and tr:B:   NT NT         Seated hip flexion:B:   20X 2SEC  NT         Seated LAQ:B: 10 x hep  20X 2SEC  NT                      Supine gastrocnemius stretch:R 10 sec x 5 hep 20 sec x 5 20SEC 5X  NT         Supine hamstring stretch:R 10 sec x 5 hep 20 sec x 5 20SEC 5X NT         Quad sets with towel roll:R 3 sec x 10 hep 3 sec x 20 20X 3SEC  NT         Ankle pumps:R   20X  NT         Glute sets:B   20X 3SEC  NT         Hip abduction slides in supine:R  2 x 10 NT 2 x 10         SAQ:B: hep  20X 3SEC  2 x 10         Hook lying hip abduction isometrics:B:   NT NT         Hook lying hip adduction    NT NT         Prone hip extension:B:   NT Add nv         Prone TKE:B:   NT Add nv                                                             Standing hr and tr:B:  2 x 10 20X  2 x 10         Standing slr x 3:B:  2 x 10 20X  2 x 10         Standing knee flexion:B:  Hold 20X  NT         Mini squats   20X  2 x 10         SLS and tandem stance:B:  30 sec x 2 30SEC 5X  10 sec x 10         Side stepping and tandem ambulation  4 x 10 feet in parallel bars 6X  8 x at ballet bar         Forward step ups:R 4\"  NT NT         Lateral step ups:R: 4\"  NT NT         Step downs:R 4\"  NT NT                      [employer] balance system     LOS static easy with bilateral upper extremity support x 6                      Ther Activity                                     "   Gait Training                                       Modalities                          US to right lateral knee             Lasar to right lateral knee                          MHP to right knee and lumbar spine seated prn   10 MIN

## 2024-01-04 ENCOUNTER — OFFICE VISIT (OUTPATIENT)
Dept: GASTROENTEROLOGY | Facility: CLINIC | Age: 69
End: 2024-01-04
Payer: MEDICARE

## 2024-01-04 ENCOUNTER — OFFICE VISIT (OUTPATIENT)
Dept: PHYSICAL THERAPY | Age: 69
End: 2024-01-04
Payer: MEDICARE

## 2024-01-04 VITALS
BODY MASS INDEX: 22.9 KG/M2 | TEMPERATURE: 97.2 F | HEIGHT: 70 IN | SYSTOLIC BLOOD PRESSURE: 138 MMHG | HEART RATE: 60 BPM | DIASTOLIC BLOOD PRESSURE: 82 MMHG | OXYGEN SATURATION: 96 % | WEIGHT: 160 LBS

## 2024-01-04 DIAGNOSIS — M17.11 ARTHRITIS OF RIGHT KNEE: ICD-10-CM

## 2024-01-04 DIAGNOSIS — R10.32 LLQ ABDOMINAL PAIN: Primary | ICD-10-CM

## 2024-01-04 DIAGNOSIS — S83.289A ACUTE LATERAL MENISCAL TEAR, INITIAL ENCOUNTER: ICD-10-CM

## 2024-01-04 DIAGNOSIS — Z12.11 COLON CANCER SCREENING: ICD-10-CM

## 2024-01-04 DIAGNOSIS — M17.0 PRIMARY OSTEOARTHRITIS OF BOTH KNEES: Primary | ICD-10-CM

## 2024-01-04 PROCEDURE — 97110 THERAPEUTIC EXERCISES: CPT | Performed by: PHYSICAL THERAPIST

## 2024-01-04 PROCEDURE — 99215 OFFICE O/P EST HI 40 MIN: CPT | Performed by: INTERNAL MEDICINE

## 2024-01-04 PROCEDURE — 97112 NEUROMUSCULAR REEDUCATION: CPT | Performed by: PHYSICAL THERAPIST

## 2024-01-04 NOTE — PROGRESS NOTES
[facility] - Outpatient Follow-up Note  Radha Mendez 68 y.o. female MRN: [id number]  Encounter: [id number]          ASSESSMENT AND PLAN:    Radha Mendez is a 68 y.o. female lower abdominal pain.  She reports worsening of the pain and swelling after prolonged standing.  Relieved by rest.  I think that the pain may be caused by a left inguinal hernia.  I will discuss the CT with a surgeon.      We need to schedule her colonoscopy.      No diagnosis found.    No orders of the defined types were placed in this encounter.    ______________________________________________________________________    SUBJECTIVE:    Radha Mendez is a 68 y.o. female with history of chronic back pain and back surgery, who presents for follow up of diarrhea and and LLQ abdominal pain.      Stool tests for calprotectin and infection were negative.  Celiac testing was also negative.  Colonoscopy was not done yet.     She says that her diarrhea has resolved.  She now has 1 BM per day.  No constipation.     She continues to have LLQ discomfort and heaviness.  Sometimes this is worse after eating, but main exacerbating factor is prolonged standing. The pain is in the suprapubic region and she continues to see bulging in the LLQ.  It is relieved with lying down.      She will be seeing Urology.      She had CT a/p on 10/19/2023, which I reviewed.  I think there is a left fat-containing inguinal hernia (e.g. series 2, image 131).    REVIEW OF SYSTEMS IS OTHERWISE NEGATIVE.      Historical Information   Past Medical History:   Diagnosis Date    Abnormal fasting glucose 05/24/2023    Arthritis 2022    Treatments didn’t work    Benign essential HTN 09/27/2017    Chronic pain disorder     Diverticulitis of colon 1 moth ago    Per my own perception    Elevated BUN     resolved 3/10/17    Fall from slipping 04/15/2022    Fluid retention in legs     last assessed 5/12/15    Hypothyroidism     Lumbago with sciatica     last assessed  "4/10/14    Lumbar radiculopathy     last assessed 4/10/14    Onychomycosis of toenail     last assessed 11/7/16    Primary osteoarthritis of both knees 10/24/2022     Past Surgical History:   Procedure Laterality Date    APPENDECTOMY      BREAST CYST ASPIRATION Left 1993    CATARACT EXTRACTION Right     KNEE ARTHROSCOPY Left     therapeutic \"30 years ago\"    WA LAMNOTMY INCL W/DCMPRSN NRV ROOT 1 INTRSPC LUMBR Right 5/31/2023    Procedure: RIGHT L3/4 HEMILAMINECTOMY,FORAMINOTOMY, DISCECTOMY;  Surgeon: Craig Robert Goldberg, MD;  Location: [facility];  Service: Neurosurgery     Social History   Social History     Substance and Sexual Activity   Alcohol Use Not Currently     Social History     Substance and Sexual Activity   Drug Use Never     Social History     Tobacco Use   Smoking Status Never   Smokeless Tobacco Never     Family History   Problem Relation Age of Onset    Breast cancer Mother 80    Hypertension Mother     No Known Problems Maternal Grandmother     No Known Problems Maternal Grandfather     No Known Problems Paternal Grandmother     No Known Problems Paternal Grandfather     Ovarian cancer Maternal Aunt 34    No Known Problems Maternal Aunt     No Known Problems Paternal Aunt     Prostate cancer Paternal Uncle 55    Ovarian cancer Cousin 55       Meds/Allergies       Current Outpatient Medications:     Acetaminophen (TYLENOL PO)    AMILoride-hydrochlorothiazide (MODURETIC) 5-50 mg per tablet    Ascorbic Acid (vitamin C) 1000 MG tablet    Calcium Carbonate-Vitamin D3 600-400 MG-UNIT TABS    Collagen Hydrolysate POWD    Glucosamine-Chondroit-Vit C-Mn (GLUCOSAMINE-CHONDROITIN) TABS    levothyroxine 50 mcg tablet    Magnesium Carbonate POWD    Multiple Vitamin (MULTI-DAY) TABS    No Known Allergies        Objective     Blood pressure 138/82, pulse 60, temperature (!) 97.2 °F (36.2 °C), temperature source Tympanic, height 5' 10\" (1.778 m), weight 72.6 kg (160 lb), SpO2 96%. Body mass index is 22.96 " kg/m².      PHYSICAL EXAM:      General Appearance:   Alert, cooperative, no distress   HEENT:   Normocephalic, atraumatic, anicteric.     Neck:  Supple, symmetrical, trachea midline   Lungs:   Clear to auscultation bilaterally; no rales, rhonchi or wheezing; respirations unlabored    Heart::   Regular rate and rhythm; no murmur, rub, or gallop.   Abdomen:   Soft, non-tender, non-distended; normal bowel sounds; no masses, no organomegaly    Genitalia:   Deferred    Rectal:   Deferred    Extremities:  No cyanosis, clubbing or edema    Pulses:  2+ and symmetric    Skin:  No jaundice, rashes, or lesions    Lymph nodes:  No palpable cervical lymphadenopathy        Lab Results:   No visits with results within 1 Day(s) from this visit.   Latest known visit with results is:   Clinical Support on 10/30/2023   Component Date Value    LEUKOCYTE ESTERASE,UA 10/30/2023 trace     NITRITE,UA 10/30/2023 negative     SL AMB POCT UROBILINOGEN 10/30/2023 0.2 E.U./dL     POCT URINE PROTEIN 10/30/2023 NEGATIVE      PH,UA 10/30/2023 7.0     BLOOD,UA 10/30/2023 NEGATIVE     SPECIFIC GRAVITY,UA 10/30/2023 1.015     KETONES,UA 10/30/2023 NEGATIVE     BILIRUBIN,UA 10/30/2023 NEGATIVE     GLUCOSE, UA 10/30/2023 NEGATIVE      COLOR,UA 10/30/2023 YELLOW     CLARITY,UA 10/30/2023 CLEAR        Lab Results   Component Value Date    WBC 8.87 10/11/2023    HGB 14.1 10/11/2023    HCT 41.6 10/11/2023    MCV 91 10/11/2023     10/11/2023       Lab Results   Component Value Date     11/14/2017    SODIUM 133 (L) 10/11/2023    K 3.7 10/11/2023    CL 93 (L) 10/11/2023    CO2 30 10/11/2023    AGAP 10 10/11/2023    BUN 21 10/11/2023    CREATININE 0.76 10/11/2023    GLUC 103 10/11/2023    GLUF 104 (H) 04/27/2023    CALCIUM 9.7 10/11/2023    AST 19 10/11/2023    ALT 13 10/11/2023    ALKPHOS 44 10/11/2023    PROT 6.1 11/14/2017    TP 6.8 10/11/2023    BILITOT 0.4 11/14/2017    TBILI 0.49 10/11/2023    EGFR 80 10/11/2023       Lab Results  "  Component Value Date    CRP 1.1 10/11/2023       Lab Results   Component Value Date    IOD9WLTWKUBD 1.360 04/18/2023    TSH 3.05 03/20/2018       No results found for: \"IRON\", \"TIBC\", \"FERRITIN\"    Radiology Results:   No results found.  "

## 2024-01-04 NOTE — PROGRESS NOTES
PT Evaluation  / PT Reassessment    Today's date: 2024  Patient name: Radha Mendez  : 1955  MRN: [id number]  Referring provider: Sarah Doran,*  Dx:   Encounter Diagnosis     ICD-10-CM    1. Primary osteoarthritis of both knees  M17.0       2. Arthritis of right knee  M17.11       3. Acute lateral meniscal tear, initial encounter  S83.289A                      Assessment  Assessment details: PT Reassessment: 24.  Patient reported the following progress since onset of PT: bilateral lower extremity strengthening and mobility progress as well as pain reduction.  But, she noted the following deficits that still persists: stair climbing with ascent more challenging than descent, lower extremity dressing, prolonged walking, car transfers, has to now sleep in supine, squatting and lifting activities and prolonged static activities like chores.      PT IE: 23.  Patient reported she has had chronic right knee pain.  Patient noted she is going to [facility] for her lumbar spine symptom presentation.  Patient noted she had an MRI on her right knee, which was + for lateral meniscal tear.  Patient noted she had fallen in 2022 which aggravated her bilateral knees.  Patient  noted x-ray is + for bilateral knee osteoarthritis.  Patient noted she is still recovering from lumbar spine surgery.  Patient noted she is trying to continue to walk for fitness following her lumbar spine surgery, but right knee pain limits her prolonged walking.  Patient noted the following deficits due to right knee pain: car and chair transfers, walking, stair climbing, bed mobility and transfers, squatting activities, bending and lifting activities.  Patient uses bilateral upper extremity to assist right lower extremity on and off the bed.  Patient noted use of CP and oral medication is helpful for right knee pain reduction.  Patient denies use of right knee orthosis.  Patient noted her balance is altered by her  right knee pain.  Patient denies right lower extremity weakness due to right knee OA and meniscal tear.  Patient noted she does have lateral knee pain on the right knee, that is constant.      Impairments: abnormal gait, abnormal or restricted ROM, abnormal movement, activity intolerance, impaired physical strength, lacks appropriate home exercise program, pain with function and weight-bearing intolerance  Understanding of Dx/Px/POC: excellent   Prognosis: good  Prognosis details: Patient is a 68 y.o. year old female seen for outpatient PT reevaluation with pain, mobility and functional deficits due to right lateral knee pain.  Patient presents with the following progress since onset of PT: decrease in right knee pain, increase in right lower extremity mobility and strength, transfer progress and self / house hold functional progress.  Patient presents to PT reassessment with the following problems, concerns, deficits and impairments: right lateral knee pain, decreased right lower extremity range of motion, decreased right lower extremity strength, + TTP, gait and stair dysfunctions, transfer dysfunctions, bilateral knee edema, functional limitations and decreased tolerance to activity.  Patient would benefit from skilled PT services under the following PT treatment plan to address the above noted deficits: therapeutic exercises and activities to facilitate right knee mobility and strength, modalities, manual therapy techniques, gait and stair training, transfer training, balance and proprioception activities, IASTM techniques, Laser treatment, Kinesio taping techniques, and a hep.  Thank you for the referral.     Goals  Short Term goals - 4 weeks  1.  Patient will be independent HEP. MET.  2.  Patient will report a 25 - 50% decrease in pain complaints. MET.  3.  Increase strength 1/2 grade. MET.  4.  Increase ROM 5-10 degrees. MET.    Long Term goals - 8 weeks  1.  Patient will report elimination of pain  complaints.  Partially MET.  2.  Patient will return to all recreational activities without restriction.Partially MET.  3.  ROM WFL.Partially MET.  4.  Strength 5/5.Partially MET.  5.  Patient will report ability to perform bed, car and chair transfers without right knee symptom or limitation aggravation. Partially MET.  6.  Patient will report ability to walk for fitness without right knee symptom or limitation aggravation.  Partially MET.  7.  Patient will report ability to perform stair climbing without right knee symptom or limitation aggravation.  Partially MET.  8.  Patient will report ability to perform house hold chores and activities without right knee symptom or limitation aggravation.  Partially MET.  9.  Patient will report ability to perform squatting and lifting activities  without right knee symptom or limitation aggravation.  Partially MET.    Plan  Patient would benefit from: PT eval and skilled physical therapy  Planned modality interventions: low level laser therapy, thermotherapy: hydrocollator packs, TENS, ultrasound, unattended electrical stimulation, cryotherapy and electrical stimulation/Russian stimulation  Planned therapy interventions: IASTM, joint mobilization, kinesiology taping, manual therapy, massage, balance, balance/weight bearing training, neuromuscular re-education, patient education, postural training, self care, strengthening, stretching, therapeutic activities, therapeutic exercise, therapeutic training, transfer training, flexibility, functional ROM exercises, gait training, graded activity, graded exercise, graded motor, home exercise program, IADL retraining and compression  Frequency: 2x week  Duration in weeks: 12  Treatment plan discussed with: patient      Subjective Evaluation    History of Present Illness  Mechanism of injury: Patient's PMHx is remarkable for hypothyroidism, HTN, lumbar spine DDD and formainal stenosis and L3 L4 laminectomy, discectomy and  foraminectomy.    Narrative & Impression  MRI RIGHT KNEE     INDICATION:   M17.11: Unilateral primary osteoarthritis, right knee.     COMPARISON: Correlation is made with the prior radiograph dated 10/4/2023.     TECHNIQUE: Multiplanar/multisequence MR of the right knee was performed.  Imaging performed on 3.0T MRI     FINDINGS:     SUBCUTANEOUS TISSUES: Normal     JOINT EFFUSION: Trace joint effusion.     BAKER'S CYST: There is a small Baker's cyst.     MENISCI: There is a complex tear of the body of the lateral meniscus extending into the anterior horn. The medial meniscus is intact.     CRUCIATE LIGAMENTS: Intact.     EXTENSOR APPARATUS: Intact.     COLLATERAL LIGAMENTS: Intact.     ARTICULAR SURFACES: Normal.     BONES: Normal.     MUSCULATURE:  Intact.     IMPRESSION:     Complex tear of the body and anterior horn of the lateral meniscus.     Trace joint effusion and small Baker's cyst.            Patient Goals  Patient goals for therapy: decreased pain, increased motion, increased strength, independence with ADLs/IADLs and return to sport/leisure activities    Pain  At best pain ratin  At worst pain ratin  Location: right lateral knee          Objective     Tenderness     Additional Tenderness Details  Patient is + for moderate to severe TTP at right lateral knee joint line, and moderate TTP at right peripatellar region, medial joint line and popliteal region.    Active Range of Motion   Left Hip   Flexion: 122 degrees   Abduction: 24 degrees     Right Hip   Flexion: 115 degrees   Abduction: 16 degrees   Left Knee   Flexion: 130 degrees   Extension: 0 degrees   Extensor la degrees     Right Knee   Flexion: 128 degrees   Extension: -8 degrees with pain  Extensor la degrees with pain  Left Ankle/Foot   Dorsiflexion (ke): 2 degrees   Plantar flexion: 54 degrees     Right Ankle/Foot   Dorsiflexion (ke): 4 degrees   Plantar flexion: 44 degrees     Strength/Myotome Testing     Left Hip   Planes of  Motion   Flexion: 4  Extension: 4  Abduction: 4  Adduction: 4+    Right Hip   Planes of Motion   Flexion: 4-  Extension: 4  Abduction: 4  Adduction: 4+    Left Knee   Flexion: 5  Extension: 4+    Right Knee   Flexion: 4  Extension: 4-    Left Ankle/Foot   Dorsiflexion: 5  Plantar flexion: 5    Right Ankle/Foot   Dorsiflexion: 4  Plantar flexion: 4+    Ambulation     Ambulation: Level Surfaces   Ambulation without assistive device: independent    Additional Level Surfaces Ambulation Details  Patient ambulates with ddecrease in right knee extension at mid stance, decrease in right knee flexion with swing phase, decrease in right sided weight shift with right stance phase and decrease in right stance and left swing phase.    Ambulation: Stairs   Ascend stairs: independent  Pattern: non-reciprocal  Railings: two rails  Descend stairs: independent  Pattern: non-reciprocal  Railings: two rails    General Comments:      Knee Comments  PT IE: Girth Measurements:  Knee:  Suprapatellar region: Right at 44.6 CM and left at 44.6 CM;  Mid patellar region: Right at 42.3 CM and left at 42.6 Cm;  Infrapatellar region: Right at 41.0 CM and left at 42.1 Cm.    PT Reassessment 01/04/2024:  Girth Measurements:  Knee:  Suprapatellar region: Right at 45.3 CM and left at 44.6 CM;  Mid patellar region: Right at 42.4 CM and left at 42.6 Cm;  Infrapatellar region: Right at 41.4 CM and left at 42.1 Cm.             Precautions: Right knee pain aggravation.    Patient's PMHx is remarkable for hypothyroidism, HTN, lumbar spine DDD and formainal stenosis and L3 L4 laminectomy, discectomy and foraminectomy.    Access Code: [email or web address]  URL: [email or web address]  Date: 12/14/2023  Prepared by: Rod Donis    Exercises  - Standing Heel Raise with Support  - 1 x daily - 7 x weekly - 2 sets - 10 reps  - Standing Toe Raises at Chair  - 1 x daily - 7 x weekly - 2 sets - 10 reps  - Standing Hip Flexion with Counter Support  - 1 x daily - 7  x weekly - 2 sets - 10 reps  - Standing Hip Abduction with Counter Support  - 1 x daily - 7 x weekly - 2 sets - 10 reps  - Standing Hip Extension with Counter Support  - 1 x daily - 7 x weekly - 2 sets - 10 reps  - Single Leg Stance with Support  - 1 x daily - 7 x weekly - 1 sets - 4 reps - 30 seconds hold  - Standing Tandem Balance with Counter Support  - 1 x daily - 7 x weekly - 1 sets - 4 reps - 30 seconds hold  - Side Stepping with Counter Support  - 1 x daily - 7 x weekly - 2 sets - 10 reps  - Tandem Walking with Counter Support  - 1 x daily - 7 x weekly - 2 sets - 10 reps  - Hooklying Hamstring Stretch with Strap  - 1 x daily - 7 x weekly - 1 sets - 5 reps - 20 seconds hold  - Long Sitting Calf Stretch with Strap  - 1 x daily - 7 x weekly - 1 sets - 5 reps - 20 seconds hold  - Supine Quad Set  - 1 x daily - 7 x weekly - 2 sets - 10 reps - 3 seconds hold  - Supine Hip Abduction  - 1 x daily - 7 x weekly - 2 sets - 10 reps    Manuals 11/30 12/14 12/19 1/2/24 1/4/24                     Neuro Re-Ed                          Ther Ex                          Nu step  10 min 10 min  10 min 10 min        Seated hr and tr:B:   NT NT NT        Seated hip flexion:B:   20X 2SEC  NT 2 x 10        Seated LAQ:B: 10 x hep  20X 2SEC  NT 2 x 10                     Supine gastrocnemius stretch:R 10 sec x 5 hep 20 sec x 5 20SEC 5X  NT NT        Supine hamstring stretch:R 10 sec x 5 hep 20 sec x 5 20SEC 5X NT NT        Quad sets with towel roll:R 3 sec x 10 hep 3 sec x 20 20X 3SEC  NT NT        Ankle pumps:R   20X  NT NT        Glute sets:B   20X 3SEC  NT NT        Quad sets:B:     3 sec x 20        Heel slides:B:     2 x 10        SLR Flexion:B:     10 x         Hip abduction slides in supine:R  2 x 10 NT 2 x 10 2 x 10        SAQ:B: hep  20X 3SEC  2 x 10 2 x 10        Hook lying hip abduction isometrics:B:   NT NT NT        Hook lying hip adduction    NT NT NT        Prone hip extension:B:   NT Add nv NT        Prone TKE:B:   NT  "Add nv NT                                                            Standing hr and tr:B:  2 x 10 20X  2 x 10 2 x 10        Standing slr x 3:B:  2 x 10 20X  2 x 10 2 x 10        Lunges on foam:B:     2 x 10        Mini squats   20X  2 x 10 2 x 10        SLS and tandem stance:B:  30 sec x 2 30SEC 5X  10 sec x 10 On foam 10 sec x 10        Side stepping and tandem ambulation  4 x 10 feet in parallel bars 6X  8 x at ballet bar 8 x at ballet bar on foam        Forward step ups:R 4\"  NT NT 10 x         Lateral step ups:R: 4\"  NT NT 10 x         Step downs:R 4\"  NT NT                      Biodex balance system     LOS static easy with bilateral upper extremity support x 6 LOS static easy with bilateral upper extremity support x 6        [place] resistance walking forward, backward and to each side      add                                 Ther Activity                                       Gait Training                                       Modalities                          US to right lateral knee             Lasar to right lateral knee                          MHP to right knee and lumbar spine seated prn   10 MIN                            "

## 2024-01-04 NOTE — Clinical Note
Nick Kenny, do you mind taking a look at this lady's CT?  She complains of intermittent LLQ pain and swelling, like a bulge.  It happens after prolonged standing and is relieved with lying down.  I think she has a fat-containing inguinal hernia (e.g. series 2, image 131).  If you agree, can you see her?  Thanks!  -Penny

## 2024-01-05 DIAGNOSIS — I10 ESSENTIAL HYPERTENSION: ICD-10-CM

## 2024-01-05 RX ORDER — AMILORIDE HYDROCHLORIDE AND HYDROCHLOROTHIAZIDE 5; 50 MG/1; MG/1
1 TABLET ORAL DAILY
Qty: 90 TABLET | Refills: 1 | Status: SHIPPED | OUTPATIENT
Start: 2024-01-05

## 2024-01-05 RX ORDER — AMILORIDE HYDROCHLORIDE AND HYDROCHLOROTHIAZIDE 5; 50 MG/1; MG/1
1 TABLET ORAL DAILY
Qty: 90 TABLET | Refills: 1 | OUTPATIENT
Start: 2024-01-05

## 2024-01-09 ENCOUNTER — OFFICE VISIT (OUTPATIENT)
Dept: UROLOGY | Facility: MEDICAL CENTER | Age: 69
End: 2024-01-09
Payer: MEDICARE

## 2024-01-09 ENCOUNTER — OFFICE VISIT (OUTPATIENT)
Dept: PHYSICAL THERAPY | Age: 69
End: 2024-01-09
Payer: MEDICARE

## 2024-01-09 ENCOUNTER — TELEPHONE (OUTPATIENT)
Age: 69
End: 2024-01-09

## 2024-01-09 VITALS
BODY MASS INDEX: 22.9 KG/M2 | HEART RATE: 68 BPM | HEIGHT: 70 IN | DIASTOLIC BLOOD PRESSURE: 70 MMHG | WEIGHT: 160 LBS | SYSTOLIC BLOOD PRESSURE: 120 MMHG | OXYGEN SATURATION: 98 %

## 2024-01-09 DIAGNOSIS — M17.0 PRIMARY OSTEOARTHRITIS OF BOTH KNEES: Primary | ICD-10-CM

## 2024-01-09 DIAGNOSIS — M17.11 ARTHRITIS OF RIGHT KNEE: ICD-10-CM

## 2024-01-09 DIAGNOSIS — S83.289A ACUTE LATERAL MENISCAL TEAR, INITIAL ENCOUNTER: ICD-10-CM

## 2024-01-09 DIAGNOSIS — K40.90 LEFT INGUINAL HERNIA: ICD-10-CM

## 2024-01-09 DIAGNOSIS — R82.81 PYURIA: ICD-10-CM

## 2024-01-09 DIAGNOSIS — N32.89 BLADDER WALL THICKENING: Primary | ICD-10-CM

## 2024-01-09 DIAGNOSIS — R10.30 LOWER ABDOMINAL PAIN: ICD-10-CM

## 2024-01-09 LAB
SL AMB  POCT GLUCOSE, UA: ABNORMAL
SL AMB LEUKOCYTE ESTERASE,UA: ABNORMAL
SL AMB POCT BILIRUBIN,UA: ABNORMAL
SL AMB POCT BLOOD,UA: ABNORMAL
SL AMB POCT CLARITY,UA: CLEAR
SL AMB POCT COLOR,UA: YELLOW
SL AMB POCT KETONES,UA: ABNORMAL
SL AMB POCT NITRITE,UA: ABNORMAL
SL AMB POCT PH,UA: 7
SL AMB POCT SPECIFIC GRAVITY,UA: 1.02
SL AMB POCT URINE PROTEIN: ABNORMAL
SL AMB POCT UROBILINOGEN: 0.2

## 2024-01-09 PROCEDURE — 81003 URINALYSIS AUTO W/O SCOPE: CPT | Performed by: UROLOGY

## 2024-01-09 PROCEDURE — 99204 OFFICE O/P NEW MOD 45 MIN: CPT | Performed by: UROLOGY

## 2024-01-09 PROCEDURE — 97110 THERAPEUTIC EXERCISES: CPT | Performed by: PHYSICAL THERAPIST

## 2024-01-09 PROCEDURE — 97112 NEUROMUSCULAR REEDUCATION: CPT | Performed by: PHYSICAL THERAPIST

## 2024-01-09 NOTE — LETTER
January 9, 2024     Arina Baltazar DO  [address]  [address]  [place] [id number]    Patient: Radha Mendez   YOB: 1955   Date of Visit: 1/9/2024       Dear Dr. Baltazar:    Thank you for referring Radha Mendez to me for evaluation. Below are my notes for this consultation.    If you have questions, please do not hesitate to call me. I look forward to following your patient along with you.         Sincerely,        Beto Yan MD        CC: MD Beto Alvarado MD  1/9/2024  3:39 PM  Sign when Signing Visit  Assessment/Plan:  #1.  Bladder wall thickening-nonspecific finding on CT scan-will plan cystoscopy but doubt pathology.  Check urine culture.    2.  Left inguinal hernia-referral to general surgery for operative repair    3.  Lower abdominal pain primarily associated in the postprandial period.  The patient gets bloating distention and often nausea after eating a big meal.    4.  Mild pyuria-check urine culture  No problem-specific Assessment & Plan notes found for this encounter.       Diagnoses and all orders for this visit:    Bladder wall thickening  -     Ambulatory Referral to Urology  -     POCT urine dip auto non-scope  -     Urine culture; Future  -     Cystoscopy; Future    Left inguinal hernia  -     Ambulatory Referral to General Surgery; Future    Lower abdominal pain    Pyuria  -     Urine culture; Future          Subjective:      Patient ID: Radha Mendez is a 68 y.o. female.    HPI  68-year-old female who was referred to gastroenterology for evaluation of abdominal pain undergoing CT scanning which revealed bladder wall thickening which is a nonspecific finding.  The patient describes her pain as mostly postprandial developing approximately half hour after eating and involving the lower abdomen bilaterally.  The patient notes the existence of a left lower quadrant/left inguinal mass.  She was referred for evaluation.  The following portions of the  "patient's history were reviewed and updated as appropriate: allergies, current medications, past family history, past medical history, past social history, past surgical history, and problem list.    Review of Systems   Gastrointestinal:  Positive for abdominal distention and abdominal pain.        Left lower quadrant mass consistent with inguinal hernia   Musculoskeletal:  Positive for arthralgias, gait problem and myalgias.   All other systems reviewed and are negative.        Objective:      /70 (BP Location: Left arm, Patient Position: Sitting, Cuff Size: Standard)   Pulse 68   Ht 5' 10\" (1.778 m)   Wt 72.6 kg (160 lb)   SpO2 98%   BMI 22.96 kg/m²          Physical Exam  Constitutional:       General: She is not in acute distress.     Appearance: Normal appearance. She is normal weight. She is not ill-appearing, toxic-appearing or diaphoretic.   HENT:      Head: Normocephalic and atraumatic.      Nose: Nose normal.      Mouth/Throat:      Mouth: Mucous membranes are moist.   Eyes:      Extraocular Movements: Extraocular movements intact.   Pulmonary:      Effort: Pulmonary effort is normal. No respiratory distress.      Breath sounds: No wheezing, rhonchi or rales.   Abdominal:      Comments: Abdominal examination reveals a 4 x 3 cm mass in the left lower quadrant of the abdomen i.e. inguinal canal consistent with an inguinal hernia.  Valsalva causes through transmission of pressure waves on examination consistent with an inguinal hernia.  Will plan referral to general surgery   Musculoskeletal:         General: Normal range of motion.      Cervical back: Neck supple.   Neurological:      Mental Status: She is alert and oriented to person, place, and time.   Psychiatric:         Mood and Affect: Mood normal.         Behavior: Behavior normal.         Thought Content: Thought content normal.         Judgment: Judgment normal.           "

## 2024-01-09 NOTE — TELEPHONE ENCOUNTER
Patient was going to reschedule today because she wasn't sure about the weather. Looked to reschedule. Could not get her in until the end of Feb. She said she will come to her appt today

## 2024-01-09 NOTE — LETTER
January 9, 2024     Arina Baltazar DO  [address]  [address]  [place] [id number]    Patient: Radha Mendez   YOB: 1955   Date of Visit: 1/9/2024       Dear Dr. Baltazar:    Thank you for referring Radha Mendez to me for evaluation. Below are my notes for this consultation.    If you have questions, please do not hesitate to call me. I look forward to following your patient along with you.         Sincerely,        Beto Yan MD        CC: MD Beto Alvarado MD  1/9/2024  3:39 PM  Sign when Signing Visit  Assessment/Plan:  #1.  Bladder wall thickening-nonspecific finding on CT scan-will plan cystoscopy but doubt pathology.  Check urine culture.    2.  Left inguinal hernia-referral to general surgery for operative repair    3.  Lower abdominal pain primarily associated in the postprandial period.  The patient gets bloating distention and often nausea after eating a big meal.    4.  Mild pyuria-check urine culture  No problem-specific Assessment & Plan notes found for this encounter.       Diagnoses and all orders for this visit:    Bladder wall thickening  -     Ambulatory Referral to Urology  -     POCT urine dip auto non-scope  -     Urine culture; Future  -     Cystoscopy; Future    Left inguinal hernia  -     Ambulatory Referral to General Surgery; Future    Lower abdominal pain    Pyuria  -     Urine culture; Future          Subjective:      Patient ID: Radha Mendez is a 68 y.o. female.    HPI  68-year-old female who was referred to gastroenterology for evaluation of abdominal pain undergoing CT scanning which revealed bladder wall thickening which is a nonspecific finding.  The patient describes her pain as mostly postprandial developing approximately half hour after eating and involving the lower abdomen bilaterally.  The patient notes the existence of a left lower quadrant/left inguinal mass.  She was referred for evaluation.  The following portions of the  "patient's history were reviewed and updated as appropriate: allergies, current medications, past family history, past medical history, past social history, past surgical history, and problem list.    Review of Systems   Gastrointestinal:  Positive for abdominal distention and abdominal pain.        Left lower quadrant mass consistent with inguinal hernia   Musculoskeletal:  Positive for arthralgias, gait problem and myalgias.   All other systems reviewed and are negative.        Objective:      /70 (BP Location: Left arm, Patient Position: Sitting, Cuff Size: Standard)   Pulse 68   Ht 5' 10\" (1.778 m)   Wt 72.6 kg (160 lb)   SpO2 98%   BMI 22.96 kg/m²          Physical Exam  Constitutional:       General: She is not in acute distress.     Appearance: Normal appearance. She is normal weight. She is not ill-appearing, toxic-appearing or diaphoretic.   HENT:      Head: Normocephalic and atraumatic.      Nose: Nose normal.      Mouth/Throat:      Mouth: Mucous membranes are moist.   Eyes:      Extraocular Movements: Extraocular movements intact.   Pulmonary:      Effort: Pulmonary effort is normal. No respiratory distress.      Breath sounds: No wheezing, rhonchi or rales.   Abdominal:      Comments: Abdominal examination reveals a 4 x 3 cm mass in the left lower quadrant of the abdomen i.e. inguinal canal consistent with an inguinal hernia.  Valsalva causes through transmission of pressure waves on examination consistent with an inguinal hernia.  Will plan referral to general surgery   Musculoskeletal:         General: Normal range of motion.      Cervical back: Neck supple.   Neurological:      Mental Status: She is alert and oriented to person, place, and time.   Psychiatric:         Mood and Affect: Mood normal.         Behavior: Behavior normal.         Thought Content: Thought content normal.         Judgment: Judgment normal.           "

## 2024-01-09 NOTE — PROGRESS NOTES
Daily Note     Today's date: 2024  Patient name: Radha Mendez  : 1955  MRN: [id number]  Referring provider: Sarah Doran,*  Dx:   Encounter Diagnosis     ICD-10-CM    1. Primary osteoarthritis of both knees  M17.0       2. Arthritis of right knee  M17.11       3. Acute lateral meniscal tear, initial encounter  S83.289A                        Subjective: Patient reported she will see Dr. Janice Grimes on 24 for a second opinion regarding her bilateral knee pain.  Patient reported she continues to have intermittent episodes of bilateral knee sharp pain, which is due to knee flexion based weight baring and non weight baring activities.  But, she noted she does walk in a manner limiting her bilateral knee flexion for pain reduction.      Objective: See treatment diary below      Assessment:   Patient presents with ability to perform closed kinetic chain activities despite pain aggravation produced with knee flexion biased movements.  This mimics her walking compensation of limiting each knee flexion during swing phase.  Patient exhibits limitation of bilateral knee flexion during swing phase due to pain aggravation thus she limits her swing phase.  Thus, PT is warranted to facilitate bilateral lower extremity strength, knee flexion mobility to promote improved standing, walking and stair climbing activities.        Plan: Continue per plan of care.   Patient to see Dr. Janice Grimes on 24.     Precautions: Right knee pain aggravation.    Patient's PMHx is remarkable for hypothyroidism, HTN, lumbar spine DDD and formainal stenosis and L3 L4 laminectomy, discectomy and foraminectomy.    Access Code: [email or web address]  URL: [email or web address]  Date: 2023  Prepared by: Rod Donis    Exercises  - Standing Heel Raise with Support  - 1 x daily - 7 x weekly - 2 sets - 10 reps  - Standing Toe Raises at Chair  - 1 x daily - 7 x weekly - 2 sets - 10 reps  - Standing Hip Flexion with  Counter Support  - 1 x daily - 7 x weekly - 2 sets - 10 reps  - Standing Hip Abduction with Counter Support  - 1 x daily - 7 x weekly - 2 sets - 10 reps  - Standing Hip Extension with Counter Support  - 1 x daily - 7 x weekly - 2 sets - 10 reps  - Single Leg Stance with Support  - 1 x daily - 7 x weekly - 1 sets - 4 reps - 30 seconds hold  - Standing Tandem Balance with Counter Support  - 1 x daily - 7 x weekly - 1 sets - 4 reps - 30 seconds hold  - Side Stepping with Counter Support  - 1 x daily - 7 x weekly - 2 sets - 10 reps  - Tandem Walking with Counter Support  - 1 x daily - 7 x weekly - 2 sets - 10 reps  - Hooklying Hamstring Stretch with Strap  - 1 x daily - 7 x weekly - 1 sets - 5 reps - 20 seconds hold  - Long Sitting Calf Stretch with Strap  - 1 x daily - 7 x weekly - 1 sets - 5 reps - 20 seconds hold  - Supine Quad Set  - 1 x daily - 7 x weekly - 2 sets - 10 reps - 3 seconds hold  - Supine Hip Abduction  - 1 x daily - 7 x weekly - 2 sets - 10 reps    Manuals 11/30 12/14 12/19 1/2/24 1/4/24 1/9                    Neuro Re-Ed                          Ther Ex                          Nu step  10 min 10 min  10 min 10 min 10 min       Seated hr and tr:B:   NT NT NT        Seated hip flexion:B:   20X 2SEC  NT 2 x 10        Seated LAQ:B: 10 x hep  20X 2SEC  NT 2 x 10                     Supine gastrocnemius stretch:R 10 sec x 5 hep 20 sec x 5 20SEC 5X  NT NT        Supine hamstring stretch:R 10 sec x 5 hep 20 sec x 5 20SEC 5X NT NT        Quad sets with towel roll:R 3 sec x 10 hep 3 sec x 20 20X 3SEC  NT NT        Ankle pumps:R   20X  NT NT        Glute sets:B   20X 3SEC  NT NT        Quad sets:B:     3 sec x 20        Heel slides:B:     2 x 10        SLR Flexion:B:     10 x         Hip abduction slides in supine:R  2 x 10 NT 2 x 10 2 x 10        SAQ:B: hep  20X 3SEC  2 x 10 2 x 10        Hook lying hip abduction isometrics:B:   NT NT NT        Hook lying hip adduction    NT NT NT        Prone hip extension:B:  "  NT Add nv NT        Prone TKE:B:   NT Add nv NT                                                            Standing hr and tr:B:  2 x 10 20X  2 x 10 2 x 10 2 x 10       Standing slr x 3:B:  2 x 10 20X  2 x 10 2 x 10 2# x 20       Lunges on foam:B:     2 x 10 2# x 20       Mini squats   20X  2 x 10 2 x 10 2 x 10       SLS and tandem stance:B:  30 sec x 2 30SEC 5X  10 sec x 10 On foam 10 sec x 10 On foam 10 sec x 10       Side stepping and tandem ambulation  4 x 10 feet in parallel bars 6X  8 x at ballet bar 8 x at ballet bar on foam 2# x 8 x at ballet bar on foam       Forward step ups:R 4\"  NT NT 10 x  2# x 20 on 4\"       Lateral step ups:R: 4\"  NT NT 10 x  2# x 20 on 4\"       Step downs:R 4\"  NT NT                      Biodex balance system     LOS static easy with bilateral upper extremity support x 6 LOS static easy with bilateral upper extremity support x 6 LOS static easy with bilateral upper extremity support x 6       [place] resistance walking forward, backward and to each side      Fwd, bwd 8# x 5 and side to side 4# x 5                                 Ther Activity                                       Gait Training                                       Modalities                          US to right lateral knee             Lasar to right lateral knee                          MHP to right knee and lumbar spine seated prn   10 MIN                                 "

## 2024-01-09 NOTE — PROGRESS NOTES
Assessment/Plan:  #1.  Bladder wall thickening-nonspecific finding on CT scan-will plan cystoscopy but doubt pathology.  Check urine culture.    2.  Left inguinal hernia-referral to general surgery for operative repair    3.  Lower abdominal pain primarily associated in the postprandial period.  The patient gets bloating distention and often nausea after eating a big meal.    4.  Mild pyuria-check urine culture  No problem-specific Assessment & Plan notes found for this encounter.       Diagnoses and all orders for this visit:    Bladder wall thickening  -     Ambulatory Referral to Urology  -     POCT urine dip auto non-scope  -     Urine culture; Future  -     Cystoscopy; Future    Left inguinal hernia  -     Ambulatory Referral to General Surgery; Future    Lower abdominal pain    Pyuria  -     Urine culture; Future          Subjective:      Patient ID: Radha Mendez is a 68 y.o. female.    HPI  68-year-old female who was referred to gastroenterology for evaluation of abdominal pain undergoing CT scanning which revealed bladder wall thickening which is a nonspecific finding.  The patient describes her pain as mostly postprandial developing approximately half hour after eating and involving the lower abdomen bilaterally.  The patient notes the existence of a left lower quadrant/left inguinal mass.  She was referred for evaluation.  The following portions of the patient's history were reviewed and updated as appropriate: allergies, current medications, past family history, past medical history, past social history, past surgical history, and problem list.    Review of Systems   Gastrointestinal:  Positive for abdominal distention and abdominal pain.        Left lower quadrant mass consistent with inguinal hernia   Musculoskeletal:  Positive for arthralgias, gait problem and myalgias.   All other systems reviewed and are negative.        Objective:      /70 (BP Location: Left arm, Patient Position: Sitting, Cuff  "Size: Standard)   Pulse 68   Ht 5' 10\" (1.778 m)   Wt 72.6 kg (160 lb)   SpO2 98%   BMI 22.96 kg/m²          Physical Exam  Constitutional:       General: She is not in acute distress.     Appearance: Normal appearance. She is normal weight. She is not ill-appearing, toxic-appearing or diaphoretic.   HENT:      Head: Normocephalic and atraumatic.      Nose: Nose normal.      Mouth/Throat:      Mouth: Mucous membranes are moist.   Eyes:      Extraocular Movements: Extraocular movements intact.   Pulmonary:      Effort: Pulmonary effort is normal. No respiratory distress.      Breath sounds: No wheezing, rhonchi or rales.   Abdominal:      Comments: Abdominal examination reveals a 4 x 3 cm mass in the left lower quadrant of the abdomen i.e. inguinal canal consistent with an inguinal hernia.  Valsalva causes through transmission of pressure waves on examination consistent with an inguinal hernia.  Will plan referral to general surgery   Musculoskeletal:         General: Normal range of motion.      Cervical back: Neck supple.   Neurological:      Mental Status: She is alert and oriented to person, place, and time.   Psychiatric:         Mood and Affect: Mood normal.         Behavior: Behavior normal.         Thought Content: Thought content normal.         Judgment: Judgment normal.           "

## 2024-01-11 ENCOUNTER — OFFICE VISIT (OUTPATIENT)
Dept: PHYSICAL THERAPY | Age: 69
End: 2024-01-11
Payer: MEDICARE

## 2024-01-11 DIAGNOSIS — M17.0 PRIMARY OSTEOARTHRITIS OF BOTH KNEES: ICD-10-CM

## 2024-01-11 DIAGNOSIS — M17.11 ARTHRITIS OF RIGHT KNEE: Primary | ICD-10-CM

## 2024-01-11 PROCEDURE — 97110 THERAPEUTIC EXERCISES: CPT

## 2024-01-11 PROCEDURE — 97140 MANUAL THERAPY 1/> REGIONS: CPT

## 2024-01-11 NOTE — PROGRESS NOTES
"Daily Note     Today's date: 1/11/2024  Patient name: Radha Mendez  DB: 1955  MRN: [id number]  Referring provider: Sarah Doran,*  Dx:   Encounter Diagnosis     ICD-10-CM    1. Arthritis of right knee  M17.11       2. Primary osteoarthritis of both knees  M17.0                          Subjective: Patient reported \"My right knee is sore, but I feel that therapy is helping a bit\"       Objective: See treatment diary below      Assessment: Fair tolerance to exercises. Pt presents with muscle soreness post PT session.       Plan: Continue per plan of care.   Patient to see Dr. Janice Grimes on 1/18/24.     Precautions: Right knee pain aggravation.    Patient's PMHx is remarkable for hypothyroidism, HTN, lumbar spine DDD and formainal stenosis and L3 L4 laminectomy, discectomy and foraminectomy.    Access Code: [email or web address]  URL: [email or web address]  Date: 12/14/2023  Prepared by: Rod Donis    Exercises  - Standing Heel Raise with Support  - 1 x daily - 7 x weekly - 2 sets - 10 reps  - Standing Toe Raises at Chair  - 1 x daily - 7 x weekly - 2 sets - 10 reps  - Standing Hip Flexion with Counter Support  - 1 x daily - 7 x weekly - 2 sets - 10 reps  - Standing Hip Abduction with Counter Support  - 1 x daily - 7 x weekly - 2 sets - 10 reps  - Standing Hip Extension with Counter Support  - 1 x daily - 7 x weekly - 2 sets - 10 reps  - Single Leg Stance with Support  - 1 x daily - 7 x weekly - 1 sets - 4 reps - 30 seconds hold  - Standing Tandem Balance with Counter Support  - 1 x daily - 7 x weekly - 1 sets - 4 reps - 30 seconds hold  - Side Stepping with Counter Support  - 1 x daily - 7 x weekly - 2 sets - 10 reps  - Tandem Walking with Counter Support  - 1 x daily - 7 x weekly - 2 sets - 10 reps  - Hooklying Hamstring Stretch with Strap  - 1 x daily - 7 x weekly - 1 sets - 5 reps - 20 seconds hold  - Long Sitting Calf Stretch with Strap  - 1 x daily - 7 x weekly - 1 sets - 5 reps - 20 seconds " "hold  - Supine Quad Set  - 1 x daily - 7 x weekly - 2 sets - 10 reps - 3 seconds hold  - Supine Hip Abduction  - 1 x daily - 7 x weekly - 2 sets - 10 reps    Manuals 11/30 12/14 12/19 1/2/24 1/4/24 1/9 1/11                   Neuro Re-Ed                          Ther Ex                          Nu step  10 min 10 min  10 min 10 min 10 min 10 min       Seated hr and tr:B:   NT NT NT  NT       Seated hip flexion:B:   20X 2SEC  NT 2 x 10  20X 2.5#       Seated LAQ:B: 10 x hep  20X 2SEC  NT 2 x 10  20X 2.5#                    Supine gastrocnemius stretch:R 10 sec x 5 hep 20 sec x 5 20SEC 5X  NT NT  NT       Supine hamstring stretch:R 10 sec x 5 hep 20 sec x 5 20SEC 5X NT NT  NT      Quad sets with towel roll:R 3 sec x 10 hep 3 sec x 20 20X 3SEC  NT NT  NT      Ankle pumps:R   20X  NT NT  NT      Glute sets:B   20X 3SEC  NT NT  NT      Quad sets:B:     3 sec x 20  NT      Heel slides:B:     2 x 10  NT      SLR Flexion:B:     10 x         Hip abduction slides in supine:R  2 x 10 NT 2 x 10 2 x 10  20x 2.5#       SAQ:B: hep  20X 3SEC  2 x 10 2 x 10  20x 3sec 2.5#       Hook lying hip abduction isometrics:B:   NT NT NT  20x 3sec 2.5#       Hook lying hip adduction    NT NT NT  NT      Prone hip extension:B:   NT Add nv NT  NT      Prone TKE:B:   NT Add nv NT  NT                                                          Standing hr and tr:B:  2 x 10 20X  2 x 10 2 x 10 2 x 10 20x       Standing slr x 3:B:  2 x 10 20X  2 x 10 2 x 10 2# x 20 20x 2.5#       Lunges on foam:B:     2 x 10 2# x 20 20x 2.5#       Mini squats   20X  2 x 10 2 x 10 2 x 10       SLS and tandem stance:B: on foam   30 sec x 2 30SEC 5X  10 sec x 10 On foam 10 sec x 10 On foam 10 sec x 10 30sec 10x       Side stepping and tandem ambulation on foam   4 x 10 feet in parallel bars 6X  8 x at ballet bar 8 x at ballet bar on foam 2# x 8 x at ballet bar on foam 2.5# 8X      Forward step ups:R 4\"  NT NT 10 x  2# x 20 on 4\" NT       Lateral step ups:R: 4\"  NT NT 10 x  " "2# x 20 on 4\" NT       Step downs:R 4\"  NT NT                      Biodex balance system     LOS static easy with bilateral upper extremity support x 6 LOS static easy with bilateral upper extremity support x 6 LOS static easy with bilateral upper extremity support x 6       [place] resistance walking forward, backward and to each side      Fwd, bwd 8# x 5 and side to side 4# x 5                                 Ther Activity                                       Gait Training                                       Modalities                          US to right lateral knee             Lasar to right lateral knee                          MHP to right knee and lumbar spine seated prn   10 MIN                                 "

## 2024-01-11 NOTE — PROGRESS NOTES
"    Today's date: 1/11/2024  Patient name: Radha Mendez  DB: 1955  MRN: [id number]  Referring provider: Sarah Doran,*  Dx:   Encounter Diagnosis     ICD-10-CM    1. Arthritis of right knee  M17.11       2. Primary osteoarthritis of both knees  M17.0                          Subjective: Patient reported \"My right knee is sore, but I feel that therapy is helping a bit\"       Objective: See treatment diary below      Assessment: Fair tolerance to exercises. Pt presents with muscle soreness post PT session.       Plan: Continue per plan of care.   Patient to see Dr. Janice Grimes on 1/18/24.     Precautions: Right knee pain aggravation.    Patient's PMHx is remarkable for hypothyroidism, HTN, lumbar spine DDD and formainal stenosis and L3 L4 laminectomy, discectomy and foraminectomy.    Access Code: [email or web address]  URL: [email or web address]  Date: 12/14/2023  Prepared by: Rod Donis    Exercises  - Standing Heel Raise with Support  - 1 x daily - 7 x weekly - 2 sets - 10 reps  - Standing Toe Raises at Chair  - 1 x daily - 7 x weekly - 2 sets - 10 reps  - Standing Hip Flexion with Counter Support  - 1 x daily - 7 x weekly - 2 sets - 10 reps  - Standing Hip Abduction with Counter Support  - 1 x daily - 7 x weekly - 2 sets - 10 reps  - Standing Hip Extension with Counter Support  - 1 x daily - 7 x weekly - 2 sets - 10 reps  - Single Leg Stance with Support  - 1 x daily - 7 x weekly - 1 sets - 4 reps - 30 seconds hold  - Standing Tandem Balance with Counter Support  - 1 x daily - 7 x weekly - 1 sets - 4 reps - 30 seconds hold  - Side Stepping with Counter Support  - 1 x daily - 7 x weekly - 2 sets - 10 reps  - Tandem Walking with Counter Support  - 1 x daily - 7 x weekly - 2 sets - 10 reps  - Hooklying Hamstring Stretch with Strap  - 1 x daily - 7 x weekly - 1 sets - 5 reps - 20 seconds hold  - Long Sitting Calf Stretch with Strap  - 1 x daily - 7 x weekly - 1 sets - 5 reps - 20 seconds hold  - Supine " "Quad Set  - 1 x daily - 7 x weekly - 2 sets - 10 reps - 3 seconds hold  - Supine Hip Abduction  - 1 x daily - 7 x weekly - 2 sets - 10 reps    Manuals 11/30 12/14 12/19 1/2/24 1/4/24 1/9 1/11                   Neuro Re-Ed                          Ther Ex                          Nu step  10 min 10 min  10 min 10 min 10 min 10 min       Seated hr and tr:B:   NT NT NT  NT       Seated hip flexion:B:   20X 2SEC  NT 2 x 10  20X 2.5#       Seated LAQ:B: 10 x hep  20X 2SEC  NT 2 x 10  20X 2.5#                    Supine gastrocnemius stretch:R 10 sec x 5 hep 20 sec x 5 20SEC 5X  NT NT  NT       Supine hamstring stretch:R 10 sec x 5 hep 20 sec x 5 20SEC 5X NT NT  NT      Quad sets with towel roll:R 3 sec x 10 hep 3 sec x 20 20X 3SEC  NT NT  NT      Ankle pumps:R   20X  NT NT  NT      Glute sets:B   20X 3SEC  NT NT  NT      Quad sets:B:     3 sec x 20  NT      Heel slides:B:     2 x 10  NT      SLR Flexion:B:     10 x         Hip abduction slides in supine:R  2 x 10 NT 2 x 10 2 x 10  20x 2.5#       SAQ:B: hep  20X 3SEC  2 x 10 2 x 10  20x 3sec 2.5#       Hook lying hip abduction isometrics:B:   NT NT NT  20x 3sec 2.5#       Hook lying hip adduction    NT NT NT  NT      Prone hip extension:B:   NT Add nv NT  NT      Prone TKE:B:   NT Add nv NT  NT                                                          Standing hr and tr:B:  2 x 10 20X  2 x 10 2 x 10 2 x 10 20x       Standing slr x 3:B:  2 x 10 20X  2 x 10 2 x 10 2# x 20 20x 2.5#       Lunges on foam:B:     2 x 10 2# x 20 20x 2.5#       Mini squats   20X  2 x 10 2 x 10 2 x 10       SLS and tandem stance:B: on foam   30 sec x 2 30SEC 5X  10 sec x 10 On foam 10 sec x 10 On foam 10 sec x 10 30sec 10x       Side stepping and tandem ambulation on foam   4 x 10 feet in parallel bars 6X  8 x at ballet bar 8 x at ballet bar on foam 2# x 8 x at ballet bar on foam 2.5# 8X      Forward step ups:R 4\"  NT NT 10 x  2# x 20 on 4\" NT       Lateral step ups:R: 4\"  NT NT 10 x  2# x 20 on 4\" " "NT       Step downs:R 4\"  NT NT                      Biodex balance system     LOS static easy with bilateral upper extremity support x 6 LOS static easy with bilateral upper extremity support x 6 LOS static easy with bilateral upper extremity support x 6 NT      [place] resistance walking forward, backward and to each side      Fwd, bwd 8# x 5 and side to side 4# x 5 NT                                Ther Activity                                       Gait Training                                       Modalities                          US to right lateral knee             Lasar to right lateral knee                          MHP to right knee and lumbar spine seated prn   10 MIN   NT         "

## 2024-01-23 ENCOUNTER — CONSULT (OUTPATIENT)
Dept: SURGERY | Facility: CLINIC | Age: 69
End: 2024-01-23
Payer: MEDICARE

## 2024-01-23 VITALS
HEART RATE: 77 BPM | DIASTOLIC BLOOD PRESSURE: 82 MMHG | OXYGEN SATURATION: 100 % | SYSTOLIC BLOOD PRESSURE: 126 MMHG | HEIGHT: 70 IN | BODY MASS INDEX: 22.9 KG/M2 | WEIGHT: 160 LBS | TEMPERATURE: 97 F

## 2024-01-23 DIAGNOSIS — K40.90 LEFT INGUINAL HERNIA: ICD-10-CM

## 2024-01-23 PROCEDURE — 99204 OFFICE O/P NEW MOD 45 MIN: CPT | Performed by: SPECIALIST

## 2024-01-23 NOTE — LETTER
January 23, 2024     Beto Yan MD  [address] [facility] [address]  [address]  [facility] [address]    Patient: Radha Mendez   YOB: 1955   Date of Visit: 1/23/2024       Dear Stephen,    Thank you for referring Radha Mendez to me for evaluation. Below are the relevant portions of my H&P.    If you have questions, please do not hesitate to call me. I look forward to following Radha along with you.         Sincerely,    King Cholo Curran MD        CC: DO Cholo Swan MD  1/23/2024  5:16 PM  Signed  Chief Complaint: Left inguinal hernia      History of Present Illness: Patient is a 68-year-old female who presents the office today with her  Betsy in regards to a left inguinal hernia.  The patient states she has had it for a long period of time but it is slowly gotten larger and now starting to bother her.  She says that she had spine surgery May 31, 2023 and since then it has begun to bother her more.  She was recently seen in the office of Dr. Beto Yan urologist jyoti who diagnosed a left inguinal hernia and who kindly referred her to our office for evaluation.    She says she has had a lump in her left groin for a long period of time getting larger.  She admits to some bloating but no nausea vomiting diarrhea constipation.  She is here today in regards to this hernia.       Past Medical History:   Past Medical History:   Diagnosis Date   • Abnormal fasting glucose 05/24/2023   • Arthritis 2022    Treatments didn’t work   • Benign essential HTN 09/27/2017   • Chronic pain disorder    • Diverticulitis of colon 1 moth ago    Per my own perception   • Elevated BUN     resolved 3/10/17   • Fall from slipping 04/15/2022   • Fluid retention in legs     last assessed 5/12/15   • Hypothyroidism    • Lumbago with sciatica     last assessed 4/10/14   • Lumbar radiculopathy     last assessed 4/10/14   • Onychomycosis of toenail     last assessed 11/7/16   • Primary  "osteoarthritis of both knees 10/24/2022         Past Surgical History:    Past Surgical History:   Procedure Laterality Date   • APPENDECTOMY     • BREAST CYST ASPIRATION Left 1993   • CATARACT EXTRACTION Right    • KNEE ARTHROSCOPY Left     therapeutic \"30 years ago\"   • DC LAMNOTMY INCL W/DCMPRSN NRV ROOT 1 INTRSPC LUMBR Right 5/31/2023    Procedure: RIGHT L3/4 HEMILAMINECTOMY,FORAMINOTOMY, DISCECTOMY;  Surgeon: Craig Robert Goldberg, MD;  Location: [facility];  Service: Neurosurgery         Allergies:  No Known Allergies      Medications:    Current Outpatient Medications:   •  Acetaminophen (TYLENOL PO), Take 325-500 mg by mouth as needed, Disp: , Rfl:   •  AMILoride-hydrochlorothiazide (MODURETIC) 5-50 mg per tablet, Take 1 tablet by mouth daily, Disp: 90 tablet, Rfl: 1  •  Ascorbic Acid (vitamin C) 1000 MG tablet, Take 1,000 mg by mouth daily, Disp: , Rfl:   •  Calcium Carbonate-Vitamin D3 600-400 MG-UNIT TABS, Take 2 tablets by mouth daily, Disp: , Rfl:   •  Collagen Hydrolysate POWD, 1 Scoop by Does not apply route daily , Disp: , Rfl:   •  Glucosamine-Chondroit-Vit C-Mn (GLUCOSAMINE-CHONDROITIN) TABS, Take 2 tablets by mouth daily, Disp: , Rfl:   •  levothyroxine 50 mcg tablet, Take 1 tablet (50 mcg total) by mouth daily Alt with 1.5 tab M,W,F, Disp: 108 tablet, Rfl: 3  •  Magnesium Carbonate POWD, 325 mg by Does not apply route daily, Disp: , Rfl:   •  Multiple Vitamin (MULTI-DAY) TABS, Take 2 tablets by mouth daily Ultra Aric, Disp: , Rfl:       Social History:  Social History    Social History     Substance and Sexual Activity   Alcohol Use Not Currently     Social History     Substance and Sexual Activity   Drug Use Never     Social History     Tobacco Use   Smoking Status Never   Smokeless Tobacco Never         Family History:    Family History   Problem Relation Age of Onset   • Breast cancer Mother 80   • Hypertension Mother    • No Known Problems Maternal Grandmother    • No Known Problems Maternal " Grandfather    • No Known Problems Paternal Grandmother    • No Known Problems Paternal Grandfather    • Ovarian cancer Maternal Aunt 34   • No Known Problems Maternal Aunt    • No Known Problems Paternal Aunt    • Prostate cancer Paternal Uncle 55   • Ovarian cancer Cousin 55         Review of Systems:    As per HPI.  Left groin pain with swelling.  Also arthralgias myalgias and some ambulatory dysfunction.  No weight loss weight gain fever chills night sweats chest pain nausea vomiting diarrhea constipation shortness of breath headaches blurry vision double vision etc.    Vitals:  Vitals:    01/23/24 1456   BP: 126/82   Pulse: 77   Temp: (!) 97 °F (36.1 °C)   SpO2: 100%       Physical Exam:  Patient is healthy-appearing middle-aged white female 5 foot 10 inches 160 pounds.  She is awake alert no distress.    Vital signs as above    Skin warm dry  Head normocephalic, atraumatic  Eyes PERRLA EOM intact  Ears and nose within normal limits  Throat reflex intact  Neck no masses thyromegaly or lymphadenopathy palpable.  Back she has a lower lumbar incision that appears well-healed.  There is no CVA or spinal tenderness.  Lungs clear to A&P  Cor regular rate and rhythm no murmurs carotid bruits  Abdomen small right lower quadrant incision over McBurney's point.  Soft nontender.  Right groin demonstrates no evidence of hernia.  Left groin demonstrates a bulge that is reducible consistent with a left inguinal hernia.  No other abdominal masses or hernias noted.  Extremities negative CCE  Neurologically A&O x 3 cranial nerves II through XII intact  Lymphatics no lymphadenopathy palpable.      Lab Results: I have personally reviewed pertinent reports. See below.  Imaging: I have personally reviewed pertinent CAT scan of the abdomen and pelvis  EKG, Pathology, and Other Studies: I have personally reviewed pertinent reports.     No visits with results within 1 Day(s) from this visit.   Latest known visit with results is:    Office Visit on 01/09/2024   Component Date Value   •  COLOR,UA 01/09/2024 yellow    • CLARITY,UA 01/09/2024 clear    • SPECIFIC GRAVITY,UA 01/09/2024 1.020    •  PH,UA 01/09/2024 7.0    • LEUKOCYTE ESTERASE,UA 01/09/2024 trace    • NITRITE,UA 01/09/2024 neg    • GLUCOSE, UA 01/09/2024 neg    • KETONES,UA 01/09/2024 neg    • BILIRUBIN,UA 01/09/2024 neg    • BLOOD,UA 01/09/2024 neg    • POCT URINE PROTEIN 01/09/2024 neg    • SL AMB POCT UROBILINOGEN 01/09/2024 0.2          Impression:  Left inguinal hernia becoming more symptomatic.  We discussed the pros and cons of laparoscopic versus open inguinal herniorrhaphy.    Plan:  At this point we will schedule for left inguinal herniorrhaphy with mesh open under local anesthesia with IV sedation at the earliest possible date.

## 2024-01-23 NOTE — H&P (VIEW-ONLY)
"Chief Complaint: Left inguinal hernia      History of Present Illness: Patient is a 68-year-old female who presents the office today with her  Betsy in regards to a left inguinal hernia.  The patient states she has had it for a long period of time but it is slowly gotten larger and now starting to bother her.  She says that she had spine surgery May 31, 2023 and since then it has begun to bother her more.  She was recently seen in the office of Dr. Beto Yan urologist jyoti who diagnosed a left inguinal hernia and who kindly referred her to our office for evaluation.    She says she has had a lump in her left groin for a long period of time getting larger.  She admits to some bloating but no nausea vomiting diarrhea constipation.  She is here today in regards to this hernia.       Past Medical History:   Past Medical History:   Diagnosis Date    Abnormal fasting glucose 05/24/2023    Arthritis 2022    Treatments didn’t work    Benign essential HTN 09/27/2017    Chronic pain disorder     Diverticulitis of colon 1 moth ago    Per my own perception    Elevated BUN     resolved 3/10/17    Fall from slipping 04/15/2022    Fluid retention in legs     last assessed 5/12/15    Hypothyroidism     Lumbago with sciatica     last assessed 4/10/14    Lumbar radiculopathy     last assessed 4/10/14    Onychomycosis of toenail     last assessed 11/7/16    Primary osteoarthritis of both knees 10/24/2022         Past Surgical History:    Past Surgical History:   Procedure Laterality Date    APPENDECTOMY      BREAST CYST ASPIRATION Left 1993    CATARACT EXTRACTION Right     KNEE ARTHROSCOPY Left     therapeutic \"30 years ago\"    [place] LAMNOTMY INCL W/DCMPRSN NRV ROOT 1 INTRSPC LUMBR Right 5/31/2023    Procedure: RIGHT L3/4 HEMILAMINECTOMY,FORAMINOTOMY, DISCECTOMY;  Surgeon: Craig Robert Goldberg, MD;  Location: [facility];  Service: Neurosurgery         Allergies:  No Known Allergies      Medications:    Current " Outpatient Medications:     Acetaminophen (TYLENOL PO), Take 325-500 mg by mouth as needed, Disp: , Rfl:     AMILoride-hydrochlorothiazide (MODURETIC) 5-50 mg per tablet, Take 1 tablet by mouth daily, Disp: 90 tablet, Rfl: 1    Ascorbic Acid (vitamin C) 1000 MG tablet, Take 1,000 mg by mouth daily, Disp: , Rfl:     Calcium Carbonate-Vitamin D3 600-400 MG-UNIT TABS, Take 2 tablets by mouth daily, Disp: , Rfl:     Collagen Hydrolysate POWD, 1 Scoop by Does not apply route daily , Disp: , Rfl:     Glucosamine-Chondroit-Vit C-Mn (GLUCOSAMINE-CHONDROITIN) TABS, Take 2 tablets by mouth daily, Disp: , Rfl:     levothyroxine 50 mcg tablet, Take 1 tablet (50 mcg total) by mouth daily Alt with 1.5 tab M,W,F, Disp: 108 tablet, Rfl: 3    Magnesium Carbonate POWD, 325 mg by Does not apply route daily, Disp: , Rfl:     Multiple Vitamin (MULTI-DAY) TABS, Take 2 tablets by mouth daily Ultra Aric, Disp: , Rfl:       Social History:  Social History     Social History     Substance and Sexual Activity   Alcohol Use Not Currently     Social History     Substance and Sexual Activity   Drug Use Never     Social History     Tobacco Use   Smoking Status Never   Smokeless Tobacco Never         Family History:    Family History   Problem Relation Age of Onset    Breast cancer Mother 80    Hypertension Mother     No Known Problems Maternal Grandmother     No Known Problems Maternal Grandfather     No Known Problems Paternal Grandmother     No Known Problems Paternal Grandfather     Ovarian cancer Maternal Aunt 34    No Known Problems Maternal Aunt     No Known Problems Paternal Aunt     Prostate cancer Paternal Uncle 55    Ovarian cancer Cousin 55         Review of Systems:    As per HPI.  Left groin pain with swelling.  Also arthralgias myalgias and some ambulatory dysfunction.  No weight loss weight gain fever chills night sweats chest pain nausea vomiting diarrhea constipation shortness of breath headaches blurry vision double vision  etc.    Vitals:  Vitals:    01/23/24 1456   BP: 126/82   Pulse: 77   Temp: (!) 97 °F (36.1 °C)   SpO2: 100%       Physical Exam:  Patient is healthy-appearing middle-aged white female 5 foot 10 inches 160 pounds.  She is awake alert no distress.    Vital signs as above    Skin warm dry  Head normocephalic, atraumatic  Eyes PERRLA EOM intact  Ears and nose within normal limits  Throat reflex intact  Neck no masses thyromegaly or lymphadenopathy palpable.  Back she has a lower lumbar incision that appears well-healed.  There is no CVA or spinal tenderness.  Lungs clear to A&P  Cor regular rate and rhythm no murmurs carotid bruits  Abdomen small right lower quadrant incision over McBurney's point.  Soft nontender.  Right groin demonstrates no evidence of hernia.  Left groin demonstrates a bulge that is reducible consistent with a left inguinal hernia.  No other abdominal masses or hernias noted.  Extremities negative CCE  Neurologically A&O x 3 cranial nerves II through XII intact  Lymphatics no lymphadenopathy palpable.      Lab Results: I have personally reviewed pertinent reports. See below.  Imaging: I have personally reviewed pertinent CAT scan of the abdomen and pelvis  EKG, Pathology, and Other Studies: I have personally reviewed pertinent reports.     No visits with results within 1 Day(s) from this visit.   Latest known visit with results is:   Office Visit on 01/09/2024   Component Date Value     COLOR,UA 01/09/2024 yellow     CLARITY,UA 01/09/2024 clear     SPECIFIC GRAVITY,UA 01/09/2024 1.020      PH,UA 01/09/2024 7.0     LEUKOCYTE ESTERASE,UA 01/09/2024 trace     NITRITE,UA 01/09/2024 neg     GLUCOSE, UA 01/09/2024 neg     KETONES,UA 01/09/2024 neg     BILIRUBIN,UA 01/09/2024 neg     BLOOD,UA 01/09/2024 neg     POCT URINE PROTEIN 01/09/2024 neg     SL AMB POCT UROBILINOGEN 01/09/2024 0.2          Impression:  Left inguinal hernia becoming more symptomatic.  We discussed the pros and cons of laparoscopic  versus open inguinal herniorrhaphy.    Plan:  At this point we will schedule for left inguinal herniorrhaphy with mesh open under local anesthesia with IV sedation at the earliest possible date.

## 2024-01-23 NOTE — H&P
"Chief Complaint: Left inguinal hernia      History of Present Illness: Patient is a 68-year-old female who presents the office today with her  Betsy in regards to a left inguinal hernia.  The patient states she has had it for a long period of time but it is slowly gotten larger and now starting to bother her.  She says that she had spine surgery May 31, 2023 and since then it has begun to bother her more.  She was recently seen in the office of Dr. Beto Yan urologist jyoti who diagnosed a left inguinal hernia and who kindly referred her to our office for evaluation.    She says she has had a lump in her left groin for a long period of time getting larger.  She admits to some bloating but no nausea vomiting diarrhea constipation.  She is here today in regards to this hernia.       Past Medical History:   Past Medical History:   Diagnosis Date    Abnormal fasting glucose 05/24/2023    Arthritis 2022    Treatments didn’t work    Benign essential HTN 09/27/2017    Chronic pain disorder     Diverticulitis of colon 1 moth ago    Per my own perception    Elevated BUN     resolved 3/10/17    Fall from slipping 04/15/2022    Fluid retention in legs     last assessed 5/12/15    Hypothyroidism     Lumbago with sciatica     last assessed 4/10/14    Lumbar radiculopathy     last assessed 4/10/14    Onychomycosis of toenail     last assessed 11/7/16    Primary osteoarthritis of both knees 10/24/2022         Past Surgical History:    Past Surgical History:   Procedure Laterality Date    APPENDECTOMY      BREAST CYST ASPIRATION Left 1993    CATARACT EXTRACTION Right     KNEE ARTHROSCOPY Left     therapeutic \"30 years ago\"    [place] LAMNOTMY INCL W/DCMPRSN NRV ROOT 1 INTRSPC LUMBR Right 5/31/2023    Procedure: RIGHT L3/4 HEMILAMINECTOMY,FORAMINOTOMY, DISCECTOMY;  Surgeon: Craig Robert Goldberg, MD;  Location: [facility];  Service: Neurosurgery         Allergies:  No Known Allergies      Medications:    Current " Outpatient Medications:     Acetaminophen (TYLENOL PO), Take 325-500 mg by mouth as needed, Disp: , Rfl:     AMILoride-hydrochlorothiazide (MODURETIC) 5-50 mg per tablet, Take 1 tablet by mouth daily, Disp: 90 tablet, Rfl: 1    Ascorbic Acid (vitamin C) 1000 MG tablet, Take 1,000 mg by mouth daily, Disp: , Rfl:     Calcium Carbonate-Vitamin D3 600-400 MG-UNIT TABS, Take 2 tablets by mouth daily, Disp: , Rfl:     Collagen Hydrolysate POWD, 1 Scoop by Does not apply route daily , Disp: , Rfl:     Glucosamine-Chondroit-Vit C-Mn (GLUCOSAMINE-CHONDROITIN) TABS, Take 2 tablets by mouth daily, Disp: , Rfl:     levothyroxine 50 mcg tablet, Take 1 tablet (50 mcg total) by mouth daily Alt with 1.5 tab M,W,F, Disp: 108 tablet, Rfl: 3    Magnesium Carbonate POWD, 325 mg by Does not apply route daily, Disp: , Rfl:     Multiple Vitamin (MULTI-DAY) TABS, Take 2 tablets by mouth daily Ultra Aric, Disp: , Rfl:       Social History:  Social History     Social History     Substance and Sexual Activity   Alcohol Use Not Currently     Social History     Substance and Sexual Activity   Drug Use Never     Social History     Tobacco Use   Smoking Status Never   Smokeless Tobacco Never         Family History:    Family History   Problem Relation Age of Onset    Breast cancer Mother 80    Hypertension Mother     No Known Problems Maternal Grandmother     No Known Problems Maternal Grandfather     No Known Problems Paternal Grandmother     No Known Problems Paternal Grandfather     Ovarian cancer Maternal Aunt 34    No Known Problems Maternal Aunt     No Known Problems Paternal Aunt     Prostate cancer Paternal Uncle 55    Ovarian cancer Cousin 55         Review of Systems:    As per HPI.  Left groin pain with swelling.  Also arthralgias myalgias and some ambulatory dysfunction.  No weight loss weight gain fever chills night sweats chest pain nausea vomiting diarrhea constipation shortness of breath headaches blurry vision double vision  etc.    Vitals:  Vitals:    01/23/24 1456   BP: 126/82   Pulse: 77   Temp: (!) 97 °F (36.1 °C)   SpO2: 100%       Physical Exam:  Patient is healthy-appearing middle-aged white female 5 foot 10 inches 160 pounds.  She is awake alert no distress.    Vital signs as above    Skin warm dry  Head normocephalic, atraumatic  Eyes PERRLA EOM intact  Ears and nose within normal limits  Throat reflex intact  Neck no masses thyromegaly or lymphadenopathy palpable.  Back she has a lower lumbar incision that appears well-healed.  There is no CVA or spinal tenderness.  Lungs clear to A&P  Cor regular rate and rhythm no murmurs carotid bruits  Abdomen small right lower quadrant incision over McBurney's point.  Soft nontender.  Right groin demonstrates no evidence of hernia.  Left groin demonstrates a bulge that is reducible consistent with a left inguinal hernia.  No other abdominal masses or hernias noted.  Extremities negative CCE  Neurologically A&O x 3 cranial nerves II through XII intact  Lymphatics no lymphadenopathy palpable.      Lab Results: I have personally reviewed pertinent reports. See below.  Imaging: I have personally reviewed pertinent CAT scan of the abdomen and pelvis  EKG, Pathology, and Other Studies: I have personally reviewed pertinent reports.     No visits with results within 1 Day(s) from this visit.   Latest known visit with results is:   Office Visit on 01/09/2024   Component Date Value     COLOR,UA 01/09/2024 yellow     CLARITY,UA 01/09/2024 clear     SPECIFIC GRAVITY,UA 01/09/2024 1.020      PH,UA 01/09/2024 7.0     LEUKOCYTE ESTERASE,UA 01/09/2024 trace     NITRITE,UA 01/09/2024 neg     GLUCOSE, UA 01/09/2024 neg     KETONES,UA 01/09/2024 neg     BILIRUBIN,UA 01/09/2024 neg     BLOOD,UA 01/09/2024 neg     POCT URINE PROTEIN 01/09/2024 neg     SL AMB POCT UROBILINOGEN 01/09/2024 0.2          Impression:  Left inguinal hernia becoming more symptomatic.  We discussed the pros and cons of laparoscopic  versus open inguinal herniorrhaphy.    Plan:  At this point we will schedule for left inguinal herniorrhaphy with mesh open under local anesthesia with IV sedation at the earliest possible date.

## 2024-02-05 ENCOUNTER — ANESTHESIA EVENT (OUTPATIENT)
Dept: PERIOP | Facility: HOSPITAL | Age: 69
End: 2024-02-05
Payer: MEDICARE

## 2024-02-07 NOTE — PRE-PROCEDURE INSTRUCTIONS
Pre-Surgery Instructions:   Medication Instructions    Acetaminophen (TYLENOL PO) Uses PRN- OK to take day of surgery    AMILoride-hydrochlorothiazide (MODURETIC) 5-50 mg per tablet Hold day of surgery.    Ascorbic Acid (vitamin C) 1000 MG tablet Stop taking 7 days prior to surgery.    Calcium Carbonate-Vitamin D3 600-400 MG-UNIT TABS Stop taking 7 days prior to surgery.    Collagen Hydrolysate POWD Stop taking 7 days prior to surgery.    Glucosamine-Chondroit-Vit C-Mn (GLUCOSAMINE-CHONDROITIN) TABS Stop taking 7 days prior to surgery.    levothyroxine 50 mcg tablet Take day of surgery.    Magnesium Carbonate POWD Hold day of surgery.    Multiple Vitamin (MULTI-DAY) TABS Stop taking 7 days prior to surgery.    Omega-3 Fatty Acids (FISH OIL PO) Stop taking 7 days prior to surgery.   Medication instructions for day surgery reviewed. Please use only a sip of water to take your instructed medications. Avoid all over the counter vitamins, supplements and NSAIDS for one week prior to surgery per anesthesia guidelines. Tylenol is ok to take as needed.     You will receive a call one business day prior to surgery with an arrival time and hospital directions. If your surgery is scheduled on a Monday, the hospital will be calling you on the Friday prior to your surgery. If you have not heard from anyone by 8pm, please call the hospital supervisor through the hospital  at [phone]. ([place] [phone] or [place] [phone]).    Do not eat or drink anything after midnight the night before your surgery, including candy, mints, lifesavers, or chewing gum. Do not drink alcohol 24hrs before your surgery. Try not to smoke at least 24hrs before your surgery.       Follow the pre surgery showering instructions as listed in the “My Surgical Experience Booklet” or otherwise provided by your surgeon's office. Do not use a blade to shave the surgical area 1 week before surgery. It is okay to use a clean electric clippers  up to 24 hours before surgery. Do not apply any lotions, creams, including makeup, cologne, deodorant, or perfumes after showering on the day of your surgery. Do not use dry shampoo, hair spray, hair gel, or any type of hair products.     No contact lenses, eye make-up, or artificial eyelashes. Remove nail polish, including gel polish, and any artificial, gel, or acrylic nails if possible. Remove all jewelry including rings and body piercing jewelry.     Wear causal clothing that is easy to take on and off. Consider your type of surgery.    Keep any valuables, jewelry, piercings at home. Please bring any specially ordered equipment (sling, braces) if indicated.    Arrange for a responsible person to drive you to and from the hospital on the day of your surgery. Visitor Guidelines discussed.     Call the surgeon's office with any new illnesses, exposures, or additional questions prior to surgery.    Please reference your “My Surgical Experience Booklet” for additional information to prepare for your upcoming surgery.

## 2024-02-16 ENCOUNTER — HOSPITAL ENCOUNTER (OUTPATIENT)
Facility: HOSPITAL | Age: 69
Setting detail: OUTPATIENT SURGERY
Discharge: HOME | End: 2024-02-16
Attending: SPECIALIST | Admitting: SPECIALIST
Payer: MEDICARE

## 2024-02-16 ENCOUNTER — ANESTHESIA (OUTPATIENT)
Dept: PERIOP | Facility: HOSPITAL | Age: 69
End: 2024-02-16
Payer: MEDICARE

## 2024-02-16 VITALS
RESPIRATION RATE: 16 BRPM | HEIGHT: 70 IN | HEART RATE: 72 BPM | WEIGHT: 160 LBS | OXYGEN SATURATION: 99 % | BODY MASS INDEX: 22.9 KG/M2 | DIASTOLIC BLOOD PRESSURE: 82 MMHG | SYSTOLIC BLOOD PRESSURE: 143 MMHG | TEMPERATURE: 97.9 F

## 2024-02-16 DIAGNOSIS — K40.90 LEFT INGUINAL HERNIA: Primary | ICD-10-CM

## 2024-02-16 PROCEDURE — 49505 PRP I/HERN INIT REDUC >5 YR: CPT | Performed by: SPECIALIST

## 2024-02-16 PROCEDURE — C1781 MESH (IMPLANTABLE): HCPCS | Performed by: SPECIALIST

## 2024-02-16 DEVICE — PLUG AND PATCH SYSTEM,MONOFILAMENT POLYESTER AND POLYLACTIC ACID SEMI-RESORBABLE PLUG WITH MONOFILAMENT POLYESTER PATCH
Type: IMPLANTABLE DEVICE | Status: FUNCTIONAL
Brand: PARIETEX

## 2024-02-16 RX ORDER — ONDANSETRON 2 MG/ML
4 INJECTION INTRAMUSCULAR; INTRAVENOUS ONCE
Status: DISCONTINUED | OUTPATIENT
Start: 2024-02-16 | End: 2024-02-16 | Stop reason: HOSPADM

## 2024-02-16 RX ORDER — OXYCODONE HYDROCHLORIDE AND ACETAMINOPHEN 5; 325 MG/1; MG/1
1 TABLET ORAL EVERY 6 HOURS PRN
Qty: 20 TABLET | Refills: 0 | Status: SHIPPED | OUTPATIENT
Start: 2024-02-16 | End: 2024-02-26

## 2024-02-16 RX ORDER — LIDOCAINE HYDROCHLORIDE 10 MG/ML
INJECTION, SOLUTION EPIDURAL; INFILTRATION; INTRACAUDAL; PERINEURAL AS NEEDED
Status: DISCONTINUED | OUTPATIENT
Start: 2024-02-16 | End: 2024-02-16 | Stop reason: HOSPADM

## 2024-02-16 RX ORDER — ONDANSETRON 2 MG/ML
4 INJECTION INTRAMUSCULAR; INTRAVENOUS EVERY 8 HOURS PRN
Status: DISCONTINUED | OUTPATIENT
Start: 2024-02-16 | End: 2024-02-16 | Stop reason: HOSPADM

## 2024-02-16 RX ORDER — DEXAMETHASONE SODIUM PHOSPHATE 10 MG/ML
INJECTION, SOLUTION INTRAMUSCULAR; INTRAVENOUS AS NEEDED
Status: DISCONTINUED | OUTPATIENT
Start: 2024-02-16 | End: 2024-02-16

## 2024-02-16 RX ORDER — FENTANYL CITRATE/PF 50 MCG/ML
25 SYRINGE (ML) INJECTION
Status: DISCONTINUED | OUTPATIENT
Start: 2024-02-16 | End: 2024-02-16 | Stop reason: HOSPADM

## 2024-02-16 RX ORDER — CEFAZOLIN SODIUM 2 G/50ML
2000 SOLUTION INTRAVENOUS ONCE
Status: COMPLETED | OUTPATIENT
Start: 2024-02-16 | End: 2024-02-16

## 2024-02-16 RX ORDER — KETOROLAC TROMETHAMINE 30 MG/ML
INJECTION, SOLUTION INTRAMUSCULAR; INTRAVENOUS AS NEEDED
Status: DISCONTINUED | OUTPATIENT
Start: 2024-02-16 | End: 2024-02-16

## 2024-02-16 RX ORDER — BUPIVACAINE HYDROCHLORIDE 5 MG/ML
INJECTION, SOLUTION EPIDURAL; INTRACAUDAL AS NEEDED
Status: DISCONTINUED | OUTPATIENT
Start: 2024-02-16 | End: 2024-02-16 | Stop reason: HOSPADM

## 2024-02-16 RX ORDER — LIDOCAINE HYDROCHLORIDE 10 MG/ML
INJECTION, SOLUTION EPIDURAL; INFILTRATION; INTRACAUDAL; PERINEURAL AS NEEDED
Status: DISCONTINUED | OUTPATIENT
Start: 2024-02-16 | End: 2024-02-16

## 2024-02-16 RX ORDER — SODIUM CHLORIDE, SODIUM LACTATE, POTASSIUM CHLORIDE, CALCIUM CHLORIDE 600; 310; 30; 20 MG/100ML; MG/100ML; MG/100ML; MG/100ML
50 INJECTION, SOLUTION INTRAVENOUS CONTINUOUS
Status: DISCONTINUED | OUTPATIENT
Start: 2024-02-16 | End: 2024-02-16 | Stop reason: HOSPADM

## 2024-02-16 RX ORDER — ONDANSETRON 2 MG/ML
INJECTION INTRAMUSCULAR; INTRAVENOUS AS NEEDED
Status: DISCONTINUED | OUTPATIENT
Start: 2024-02-16 | End: 2024-02-16

## 2024-02-16 RX ORDER — FENTANYL CITRATE 50 UG/ML
INJECTION, SOLUTION INTRAMUSCULAR; INTRAVENOUS AS NEEDED
Status: DISCONTINUED | OUTPATIENT
Start: 2024-02-16 | End: 2024-02-16

## 2024-02-16 RX ORDER — PROPOFOL 10 MG/ML
INJECTION, EMULSION INTRAVENOUS CONTINUOUS PRN
Status: DISCONTINUED | OUTPATIENT
Start: 2024-02-16 | End: 2024-02-16

## 2024-02-16 RX ORDER — IBUPROFEN 600 MG/1
600 TABLET ORAL EVERY 6 HOURS PRN
Status: DISCONTINUED | OUTPATIENT
Start: 2024-02-16 | End: 2024-02-16 | Stop reason: HOSPADM

## 2024-02-16 RX ORDER — MIDAZOLAM HYDROCHLORIDE 2 MG/2ML
INJECTION, SOLUTION INTRAMUSCULAR; INTRAVENOUS AS NEEDED
Status: DISCONTINUED | OUTPATIENT
Start: 2024-02-16 | End: 2024-02-16

## 2024-02-16 RX ORDER — MAGNESIUM HYDROXIDE 1200 MG/15ML
LIQUID ORAL AS NEEDED
Status: DISCONTINUED | OUTPATIENT
Start: 2024-02-16 | End: 2024-02-16 | Stop reason: HOSPADM

## 2024-02-16 RX ORDER — OXYCODONE HYDROCHLORIDE AND ACETAMINOPHEN 5; 325 MG/1; MG/1
1 TABLET ORAL EVERY 4 HOURS PRN
Status: DISCONTINUED | OUTPATIENT
Start: 2024-02-16 | End: 2024-02-16 | Stop reason: HOSPADM

## 2024-02-16 RX ADMIN — CEFAZOLIN SODIUM 2000 MG: 2 SOLUTION INTRAVENOUS at 09:20

## 2024-02-16 RX ADMIN — KETOROLAC TROMETHAMINE 30 MG: 30 INJECTION, SOLUTION INTRAMUSCULAR; INTRAVENOUS at 10:57

## 2024-02-16 RX ADMIN — FENTANYL CITRATE 25 MCG: 50 INJECTION, SOLUTION INTRAMUSCULAR; INTRAVENOUS at 09:34

## 2024-02-16 RX ADMIN — FENTANYL CITRATE 25 MCG: 50 INJECTION, SOLUTION INTRAMUSCULAR; INTRAVENOUS at 09:36

## 2024-02-16 RX ADMIN — FENTANYL CITRATE 25 MCG: 50 INJECTION, SOLUTION INTRAMUSCULAR; INTRAVENOUS at 10:25

## 2024-02-16 RX ADMIN — MIDAZOLAM HYDROCHLORIDE 2 MG: 1 INJECTION, SOLUTION INTRAMUSCULAR; INTRAVENOUS at 09:21

## 2024-02-16 RX ADMIN — FENTANYL CITRATE 25 MCG: 50 INJECTION, SOLUTION INTRAMUSCULAR; INTRAVENOUS at 10:00

## 2024-02-16 RX ADMIN — LIDOCAINE HYDROCHLORIDE 50 MG: 10 INJECTION, SOLUTION EPIDURAL; INFILTRATION; INTRACAUDAL; PERINEURAL at 09:28

## 2024-02-16 RX ADMIN — DEXAMETHASONE SODIUM PHOSPHATE 10 MG: 10 INJECTION, SOLUTION INTRAMUSCULAR; INTRAVENOUS at 09:31

## 2024-02-16 RX ADMIN — SODIUM CHLORIDE, SODIUM LACTATE, POTASSIUM CHLORIDE, AND CALCIUM CHLORIDE: .6; .31; .03; .02 INJECTION, SOLUTION INTRAVENOUS at 07:55

## 2024-02-16 RX ADMIN — ONDANSETRON 4 MG: 2 INJECTION INTRAMUSCULAR; INTRAVENOUS at 09:21

## 2024-02-16 RX ADMIN — PROPOFOL 100 MCG/KG/MIN: 10 INJECTION, EMULSION INTRAVENOUS at 09:28

## 2024-02-16 NOTE — NURSING NOTE
Pt is awake,alert,tolerated diet, assisted OOB to BR, voided QS. No complaints,DSD applied to left groin incision, for a scant amount of serosanguineous drainage.

## 2024-02-16 NOTE — ANESTHESIA POSTPROCEDURE EVALUATION
Post-Op Assessment Note    CV Status:  Stable  Pain Score: 0    Pain management: adequate    Multimodal analgesia used between 6 hours prior to anesthesia start to PACU discharge    Mental Status:  Sleepy   Hydration Status:  Stable   PONV Controlled:  None   Airway Patency:  Patent     Post Op Vitals Reviewed: Yes    No anethesia notable event occurred.    Staff: CRNA               BP   124/77   Temp   98.2   Pulse  77   Resp   15   SpO2   97

## 2024-02-16 NOTE — OP NOTE
OPERATIVE REPORT  PATIENT NAME: Radha Mendez    :  1955  MRN: [id number]  Pt Location:  OR ROOM 11    SURGERY DATE: 2024    Surgeons and Role:     * Cholo Curran MD - Primary    Preop Diagnosis:  Inguinal hernia [K40.90] left    Post-Op Diagnosis Codes:     * Inguinal hernia [K40.90] left indirect inguinal hernia    Procedure(s):  Left - REPAIR HERNIA INGUINAL WITH MESH indirect    Specimen(s):  * No specimens in log *    Estimated Blood Loss:   Minimal    Drains:  * No LDAs found *    Anesthesia Type:   IV Sedation with Anesthesia    Operative Indications:  Inguinal hernia [K40.90]      Operative Findings:  Large left indirect inguinal hernia    Complications:   None    Procedure and Technique:  The patient was brought to the operating room and placed on the operative table in supine position.  Under adequate IV sedation left groin was shaved prepped and draped in usual sterile fashion.    1% lidocaine was used to infiltrate the ilioinguinal nerve and the skin over the left inguinal canal.  A 15 scalpel blade was used to make an incision directly over this area.  Is carried down subtenons tissue using the Bovie.  Bleeders cauterized at that time.  Uyen's fascia is divided direction of the incision and retractors were inserted in the wound.   The external oblique aponeurosis was identified and infiltrated with some 1% lidocaine.  The inguinal canal was opened with a scalpel and Metzenbaum scissors.  A large bulge could be seen protruding through the round ligament extending down through the external ring.  The round ligament was circumferentially dissected and was looped with a Penrose drain.  To the distal end was then taken down using the Bovie cauterizing bleeders along the way.  It was eventually disconnected distally and the round ligament was explored.  Once again there was a large bulge of fat protruding through this consistent with a lipoma.  Dissecting this from the cremasteric fibers  demonstrated a small sac that was visible.  The lipoma was then amputated using the Bovie.  The sac was identified and dissected free from the surrounding tissue and replaced with the obvious large indirect inguinal hernia.  The gloved finger fit without difficulty through this and a sponge was placed in the wound.  At that point a piece of pariah Edward mesh plug and patch was obtained.  The sponge was removed from the internal ring and the plug was placed through this without difficulty effectively repairing the hernia.  It was sutured in place using a 2-0 Prolene suture.  The patch was then cut to fit the floor the canal.  The floor the canal was quite lax.  This was sutured in place with 2-0 Prolene in the conjoined tendon,  Poupart ligament, and the general vicinity of the pubic tubercle..  The mesh.  The area was inspected for bleeding and bleeding was controlled electrocautery.  Half percent Marcaine was obtained and the area was copiously infiltrated.  Once again after adequately obtaining hemostasis the wound was closed using 3-0 Vicryl on the external oblique aponeurosis.  Uyen's fascia was closed using 3 oh Vicryl in interrupted fashion.  The skin is closed with 4 Monocryl in a running subicular fashion.  Benzoin and Steri-Strips were applied.  The estimated blood loss was minimal patient tolerated the procedure well she was delivered to the recovery room in stable condition   I was present for the entire procedure.    Patient Disposition:  PACU         SIGNATURE: Cholo Curran MD  DATE: February 16, 2024  TIME: 3:42 PM

## 2024-02-16 NOTE — ANESTHESIA PREPROCEDURE EVALUATION
Procedure:  REPAIR HERNIA INGUINAL WITH MESH (Left: Groin)    Relevant Problems   CARDIO   (+) Benign essential HTN      ENDO   (+) Hypothyroidism      MUSCULOSKELETAL   (+) Osteoarthritis of knee   (+) Primary osteoarthritis of both knees        Physical Exam    Airway    Mallampati score: II  TM Distance: >3 FB  Neck ROM: full     Dental       Cardiovascular  Cardiovascular exam normal    Pulmonary  Pulmonary exam normal     Other Findings  post-pubertal.      Anesthesia Plan  ASA Score- 2     Anesthesia Type- IV sedation with anesthesia with ASA Monitors.         Additional Monitors:     Airway Plan:            Plan Factors-Exercise tolerance (METS): >4 METS.    Chart reviewed. EKG reviewed.  Existing labs reviewed. Patient summary reviewed.    Patient is not a current smoker. Patient not instructed to abstain from smoking on day of procedure. Patient did not smoke on day of surgery.            Induction-     Postoperative Plan- Plan for postoperative opioid use.     Informed Consent- Anesthetic plan and risks discussed with patient, spouse and son.  I personally reviewed this patient with the CRNA. Discussed and agreed on the Anesthesia Plan with the CRNA..              Lab Results   Component Value Date    HGBA1C 5.9 (H) 10/11/2023       Lab Results   Component Value Date     11/14/2017    K 3.7 10/11/2023    CL 93 (L) 10/11/2023    CO2 30 10/11/2023    BUN 21 10/11/2023    CREATININE 0.76 10/11/2023    GLUF 104 (H) 04/27/2023    CALCIUM 9.7 10/11/2023    AST 19 10/11/2023    ALT 13 10/11/2023    ALKPHOS 44 10/11/2023    PROT 6.1 11/14/2017    BILITOT 0.4 11/14/2017    EGFR 80 10/11/2023       Lab Results   Component Value Date    WBC 8.87 10/11/2023    HGB 14.1 10/11/2023    HCT 41.6 10/11/2023    MCV 91 10/11/2023     10/11/2023     Normal sinus rhythm  Possible Left atrial enlargement  No previous ECGs available  Confirmed by Rod Torres ([id number]) on 4/27/2023 12:20:31 PM      Specimen  Collected: 04/27/23 11:16 Last Resulted: 04/27/23 12:20

## 2024-02-16 NOTE — NURSING NOTE
Pt is awake,alert. Pt and her  seen and instructed by Dr Curran, written and verbal instructions given. Pt verbalizes an understanding and voices no questions or complaints.

## 2024-03-05 ENCOUNTER — OFFICE VISIT (OUTPATIENT)
Dept: SURGERY | Facility: CLINIC | Age: 69
End: 2024-03-05

## 2024-03-05 VITALS
WEIGHT: 160 LBS | HEART RATE: 81 BPM | TEMPERATURE: 97 F | BODY MASS INDEX: 22.9 KG/M2 | HEIGHT: 70 IN | OXYGEN SATURATION: 98 %

## 2024-03-05 DIAGNOSIS — K40.90 LEFT INGUINAL HERNIA: Primary | ICD-10-CM

## 2024-03-05 PROCEDURE — 99024 POSTOP FOLLOW-UP VISIT: CPT | Performed by: SPECIALIST

## 2024-03-11 DIAGNOSIS — E03.9 ACQUIRED HYPOTHYROIDISM: ICD-10-CM

## 2024-03-11 RX ORDER — LEVOTHYROXINE SODIUM 0.05 MG/1
50 TABLET ORAL DAILY
Qty: 108 TABLET | Refills: 3 | Status: SHIPPED | OUTPATIENT
Start: 2024-03-11

## 2024-03-11 NOTE — PROGRESS NOTES
Postop visit status post repair of a large left inguinal hernia with mesh.    Today in the office she is doing great.  Denies any incisional pain.    Physical exam: Middle-aged female awake alert no distress.    Left groin demonstrates a healing wound with some normal postoperative edema but no evidence of recurrent hernia or infection.  Excellent cosmetic result.    Impression: Doing quite well status post repair of a large left inguinal hernia.    Plan: She would like a referral for an evaluation of her knee and also lumbar spine which was previously operated on.  We of course oblige her with this.    She is a wonderful woman.  She brought us homemade baclava which is the best I have ever tasted.

## 2024-03-20 ENCOUNTER — TELEPHONE (OUTPATIENT)
Dept: INTERNAL MEDICINE CLINIC | Age: 69
End: 2024-03-20

## 2024-03-20 NOTE — TELEPHONE ENCOUNTER
Patient is requesting a post-back surgery MRI because she is currently seeking a second opinion about her back issue, says it is still not right. She stated she would like the new Dr to have the results before she would get in to see them.

## 2024-03-22 ENCOUNTER — TELEPHONE (OUTPATIENT)
Dept: UROLOGY | Facility: MEDICAL CENTER | Age: 69
End: 2024-03-22

## 2024-03-22 NOTE — TELEPHONE ENCOUNTER
Call placed- Spoke to patient. Asked patient to have Urine Culture done prior to OV with Dr. Yan on 03/28/24. Patient stated she will get it done.

## 2024-03-25 ENCOUNTER — APPOINTMENT (OUTPATIENT)
Dept: LAB | Age: 69
End: 2024-03-25
Payer: MEDICARE

## 2024-03-25 DIAGNOSIS — R82.81 PYURIA: ICD-10-CM

## 2024-03-25 DIAGNOSIS — I10 BENIGN ESSENTIAL HTN: ICD-10-CM

## 2024-03-25 DIAGNOSIS — E03.9 ACQUIRED HYPOTHYROIDISM: ICD-10-CM

## 2024-03-25 DIAGNOSIS — N32.89 BLADDER WALL THICKENING: ICD-10-CM

## 2024-03-25 DIAGNOSIS — R79.89 HIGH SERUM HIGH DENSITY LIPOPROTEIN (HDL): ICD-10-CM

## 2024-03-25 PROCEDURE — 87086 URINE CULTURE/COLONY COUNT: CPT

## 2024-03-26 LAB — BACTERIA UR CULT: NORMAL

## 2024-03-28 ENCOUNTER — PROCEDURE VISIT (OUTPATIENT)
Dept: UROLOGY | Facility: MEDICAL CENTER | Age: 69
End: 2024-03-28
Payer: MEDICARE

## 2024-03-28 VITALS
BODY MASS INDEX: 22.9 KG/M2 | HEART RATE: 75 BPM | OXYGEN SATURATION: 99 % | WEIGHT: 160 LBS | HEIGHT: 70 IN | DIASTOLIC BLOOD PRESSURE: 86 MMHG | SYSTOLIC BLOOD PRESSURE: 130 MMHG

## 2024-03-28 DIAGNOSIS — N32.89 BLADDER WALL THICKENING: Primary | ICD-10-CM

## 2024-03-28 PROCEDURE — 52000 CYSTOURETHROSCOPY: CPT | Performed by: UROLOGY

## 2024-03-28 RX ORDER — SULFAMETHOXAZOLE AND TRIMETHOPRIM 800; 160 MG/1; MG/1
1 TABLET ORAL EVERY 12 HOURS SCHEDULED
Qty: 4 TABLET | Refills: 0 | Status: SHIPPED | OUTPATIENT
Start: 2024-03-28 | End: 2024-03-30

## 2024-03-28 NOTE — PROGRESS NOTES
Cystoscopy     Date/Time  3/28/2024 2:00 PM     Performed by  Beto Yan MD   Authorized by  Beto Yan MD     Universal Protocol:  Consent: Verbal consent obtained. Written consent obtained.  Risks and benefits: risks, benefits and alternatives were discussed  Consent given by: patient  Patient understanding: patient states understanding of the procedure being performed  Patient consent: the patient's understanding of the procedure matches consent given  Procedure consent: procedure consent matches procedure scheduled  Required items: required blood products, implants, devices, and special equipment available  Patient identity confirmed: verbally with patient      Procedure Details:  Procedure type: cystoscopy    Patient tolerance: Patient tolerated the procedure well with no immediate complications    Additional Procedure Details: With the patient in dorsolithotomy position after prepping the meatus with Betadine and instilling lidocaine lubricant per urethra flexible video cystourethroscopy revealed a normal anterior urethra without stricture or lesion.  The bladder neck was normal of the posterior wall of the bladder however this was mild and not focal scope was removed without difficulty or incident.  The patient will return as needed basis.

## 2024-03-28 NOTE — LETTER
March 28, 2024     Arina Baltazar DO  [address]  [facility] [address]    Patient: Radha Mendez   YOB: 1955   Date of Visit: 3/28/2024       Dear Dr. Baltazar:    Thank you for referring Radha Mendez to me for evaluation. Below are my notes for this consultation.    If you have questions, please do not hesitate to call me. I look forward to following your patient along with you.         Sincerely,        Beto Yan MD        CC: No Recipients    Beto Yan MD  3/28/2024  3:47 PM  Sign when Signing Visit     Cystoscopy     Date/Time  3/28/2024 2:00 PM     Performed by  Beto Yan MD   Authorized by  Beto Yan MD     Universal Protocol:  Consent: Verbal consent obtained. Written consent obtained.  Risks and benefits: risks, benefits and alternatives were discussed  Consent given by: patient  Patient understanding: patient states understanding of the procedure being performed  Patient consent: the patient's understanding of the procedure matches consent given  Procedure consent: procedure consent matches procedure scheduled  Required items: required blood products, implants, devices, and special equipment available  Patient identity confirmed: verbally with patient      Procedure Details:  Procedure type: cystoscopy    Patient tolerance: Patient tolerated the procedure well with no immediate complications    Additional Procedure Details: With the patient in dorsolithotomy position after prepping the meatus with Betadine and instilling lidocaine lubricant per urethra flexible video cystourethroscopy revealed a normal anterior urethra without stricture or lesion.  The bladder neck was normal of the posterior wall of the bladder however this was mild and not focal scope was removed without difficulty or incident.  The patient will return as needed basis.

## 2024-05-06 ENCOUNTER — OFFICE VISIT (OUTPATIENT)
Dept: INTERNAL MEDICINE CLINIC | Age: 69
End: 2024-05-06
Payer: MEDICARE

## 2024-05-06 VITALS
SYSTOLIC BLOOD PRESSURE: 118 MMHG | TEMPERATURE: 97.6 F | BODY MASS INDEX: 24.48 KG/M2 | HEIGHT: 70 IN | DIASTOLIC BLOOD PRESSURE: 64 MMHG | HEART RATE: 67 BPM | OXYGEN SATURATION: 99 % | WEIGHT: 171 LBS

## 2024-05-06 DIAGNOSIS — Z23 ENCOUNTER FOR IMMUNIZATION: ICD-10-CM

## 2024-05-06 DIAGNOSIS — E03.9 ACQUIRED HYPOTHYROIDISM: ICD-10-CM

## 2024-05-06 DIAGNOSIS — Z00.00 ENCOUNTER FOR ANNUAL WELLNESS VISIT (AWV) IN MEDICARE PATIENT: ICD-10-CM

## 2024-05-06 DIAGNOSIS — Z12.11 SCREENING FOR MALIGNANT NEOPLASM OF COLON: ICD-10-CM

## 2024-05-06 DIAGNOSIS — I10 BENIGN ESSENTIAL HTN: ICD-10-CM

## 2024-05-06 DIAGNOSIS — M48.061 LUMBAR FORAMINAL STENOSIS: Primary | ICD-10-CM

## 2024-05-06 DIAGNOSIS — R79.89 HIGH SERUM HIGH DENSITY LIPOPROTEIN (HDL): ICD-10-CM

## 2024-05-06 DIAGNOSIS — Z23 NEED FOR PNEUMOCOCCAL 20-VALENT CONJUGATE VACCINATION: ICD-10-CM

## 2024-05-06 DIAGNOSIS — M51.16 LUMBAR DISC DISEASE WITH RADICULOPATHY: ICD-10-CM

## 2024-05-06 DIAGNOSIS — I10 ESSENTIAL HYPERTENSION: ICD-10-CM

## 2024-05-06 PROCEDURE — 99214 OFFICE O/P EST MOD 30 MIN: CPT | Performed by: FAMILY MEDICINE

## 2024-05-06 PROCEDURE — 90677 PCV20 VACCINE IM: CPT

## 2024-05-06 PROCEDURE — G0439 PPPS, SUBSEQ VISIT: HCPCS | Performed by: FAMILY MEDICINE

## 2024-05-06 PROCEDURE — G0009 ADMIN PNEUMOCOCCAL VACCINE: HCPCS

## 2024-05-06 RX ORDER — AMILORIDE HYDROCHLORIDE AND HYDROCHLOROTHIAZIDE 5; 50 MG/1; MG/1
1 TABLET ORAL DAILY
Qty: 30 TABLET | Refills: 1 | Status: SHIPPED | OUTPATIENT
Start: 2024-05-06

## 2024-05-06 RX ORDER — LEVOTHYROXINE SODIUM 0.05 MG/1
50 TABLET ORAL DAILY
Qty: 45 TABLET | Refills: 1 | Status: SHIPPED | OUTPATIENT
Start: 2024-05-06 | End: 2024-07-05

## 2024-05-06 NOTE — PATIENT INSTRUCTIONS
Medicare Preventive Visit Patient Instructions  Thank you for completing your Welcome to Medicare Visit or Medicare Annual Wellness Visit today. Your next wellness visit will be due in one year (5/7/2025).  The screening/preventive services that you may require over the next 5-10 years are detailed below. Some tests may not apply to you based off risk factors and/or age. Screening tests ordered at today's visit but not completed yet may show as past due. Also, please note that scanned in results may not display below.  Preventive Screenings:  Service Recommendations Previous Testing/Comments   Colorectal Cancer Screening  * Colonoscopy    * Fecal Occult Blood Test (FOBT)/Fecal Immunochemical Test (FIT)  * Fecal DNA/Cologuard Test  * Flexible Sigmoidoscopy Age: 45-75 years old   Colonoscopy: every 10 years (may be performed more frequently if at higher risk)  OR  FOBT/FIT: every 1 year  OR  Cologuard: every 3 years  OR  Sigmoidoscopy: every 5 years  Screening may be recommended earlier than age 45 if at higher risk for colorectal cancer. Also, an individualized decision between you and your healthcare provider will decide whether screening between the ages of 76-85 would be appropriate. Colonoscopy: 01/01/2007  FOBT/FIT: Not on file  Cologuard: Not on file  Sigmoidoscopy: Not on file          Breast Cancer Screening Age: 40+ years old  Frequency: every 1-2 years  Not required if history of left and right mastectomy Mammogram: 08/28/2023    Screening Current   Cervical Cancer Screening Between the ages of 21-29, pap smear recommended once every 3 years.   Between the ages of 30-65, can perform pap smear with HPV co-testing every 5 years.   Recommendations may differ for women with a history of total hysterectomy, cervical cancer, or abnormal pap smears in past. Pap Smear: 09/26/2023    Screening Not Indicated   Hepatitis C Screening Once for adults born between 1945 and 1965  More frequently in patients at high risk  for Hepatitis C Hep C Antibody: 07/01/2019    Screening Current   Diabetes Screening 1-2 times per year if you're at risk for diabetes or have pre-diabetes Fasting glucose: 104 mg/dL (4/27/2023)  A1C: 5.9 % (10/11/2023)  Screening Current   Cholesterol Screening Once every 5 years if you don't have a lipid disorder. May order more often based on risk factors. Lipid panel: 03/30/2022    Screening Current     Other Preventive Screenings Covered by Medicare:  Abdominal Aortic Aneurysm (AAA) Screening: covered once if your at risk. You're considered to be at risk if you have a family history of AAA.  Lung Cancer Screening: covers low dose CT scan once per year if you meet all of the following conditions: (1) Age 55-77; (2) No signs or symptoms of lung cancer; (3) Current smoker or have quit smoking within the last 15 years; (4) You have a tobacco smoking history of at least 20 pack years (packs per day multiplied by number of years you smoked); (5) You get a written order from a healthcare provider.  Glaucoma Screening: covered annually if you're considered high risk: (1) You have diabetes OR (2) Family history of glaucoma OR (3)  aged 50 and older OR (4)  American aged 65 and older  Osteoporosis Screening: covered every 2 years if you meet one of the following conditions: (1) You're estrogen deficient and at risk for osteoporosis based off medical history and other findings; (2) Have a vertebral abnormality; (3) On glucocorticoid therapy for more than 3 months; (4) Have primary hyperparathyroidism; (5) On osteoporosis medications and need to assess response to drug therapy.   Last bone density test (DXA Scan): 05/31/2023.  HIV Screening: covered annually if you're between the age of 15-65. Also covered annually if you are younger than 15 and older than 65 with risk factors for HIV infection. For pregnant patients, it is covered up to 3 times per pregnancy.    Immunizations:  Immunization  Recommendations   Influenza Vaccine Annual influenza vaccination during flu season is recommended for all persons aged >= 6 months who do not have contraindications   Pneumococcal Vaccine   * Pneumococcal conjugate vaccine = PCV13 (Prevnar 13), PCV15 (Vaxneuvance), PCV20 (Prevnar 20)  * Pneumococcal polysaccharide vaccine = PPSV23 (Pneumovax) Adults 19-63 yo with certain risk factors or if 65+ yo  If never received any pneumonia vaccine: recommend Prevnar 20 (PCV20)  Give PCV20 if previously received 1 dose of PCV13 or PPSV23   Hepatitis B Vaccine 3 dose series if at intermediate or high risk (ex: diabetes, end stage renal disease, liver disease)   Respiratory syncytial virus (RSV) Vaccine - COVERED BY MEDICARE PART D  * RSVPreF3 (Arexvy) CDC recommends that adults 60 years of age and older may receive a single dose of RSV vaccine using shared clinical decision-making (SCDM)   Tetanus (Td) Vaccine - COST NOT COVERED BY MEDICARE PART B Following completion of primary series, a booster dose should be given every 10 years to maintain immunity against tetanus. Td may also be given as tetanus wound prophylaxis.   Tdap Vaccine - COST NOT COVERED BY MEDICARE PART B Recommended at least once for all adults. For pregnant patients, recommended with each pregnancy.   Shingles Vaccine (Shingrix) - COST NOT COVERED BY MEDICARE PART B  2 shot series recommended in those 19 years and older who have or will have weakened immune systems or those 50 years and older     Health Maintenance Due:      Topic Date Due   • Colorectal Cancer Screening  04/20/2024   • Breast Cancer Screening: Mammogram  08/28/2024   • Hepatitis C Screening  Completed     Immunizations Due:      Topic Date Due   • Pneumococcal Vaccine: 65+ Years (2 of 2 - PPSV23 or PCV20) 05/10/2023   • COVID-19 Vaccine (8 - 2023-24 season) 09/01/2023     Advance Directives   What are advance directives?  Advance directives are legal documents that state your wishes and plans  for medical care. These plans are made ahead of time in case you lose your ability to make decisions for yourself. Advance directives can apply to any medical decision, such as the treatments you want, and if you want to donate organs.   What are the types of advance directives?  There are many types of advance directives, and each state has rules about how to use them. You may choose a combination of any of the following:  Living will:  This is a written record of the treatment you want. You can also choose which treatments you do not want, which to limit, and which to stop at a certain time. This includes surgery, medicine, IV fluid, and tube feedings.   Durable power of  for healthcare (DPAHC):  This is a written record that states who you want to make healthcare choices for you when you are unable to make them for yourself. This person, called a proxy, is usually a family member or a friend. You may choose more than 1 proxy.  Do not resuscitate (DNR) order:  A DNR order is used in case your heart stops beating or you stop breathing. It is a request not to have certain forms of treatment, such as CPR. A DNR order may be included in other types of advance directives.  Medical directive:  This covers the care that you want if you are in a coma, near death, or unable to make decisions for yourself. You can list the treatments you want for each condition. Treatment may include pain medicine, surgery, blood transfusions, dialysis, IV or tube feedings, and a ventilator (breathing machine).  Values history:  This document has questions about your views, beliefs, and how you feel and think about life. This information can help others choose the care that you would choose.  Why are advance directives important?  An advance directive helps you control your care. Although spoken wishes may be used, it is better to have your wishes written down. Spoken wishes can be misunderstood, or not followed. Treatments may be  given even if you do not want them. An advance directive may make it easier for your family to make difficult choices about your care.       © Copyright [employer] 2018 Information is for End User's use only and may not be sold, redistributed or otherwise used for commercial purposes. All illustrations and images included in CareNotes® are the copyrighted property of [email or web address] or [employer]

## 2024-05-06 NOTE — PROGRESS NOTES
Assessment/Plan:    1. Lumbar foraminal stenosis  -     MRI lumbar spine wo contrast; Future; Expected date: 05/06/2024    2. Screening for malignant neoplasm of colon  -     Cologuard    3. Benign essential HTN    4. High serum high density lipoprotein (HDL)    5. Acquired hypothyroidism  -     levothyroxine 50 mcg tablet; Take 1 tablet (50 mcg total) by mouth daily Alt with 1.5 tab M,W,F    6. Encounter for annual wellness visit (AWV) in Medicare patient    7. Essential hypertension  -     AMILoride-hydrochlorothiazide (MODURETIC) 5-50 mg per tablet; Take 1 tablet by mouth daily    8. Lumbar disc disease with radiculopathy  -     MRI lumbar spine wo contrast; Future; Expected date: 05/06/2024    Patient with continued lower lumbar pain after her previous spine surgery.  She is now interested in second opinion and has made an appointment with another spine doctor however he has requested a repeat MRI before they evaluate her in the office.  Patient has continued lower lumbar pain.  She does walk as a primary form of exercise on a regular basis.  Uses aqua therapy twice a week that she goes for and takes Tylenol to help relieve her pain.  Pain does not wake her up at night but if she feels very stiff and tries to do her own stretching.  It was difficult getting in and out of the car and she has missed some vacationing with family due to her back discomfort.        Patient Instructions   Medicare Preventive Visit Patient Instructions  Thank you for completing your Welcome to Medicare Visit or Medicare Annual Wellness Visit today. Your next wellness visit will be due in one year (5/7/2025).  The screening/preventive services that you may require over the next 5-10 years are detailed below. Some tests may not apply to you based off risk factors and/or age. Screening tests ordered at today's visit but not completed yet may show as past due. Also, please note that scanned in results may not display below.  Preventive  Screenings:  Service Recommendations Previous Testing/Comments   Colorectal Cancer Screening  * Colonoscopy    * Fecal Occult Blood Test (FOBT)/Fecal Immunochemical Test (FIT)  * Fecal DNA/Cologuard Test  * Flexible Sigmoidoscopy Age: 45-75 years old   Colonoscopy: every 10 years (may be performed more frequently if at higher risk)  OR  FOBT/FIT: every 1 year  OR  Cologuard: every 3 years  OR  Sigmoidoscopy: every 5 years  Screening may be recommended earlier than age 45 if at higher risk for colorectal cancer. Also, an individualized decision between you and your healthcare provider will decide whether screening between the ages of 76-85 would be appropriate. Colonoscopy: 01/01/2007  FOBT/FIT: Not on file  Cologuard: Not on file  Sigmoidoscopy: Not on file          Breast Cancer Screening Age: 40+ years old  Frequency: every 1-2 years  Not required if history of left and right mastectomy Mammogram: 08/28/2023    Screening Current   Cervical Cancer Screening Between the ages of 21-29, pap smear recommended once every 3 years.   Between the ages of 30-65, can perform pap smear with HPV co-testing every 5 years.   Recommendations may differ for women with a history of total hysterectomy, cervical cancer, or abnormal pap smears in past. Pap Smear: 09/26/2023    Screening Not Indicated   Hepatitis C Screening Once for adults born between 1945 and 1965  More frequently in patients at high risk for Hepatitis C Hep C Antibody: 07/01/2019    Screening Current   Diabetes Screening 1-2 times per year if you're at risk for diabetes or have pre-diabetes Fasting glucose: 104 mg/dL (4/27/2023)  A1C: 5.9 % (10/11/2023)  Screening Current   Cholesterol Screening Once every 5 years if you don't have a lipid disorder. May order more often based on risk factors. Lipid panel: 03/30/2022    Screening Current     Other Preventive Screenings Covered by Medicare:  Abdominal Aortic Aneurysm (AAA) Screening: covered once if your at risk.  You're considered to be at risk if you have a family history of AAA.  Lung Cancer Screening: covers low dose CT scan once per year if you meet all of the following conditions: (1) Age 55-77; (2) No signs or symptoms of lung cancer; (3) Current smoker or have quit smoking within the last 15 years; (4) You have a tobacco smoking history of at least 20 pack years (packs per day multiplied by number of years you smoked); (5) You get a written order from a healthcare provider.  Glaucoma Screening: covered annually if you're considered high risk: (1) You have diabetes OR (2) Family history of glaucoma OR (3)  aged 50 and older OR (4)  American aged 65 and older  Osteoporosis Screening: covered every 2 years if you meet one of the following conditions: (1) You're estrogen deficient and at risk for osteoporosis based off medical history and other findings; (2) Have a vertebral abnormality; (3) On glucocorticoid therapy for more than 3 months; (4) Have primary hyperparathyroidism; (5) On osteoporosis medications and need to assess response to drug therapy.   Last bone density test (DXA Scan): 05/31/2023.  HIV Screening: covered annually if you're between the age of 15-65. Also covered annually if you are younger than 15 and older than 65 with risk factors for HIV infection. For pregnant patients, it is covered up to 3 times per pregnancy.    Immunizations:  Immunization Recommendations   Influenza Vaccine Annual influenza vaccination during flu season is recommended for all persons aged >= 6 months who do not have contraindications   Pneumococcal Vaccine   * Pneumococcal conjugate vaccine = PCV13 (Prevnar 13), PCV15 (Vaxneuvance), PCV20 (Prevnar 20)  * Pneumococcal polysaccharide vaccine = PPSV23 (Pneumovax) Adults 19-65 yo with certain risk factors or if 65+ yo  If never received any pneumonia vaccine: recommend Prevnar 20 (PCV20)  Give PCV20 if previously received 1 dose of PCV13 or PPSV23    Hepatitis B Vaccine 3 dose series if at intermediate or high risk (ex: diabetes, end stage renal disease, liver disease)   Respiratory syncytial virus (RSV) Vaccine - COVERED BY MEDICARE PART D  * RSVPreF3 (Arexvy) CDC recommends that adults 60 years of age and older may receive a single dose of RSV vaccine using shared clinical decision-making (SCDM)   Tetanus (Td) Vaccine - COST NOT COVERED BY MEDICARE PART B Following completion of primary series, a booster dose should be given every 10 years to maintain immunity against tetanus. Td may also be given as tetanus wound prophylaxis.   Tdap Vaccine - COST NOT COVERED BY MEDICARE PART B Recommended at least once for all adults. For pregnant patients, recommended with each pregnancy.   Shingles Vaccine (Shingrix) - COST NOT COVERED BY MEDICARE PART B  2 shot series recommended in those 19 years and older who have or will have weakened immune systems or those 50 years and older     Health Maintenance Due:      Topic Date Due    Colorectal Cancer Screening  04/20/2024    Breast Cancer Screening: Mammogram  08/28/2024    Hepatitis C Screening  Completed     Immunizations Due:      Topic Date Due    Pneumococcal Vaccine: 65+ Years (2 of 2 - PPSV23 or PCV20) 05/10/2023    COVID-19 Vaccine (8 - 2023-24 season) 09/01/2023     Advance Directives   What are advance directives?  Advance directives are legal documents that state your wishes and plans for medical care. These plans are made ahead of time in case you lose your ability to make decisions for yourself. Advance directives can apply to any medical decision, such as the treatments you want, and if you want to donate organs.   What are the types of advance directives?  There are many types of advance directives, and each state has rules about how to use them. You may choose a combination of any of the following:  Living will:  This is a written record of the treatment you want. You can also choose which treatments you do  not want, which to limit, and which to stop at a certain time. This includes surgery, medicine, IV fluid, and tube feedings.   Durable power of  for healthcare (DPAHC):  This is a written record that states who you want to make healthcare choices for you when you are unable to make them for yourself. This person, called a proxy, is usually a family member or a friend. You may choose more than 1 proxy.  Do not resuscitate (DNR) order:  A DNR order is used in case your heart stops beating or you stop breathing. It is a request not to have certain forms of treatment, such as CPR. A DNR order may be included in other types of advance directives.  Medical directive:  This covers the care that you want if you are in a coma, near death, or unable to make decisions for yourself. You can list the treatments you want for each condition. Treatment may include pain medicine, surgery, blood transfusions, dialysis, IV or tube feedings, and a ventilator (breathing machine).  Values history:  This document has questions about your views, beliefs, and how you feel and think about life. This information can help others choose the care that you would choose.  Why are advance directives important?  An advance directive helps you control your care. Although spoken wishes may be used, it is better to have your wishes written down. Spoken wishes can be misunderstood, or not followed. Treatments may be given even if you do not want them. An advance directive may make it easier for your family to make difficult choices about your care.       © Copyright [employer] 2018 Information is for End User's use only and may not be sold, redistributed or otherwise used for commercial purposes. All illustrations and images included in CareNotes® are the copyrighted property of [employer]. or [employer]         Return in about 6 months (around 11/6/2024).    Subjective:      Patient ID: Radha Mendez is a 69 y.o. female.    Chief  Complaint   Patient presents with    Medicare Wellness Visit     AWV-SUB- please address refills        HPI        Hypertension.  On amiloride with hydrochlorothiazide that she is tolerating well.  Checks blood pressure at home and is well controlled.  No side effects and renal function is stable.  September 2021, well controlled with medications.  She is compliant   Oct 2022: doing well, compliant with meds, BP well controlled   April 2023, well controlled with no issues  October 2023, blood pressure well controlled, takes medications and is compliant  May 2024, blood pressure well-controlled.  Taking medications as prescribed and tries to walk for exercise.  Has gained a little bit of weight since her hernia surgery       Hypothyroidism, on levothyroxine 50 mcg daily and is compliant.  Thyroid levels are well controlled.  April 2023, well controlled with no issues.  October 2023, well-controlled TSH and T4 that we check yearly, on medications and tolerating well     Lumbar foraminal stenosis and radiculopathy, following with neurosurgery and has been recommended a discectomy which she will schedule.  She has an appointment on Thursday for final details.  She is not able to travel due to this issue right now and is in the process of changing her travel plans this summer for trip to [place].  She has not started gabapentin but at night takes Tylenol and she thinks it is slightly helpful.,  And no falls  October 2023, originally very upset with surgery as it did not resolve her pain however now slightly better but still not able to do a lot of her activities of daily living such as driving  May 2024:   Patient with continued lower lumbar pain after her previous spine surgery.  She is now interested in second opinion and has made an appointment with another spine doctor however he has requested a repeat MRI before they evaluate her in the office.  Patient has continued lower lumbar pain.  She does walk as a primary  form of exercise on a regular basis.  Uses aqua therapy twice a week that she goes for and takes Tylenol to help relieve her pain.  Pain does not wake her up at night but if she feels very stiff and tries to do her own stretching.  It was difficult getting in and out of the car and she has missed some vacationing with family due to her back discomfort.     Patient here due to not feeling 100% after surgery, she still has back pain and chronic right knee pain, she is doing home physical therapy and not driving yet because she does not feel safe.  She is also complaining of pelvic congestion and pain.  Had this issue 2 years ago where she saw GYN and I had previously ordered a pelvic ultrasound.  Nothing more was done after that as internal exam was negative.  Currently patient feels some abdominal and pelvic discomfort but no pain at the site of the palpable fullness or mass that she has over the left pubic rami.  She did experience constipation after her surgery with pain meds but states she is more regular now.  She denies any nausea vomiting or diarrhea and states occasionally she has pain from the front to the back.  Her only imaging was a pelvic ultrasound 2 years ago  Patient has delivered 2 children's via vaginal delivery with no urological complaints  October 2023, following with GI now and CT scan done but still pending, stool cultures are negative for any pathogens.  Patient still has issues and is being ruled out for microscopic colitis and hernia, has colonoscopy scheduled.  Does not feel any better but does not feel any worse.  May 2024, general surgery has repaired the hernia and she is much better    The following portions of the patient's history were reviewed and updated as appropriate: allergies, current medications, past family history, past medical history, past social history, past surgical history and problem list.    Review of Systems      Constitutional:  Denies fever or chills , + weight  "gain  Eyes:  Denies double , blurry vision or eye pain  HENT:  Denies nasal congestion, sore throat or new hearing issues  Respiratory:  Denies cough or shortness of breath or wheezing  Cardiovascular:  Denies palpitations or chest pain  GI:  Denies abdominal pain, nausea, or vomiting, no loose stools, no reflux  Integument:  Denies rash , no open areas  Neurologic:  Denies headache or focal weakness, no dizziness  : no dysuria, or hematuria      Current Outpatient Medications   Medication Sig Dispense Refill    Acetaminophen (TYLENOL PO) Take 325-500 mg by mouth as needed      AMILoride-hydrochlorothiazide (MODURETIC) 5-50 mg per tablet Take 1 tablet by mouth daily 30 tablet 1    Ascorbic Acid (vitamin C) 1000 MG tablet Take 1,000 mg by mouth daily      Calcium Carbonate-Vitamin D3 600-400 MG-UNIT TABS Take 2 tablets by mouth daily      Collagen Hydrolysate POWD 1 Scoop by Does not apply route daily       Glucosamine-Chondroit-Vit C-Mn (GLUCOSAMINE-CHONDROITIN) TABS Take 2 tablets by mouth daily      levothyroxine 50 mcg tablet Take 1 tablet (50 mcg total) by mouth daily Alt with 1.5 tab M,W,F 45 tablet 1    Magnesium Carbonate POWD 325 mg by Does not apply route daily      Multiple Vitamin (MULTI-DAY) TABS Take 2 tablets by mouth daily Ultra Aric      Omega-3 Fatty Acids (FISH OIL PO) Take by mouth daily       No current facility-administered medications for this visit.       Objective:    /64 (BP Location: Left arm, Patient Position: Sitting, Cuff Size: Standard)   Pulse 67   Temp 97.6 °F (36.4 °C) (Temporal)   Ht 5' 10\" (1.778 m)   Wt 77.6 kg (171 lb)   SpO2 99%   BMI 24.54 kg/m²        Physical Exam         Constitutional:  Well developed, well nourished, no acute distress, non-toxic appearance   Eyes:  PERRL, conjunctiva normal , non icteric sclera  HENT:  Atraumatic, oropharynx moist. Neck-  supple   Respiratory:  CTA b/l, normal breath sounds, no rales, no wheezing   Cardiovascular:  RRR, no " "murmurs, no LE edema b/l  GI:  Soft, nondistended, normal bowel sounds x 4, nontender, no organomegaly, no mass, no rebound, no guarding   Neurologic:  no focal deficits noted   Psychiatric:  Speech and behavior appropriate , AAO x 3    Arina Baltazar,     Answers submitted by the patient for this visit:  Medicare Annual Wellness Visit (Submitted on 4/29/2024)  How would you rate your overall health?: very good  Compared to last year, how is your physical health?: slightly better  In general, how satisfied are you with your life?: satisfied  Compared to last year, how is your eyesight?: same  Compared to last year, how is your hearing?: same  Compared to last year, how is your emotional/mental health?: same  How often is anger a problem for you?: never, rarely  How often do you feel unusually tired/fatigued?: sometimes  In the past 7 days, how much pain have you experienced?: some  If you answered \"some\" or \"a lot\", please rate the severity of your pain on a scale of 1 to 10 (1 being the least severe pain and 10 being the most intense pain).: 5/10  In the past 6 months, have you lost or gained 10 pounds without trying?: No  One or more falls in the last year: No  In the past 6 months, have you accidentally leaked urine?: No  Do you have trouble with the stairs inside or outside your home?: Yes  Does your home have working smoke alarms?: Yes  Does your home have a carbon monoxide monitor?: Yes  Which safety hazards (if any) have you experienced in your home? Please select all that apply.: none  How would you describe your current diet? Please select all that apply.: Regular  In addition to prescription medications, are you taking any over-the-counter supplements?: Yes  If yes, what supplements are you taking?: Vitamins  Can you manage your medications?: Yes  Are you currently taking any opioid medications?: No  Can you walk and transfer into and out of your bed and chair?: Yes  Can you dress and groom yourself?: " Yes  Can you bathe or shower yourself?: Yes  Can you feed yourself?: Yes  Can you do your laundry/ housekeeping?: Yes  Can you manage your money, pay your bills, and track your expenses?: Yes  Can you make your own meals?: Yes  Can you do your own shopping?: Yes  Within the last 12 months, have you had any hospitalizations or Emergency Department visits?: Yes  If yes, how many times have you been hospitalized within the past year?: 1-2  Do you have a living will?: No  Do you have a Durable POA (Power of ) for healthcare decisions?: No  Do you have an Advanced Directive for end of life decisions?: No  How often have you used an illegal drug (including marijuana) or a prescription medication for non-medical reasons in the past year?: never  What is the typical number of drinks you consume in a day?: 0  What is the typical number of drinks you consume in a week?: 0  How often did you have a drink containing alcohol in the past year?: 2 to 4 times a month  How many drinks did you have on a typical day  when you were drinking in the past year?: 0  How often did you have 6 or more drinks on one occasion in the past year?: never

## 2024-05-06 NOTE — PROGRESS NOTES
Assessment and Plan:     Problem List Items Addressed This Visit    None  Visit Diagnoses       Screening for malignant neoplasm of colon    -  Primary    Relevant Orders    Cologuard             Preventive health issues were discussed with patient, and age appropriate screening tests were ordered as noted in patient's After Visit Summary.  Personalized health advice and appropriate referrals for health education or preventive services given if needed, as noted in patient's After Visit Summary.     History of Present Illness:     Patient presents for a Medicare Wellness Visit    HPI   Patient Care Team:  Arina Baltazar DO as PCP - General (Family Medicine)  Arina Baltazar DO as PCP - PCP-[facility]  Vanesa Siu MD (Obstetrics and Gynecology)     Review of Systems:     Review of Systems     Problem List:     Patient Active Problem List   Diagnosis    Benign essential HTN    Hypothyroidism    Pelvic pain    Vaginal cyst    Chronic knee pain    Traumatic injury of knee    Age-related cataract of both eyes    High serum high density lipoprotein (HDL)    Complex tear of medial meniscus of left knee as current injury    Lumbar disc disease with radiculopathy    Lumbar foraminal stenosis    Primary osteoarthritis of both knees    Osteoarthritis of knee    Lipoma of skin      Past Medical and Surgical History:     Past Medical History:   Diagnosis Date    Abnormal fasting glucose 05/24/2023    Arthritis 2022    Treatments didn’t work    Benign essential HTN 09/27/2017    Chronic pain disorder     Disease of thyroid gland     Fall from slipping 04/15/2022    Fluid retention in legs     last assessed 5/12/15    Hypothyroidism     Lumbago with sciatica     last assessed 4/10/14    Lumbar radiculopathy     last assessed 4/10/14    Primary osteoarthritis of both knees 10/24/2022    Torn meniscus     right     Past Surgical History:   Procedure Laterality Date    APPENDECTOMY      BREAST CYST  "ASPIRATION Left 1993    CATARACT EXTRACTION Bilateral     COLONOSCOPY      HERNIA REPAIR  02/16/2024    KNEE ARTHROSCOPY Left     therapeutic \"30 years ago\"    OH LAMNOTMY INCL W/DCMPRSN NRV ROOT 1 INTRSPC LUMBR Right 05/31/2023    Procedure: RIGHT L3/4 HEMILAMINECTOMY,FORAMINOTOMY, DISCECTOMY;  Surgeon: Craig Robert Goldberg, MD;  Location:  MAIN OR;  Service: Neurosurgery    OH RPR 1ST INGUN HRNA AGE 5 YRS/> REDUCIBLE Left 02/16/2024    Procedure: REPAIR HERNIA INGUINAL WITH MESH;  Surgeon: Cholo Curran MD;  Location:  MAIN OR;  Service: General    SPINE SURGERY  05/31/2023      Family History:     Family History   Problem Relation Age of Onset    Breast cancer Mother 80    Hypertension Mother     No Known Problems Maternal Grandmother     No Known Problems Maternal Grandfather     No Known Problems Paternal Grandmother     No Known Problems Paternal Grandfather     Ovarian cancer Maternal Aunt 34    No Known Problems Maternal Aunt     No Known Problems Paternal Aunt     Prostate cancer Paternal Uncle 55    Ovarian cancer Cousin 55      Social History:     Social History     Socioeconomic History    Marital status: /Civil Union     Spouse name: None    Number of children: None    Years of education: None    Highest education level: None   Occupational History    None   Tobacco Use    Smoking status: Never    Smokeless tobacco: Never   Vaping Use    Vaping status: Never Used   Substance and Sexual Activity    Alcohol use: Yes     Comment: occ    Drug use: Never    Sexual activity: Not Currently     Partners: Male   Other Topics Concern    None   Social History Narrative    Pt visited [place] April[place]July [place]     Social Determinants of Health     Financial Resource Strain: Low Risk  (4/17/2023)    Overall Financial Resource Strain (CARDIA)     Difficulty of Paying Living Expenses: Not hard at all   Food Insecurity: No Food Insecurity (4/29/2024)    Hunger Vital Sign     Worried About Running Out of Food in " the Last Year: Never true     Ran Out of Food in the Last Year: Never true   Transportation Needs: No Transportation Needs (4/29/2024)    PRAPARE - Transportation     Lack of Transportation (Medical): No     Lack of Transportation (Non-Medical): No   Physical Activity: Not on file   Stress: Not on file   Social Connections: Not on file   Intimate Partner Violence: Not on file   Housing Stability: Low Risk  (4/29/2024)    Housing Stability Vital Sign     Unable to Pay for Housing in the Last Year: No     Number of Places Lived in the Last Year: 1     Unstable Housing in the Last Year: No      Medications and Allergies:     Current Outpatient Medications   Medication Sig Dispense Refill    Acetaminophen (TYLENOL PO) Take 325-500 mg by mouth as needed      AMILoride-hydrochlorothiazide (MODURETIC) 5-50 mg per tablet Take 1 tablet by mouth daily 90 tablet 1    Ascorbic Acid (vitamin C) 1000 MG tablet Take 1,000 mg by mouth daily      Calcium Carbonate-Vitamin D3 600-400 MG-UNIT TABS Take 2 tablets by mouth daily      Collagen Hydrolysate POWD 1 Scoop by Does not apply route daily       Glucosamine-Chondroit-Vit C-Mn (GLUCOSAMINE-CHONDROITIN) TABS Take 2 tablets by mouth daily      levothyroxine 50 mcg tablet Take 1 tablet (50 mcg total) by mouth daily Alt with 1.5 tab M,W,F 108 tablet 3    Magnesium Carbonate POWD 325 mg by Does not apply route daily      Multiple Vitamin (MULTI-DAY) TABS Take 2 tablets by mouth daily Ultra Aric      Omega-3 Fatty Acids (FISH OIL PO) Take by mouth daily       No current facility-administered medications for this visit.     No Known Allergies   Immunizations:     Immunization History   Administered Date(s) Administered    COVID-19 MODERNA VACC 0.5 ML IM 01/24/2021, 02/21/2021, 10/24/2021, 04/23/2022    COVID-19 Moderna Vac BIVALENT 12 Yr+ IM 0.5 ML 10/24/2022    COVID-19, unspecified 01/24/2021, 02/21/2021    INFLUENZA 11/04/2022    Influenza, high dose seasonal 0.7 mL 10/06/2020,  12/07/2021, 11/04/2022, 10/24/2023    Pneumococcal Conjugate 13-Valent 09/08/2020, 05/10/2022, 05/10/2022    Tdap 09/08/2020    Zoster Vaccine Recombinant 01/18/2023, 04/26/2023      Health Maintenance:         Topic Date Due    Colorectal Cancer Screening  04/20/2024    Breast Cancer Screening: Mammogram  08/28/2024    Hepatitis C Screening  Completed         Topic Date Due    Pneumococcal Vaccine: 65+ Years (2 of 2 - PPSV23 or PCV20) 05/10/2023    COVID-19 Vaccine (8 - 2023-24 season) 09/01/2023      Medicare Screening Tests and Risk Assessments:     Radha is here for her Subsequent Wellness visit.     Health Risk Assessment:   Patient rates overall health as very good. Patient feels that their physical health rating is slightly better. Patient is satisfied with their life. Eyesight was rated as same. Hearing was rated as same. Patient feels that their emotional and mental health rating is same. Patients states they are never, rarely angry. Patient states they are sometimes unusually tired/fatigued. Pain experienced in the last 7 days has been some. Patient's pain rating has been 5/10. Patient states that she has experienced no weight loss or gain in last 6 months.     Fall Risk Screening:   In the past year, patient has experienced: no history of falling in past year      Urinary Incontinence Screening:   Patient has not leaked urine accidently in the last six months.     Home Safety:  Patient has trouble with stairs inside or outside of their home. Patient has working smoke alarms and has working carbon monoxide detector. Home safety hazards include: none.     Nutrition:   Current diet is Regular.     Medications:   Patient is currently taking over-the-counter supplements. OTC medications include: see medication list. Patient is able to manage medications.     Activities of Daily Living (ADLs)/Instrumental Activities of Daily Living (IADLs):   Walk and transfer into and out of bed and chair?: Yes  Dress and  groom yourself?: Yes    Bathe or shower yourself?: Yes    Feed yourself? Yes  Do your laundry/housekeeping?: Yes  Manage your money, pay your bills and track your expenses?: Yes  Make your own meals?: Yes    Do your own shopping?: Yes    Previous Hospitalizations:   Any hospitalizations or ED visits within the last 12 months?: Yes    How many hospitalizations have you had in the last year?: 1-2    Advance Care Planning:   Living will: No    Durable POA for healthcare: No    Advanced directive: No      Comments: Her      Cognitive Screening:   Provider or family/friend/caregiver concerned regarding cognition?: No    PREVENTIVE SCREENINGS      Cardiovascular Screening:    General: Screening Current      Diabetes Screening:     General: Screening Current      Breast Cancer Screening:     General: Screening Current      Cervical Cancer Screening:    General: Screening Not Indicated      Lung Cancer Screening:     General: Screening Not Indicated      Hepatitis C Screening:    General: Screening Current    Screening, Brief Intervention, and Referral to Treatment (SBIRT)    Screening  Typical number of drinks in a day: 0  Typical number of drinks in a week: 0  Interpretation: Low risk drinking behavior.    AUDIT-C Screenin) How often did you have a drink containing alcohol in the past year? 2 to 4 times a month  2) How many drinks did you have on a typical day when you were drinking in the past year? 0  3) How often did you have 6 or more drinks on one occasion in the past year? never    AUDIT-C Score: 2  Interpretation: Score 0-2 (female): Negative screen for alcohol misuse    Single Item Drug Screening:  How often have you used an illegal drug (including marijuana) or a prescription medication for non-medical reasons in the past year? never    Single Item Drug Screen Score: 0  Interpretation: Negative screen for possible drug use disorder    Brief Intervention  Alcohol & drug use screenings were reviewed. No  "concerns regarding substance use disorder identified.     Other Counseling Topics:   Car/seat belt/driving safety, skin self-exam, sunscreen and calcium and vitamin D intake and regular weightbearing exercise.     No results found.     Physical Exam:     /64 (BP Location: Left arm, Patient Position: Sitting, Cuff Size: Standard)   Pulse 67   Temp 97.6 °F (36.4 °C) (Temporal)   Ht 5' 10\" (1.778 m)   Wt 77.6 kg (171 lb)   SpO2 99%   BMI 24.54 kg/m²     Physical Exam     Arina Baltazar DO  "

## 2024-05-16 ENCOUNTER — HOSPITAL ENCOUNTER (OUTPATIENT)
Dept: RADIOLOGY | Facility: HOSPITAL | Age: 69
Discharge: HOME | End: 2024-05-16
Attending: FAMILY MEDICINE
Payer: MEDICARE

## 2024-05-16 DIAGNOSIS — M51.16 LUMBAR DISC DISEASE WITH RADICULOPATHY: ICD-10-CM

## 2024-05-16 DIAGNOSIS — M48.061 LUMBAR FORAMINAL STENOSIS: ICD-10-CM

## 2024-05-16 PROCEDURE — 72148 MRI LUMBAR SPINE W/O DYE: CPT

## 2024-06-03 DIAGNOSIS — E03.9 ACQUIRED HYPOTHYROIDISM: ICD-10-CM

## 2024-06-03 RX ORDER — LEVOTHYROXINE SODIUM 0.05 MG/1
TABLET ORAL
Qty: 108 TABLET | Refills: 1 | Status: SHIPPED | OUTPATIENT
Start: 2024-06-03

## 2024-06-05 PROBLEM — Z00.00 ENCOUNTER FOR ANNUAL WELLNESS VISIT (AWV) IN MEDICARE PATIENT: Status: RESOLVED | Noted: 2023-04-24 | Resolved: 2024-06-05

## 2024-06-18 NOTE — DISCHARGE INSTR - LAB
Medial Branch Radiofrequency Ablation     WHAT YOU NEED TO KNOW:   Medial branch radiofrequency ablation (RFA) is a procedure used to treat facet joint pain in your neck, mid back, or lower back  Facet joints are found at the back of each vertebra  A needle electrode is used to send electrical currents to the nerves in your facet joint  The electrical currents create heat that damages the nerve so it cannot send pain signals  ACTIVITY  Do not drive or operate machinery today  No strenuous activity today - bending, lifting, etc   You may shower today, but do not sit in a tub of water  Resume normal activities tomorrow as tolerated  CARE OF THE INJECTION SITE  If you have soreness or pain, apply ice to the area today (20 minutes on/20 minutes off)  Starting tomorrow, you may use warm, moist heat or ice if needed  Notify the [facility] if you have any of the following: redness, drainage, swelling, or fever above 100°F     SPECIAL INSTRUCTIONS  Our office will call you tomorrow for a progress report and make an appointment for a follow up visit in 4 weeks  If you feel a sunburn-like sensation in the area of your procedure, call our office  MEDICATIONS  Continue to take all routine medications  Our office may have instructed you to hold some medications  As no general anesthesia was used in today's procedure, you should not experience any side effects related to anesthesia  If you have a problem specifically related to your procedure, please call our office at [phone]  Problems not related to your procedure should be directed to your primary care physician  Medical Necessity Information: It is in your best interest to select a reason for this procedure from the list below. All of these items fulfill various CMS LCD requirements except lesion extends to a margin. Include Z78.9 (Other Specified Conditions Influencing Health Status) As An Associated Diagnosis?: No Medical Necessity Clause: This procedure was medically necessary because the lesion that was treated was: Lab: [id number] Lab Facility: 0 Surgeon (Optional): Sylvie Paula Biopsy Photograph Reviewed: Yes Size Of Lesion In Cm: 0.9 X Size Of Lesion In Cm (Optional): 1 Size Of Margin In Cm: 0.2 Anesthesia Volume In Cc: 8 Eye Clamp Note Details: An eye clamp was used during the procedure. Excision Method: Elliptical Saucerization Depth: dermis and superficial adipose tissue Repair Type: Intermediate Intermediate / Complex Repair - Final Wound Length In Cm: 3.8 Suturegard Retention Suture: 2-0 Nylon Retention Suture Bite Size: 3 mm Length To Time In Minutes Device Was In Place: 10 Undermining Type: Entire Wound Debridement Text: The wound edges were debrided prior to proceeding with the closure to facilitate wound healing. Helical Rim Text: The closure involved the helical rim. Vermilion Border Text: The closure involved the vermilion border. Nostril Rim Text: The closure involved the nostril rim. Retention Suture Text: Retention sutures were placed to support the closure and prevent dehiscence. Suture Removal: 14 days Epidermal Closure Graft Donor Site (Optional): simple interrupted Graft Donor Site Bandage (Optional-Leave Blank If You Don't Want In Note): Steri-strips and a pressure bandage were applied to the donor site. Detail Level: Detailed Excision Depth: adipose tissue Scalpel Size: 15 blade Anesthesia Type: 1% lidocaine with epinephrine Additional Anesthesia Volume In Cc: 6 Hemostasis: Electrodesiccation Estimated Blood Loss (Cc): minimal Repair Depth: use same depth as excision depth Deep Sutures: 4-0 Vicryl Dermal Closure: buried vertical mattress Epidermal Sutures: 4-0 Prolene Epidermal Closure: running cuticular Wound Care: Petrolatum Dressing: dry sterile dressing Suturegard Intro: Intraoperative tissue expansion was performed, utilizing the SUTUREGARD device, in order to reduce wound tension. Suturegard Body: The suture ends were repeatedly re-tightened and re-clamped to achieve the desired tissue expansion. Hemigard Intro: Due to skin fragility and wound tension, it was decided to use HEMIGARD adhesive retention suture devices to permit a linear closure. The skin was cleaned and dried for a 6cm distance away from the wound. Excessive hair, if present, was removed to allow for adhesion. Hemigard Postcare Instructions: The HEMIGARD strips are to remain completely dry for at least 5-7 days. Positioning (Leave Blank If You Do Not Want): The patient was placed in a comfortable position exposing the surgical site. Complex Repair Preamble Text (Leave Blank If You Do Not Want): Extensive wide undermining was performed. Intermediate Repair Preamble Text (Leave Blank If You Do Not Want): Undermining was performed with blunt dissection. Fusiform Excision Additional Text (Leave Blank If You Do Not Want): The margin was drawn around the clinically apparent lesion.  A fusiform shape was then drawn on the skin incorporating the lesion and margins.  Incisions were then made along these lines to the appropriate tissue plane and the lesion was extirpated. Eliptical Excision Additional Text (Leave Blank If You Do Not Want): The margin was drawn around the clinically apparent lesion.  An elliptical shape was then drawn on the skin incorporating the lesion and margins.  Incisions were then made along these lines to the appropriate tissue plane and the lesion was extirpated. Saucerization Excision Additional Text (Leave Blank If You Do Not Want): The margin was drawn around the clinically apparent lesion.  Incisions were then made along these lines, in a tangential fashion, to the appropriate tissue plane and the lesion was extirpated. Slit Excision Additional Text (Leave Blank If You Do Not Want): A linear line was drawn on the skin overlying the lesion. An incision was made slowly until the lesion was visualized.  Once visualized, the lesion was removed with blunt dissection. Excisional Biopsy Additional Text (Leave Blank If You Do Not Want): The margin was drawn around the clinically apparent lesion. An elliptical shape was then drawn on the skin incorporating the lesion and margins.  Incisions were then made along these lines to the appropriate tissue plane and the lesion was extirpated. Perilesional Excision Additional Text (Leave Blank If You Do Not Want): The margin was drawn around the clinically apparent lesion. Incisions were then made along these lines to the appropriate tissue plane and the lesion was extirpated. Repair Performed By Another Provider Text (Leave Blank If You Do Not Want): After the tissue was excised the defect was repaired by another provider. No Repair - Repaired With Adjacent Surgical Defect Text (Leave Blank If You Do Not Want): After the excision the defect was repaired concurrently with another surgical defect which was in close approximation. Adjacent Tissue Transfer Text: The defect edges were debeveled with a #15 scalpel blade.  Given the location of the defect and the proximity to free margins an adjacent tissue transfer was deemed most appropriate.  Using a sterile surgical marker, an appropriate flap was drawn incorporating the defect and placing the expected incisions within the relaxed skin tension lines where possible.    The area thus outlined was incised deep to adipose tissue with a #15 scalpel blade.  The skin margins were undermined to an appropriate distance in all directions utilizing iris scissors. Advancement Flap (Single) Text: The defect edges were debeveled with a #15 scalpel blade.  Given the location of the defect and the proximity to free margins a single advancement flap was deemed most appropriate.  Using a sterile surgical marker, an appropriate advancement flap was drawn incorporating the defect and placing the expected incisions within the relaxed skin tension lines where possible.    The area thus outlined was incised deep to adipose tissue with a #15 scalpel blade.  The skin margins were undermined to an appropriate distance in all directions utilizing iris scissors. Advancement Flap (Double) Text: The defect edges were debeveled with a #15 scalpel blade.  Given the location of the defect and the proximity to free margins a double advancement flap was deemed most appropriate.  Using a sterile surgical marker, the appropriate advancement flaps were drawn incorporating the defect and placing the expected incisions within the relaxed skin tension lines where possible.    The area thus outlined was incised deep to adipose tissue with a #15 scalpel blade.  The skin margins were undermined to an appropriate distance in all directions utilizing iris scissors. Burow's Advancement Flap Text: The defect edges were debeveled with a #15 scalpel blade.  Given the location of the defect and the proximity to free margins a Burow's advancement flap was deemed most appropriate.  Using a sterile surgical marker, the appropriate advancement flap was drawn incorporating the defect and placing the expected incisions within the relaxed skin tension lines where possible.    The area thus outlined was incised deep to adipose tissue with a #15 scalpel blade.  The skin margins were undermined to an appropriate distance in all directions utilizing iris scissors. Chonodrocutaneous Helical Advancement Flap Text: The defect edges were debeveled with a #15 scalpel blade.  Given the location of the defect and the proximity to free margins a chondrocutaneous helical advancement flap was deemed most appropriate.  Using a sterile surgical marker, the appropriate advancement flap was drawn incorporating the defect and placing the expected incisions within the relaxed skin tension lines where possible.    The area thus outlined was incised deep to adipose tissue with a #15 scalpel blade.  The skin margins were undermined to an appropriate distance in all directions utilizing iris scissors. Crescentic Advancement Flap Text: The defect edges were debeveled with a #15 scalpel blade.  Given the location of the defect and the proximity to free margins a crescentic advancement flap was deemed most appropriate.  Using a sterile surgical marker, the appropriate advancement flap was drawn incorporating the defect and placing the expected incisions within the relaxed skin tension lines where possible.    The area thus outlined was incised deep to adipose tissue with a #15 scalpel blade.  The skin margins were undermined to an appropriate distance in all directions utilizing iris scissors. A-T Advancement Flap Text: The defect edges were debeveled with a #15 scalpel blade.  Given the location of the defect, shape of the defect and the proximity to free margins an A-T advancement flap was deemed most appropriate.  Using a sterile surgical marker, an appropriate advancement flap was drawn incorporating the defect and placing the expected incisions within the relaxed skin tension lines where possible.    The area thus outlined was incised deep to adipose tissue with a #15 scalpel blade.  The skin margins were undermined to an appropriate distance in all directions utilizing iris scissors. O-T Advancement Flap Text: The defect edges were debeveled with a #15 scalpel blade.  Given the location of the defect, shape of the defect and the proximity to free margins an O-T advancement flap was deemed most appropriate.  Using a sterile surgical marker, an appropriate advancement flap was drawn incorporating the defect and placing the expected incisions within the relaxed skin tension lines where possible.    The area thus outlined was incised deep to adipose tissue with a #15 scalpel blade.  The skin margins were undermined to an appropriate distance in all directions utilizing iris scissors. O-L Flap Text: The defect edges were debeveled with a #15 scalpel blade.  Given the location of the defect, shape of the defect and the proximity to free margins an O-L flap was deemed most appropriate.  Using a sterile surgical marker, an appropriate advancement flap was drawn incorporating the defect and placing the expected incisions within the relaxed skin tension lines where possible.    The area thus outlined was incised deep to adipose tissue with a #15 scalpel blade.  The skin margins were undermined to an appropriate distance in all directions utilizing iris scissors. O-Z Flap Text: The defect edges were debeveled with a #15 scalpel blade.  Given the location of the defect, shape of the defect and the proximity to free margins an O-Z flap was deemed most appropriate.  Using a sterile surgical marker, an appropriate transposition flap was drawn incorporating the defect and placing the expected incisions within the relaxed skin tension lines where possible. The area thus outlined was incised deep to adipose tissue with a #15 scalpel blade.  The skin margins were undermined to an appropriate distance in all directions utilizing iris scissors. Double O-Z Flap Text: The defect edges were debeveled with a #15 scalpel blade.  Given the location of the defect, shape of the defect and the proximity to free margins a Double O-Z flap was deemed most appropriate.  Using a sterile surgical marker, an appropriate transposition flap was drawn incorporating the defect and placing the expected incisions within the relaxed skin tension lines where possible. The area thus outlined was incised deep to adipose tissue with a #15 scalpel blade.  The skin margins were undermined to an appropriate distance in all directions utilizing iris scissors. V-Y Flap Text: The defect edges were debeveled with a #15 scalpel blade.  Given the location of the defect, shape of the defect and the proximity to free margins a V-Y flap was deemed most appropriate.  Using a sterile surgical marker, an appropriate advancement flap was drawn incorporating the defect and placing the expected incisions within the relaxed skin tension lines where possible.    The area thus outlined was incised deep to adipose tissue with a #15 scalpel blade.  The skin margins were undermined to an appropriate distance in all directions utilizing iris scissors. Advancement-Rotation Flap Text: The defect edges were debeveled with a #15 scalpel blade.  Given the location of the defect, shape of the defect and the proximity to free margins an advancement-rotation flap was deemed most appropriate.  Using a sterile surgical marker, an appropriate flap was drawn incorporating the defect and placing the expected incisions within the relaxed skin tension lines where possible. The area thus outlined was incised deep to adipose tissue with a #15 scalpel blade.  The skin margins were undermined to an appropriate distance in all directions utilizing iris scissors. Mercedes Flap Text: The defect edges were debeveled with a #15 scalpel blade.  Given the location of the defect, shape of the defect and the proximity to free margins a Mercedes flap was deemed most appropriate.  Using a sterile surgical marker, an appropriate advancement flap was drawn incorporating the defect and placing the expected incisions within the relaxed skin tension lines where possible. The area thus outlined was incised deep to adipose tissue with a #15 scalpel blade.  The skin margins were undermined to an appropriate distance in all directions utilizing iris scissors. Modified Advancement Flap Text: The defect edges were debeveled with a #15 scalpel blade.  Given the location of the defect, shape of the defect and the proximity to free margins a modified advancement flap was deemed most appropriate.  Using a sterile surgical marker, an appropriate advancement flap was drawn incorporating the defect and placing the expected incisions within the relaxed skin tension lines where possible.    The area thus outlined was incised deep to adipose tissue with a #15 scalpel blade.  The skin margins were undermined to an appropriate distance in all directions utilizing iris scissors. Mucosal Advancement Flap Text: Given the location of the defect, shape of the defect and the proximity to free margins a mucosal advancement flap was deemed most appropriate. Incisions were made with a 15 blade scalpel in the appropriate fashion along the cutaneous vermilion border and the mucosal lip. The remaining actinically damaged mucosal tissue was excised.  The mucosal advancement flap was then elevated to the gingival sulcus with care taken to preserve the neurovascular structures and advanced into the primary defect. Care was taken to ensure that precise realignment of the vermilion border was achieved. Peng Advancement Flap Text: The defect edges were debeveled with a #15 scalpel blade.  Given the location of the defect, shape of the defect and the proximity to free margins a Peng advancement flap was deemed most appropriate.  Using a sterile surgical marker, an appropriate advancement flap was drawn incorporating the defect and placing the expected incisions within the relaxed skin tension lines where possible. The area thus outlined was incised deep to adipose tissue with a #15 scalpel blade.  The skin margins were undermined to an appropriate distance in all directions utilizing iris scissors. Hatchet Flap Text: The defect edges were debeveled with a #15 scalpel blade.  Given the location of the defect, shape of the defect and the proximity to free margins a hatchet flap was deemed most appropriate.  Using a sterile surgical marker, an appropriate hatchet flap was drawn incorporating the defect and placing the expected incisions within the relaxed skin tension lines where possible.    The area thus outlined was incised deep to adipose tissue with a #15 scalpel blade.  The skin margins were undermined to an appropriate distance in all directions utilizing iris scissors. Rotation Flap Text: The defect edges were debeveled with a #15 scalpel blade.  Given the location of the defect, shape of the defect and the proximity to free margins a rotation flap was deemed most appropriate.  Using a sterile surgical marker, an appropriate rotation flap was drawn incorporating the defect and placing the expected incisions within the relaxed skin tension lines where possible.    The area thus outlined was incised deep to adipose tissue with a #15 scalpel blade.  The skin margins were undermined to an appropriate distance in all directions utilizing iris scissors. Bilateral Rotation Flap Text: The defect edges were debeveled with a #15 scalpel blade. Given the location of the defect, shape of the defect and the proximity to free margins a bilateral rotation flap was deemed most appropriate. Using a sterile surgical marker, an appropriate rotation flap was drawn incorporating the defect and placing the expected incisions within the relaxed skin tension lines where possible. The area thus outlined was incised deep to adipose tissue with a #15 scalpel blade. The skin margins were undermined to an appropriate distance in all directions utilizing iris scissors. Following this, the designed flap was carried over into the primary defect and sutured into place. Spiral Flap Text: The defect edges were debeveled with a #15 scalpel blade.  Given the location of the defect, shape of the defect and the proximity to free margins a spiral flap was deemed most appropriate.  Using a sterile surgical marker, an appropriate rotation flap was drawn incorporating the defect and placing the expected incisions within the relaxed skin tension lines where possible. The area thus outlined was incised deep to adipose tissue with a #15 scalpel blade.  The skin margins were undermined to an appropriate distance in all directions utilizing iris scissors. Staged Advancement Flap Text: The defect edges were debeveled with a #15 scalpel blade.  Given the location of the defect, shape of the defect and the proximity to free margins a staged advancement flap was deemed most appropriate.  Using a sterile surgical marker, an appropriate advancement flap was drawn incorporating the defect and placing the expected incisions within the relaxed skin tension lines where possible. The area thus outlined was incised deep to adipose tissue with a #15 scalpel blade.  The skin margins were undermined to an appropriate distance in all directions utilizing iris scissors. Star Wedge Flap Text: The defect edges were debeveled with a #15 scalpel blade.  Given the location of the defect, shape of the defect and the proximity to free margins a star wedge flap was deemed most appropriate.  Using a sterile surgical marker, an appropriate rotation flap was drawn incorporating the defect and placing the expected incisions within the relaxed skin tension lines where possible. The area thus outlined was incised deep to adipose tissue with a #15 scalpel blade.  The skin margins were undermined to an appropriate distance in all directions utilizing iris scissors. Transposition Flap Text: The defect edges were debeveled with a #15 scalpel blade.  Given the location of the defect and the proximity to free margins a transposition flap was deemed most appropriate.  Using a sterile surgical marker, an appropriate transposition flap was drawn incorporating the defect.    The area thus outlined was incised deep to adipose tissue with a #15 scalpel blade.  The skin margins were undermined to an appropriate distance in all directions utilizing iris scissors. Muscle Hinge Flap Text: The defect edges were debeveled with a #15 scalpel blade.  Given the size, depth and location of the defect and the proximity to free margins a muscle hinge flap was deemed most appropriate.  Using a sterile surgical marker, an appropriate hinge flap was drawn incorporating the defect. The area thus outlined was incised with a #15 scalpel blade.  The skin margins were undermined to an appropriate distance in all directions utilizing iris scissors. Mustarde Flap Text: The defect edges were debeveled with a #15 scalpel blade.  Given the size, depth and location of the defect and the proximity to free margins a Mustarde flap was deemed most appropriate.  Using a sterile surgical marker, an appropriate flap was drawn incorporating the defect. The area thus outlined was incised with a #15 scalpel blade.  The skin margins were undermined to an appropriate distance in all directions utilizing iris scissors. Nasal Turnover Hinge Flap Text: The defect edges were debeveled with a #15 scalpel blade.  Given the size, depth, location of the defect and the defect being full thickness a nasal turnover hinge flap was deemed most appropriate.  Using a sterile surgical marker, an appropriate hinge flap was drawn incorporating the defect. The area thus outlined was incised with a #15 scalpel blade. The flap was designed to recreate the nasal mucosal lining and the alar rim. The skin margins were undermined to an appropriate distance in all directions utilizing iris scissors. Nasalis-Muscle-Based Myocutaneous Island Pedicle Flap Text: Using a #15 blade, an incision was made around the donor flap to the level of the nasalis muscle. Wide lateral undermining was then performed in both the subcutaneous plane above the nasalis muscle, and in a submuscular plane just above periosteum. This allowed the formation of a free nasalis muscle axial pedicle (based on the angular artery) which was still attached to the actual cutaneous flap, increasing its mobility and vascular viability. Hemostasis was obtained with pinpoint electrocoagulation. The flap was mobilized into position and the pivotal anchor points positioned and stabilized with buried interrupted sutures. Subcutaneous and dermal tissues were closed in a multilayered fashion with sutures. Tissue redundancies were excised, and the epidermal edges were apposed without significant tension and sutured with sutures. Nasalis Myocutaneous Flap Text: Using a #15 blade, an incision was made around the donor flap to the level of the nasalis muscle. Wide lateral undermining was then performed in both the subcutaneous plane above the nasalis muscle, and in a submuscular plane just above periosteum. This allowed the formation of a free nasalis muscle axial pedicle which was still attached to the actual cutaneous flap, increasing its mobility and vascular viability. Hemostasis was obtained with pinpoint electrocoagulation. The flap was mobilized into position and the pivotal anchor points positioned and stabilized with buried interrupted sutures. Subcutaneous and dermal tissues were closed in a multilayered fashion with sutures. Tissue redundancies were excised, and the epidermal edges were apposed without significant tension and sutured with sutures. Nasolabial Transposition Flap Text: The defect edges were debeveled with a #15 scalpel blade.  Given the size, depth and location of the defect and the proximity to free margins a nasolabial transposition flap was deemed most appropriate. Using a sterile surgical marker, an appropriate flap was drawn incorporating the defect. The area thus outlined was incised with a #15 scalpel blade. The skin margins were undermined to an appropriate distance in all directions utilizing iris scissors. Following this, the designed flap was carried into the primary defect and sutured into place. Orbicularis Oris Muscle Flap Text: The defect edges were debeveled with a #15 scalpel blade.  Given that the defect affected the competency of the oral sphincter an orbicularis oris muscle flap was deemed most appropriate to restore this competency and normal muscle function.  Using a sterile surgical marker, an appropriate flap was drawn incorporating the defect. The area thus outlined was incised with a #15 scalpel blade. Melolabial Transposition Flap Text: The defect edges were debeveled with a #15 scalpel blade.  Given the location of the defect and the proximity to free margins a melolabial flap was deemed most appropriate.  Using a sterile surgical marker, an appropriate melolabial transposition flap was drawn incorporating the defect.    The area thus outlined was incised deep to adipose tissue with a #15 scalpel blade.  The skin margins were undermined to an appropriate distance in all directions utilizing iris scissors. Rectangular Flap Text: The defect edges were debeveled with a #15 scalpel blade. Given the location of the defect and the proximity to free margins a rectangular flap was deemed most appropriate. Using a sterile surgical marker, an appropriate rectangular flap was drawn incorporating the defect. The area thus outlined was incised deep to adipose tissue with a #15 scalpel blade. The skin margins were undermined to an appropriate distance in all directions utilizing iris scissors. Following this, the designed flap was carried over into the primary defect and sutured into place. Rhombic Flap Text: The defect edges were debeveled with a #15 scalpel blade.  Given the location of the defect and the proximity to free margins a rhombic flap was deemed most appropriate.  Using a sterile surgical marker, an appropriate rhombic flap was drawn incorporating the defect.    The area thus outlined was incised deep to adipose tissue with a #15 scalpel blade.  The skin margins were undermined to an appropriate distance in all directions utilizing iris scissors. Rhomboid Transposition Flap Text: The defect edges were debeveled with a #15 scalpel blade.  Given the location of the defect and the proximity to free margins a rhomboid transposition flap was deemed most appropriate.  Using a sterile surgical marker, an appropriate rhomboid flap was drawn incorporating the defect.    The area thus outlined was incised deep to adipose tissue with a #15 scalpel blade.  The skin margins were undermined to an appropriate distance in all directions utilizing iris scissors. Bi-Rhombic Flap Text: The defect edges were debeveled with a #15 scalpel blade.  Given the location of the defect and the proximity to free margins a bi-rhombic flap was deemed most appropriate.  Using a sterile surgical marker, an appropriate rhombic flap was drawn incorporating the defect. The area thus outlined was incised deep to adipose tissue with a #15 scalpel blade.  The skin margins were undermined to an appropriate distance in all directions utilizing iris scissors. Helical Rim Advancement Flap Text: The defect edges were debeveled with a #15 blade scalpel.  Given the location of the defect and the proximity to free margins (helical rim) a double helical rim advancement flap was deemed most appropriate.  Using a sterile surgical marker, the appropriate advancement flaps were drawn incorporating the defect and placing the expected incisions between the helical rim and antihelix where possible.  The area thus outlined was incised through and through with a #15 scalpel blade.  With a skin hook and iris scissors, the flaps were gently and sharply undermined and freed up. Bilateral Helical Rim Advancement Flap Text: The defect edges were debeveled with a #15 blade scalpel.  Given the location of the defect and the proximity to free margins (helical rim) a bilateral helical rim advancement flap was deemed most appropriate.  Using a sterile surgical marker, the appropriate advancement flaps were drawn incorporating the defect and placing the expected incisions between the helical rim and antihelix where possible.  The area thus outlined was incised through and through with a #15 scalpel blade.  With a skin hook and iris scissors, the flaps were gently and sharply undermined and freed up. Ear Star Wedge Flap Text: The defect edges were debeveled with a #15 blade scalpel.  Given the location of the defect and the proximity to free margins (helical rim) an ear star wedge flap was deemed most appropriate.  Using a sterile surgical marker, the appropriate flap was drawn incorporating the defect and placing the expected incisions between the helical rim and antihelix where possible.  The area thus outlined was incised through and through with a #15 scalpel blade. Banner Transposition Flap Text: The defect edges were debeveled with a #15 scalpel blade.  Given the location of the defect and the proximity to free margins a Banner transposition flap was deemed most appropriate.  Using a sterile surgical marker, an appropriate flap drawn around the defect. The area thus outlined was incised deep to adipose tissue with a #15 scalpel blade.  The skin margins were undermined to an appropriate distance in all directions utilizing iris scissors. Bilobed Flap Text: The defect edges were debeveled with a #15 scalpel blade.  Given the location of the defect and the proximity to free margins a bilobe flap was deemed most appropriate.  Using a sterile surgical marker, an appropriate bilobe flap drawn around the defect.    The area thus outlined was incised deep to adipose tissue with a #15 scalpel blade.  The skin margins were undermined to an appropriate distance in all directions utilizing iris scissors. Bilobed Transposition Flap Text: The defect edges were debeveled with a #15 scalpel blade.  Given the location of the defect and the proximity to free margins a bilobed transposition flap was deemed most appropriate.  Using a sterile surgical marker, an appropriate bilobe flap drawn around the defect.    The area thus outlined was incised deep to adipose tissue with a #15 scalpel blade.  The skin margins were undermined to an appropriate distance in all directions utilizing iris scissors. Trilobed Flap Text: The defect edges were debeveled with a #15 scalpel blade.  Given the location of the defect and the proximity to free margins a trilobed flap was deemed most appropriate.  Using a sterile surgical marker, an appropriate trilobed flap drawn around the defect.    The area thus outlined was incised deep to adipose tissue with a #15 scalpel blade.  The skin margins were undermined to an appropriate distance in all directions utilizing iris scissors. Dorsal Nasal Flap Text: The defect edges were debeveled with a #15 scalpel blade.  Given the location of the defect and the proximity to free margins a dorsal nasal flap was deemed most appropriate.  Using a sterile surgical marker, an appropriate dorsal nasal flap was drawn around the defect.    The area thus outlined was incised deep to adipose tissue with a #15 scalpel blade.  The skin margins were undermined to an appropriate distance in all directions utilizing iris scissors. Island Pedicle Flap Text: The defect edges were debeveled with a #15 scalpel blade.  Given the location of the defect, shape of the defect and the proximity to free margins an island pedicle advancement flap was deemed most appropriate.  Using a sterile surgical marker, an appropriate advancement flap was drawn incorporating the defect, outlining the appropriate donor tissue and placing the expected incisions within the relaxed skin tension lines where possible.    The area thus outlined was incised deep to adipose tissue with a #15 scalpel blade.  The skin margins were undermined to an appropriate distance in all directions around the primary defect and laterally outward around the island pedicle utilizing iris scissors.  There was minimal undermining beneath the pedicle flap. Island Pedicle Flap With Canthal Suspension Text: The defect edges were debeveled with a #15 scalpel blade.  Given the location of the defect, shape of the defect and the proximity to free margins an island pedicle advancement flap was deemed most appropriate.  Using a sterile surgical marker, an appropriate advancement flap was drawn incorporating the defect, outlining the appropriate donor tissue and placing the expected incisions within the relaxed skin tension lines where possible. The area thus outlined was incised deep to adipose tissue with a #15 scalpel blade.  The skin margins were undermined to an appropriate distance in all directions around the primary defect and laterally outward around the island pedicle utilizing iris scissors.  There was minimal undermining beneath the pedicle flap. A suspension suture was placed in the canthal tendon to prevent tension and prevent ectropion. Alar Island Pedicle Flap Text: The defect edges were debeveled with a #15 scalpel blade.  Given the location of the defect, shape of the defect and the proximity to the alar rim an island pedicle advancement flap was deemed most appropriate.  Using a sterile surgical marker, an appropriate advancement flap was drawn incorporating the defect, outlining the appropriate donor tissue and placing the expected incisions within the nasal ala running parallel to the alar rim. The area thus outlined was incised with a #15 scalpel blade.  The skin margins were undermined minimally to an appropriate distance in all directions around the primary defect and laterally outward around the island pedicle utilizing iris scissors.  There was minimal undermining beneath the pedicle flap. Double Island Pedicle Flap Text: The defect edges were debeveled with a #15 scalpel blade.  Given the location of the defect, shape of the defect and the proximity to free margins a double island pedicle advancement flap was deemed most appropriate.  Using a sterile surgical marker, an appropriate advancement flap was drawn incorporating the defect, outlining the appropriate donor tissue and placing the expected incisions within the relaxed skin tension lines where possible.    The area thus outlined was incised deep to adipose tissue with a #15 scalpel blade.  The skin margins were undermined to an appropriate distance in all directions around the primary defect and laterally outward around the island pedicle utilizing iris scissors.  There was minimal undermining beneath the pedicle flap. Island Pedicle Flap-Requiring Vessel Identification Text: The defect edges were debeveled with a #15 scalpel blade.  Given the location of the defect, shape of the defect and the proximity to free margins an island pedicle advancement flap was deemed most appropriate.  Using a sterile surgical marker, an appropriate advancement flap was drawn, based on the axial vessel mentioned above, incorporating the defect, outlining the appropriate donor tissue and placing the expected incisions within the relaxed skin tension lines where possible.    The area thus outlined was incised deep to adipose tissue with a #15 scalpel blade.  The skin margins were undermined to an appropriate distance in all directions around the primary defect and laterally outward around the island pedicle utilizing iris scissors.  There was minimal undermining beneath the pedicle flap. Keystone Flap Text: The defect edges were debeveled with a #15 scalpel blade.  Given the location of the defect, shape of the defect a keystone flap was deemed most appropriate.  Using a sterile surgical marker, an appropriate keystone flap was drawn incorporating the defect, outlining the appropriate donor tissue and placing the expected incisions within the relaxed skin tension lines where possible. The area thus outlined was incised deep to adipose tissue with a #15 scalpel blade.  The skin margins were undermined to an appropriate distance in all directions around the primary defect and laterally outward around the flap utilizing iris scissors. O-T Plasty Text: The defect edges were debeveled with a #15 scalpel blade.  Given the location of the defect, shape of the defect and the proximity to free margins an O-T plasty was deemed most appropriate.  Using a sterile surgical marker, an appropriate O-T plasty was drawn incorporating the defect and placing the expected incisions within the relaxed skin tension lines where possible.    The area thus outlined was incised deep to adipose tissue with a #15 scalpel blade.  The skin margins were undermined to an appropriate distance in all directions utilizing iris scissors. O-Z Plasty Text: The defect edges were debeveled with a #15 scalpel blade.  Given the location of the defect, shape of the defect and the proximity to free margins an O-Z plasty (double transposition flap) was deemed most appropriate.  Using a sterile surgical marker, the appropriate transposition flaps were drawn incorporating the defect and placing the expected incisions within the relaxed skin tension lines where possible.    The area thus outlined was incised deep to adipose tissue with a #15 scalpel blade.  The skin margins were undermined to an appropriate distance in all directions utilizing iris scissors.  Hemostasis was achieved with electrocautery.  The flaps were then transposed into place, one clockwise and the other counterclockwise, and anchored with interrupted buried subcutaneous sutures. Double O-Z Plasty Text: The defect edges were debeveled with a #15 scalpel blade.  Given the location of the defect, shape of the defect and the proximity to free margins a Double O-Z plasty (double transposition flap) was deemed most appropriate.  Using a sterile surgical marker, the appropriate transposition flaps were drawn incorporating the defect and placing the expected incisions within the relaxed skin tension lines where possible. The area thus outlined was incised deep to adipose tissue with a #15 scalpel blade.  The skin margins were undermined to an appropriate distance in all directions utilizing iris scissors.  Hemostasis was achieved with electrocautery.  The flaps were then transposed into place, one clockwise and the other counterclockwise, and anchored with interrupted buried subcutaneous sutures. V-Y Plasty Text: The defect edges were debeveled with a #15 scalpel blade.  Given the location of the defect, shape of the defect and the proximity to free margins an V-Y advancement flap was deemed most appropriate.  Using a sterile surgical marker, an appropriate advancement flap was drawn incorporating the defect and placing the expected incisions within the relaxed skin tension lines where possible.    The area thus outlined was incised deep to adipose tissue with a #15 scalpel blade.  The skin margins were undermined to an appropriate distance in all directions utilizing iris scissors. H Plasty Text: Given the location of the defect, shape of the defect and the proximity to free margins a H-plasty was deemed most appropriate for repair.  Using a sterile surgical marker, the appropriate advancement arms of the H-plasty were drawn incorporating the defect and placing the expected incisions within the relaxed skin tension lines where possible. The area thus outlined was incised deep to adipose tissue with a #15 scalpel blade. The skin margins were undermined to an appropriate distance in all directions utilizing iris scissors.  The opposing advancement arms were then advanced into place in opposite direction and anchored with interrupted buried subcutaneous sutures. W Plasty Text: The lesion was extirpated to the level of the fat with a #15 scalpel blade.  Given the location of the defect, shape of the defect and the proximity to free margins a W-plasty was deemed most appropriate for repair.  Using a sterile surgical marker, the appropriate transposition arms of the W-plasty were drawn incorporating the defect and placing the expected incisions within the relaxed skin tension lines where possible.    The area thus outlined was incised deep to adipose tissue with a #15 scalpel blade.  The skin margins were undermined to an appropriate distance in all directions utilizing iris scissors.  The opposing transposition arms were then transposed into place in opposite direction and anchored with interrupted buried subcutaneous sutures. Z Plasty Text: The lesion was extirpated to the level of the fat with a #15 scalpel blade.  Given the location of the defect, shape of the defect and the proximity to free margins a Z-plasty was deemed most appropriate for repair.  Using a sterile surgical marker, the appropriate transposition arms of the Z-plasty were drawn incorporating the defect and placing the expected incisions within the relaxed skin tension lines where possible.    The area thus outlined was incised deep to adipose tissue with a #15 scalpel blade.  The skin margins were undermined to an appropriate distance in all directions utilizing iris scissors.  The opposing transposition arms were then transposed into place in opposite direction and anchored with interrupted buried subcutaneous sutures. Double Z Plasty Text: The lesion was extirpated to the level of the fat with a #15 scalpel blade. Given the location of the defect, shape of the defect and the proximity to free margins a double Z-plasty was deemed most appropriate for repair. Using a sterile surgical marker, the appropriate transposition arms of the double Z-plasty were drawn incorporating the defect and placing the expected incisions within the relaxed skin tension lines where possible. The area thus outlined was incised deep to adipose tissue with a #15 scalpel blade. The skin margins were undermined to an appropriate distance in all directions utilizing iris scissors. The opposing transposition arms were then transposed and carried over into place in opposite direction and anchored with interrupted buried subcutaneous sutures. Zygomaticofacial Flap Text: Given the location of the defect, shape of the defect and the proximity to free margins a zygomaticofacial flap was deemed most appropriate for repair.  Using a sterile surgical marker, the appropriate flap was drawn incorporating the defect and placing the expected incisions within the relaxed skin tension lines where possible. The area thus outlined was incised deep to adipose tissue with a #15 scalpel blade with preservation of a vascular pedicle.  The skin margins were undermined to an appropriate distance in all directions utilizing iris scissors.  The flap was then placed into the defect and anchored with interrupted buried subcutaneous sutures. Cheek Interpolation Flap Text: A decision was made to reconstruct the defect utilizing an interpolation axial flap and a staged reconstruction.  A telfa template was made of the defect.  This telfa template was then used to outline the Cheek Interpolation flap.  The donor area for the pedicle flap was then injected with anesthesia.  The flap was excised through the skin and subcutaneous tissue down to the layer of the underlying musculature.  The interpolation flap was carefully excised within this deep plane to maintain its blood supply.  The edges of the donor site were undermined.   The donor site was closed in a primary fashion.  The pedicle was then rotated into position and sutured.  Once the tube was sutured into place, adequate blood supply was confirmed with blanching and refill.  The pedicle was then wrapped with xeroform gauze and dressed appropriately with a telfa and gauze bandage to ensure continued blood supply and protect the attached pedicle. Cheek-To-Nose Interpolation Flap Text: A decision was made to reconstruct the defect utilizing an interpolation axial flap and a staged reconstruction.  A telfa template was made of the defect.  This telfa template was then used to outline the Cheek-To-Nose Interpolation flap.  The donor area for the pedicle flap was then injected with anesthesia.  The flap was excised through the skin and subcutaneous tissue down to the layer of the underlying musculature.  The interpolation flap was carefully excised within this deep plane to maintain its blood supply.  The edges of the donor site were undermined.   The donor site was closed in a primary fashion.  The pedicle was then rotated into position and sutured.  Once the tube was sutured into place, adequate blood supply was confirmed with blanching and refill.  The pedicle was then wrapped with xeroform gauze and dressed appropriately with a telfa and gauze bandage to ensure continued blood supply and protect the attached pedicle. Interpolation Flap Text: A decision was made to reconstruct the defect utilizing an interpolation axial flap and a staged reconstruction.  A telfa template was made of the defect.  This telfa template was then used to outline the interpolation flap.  The donor area for the pedicle flap was then injected with anesthesia.  The flap was excised through the skin and subcutaneous tissue down to the layer of the underlying musculature.  The interpolation flap was carefully excised within this deep plane to maintain its blood supply.  The edges of the donor site were undermined.   The donor site was closed in a primary fashion.  The pedicle was then rotated into position and sutured.  Once the tube was sutured into place, adequate blood supply was confirmed with blanching and refill.  The pedicle was then wrapped with xeroform gauze and dressed appropriately with a telfa and gauze bandage to ensure continued blood supply and protect the attached pedicle. Melolabial Interpolation Flap Text: A decision was made to reconstruct the defect utilizing an interpolation axial flap and a staged reconstruction.  A telfa template was made of the defect.  This telfa template was then used to outline the melolabial interpolation flap.  The donor area for the pedicle flap was then injected with anesthesia.  The flap was excised through the skin and subcutaneous tissue down to the layer of the underlying musculature.  The pedicle flap was carefully excised within this deep plane to maintain its blood supply.  The edges of the donor site were undermined.   The donor site was closed in a primary fashion.  The pedicle was then rotated into position and sutured.  Once the tube was sutured into place, adequate blood supply was confirmed with blanching and refill.  The pedicle was then wrapped with xeroform gauze and dressed appropriately with a telfa and gauze bandage to ensure continued blood supply and protect the attached pedicle. Mastoid Interpolation Flap Text: A decision was made to reconstruct the defect utilizing an interpolation axial flap and a staged reconstruction.  A telfa template was made of the defect.  This telfa template was then used to outline the mastoid interpolation flap.  The donor area for the pedicle flap was then injected with anesthesia.  The flap was excised through the skin and subcutaneous tissue down to the layer of the underlying musculature.  The pedicle flap was carefully excised within this deep plane to maintain its blood supply.  The edges of the donor site were undermined.   The donor site was closed in a primary fashion.  The pedicle was then rotated into position and sutured.  Once the tube was sutured into place, adequate blood supply was confirmed with blanching and refill.  The pedicle was then wrapped with xeroform gauze and dressed appropriately with a telfa and gauze bandage to ensure continued blood supply and protect the attached pedicle. Posterior Auricular Interpolation Flap Text: A decision was made to reconstruct the defect utilizing an interpolation axial flap and a staged reconstruction.  A telfa template was made of the defect.  This telfa template was then used to outline the posterior auricular interpolation flap.  The donor area for the pedicle flap was then injected with anesthesia.  The flap was excised through the skin and subcutaneous tissue down to the layer of the underlying musculature.  The pedicle flap was carefully excised within this deep plane to maintain its blood supply.  The edges of the donor site were undermined.   The donor site was closed in a primary fashion.  The pedicle was then rotated into position and sutured.  Once the tube was sutured into place, adequate blood supply was confirmed with blanching and refill.  The pedicle was then wrapped with xeroform gauze and dressed appropriately with a telfa and gauze bandage to ensure continued blood supply and protect the attached pedicle. Paramedian Forehead Flap Text: A decision was made to reconstruct the defect utilizing an interpolation axial flap and a staged reconstruction.  A telfa template was made of the defect.  This telfa template was then used to outline the paramedian forehead pedicle flap.  The donor area for the pedicle flap was then injected with anesthesia.  The flap was excised through the skin and subcutaneous tissue down to the layer of the underlying musculature.  The pedicle flap was carefully excised within this deep plane to maintain its blood supply.  The edges of the donor site were undermined.   The donor site was closed in a primary fashion.  The pedicle was then rotated into position and sutured.  Once the tube was sutured into place, adequate blood supply was confirmed with blanching and refill.  The pedicle was then wrapped with xeroform gauze and dressed appropriately with a telfa and gauze bandage to ensure continued blood supply and protect the attached pedicle. Abbe Flap (Upper To Lower Lip) Text: The defect of the lower lip was assessed and measured.  Given the location and size of the defect, an Abbe flap was deemed most appropriate. Using a sterile surgical marker, an appropriate Abbe flap was measured and drawn on the upper lip. Local anesthesia was then infiltrated.  A scalpel was then used to incise the upper lip through and through the skin, vermilion, muscle and mucosa, leaving the flap pedicled on the opposite side.  The flap was then rotated and transferred to the lower lip defect.  The flap was then sutured into place with a three layer technique, closing the orbicularis oris muscle layer with subcutaneous buried sutures, followed by a mucosal layer and an epidermal layer. Abbe Flap (Lower To Upper Lip) Text: The defect of the upper lip was assessed and measured.  Given the location and size of the defect, an Abbe flap was deemed most appropriate. Using a sterile surgical marker, an appropriate Abbe flap was measured and drawn on the lower lip. Local anesthesia was then infiltrated. A scalpel was then used to incise the upper lip through and through the skin, vermilion, muscle and mucosa, leaving the flap pedicled on the opposite side.  The flap was then rotated and transferred to the lower lip defect.  The flap was then sutured into place with a three layer technique, closing the orbicularis oris muscle layer with subcutaneous buried sutures, followed by a mucosal layer and an epidermal layer. Estlander Flap (Upper To Lower Lip) Text: The defect of the lower lip was assessed and measured.  Given the location and size of the defect, an Estlander flap was deemed most appropriate. Using a sterile surgical marker, an appropriate Estlander flap was measured and drawn on the upper lip. Local anesthesia was then infiltrated. A scalpel was then used to incise the lateral aspect of the flap, through skin, muscle and mucosa, leaving the flap pedicled medially.  The flap was then rotated and positioned to fill the lower lip defect.  The flap was then sutured into place with a three layer technique, closing the orbicularis oris muscle layer with subcutaneous buried sutures, followed by a mucosal layer and an epidermal layer. Lip Wedge Excision Repair Text: Given the location of the defect and the proximity to free margins a full thickness wedge repair was deemed most appropriate.  Using a sterile surgical marker, the appropriate repair was drawn incorporating the defect and placing the expected incisions perpendicular to the vermilion border.  The vermilion border was also meticulously outlined to ensure appropriate reapproximation during the repair.  The area thus outlined was incised through and through with a #15 scalpel blade.  The muscularis and dermis were reaproximated with deep sutures following hemostasis. Care was taken to realign the vermilion border before proceeding with the superficial closure.  Once the vermilion was realigned the superfical and mucosal closure was finished. Ftsg Text: The defect edges were debeveled with a #15 scalpel blade.  Given the location of the defect, shape of the defect and the proximity to free margins a full thickness skin graft was deemed most appropriate.  Using a sterile surgical marker, the primary defect shape was transferred to the donor site. The area thus outlined was incised deep to adipose tissue with a #15 scalpel blade.  The harvested graft was then trimmed of adipose tissue until only dermis and epidermis was left.  The skin margins of the secondary defect were undermined to an appropriate distance in all directions utilizing iris scissors.  The secondary defect was closed with interrupted buried subcutaneous sutures.  The skin edges were then re-apposed with running  sutures.  The skin graft was then placed in the primary defect and oriented appropriately. Split-Thickness Skin Graft Text: The defect edges were debeveled with a #15 scalpel blade.  Given the location of the defect, shape of the defect and the proximity to free margins a split thickness skin graft was deemed most appropriate.  Using a sterile surgical marker, the primary defect shape was transferred to the donor site. The split thickness graft was then harvested.  The skin graft was then placed in the primary defect and oriented appropriately. Pinch Graft Text: The defect edges were debeveled with a #15 scalpel blade. Given the location of the defect, shape of the defect and the proximity to free margins a pinch graft was deemed most appropriate. Using a sterile surgical marker, the primary defect shape was transferred to the donor site. The area thus outlined was incised deep to adipose tissue with a #15 scalpel blade.  The harvested graft was then trimmed of adipose tissue until only dermis and epidermis was left. The skin margins of the secondary defect were undermined to an appropriate distance in all directions utilizing iris scissors.  The secondary defect was closed with interrupted buried subcutaneous sutures.  The skin edges were then re-apposed with running  sutures.  The skin graft was then placed in the primary defect and oriented appropriately. Burow's Graft Text: The defect edges were debeveled with a #15 scalpel blade.  Given the location of the defect, shape of the defect, the proximity to free margins and the presence of a standing cone deformity a Burow's skin graft was deemed most appropriate. The standing cone was removed and this tissue was then trimmed to the shape of the primary defect. The adipose tissue was also removed until only dermis and epidermis were left.  The skin margins of the secondary defect were undermined to an appropriate distance in all directions utilizing iris scissors.  The secondary defect was closed with interrupted buried subcutaneous sutures.  The skin edges were then re-apposed with running  sutures.  The skin graft was then placed in the primary defect and oriented appropriately. Cartilage Graft Text: The defect edges were debeveled with a #15 scalpel blade.  Given the location of the defect, shape of the defect, the fact the defect involved a full thickness cartilage defect a cartilage graft was deemed most appropriate.  An appropriate donor site was identified, cleansed, and anesthetized. The cartilage graft was then harvested and transferred to the recipient site, oriented appropriately and then sutured into place.  The secondary defect was then repaired using a primary closure. Composite Graft Text: The defect edges were debeveled with a #15 scalpel blade.  Given the location of the defect, shape of the defect, the proximity to free margins and the fact the defect was full thickness a composite graft was deemed most appropriate.  The defect was outline and then transferred to the donor site.  A full thickness graft was then excised from the donor site. The graft was then placed in the primary defect, oriented appropriately and then sutured into place.  The secondary defect was then repaired using a primary closure. Epidermal Autograft Text: The defect edges were debeveled with a #15 scalpel blade.  Given the location of the defect, shape of the defect and the proximity to free margins an epidermal autograft was deemed most appropriate.  Using a sterile surgical marker, the primary defect shape was transferred to the donor site. The epidermal graft was then harvested.  The skin graft was then placed in the primary defect and oriented appropriately. Dermal Autograft Text: The defect edges were debeveled with a #15 scalpel blade.  Given the location of the defect, shape of the defect and the proximity to free margins a dermal autograft was deemed most appropriate.  Using a sterile surgical marker, the primary defect shape was transferred to the donor site. The area thus outlined was incised deep to adipose tissue with a #15 scalpel blade.  The harvested graft was then trimmed of adipose and epidermal tissue until only dermis was left.  The skin graft was then placed in the primary defect and oriented appropriately. Skin Substitute Text: The defect edges were debeveled with a #15 scalpel blade.  Given the location of the defect, shape of the defect and the proximity to free margins a skin substitute graft was deemed most appropriate.  The graft material was trimmed to fit the size of the defect. The graft was then placed in the primary defect and oriented appropriately. Tissue Cultured Epidermal Autograft Text: The defect edges were debeveled with a #15 scalpel blade.  Given the location of the defect, shape of the defect and the proximity to free margins a tissue cultured epidermal autograft was deemed most appropriate.  The graft was then trimmed to fit the size of the defect.  The graft was then placed in the primary defect and oriented appropriately. Xenograft Text: The defect edges were debeveled with a #15 scalpel blade.  Given the location of the defect, shape of the defect and the proximity to free margins a xenograft was deemed most appropriate.  The graft was then trimmed to fit the size of the defect.  The graft was then placed in the primary defect and oriented appropriately. Purse String (Intermediate) Text: Given the location of the defect and the characteristics of the surrounding skin a purse string intermediate closure was deemed most appropriate.  Undermining was performed circumferentially around the surgical defect.  A purse string suture was then placed and tightened. Purse String (Simple) Text: Given the location of the defect and the characteristics of the surrounding skin a purse string simple closure was deemed most appropriate.  Undermining was performed circumferentially around the surgical defect.  A purse string suture was then placed and tightened. Complex Repair And Single Advancement Flap Text: The defect edges were debeveled with a #15 scalpel blade.  The primary defect was closed partially with a complex linear closure.  Given the location of the remaining defect, shape of the defect and the proximity to free margins a single advancement flap was deemed most appropriate for complete closure of the defect.  Using a sterile surgical marker, an appropriate advancement flap was drawn incorporating the defect and placing the expected incisions within the relaxed skin tension lines where possible.    The area thus outlined was incised deep to adipose tissue with a #15 scalpel blade.  The skin margins were undermined to an appropriate distance in all directions utilizing iris scissors. Complex Repair And Double Advancement Flap Text: The defect edges were debeveled with a #15 scalpel blade.  The primary defect was closed partially with a complex linear closure.  Given the location of the remaining defect, shape of the defect and the proximity to free margins a double advancement flap was deemed most appropriate for complete closure of the defect.  Using a sterile surgical marker, an appropriate advancement flap was drawn incorporating the defect and placing the expected incisions within the relaxed skin tension lines where possible.    The area thus outlined was incised deep to adipose tissue with a #15 scalpel blade.  The skin margins were undermined to an appropriate distance in all directions utilizing iris scissors. Complex Repair And Modified Advancement Flap Text: The defect edges were debeveled with a #15 scalpel blade.  The primary defect was closed partially with a complex linear closure.  Given the location of the remaining defect, shape of the defect and the proximity to free margins a modified advancement flap was deemed most appropriate for complete closure of the defect.  Using a sterile surgical marker, an appropriate advancement flap was drawn incorporating the defect and placing the expected incisions within the relaxed skin tension lines where possible.    The area thus outlined was incised deep to adipose tissue with a #15 scalpel blade.  The skin margins were undermined to an appropriate distance in all directions utilizing iris scissors. Complex Repair And A-T Advancement Flap Text: The defect edges were debeveled with a #15 scalpel blade.  The primary defect was closed partially with a complex linear closure.  Given the location of the remaining defect, shape of the defect and the proximity to free margins an A-T advancement flap was deemed most appropriate for complete closure of the defect.  Using a sterile surgical marker, an appropriate advancement flap was drawn incorporating the defect and placing the expected incisions within the relaxed skin tension lines where possible.    The area thus outlined was incised deep to adipose tissue with a #15 scalpel blade.  The skin margins were undermined to an appropriate distance in all directions utilizing iris scissors. Complex Repair And O-T Advancement Flap Text: The defect edges were debeveled with a #15 scalpel blade.  The primary defect was closed partially with a complex linear closure.  Given the location of the remaining defect, shape of the defect and the proximity to free margins an O-T advancement flap was deemed most appropriate for complete closure of the defect.  Using a sterile surgical marker, an appropriate advancement flap was drawn incorporating the defect and placing the expected incisions within the relaxed skin tension lines where possible.    The area thus outlined was incised deep to adipose tissue with a #15 scalpel blade.  The skin margins were undermined to an appropriate distance in all directions utilizing iris scissors. Complex Repair And O-L Flap Text: The defect edges were debeveled with a #15 scalpel blade.  The primary defect was closed partially with a complex linear closure.  Given the location of the remaining defect, shape of the defect and the proximity to free margins an O-L flap was deemed most appropriate for complete closure of the defect.  Using a sterile surgical marker, an appropriate flap was drawn incorporating the defect and placing the expected incisions within the relaxed skin tension lines where possible.    The area thus outlined was incised deep to adipose tissue with a #15 scalpel blade.  The skin margins were undermined to an appropriate distance in all directions utilizing iris scissors. Complex Repair And Bilobe Flap Text: The defect edges were debeveled with a #15 scalpel blade.  The primary defect was closed partially with a complex linear closure.  Given the location of the remaining defect, shape of the defect and the proximity to free margins a bilobe flap was deemed most appropriate for complete closure of the defect.  Using a sterile surgical marker, an appropriate advancement flap was drawn incorporating the defect and placing the expected incisions within the relaxed skin tension lines where possible.    The area thus outlined was incised deep to adipose tissue with a #15 scalpel blade.  The skin margins were undermined to an appropriate distance in all directions utilizing iris scissors. Complex Repair And Melolabial Flap Text: The defect edges were debeveled with a #15 scalpel blade.  The primary defect was closed partially with a complex linear closure.  Given the location of the remaining defect, shape of the defect and the proximity to free margins a melolabial flap was deemed most appropriate for complete closure of the defect.  Using a sterile surgical marker, an appropriate advancement flap was drawn incorporating the defect and placing the expected incisions within the relaxed skin tension lines where possible.    The area thus outlined was incised deep to adipose tissue with a #15 scalpel blade.  The skin margins were undermined to an appropriate distance in all directions utilizing iris scissors. Complex Repair And Rotation Flap Text: The defect edges were debeveled with a #15 scalpel blade.  The primary defect was closed partially with a complex linear closure.  Given the location of the remaining defect, shape of the defect and the proximity to free margins a rotation flap was deemed most appropriate for complete closure of the defect.  Using a sterile surgical marker, an appropriate advancement flap was drawn incorporating the defect and placing the expected incisions within the relaxed skin tension lines where possible.    The area thus outlined was incised deep to adipose tissue with a #15 scalpel blade.  The skin margins were undermined to an appropriate distance in all directions utilizing iris scissors. Complex Repair And Rhombic Flap Text: The defect edges were debeveled with a #15 scalpel blade.  The primary defect was closed partially with a complex linear closure.  Given the location of the remaining defect, shape of the defect and the proximity to free margins a rhombic flap was deemed most appropriate for complete closure of the defect.  Using a sterile surgical marker, an appropriate advancement flap was drawn incorporating the defect and placing the expected incisions within the relaxed skin tension lines where possible.    The area thus outlined was incised deep to adipose tissue with a #15 scalpel blade.  The skin margins were undermined to an appropriate distance in all directions utilizing iris scissors. Complex Repair And Transposition Flap Text: The defect edges were debeveled with a #15 scalpel blade.  The primary defect was closed partially with a complex linear closure.  Given the location of the remaining defect, shape of the defect and the proximity to free margins a transposition flap was deemed most appropriate for complete closure of the defect.  Using a sterile surgical marker, an appropriate advancement flap was drawn incorporating the defect and placing the expected incisions within the relaxed skin tension lines where possible.    The area thus outlined was incised deep to adipose tissue with a #15 scalpel blade.  The skin margins were undermined to an appropriate distance in all directions utilizing iris scissors. Complex Repair And V-Y Plasty Text: The defect edges were debeveled with a #15 scalpel blade.  The primary defect was closed partially with a complex linear closure.  Given the location of the remaining defect, shape of the defect and the proximity to free margins a V-Y plasty was deemed most appropriate for complete closure of the defect.  Using a sterile surgical marker, an appropriate advancement flap was drawn incorporating the defect and placing the expected incisions within the relaxed skin tension lines where possible.    The area thus outlined was incised deep to adipose tissue with a #15 scalpel blade.  The skin margins were undermined to an appropriate distance in all directions utilizing iris scissors. Complex Repair And M Plasty Text: The defect edges were debeveled with a #15 scalpel blade.  The primary defect was closed partially with a complex linear closure.  Given the location of the remaining defect, shape of the defect and the proximity to free margins an M plasty was deemed most appropriate for complete closure of the defect.  Using a sterile surgical marker, an appropriate advancement flap was drawn incorporating the defect and placing the expected incisions within the relaxed skin tension lines where possible.    The area thus outlined was incised deep to adipose tissue with a #15 scalpel blade.  The skin margins were undermined to an appropriate distance in all directions utilizing iris scissors. Complex Repair And Double M Plasty Text: The defect edges were debeveled with a #15 scalpel blade.  The primary defect was closed partially with a complex linear closure.  Given the location of the remaining defect, shape of the defect and the proximity to free margins a double M plasty was deemed most appropriate for complete closure of the defect.  Using a sterile surgical marker, an appropriate advancement flap was drawn incorporating the defect and placing the expected incisions within the relaxed skin tension lines where possible.    The area thus outlined was incised deep to adipose tissue with a #15 scalpel blade.  The skin margins were undermined to an appropriate distance in all directions utilizing iris scissors. Complex Repair And W Plasty Text: The defect edges were debeveled with a #15 scalpel blade.  The primary defect was closed partially with a complex linear closure.  Given the location of the remaining defect, shape of the defect and the proximity to free margins a W plasty was deemed most appropriate for complete closure of the defect.  Using a sterile surgical marker, an appropriate advancement flap was drawn incorporating the defect and placing the expected incisions within the relaxed skin tension lines where possible.    The area thus outlined was incised deep to adipose tissue with a #15 scalpel blade.  The skin margins were undermined to an appropriate distance in all directions utilizing iris scissors. Complex Repair And Z Plasty Text: The defect edges were debeveled with a #15 scalpel blade.  The primary defect was closed partially with a complex linear closure.  Given the location of the remaining defect, shape of the defect and the proximity to free margins a Z plasty was deemed most appropriate for complete closure of the defect.  Using a sterile surgical marker, an appropriate advancement flap was drawn incorporating the defect and placing the expected incisions within the relaxed skin tension lines where possible.    The area thus outlined was incised deep to adipose tissue with a #15 scalpel blade.  The skin margins were undermined to an appropriate distance in all directions utilizing iris scissors. Complex Repair And Dorsal Nasal Flap Text: The defect edges were debeveled with a #15 scalpel blade.  The primary defect was closed partially with a complex linear closure.  Given the location of the remaining defect, shape of the defect and the proximity to free margins a dorsal nasal flap was deemed most appropriate for complete closure of the defect.  Using a sterile surgical marker, an appropriate flap was drawn incorporating the defect and placing the expected incisions within the relaxed skin tension lines where possible.    The area thus outlined was incised deep to adipose tissue with a #15 scalpel blade.  The skin margins were undermined to an appropriate distance in all directions utilizing iris scissors. Complex Repair And Ftsg Text: The defect edges were debeveled with a #15 scalpel blade.  The primary defect was closed partially with a complex linear closure.  Given the location of the defect, shape of the defect and the proximity to free margins a full thickness skin graft was deemed most appropriate to repair the remaining defect.  The graft was trimmed to fit the size of the remaining defect.  The graft was then placed in the primary defect, oriented appropriately, and sutured into place. Complex Repair And Burow's Graft Text: The defect edges were debeveled with a #15 scalpel blade.  The primary defect was closed partially with a complex linear closure.  Given the location of the defect, shape of the defect, the proximity to free margins and the presence of a standing cone deformity a Burow's graft was deemed most appropriate to repair the remaining defect.  The graft was trimmed to fit the size of the remaining defect.  The graft was then placed in the primary defect, oriented appropriately, and sutured into place. Complex Repair And Split-Thickness Skin Graft Text: The defect edges were debeveled with a #15 scalpel blade.  The primary defect was closed partially with a complex linear closure.  Given the location of the defect, shape of the defect and the proximity to free margins a split thickness skin graft was deemed most appropriate to repair the remaining defect.  The graft was trimmed to fit the size of the remaining defect.  The graft was then placed in the primary defect, oriented appropriately, and sutured into place. Complex Repair And Epidermal Autograft Text: The defect edges were debeveled with a #15 scalpel blade.  The primary defect was closed partially with a complex linear closure.  Given the location of the defect, shape of the defect and the proximity to free margins an epidermal autograft was deemed most appropriate to repair the remaining defect.  The graft was trimmed to fit the size of the remaining defect.  The graft was then placed in the primary defect, oriented appropriately, and sutured into place. Complex Repair And Dermal Autograft Text: The defect edges were debeveled with a #15 scalpel blade.  The primary defect was closed partially with a complex linear closure.  Given the location of the defect, shape of the defect and the proximity to free margins an dermal autograft was deemed most appropriate to repair the remaining defect.  The graft was trimmed to fit the size of the remaining defect.  The graft was then placed in the primary defect, oriented appropriately, and sutured into place. Complex Repair And Tissue Cultured Epidermal Autograft Text: The defect edges were debeveled with a #15 scalpel blade.  The primary defect was closed partially with a complex linear closure.  Given the location of the defect, shape of the defect and the proximity to free margins an tissue cultured epidermal autograft was deemed most appropriate to repair the remaining defect.  The graft was trimmed to fit the size of the remaining defect.  The graft was then placed in the primary defect, oriented appropriately, and sutured into place. Complex Repair And Xenograft Text: The defect edges were debeveled with a #15 scalpel blade.  The primary defect was closed partially with a complex linear closure.  Given the location of the defect, shape of the defect and the proximity to free margins a xenograft was deemed most appropriate to repair the remaining defect.  The graft was trimmed to fit the size of the remaining defect.  The graft was then placed in the primary defect, oriented appropriately, and sutured into place. Complex Repair And Skin Substitute Graft Text: The defect edges were debeveled with a #15 scalpel blade.  The primary defect was closed partially with a complex linear closure.  Given the location of the remaining defect, shape of the defect and the proximity to free margins a skin substitute graft was deemed most appropriate to repair the remaining defect.  The graft was trimmed to fit the size of the remaining defect.  The graft was then placed in the primary defect, oriented appropriately, and sutured into place. Consent was obtained from the patient. The risks and benefits to therapy were discussed in detail. Specifically, the risks of infection, scarring, bleeding, prolonged wound healing, incomplete removal, allergy to anesthesia, nerve injury and recurrence were addressed. Prior to the procedure, the treatment site was clearly identified and confirmed by the patient. All components of Universal Protocol/PAUSE Rule completed. Post-Care Instructions: I reviewed with the patient in detail post-care instructions. Patient is not to engage in any heavy lifting, exercise, or swimming for the next 14 days. Should the patient develop any fevers, chills, bleeding, severe pain patient will contact the office immediately. Home Suture Removal Text: Patient was provided a home suture removal kit and will remove their sutures at home.  If they have any questions or difficulties they will call the office. Where Do You Want The Question To Include Opioid Counseling Located?: Case Summary Tab Billing Type: Third-Party Bill Information: Selecting Yes will display possible errors in your note based on the variables you have selected. This validation is only offered as a suggestion for you. PLEASE NOTE THAT THE VALIDATION TEXT WILL BE REMOVED WHEN YOU FINALIZE YOUR NOTE. IF YOU WANT TO FAX A PRELIMINARY NOTE YOU WILL NEED TO TOGGLE THIS TO 'NO' IF YOU DO NOT WANT IT IN YOUR FAXED NOTE.

## 2024-08-18 DIAGNOSIS — I10 ESSENTIAL HYPERTENSION: ICD-10-CM

## 2024-08-18 RX ORDER — AMILORIDE HYDROCHLORIDE AND HYDROCHLOROTHIAZIDE 5; 50 MG/1; MG/1
1 TABLET ORAL DAILY
Qty: 30 TABLET | Refills: 0 | Status: SHIPPED | OUTPATIENT
Start: 2024-08-18

## 2024-08-18 NOTE — TELEPHONE ENCOUNTER
Patient needs updated blood work and has previously placed orders. Please contact patient to go for labs. Courtesy refill provided.  CMP

## 2024-08-19 ENCOUNTER — HOSPITAL ENCOUNTER (OUTPATIENT)
Dept: RADIOLOGY | Age: 69
Discharge: HOME | End: 2024-08-19
Payer: MEDICARE

## 2024-08-19 DIAGNOSIS — N83.202 CYST OF LEFT OVARY: ICD-10-CM

## 2024-08-19 PROCEDURE — 76830 TRANSVAGINAL US NON-OB: CPT

## 2024-08-19 PROCEDURE — 76856 US EXAM PELVIC COMPLETE: CPT

## 2024-08-21 ENCOUNTER — CONSULT (OUTPATIENT)
Dept: INTERNAL MEDICINE CLINIC | Age: 69
End: 2024-08-21
Payer: MEDICARE

## 2024-08-21 VITALS
HEIGHT: 70 IN | TEMPERATURE: 98.4 F | HEART RATE: 73 BPM | WEIGHT: 178 LBS | OXYGEN SATURATION: 98 % | SYSTOLIC BLOOD PRESSURE: 112 MMHG | DIASTOLIC BLOOD PRESSURE: 58 MMHG | BODY MASS INDEX: 25.48 KG/M2

## 2024-08-21 DIAGNOSIS — I10 BENIGN ESSENTIAL HTN: ICD-10-CM

## 2024-08-21 DIAGNOSIS — Z01.818 PRE-OP EXAMINATION: Primary | ICD-10-CM

## 2024-08-21 DIAGNOSIS — M17.11 PRIMARY OSTEOARTHRITIS OF RIGHT KNEE: ICD-10-CM

## 2024-08-21 DIAGNOSIS — E03.9 ACQUIRED HYPOTHYROIDISM: ICD-10-CM

## 2024-08-21 DIAGNOSIS — Z13.6 SCREENING FOR CARDIOVASCULAR CONDITION: ICD-10-CM

## 2024-08-21 PROCEDURE — 93000 ELECTROCARDIOGRAM COMPLETE: CPT | Performed by: PHYSICIAN ASSISTANT

## 2024-08-21 PROCEDURE — 99214 OFFICE O/P EST MOD 30 MIN: CPT | Performed by: PHYSICIAN ASSISTANT

## 2024-08-21 NOTE — PROGRESS NOTES
Assessment/Plan:         Diagnoses and all orders for this visit:    Pre-op examination  Comments:  6 METS   pt optimized for surgery  advised to hold fish oil and supplements 1 week prior to surgery  denies nsaid use  Orders:  -     POCT ECG    Screening for cardiovascular condition  -     POCT ECG    Primary osteoarthritis of right knee    Benign essential HTN  Comments:  well controlled    Acquired hypothyroidism    Other orders  -     Glucos-Chond-MSM-Bor-D3-Hyalur (MOVE FREE JOINT HEALTH ADV + D PO)          Subjective:      Patient ID: Radha Mendez is a 69 y.o. female.    Presurgical Evaluation    Subjective:     Patient ID: Radha Mendez is a 69 y.o. female.    Patient presents with:  Pre-op Exam: Preop Rt knee [facility]- Dr Jd Morales- 9/10/24--fax to [phone]-- [phone]      The following portions of the patient's history were reviewed and updated as appropriate: allergies, current medications, past family history, past medical history, past social history, past surgical history, and problem list.    Procedure date: 9/10/24    Surgeon:  Dr Antonio Morales   Planned procedure:  R knee arthroscopic menisectomy, chondroplasty  Diagnosis for procedure:  R knee OA    Prior anesthesia: Yes   General; Complications:  None / Tolerated well    CAD History: None    Pulmonary History: None    Renal history: None    Diabetes History:  None     Neurological History: None     On Immunosuppressant meds/biologics: No    Preop labs/testing available and reviewed: yes    EKG yes    Functional capacity: Shovel snow                          6 Mets   Pick the highest level patient can comfortably perform   4 mets or greater for surgery    RCRI  High Risk surgery?         1 Point  CAD History:         1 Point   MI; Positive Stress Test; CP due to Mi;  Nitrate Usage to control Angina; Pathologic Q wave on EKG  CHF Active:         1 Point   Pulm Edema; Paroxysmal Nocturnal Dyspnea;  Bibasilar Rales (crackles);S3; CHF on  CXR  Cerebrovascular Disease (TIA or CVA):     1 Point  DM on Insulin:        1 Point  Serum Creat >2.0 mg/dl:       1 Point          Total Points: 0     Scorin: Class I, Very Low Risk (0.4%)     1: Class II, Low risk (0.9%)     2: Class III Moderate (6.6%)     3: Class IV High (>11%)      LEORA Risk:  GFR:        For PCP:  If GFR>60, Hold ACE/ARB/Diuretic on the day of surgery, and NSAIDS 10 days before.    If GFR<45, Consider PRE and POST op Nephrology Consult.    If 46 <GFR> 59 : Has Patient had LEORA in last 6 Months? no   If YES: Preop Nephrology consult   If No:  Post Op Nephrology consult.                              The following portions of the patient's history were reviewed and updated as appropriate: allergies, current medications, past family history, past medical history, past social history, past surgical history, and problem list.    Review of Systems   Constitutional:  Negative for activity change, appetite change, chills, diaphoresis and fever.   HENT:  Negative for congestion and sore throat.    Eyes:  Negative for pain and redness.   Respiratory:  Negative for cough, shortness of breath and wheezing.    Cardiovascular:  Negative for chest pain, palpitations and leg swelling.   Gastrointestinal:  Negative for abdominal pain, constipation, diarrhea and nausea.   Genitourinary:  Negative for dysuria and flank pain.   Musculoskeletal:  Positive for arthralgias and back pain.   Skin:  Negative for rash.   Neurological:  Negative for dizziness, light-headedness and headaches.   Hematological:  Negative for adenopathy. Does not bruise/bleed easily.   Psychiatric/Behavioral:  Negative for sleep disturbance. The patient is not nervous/anxious.          Past Medical History:   Diagnosis Date    Abnormal fasting glucose 2023    Arthritis     Treatments didn’t work    Benign essential HTN 2017    Chronic pain disorder     Disease of thyroid gland     Fall from slipping 04/15/2022    Fluid  "retention in legs     last assessed 5/12/15    Hypothyroidism     Lumbago with sciatica     last assessed 4/10/14    Lumbar radiculopathy     last assessed 4/10/14    Primary osteoarthritis of both knees 10/24/2022    Torn meniscus     right         Current Outpatient Medications:     Acetaminophen (TYLENOL PO), Take 325-500 mg by mouth as needed, Disp: , Rfl:     AMILoride-hydrochlorothiazide (MODURETIC) 5-50 mg per tablet, TAKE 1 TABLET BY MOUTH EVERY DAY, Disp: 30 tablet, Rfl: 0    Ascorbic Acid (vitamin C) 1000 MG tablet, Take 1,000 mg by mouth daily, Disp: , Rfl:     Calcium Carbonate-Vitamin D3 600-400 MG-UNIT TABS, Take 2 tablets by mouth daily, Disp: , Rfl:     Collagen Hydrolysate POWD, 1 Scoop by Does not apply route daily , Disp: , Rfl:     Glucos-Chond-MSM-Bor-D3-Hyalur (MOVE FREE JOINT HEALTH ADV + D PO), , Disp: , Rfl:     levothyroxine 50 mcg tablet, TAKE 1 AND 1/2 TABLET BY MOUTH ONCE DAILY EVERY MONDAY, WEDNESDAY, AND FRIDAY ALTERNATING WITH 1 TABLET BY MOUTH ONCE DAILY EVERY SUNDAY, TUESDAY, THURSDAY, AND SATURDAY, Disp: 108 tablet, Rfl: 1    Magnesium Carbonate POWD, 325 mg by Does not apply route daily, Disp: , Rfl:     Multiple Vitamin (MULTI-DAY) TABS, Take 2 tablets by mouth daily Ultra Aric, Disp: , Rfl:     Omega-3 Fatty Acids (FISH OIL PO), Take by mouth daily, Disp: , Rfl:     No Known Allergies    Social History   Past Surgical History:   Procedure Laterality Date    APPENDECTOMY      BREAST CYST ASPIRATION Left 1993    CATARACT EXTRACTION Bilateral     COLONOSCOPY      HERNIA REPAIR  02/16/2024    KNEE ARTHROSCOPY Left     therapeutic \"30 years ago\"    HI LAMNOTMY INCL W/DCMPRSN NRV ROOT 1 INTRSPC LUMBR Right 05/31/2023    Procedure: RIGHT L3/4 HEMILAMINECTOMY,FORAMINOTOMY, DISCECTOMY;  Surgeon: Craig Robert Goldberg, MD;  Location: [facility];  Service: Neurosurgery    HI RPR 1ST INGUN HRNA AGE 5 YRS/> REDUCIBLE Left 02/16/2024    Procedure: REPAIR HERNIA INGUINAL WITH MESH;  Surgeon: " "Cholo Curran MD;  Location:  MAIN OR;  Service: General    SPINE SURGERY  05/31/2023     Family History   Problem Relation Age of Onset    Breast cancer Mother 80    Hypertension Mother     No Known Problems Maternal Grandmother     No Known Problems Maternal Grandfather     No Known Problems Paternal Grandmother     No Known Problems Paternal Grandfather     Ovarian cancer Maternal Aunt 34    No Known Problems Maternal Aunt     No Known Problems Paternal Aunt     Prostate cancer Paternal Uncle 55    Ovarian cancer Cousin 55       Objective:  /58 (BP Location: Left arm, Patient Position: Sitting, Cuff Size: Standard)   Pulse 73   Temp 98.4 °F (36.9 °C) (Temporal)   Ht 5' 10\" (1.778 m)   Wt 80.7 kg (178 lb) Comment: shoes on  SpO2 98%   BMI 25.54 kg/m²        Physical Exam  Vitals reviewed.   Constitutional:       General: She is not in acute distress.  HENT:      Head: Normocephalic and atraumatic.      Right Ear: External ear normal.      Left Ear: External ear normal.      Nose: Nose normal.      Mouth/Throat:      Mouth: Mucous membranes are moist.   Eyes:      General:         Right eye: No discharge.         Left eye: No discharge.      Extraocular Movements: Extraocular movements intact.      Conjunctiva/sclera: Conjunctivae normal.      Pupils: Pupils are equal, round, and reactive to light.   Cardiovascular:      Rate and Rhythm: Normal rate and regular rhythm.   Pulmonary:      Effort: Pulmonary effort is normal. No respiratory distress.      Breath sounds: Normal breath sounds. No wheezing or rales.   Musculoskeletal:      Cervical back: Normal range of motion.      Right lower leg: No edema.      Left lower leg: No edema.   Neurological:      General: No focal deficit present.      Mental Status: She is alert and oriented to person, place, and time.      Cranial Nerves: No cranial nerve deficit.   Psychiatric:         Mood and Affect: Mood normal.         "

## 2024-08-24 ENCOUNTER — APPOINTMENT (OUTPATIENT)
Dept: LAB | Age: 69
End: 2024-08-24
Payer: MEDICARE

## 2024-08-24 DIAGNOSIS — E03.9 MYXEDEMA HEART DISEASE: ICD-10-CM

## 2024-08-24 DIAGNOSIS — I51.9 MYXEDEMA HEART DISEASE: ICD-10-CM

## 2024-08-24 DIAGNOSIS — I10 ESSENTIAL HYPERTENSION, MALIGNANT: ICD-10-CM

## 2024-08-24 DIAGNOSIS — R79.89 HYPOURICEMIA: ICD-10-CM

## 2024-08-24 LAB
ALBUMIN SERPL BCG-MCNC: 4.2 G/DL (ref 3.5–5)
ALP SERPL-CCNC: 45 U/L (ref 34–104)
ALT SERPL W P-5'-P-CCNC: 16 U/L (ref 7–52)
ANION GAP SERPL CALCULATED.3IONS-SCNC: 10 MMOL/L (ref 4–13)
AST SERPL W P-5'-P-CCNC: 19 U/L (ref 13–39)
BASOPHILS # BLD AUTO: 0.08 THOUSANDS/ÂΜL (ref 0–0.1)
BASOPHILS NFR BLD AUTO: 1 % (ref 0–1)
BILIRUB SERPL-MCNC: 0.57 MG/DL (ref 0.2–1)
BUN SERPL-MCNC: 13 MG/DL (ref 5–25)
CALCIUM SERPL-MCNC: 9 MG/DL (ref 8.4–10.2)
CHLORIDE SERPL-SCNC: 99 MMOL/L (ref 96–108)
CHOLEST SERPL-MCNC: 174 MG/DL
CO2 SERPL-SCNC: 30 MMOL/L (ref 21–32)
CREAT SERPL-MCNC: 0.71 MG/DL (ref 0.6–1.3)
EOSINOPHIL # BLD AUTO: 0.43 THOUSAND/ÂΜL (ref 0–0.61)
EOSINOPHIL NFR BLD AUTO: 5 % (ref 0–6)
ERYTHROCYTE [DISTWIDTH] IN BLOOD BY AUTOMATED COUNT: 13.6 % (ref 11.6–15.1)
GFR SERPL CREATININE-BSD FRML MDRD: 86 ML/MIN/1.73SQ M
GLUCOSE P FAST SERPL-MCNC: 94 MG/DL (ref 65–99)
HCT VFR BLD AUTO: 44.1 % (ref 34.8–46.1)
HDLC SERPL-MCNC: 70 MG/DL
HGB BLD-MCNC: 14.4 G/DL (ref 11.5–15.4)
IMM GRANULOCYTES # BLD AUTO: 0.04 THOUSAND/UL (ref 0–0.2)
IMM GRANULOCYTES NFR BLD AUTO: 0 % (ref 0–2)
LDLC SERPL CALC-MCNC: 87 MG/DL (ref 0–100)
LYMPHOCYTES # BLD AUTO: 1.71 THOUSANDS/ÂΜL (ref 0.6–4.47)
LYMPHOCYTES NFR BLD AUTO: 19 % (ref 14–44)
MCH RBC QN AUTO: 28.9 PG (ref 26.8–34.3)
MCHC RBC AUTO-ENTMCNC: 32.7 G/DL (ref 31.4–37.4)
MCV RBC AUTO: 89 FL (ref 82–98)
MONOCYTES # BLD AUTO: 0.93 THOUSAND/ÂΜL (ref 0.17–1.22)
MONOCYTES NFR BLD AUTO: 10 % (ref 4–12)
NEUTROPHILS # BLD AUTO: 5.91 THOUSANDS/ÂΜL (ref 1.85–7.62)
NEUTS SEG NFR BLD AUTO: 65 % (ref 43–75)
NRBC BLD AUTO-RTO: 0 /100 WBCS
PLATELET # BLD AUTO: 201 THOUSANDS/UL (ref 149–390)
PMV BLD AUTO: 10.7 FL (ref 8.9–12.7)
POTASSIUM SERPL-SCNC: 3.6 MMOL/L (ref 3.5–5.3)
PROT SERPL-MCNC: 6.7 G/DL (ref 6.4–8.4)
RBC # BLD AUTO: 4.98 MILLION/UL (ref 3.81–5.12)
SODIUM SERPL-SCNC: 139 MMOL/L (ref 135–147)
TRIGL SERPL-MCNC: 87 MG/DL
TSH SERPL DL<=0.05 MIU/L-ACNC: 1.05 UIU/ML (ref 0.45–4.5)
WBC # BLD AUTO: 9.1 THOUSAND/UL (ref 4.31–10.16)

## 2024-08-28 ENCOUNTER — HOSPITAL ENCOUNTER (OUTPATIENT)
Dept: RADIOLOGY | Age: 69
Discharge: HOME | End: 2024-08-28
Payer: MEDICARE

## 2024-08-28 VITALS — WEIGHT: 158 LBS | BODY MASS INDEX: 22.62 KG/M2 | HEIGHT: 70 IN

## 2024-08-28 DIAGNOSIS — Z12.31 VISIT FOR SCREENING MAMMOGRAM: ICD-10-CM

## 2024-08-28 LAB — COLOGUARD RESULT REPORTABLE: NEGATIVE

## 2024-08-28 PROCEDURE — 77067 SCR MAMMO BI INCL CAD: CPT

## 2024-08-28 PROCEDURE — 77063 BREAST TOMOSYNTHESIS BI: CPT

## 2024-09-03 ENCOUNTER — EVALUATION (OUTPATIENT)
Dept: PHYSICAL THERAPY | Age: 69
End: 2024-09-03
Payer: MEDICARE

## 2024-09-03 DIAGNOSIS — M17.11 PRIMARY OSTEOARTHRITIS OF RIGHT KNEE: Primary | ICD-10-CM

## 2024-09-03 PROCEDURE — 97110 THERAPEUTIC EXERCISES: CPT | Performed by: PHYSICAL THERAPIST

## 2024-09-03 PROCEDURE — 97162 PT EVAL MOD COMPLEX 30 MIN: CPT | Performed by: PHYSICAL THERAPIST

## 2024-09-03 NOTE — PROGRESS NOTES
PT Evaluation  Pre Op Right Knee     Today's date: 9/3/2024  Patient name: Radha Mendez  : 1955  MRN: [id number]  Referring provider: Antonio Morales MD  Dx:   Encounter Diagnosis     ICD-10-CM    1. Primary osteoarthritis of right knee  M17.11                      Assessment  Impairments: abnormal gait, abnormal or restricted ROM, abnormal movement, activity intolerance, impaired physical strength, lacks appropriate home exercise program and pain with function    Assessment details: PT IE: 9/3/24  Patient reported she will have a right knee meniscectomy on 9/10/24 due to MRI showing + for meniscal tear.  Patient noted she still has problems with her lumbar spine due to pain.  Patient noted she will see another pain management specialist on 24.  Patient noted she has constant right knee pain that limits all functional mobility.  Patient noted right knee symptoms as follows: edema, pain, and TTP.  Patient noted she still tries to walk up to one hour in the am.  Patient is + moderate TTP at popliteal region and severe TTP at right lateral joint line.  Patient noted she has fallen 2 x over the past 2 - 3 months.  Patient denies right knee paresthesias.  Patient denies use of assistive device.  Patient noted she will return to PT post op on 24.  Patient noted she leaves to travel out of the country to [facility] on 10/24/24 for 9 days.  Patient noted she can return to the pool 2 weeks after arthroscopy.   Patient noted she can move better in the pool versus land regarding both her right knee and lumbar spine region.  Patient noted she does exhibit a limp, and prolonged walking is limited by pain aggravation.  Patient noted she limits stair climbing due to pain aggravation in her lumbar spine region.  Patient noted sleep remains deprived due to pain and weakness aggravation / limitations.  Patient noted she uses a shoe horn to assist with her right distal sock and shoe dressing.  Patient noted static standing  based functional activities are limited by right and lumbar spine pain aggravation and weakness / fatigue in bilateral lower extremity and core / postural musculature.  Patient noted right knee edema persists.  Patient noted static standing to shower remains difficult and her  provides supervision.  Patient denies right knee buckling, locking, grinding or popping.  Patient noted she fell 2 x while tripping on objects.  Understanding of Dx/Px/POC: excellent     Prognosis: good  Prognosis details: Patient is a 69 y.o. year old female seen for outpatient PT evaluation with a Primary osteoarthritis of right knee [M17.11] . Patient presents to PT IE with the following problems, concerns, deficits and impairments: right knee region pain, + TTP, decreased right lower extremity range of motion, decreased right lower extremity strength, gait and stair dysfunctions, transfer deficits, right knee edema, + TTP, functional limitations and decreased tolerance to activity.  Patient would benefit from skilled PT services under the following PT treatment plan to address the above noted deficits: therapeutic exercises and activities to facilitate right lower extremity mobility and strength, modalities, manual therapy techniques, gait and stair training, transfer training, balance and proprioception activities, edema reduction techniques, IASTM techniques, Kinesio taping techniques and a hep.  Thank you for the referral.     Goals  Short Term goals 4 - 6 weeks  1.  Patient will be independent HEP.   2.  Patient will report a 25 - 50% decrease in pain complaints.  3.  Increase strength 1/2 grade.  4.  Increase ROM 5-10 degrees.    Long Term goals 8 - 12 weeks  1.  Patient will report elimination of pain complaints.  2.  Patient will return to all recreational activities without restriction.  3.  ROM WFL.  4.  Strength 5/5.  5.  Patient will report ability to perform all transfers without right knee symptom aggravation or  limitations.  6.  Patient will report ability to perform house hold gait without right knee symptom aggravation or limitations.  7.  Patient will report ability to perform community ambulation with or without spc without right knee symptom aggravation or limitations.  8.  Patient will report ability to perform showering without right knee symptom aggravation or limitations.  9.  Patient will report ability to perform self dressing activities without right knee symptom aggravation or limitations.  10.  Patient will report ability to increase sleeping by > 30 minutes prior to right knee symptom aggravation or limitations.  11.  Patient will report ability to perform self and house hold standing activities without right knee symptom aggravation or limitations.      Plan  Patient would benefit from: skilled physical therapy and PT eval  Planned modality interventions: low level laser therapy, manual electrical stimulation, TENS, thermotherapy: hydrocollator packs, ultrasound, unattended electrical stimulation, cryotherapy and electrical stimulation/Russian stimulation    Planned therapy interventions: IASTM, joint mobilization, kinesiology taping, manual therapy, massage, balance, balance/weight bearing training, neuromuscular re-education, postural training, body mechanics training, self care, compression, strengthening, stretching, therapeutic activities, therapeutic exercise, therapeutic training, transfer training, flexibility, functional ROM exercises, gait training, graded activity, graded exercise, graded motor, home exercise program and IADL retraining    Frequency: 3x week  Duration in weeks: 12  Treatment plan discussed with: patient      Subjective Evaluation    History of Present Illness  Mechanism of injury: Patient's PMHx is remarkable for hypothyroidism, HTN, lumbar spine DDD and formainal stenosis and L3 L4 laminectomy, discectomy and foraminectomy.    RIGHT KNEE     INDICATION:   M25.561: Pain in right  knee.     COMPARISON: Right knee x-ray 2023     VIEWS:  XR KNEE 1 OR 2 VW RIGHT  Image: 1     FINDINGS:     There is no acute fracture or dislocation.     Joint effusion cannot be reliably evaluated without lateral view.     No significant degenerative changes.     No lytic or blastic osseous lesion.     Soft tissues are unremarkable.     IMPRESSION:     No acute osseous abnormality.                 MRI RIGHT KNEE     INDICATION:   M17.11: Unilateral primary osteoarthritis, right knee.     COMPARISON: Correlation is made with the prior radiograph dated 10/4/2023.     TECHNIQUE: Multiplanar/multisequence MR of the right knee was performed.  Imaging performed on 3.0T MRI     FINDINGS:     SUBCUTANEOUS TISSUES: Normal     JOINT EFFUSION: Trace joint effusion.     BAKER'S CYST: There is a small Baker's cyst.     MENISCI: There is a complex tear of the body of the lateral meniscus extending into the anterior horn. The medial meniscus is intact.     CRUCIATE LIGAMENTS: Intact.     EXTENSOR APPARATUS: Intact.     COLLATERAL LIGAMENTS: Intact.     ARTICULAR SURFACES: Normal.     BONES: Normal.     MUSCULATURE:  Intact.     IMPRESSION:     Complex tear of the body and anterior horn of the lateral meniscus.     Trace joint effusion and small Baker's cyst.          Patient Goals  Patient goals for therapy: decreased pain, increased motion, increased strength, independence with ADLs/IADLs and return to sport/leisure activities  Patient goal: to enjoy traveling with  and to have less pain  Pain  At best pain ratin  At worst pain ratin  Location: Right lateral joint line of knee and popliteal region of right knee        Objective     Tenderness     Additional Tenderness Details   Patient is + moderate TTP at popliteal region and severe TTP at right lateral joint line.    Active Range of Motion   Left Hip   Flexion: 100 degrees   Abduction: 22 degrees     Right Hip   Flexion: 100 degrees with pain  Abduction:  20 degrees   Left Knee   Flexion: 116 degrees   Extension: 5 degrees   Extensor la degrees     Right Knee   Flexion: 110 degrees with pain  Extension: 5 degrees with pain  Extensor lag: 15 degrees with pain  Left Ankle/Foot   Dorsiflexion (ke): 8 degrees   Plantar flexion: 40 degrees     Right Ankle/Foot   Dorsiflexion (ke): 6 degrees   Plantar flexion: 34 degrees     Strength/Myotome Testing     Left Hip   Planes of Motion   Flexion: 4+  Extension: 4+  Abduction: 4+  Adduction: 5    Right Hip   Planes of Motion   Flexion: 4  Extension: 4  Abduction: 4+  Adduction: 5    Left Knee   Flexion: 5  Extension: 4+    Right Knee   Flexion: 4+  Extension: 4    Left Ankle/Foot   Dorsiflexion: 5  Plantar flexion: 5    Right Ankle/Foot   Dorsiflexion: 4+  Plantar flexion: 5    Ambulation     Ambulation: Level Surfaces   Ambulation without assistive device: independent    Additional Level Surfaces Ambulation Details  Patient ambulates with right lower extremity antalgic gait pattern of decrease in right stance and left swing phase, decrease in right knee extension at mid stance, decrease in right sided weight shift with right stance phase and decrease in pace.    Ambulation: Stairs   Ascend stairs: independent  Pattern: non-reciprocal  Railings: two rails  Descend stairs: independent  Pattern: non-reciprocal  Railings: two rails    Comments   PT IE: 9/3/24.  Tug is at 13.96 seconds    General Comments:      Knee Comments  Girth Measurements:  Knee:  Suprapatellar region: Right at 46.6 CM and left at 46.0 CM;  Mid patellar region: Right at 45.4 CM and left at 44.3 Cm;  Infrapatellar region: Right at 42.7 CM and left at 42.4 Cm.             Precautions: Right Knee TKA on 9/10/24.    Patient's PMHx is remarkable for hypothyroidism, HTN, lumbar spine DDD and formainal stenosis and L3 L4 laminectomy, discectomy and foraminectomy.    Access Code: [email or web address]  URL: [email or web address]  Date: 2024  Prepared by:  "Rod Due    Exercises  - Supine Ankle Pumps  - 1 x daily - 7 x weekly - 2 sets - 10 reps  - Supine Quad Set  - 1 x daily - 7 x weekly - 2 sets - 10 reps - 5 seconds hold  - Supine Heel Slide  - 1 x daily - 7 x weekly - 2 sets - 10 reps  - Supine Active Straight Leg Raise  - 1 x daily - 7 x weekly - 2 sets - 10 reps  - Supine Hip Abduction  - 1 x daily - 7 x weekly - 2 sets - 10 reps  - Seated Long Arc Quad  - 1 x daily - 7 x weekly - 2 sets - 10 reps  - Seated Heel Slide  - 1 x daily - 7 x weekly - 2 sets - 10 reps  - Seated Heel Toe Raises  - 1 x daily - 7 x weekly - 2 sets - 10 reps      Manuals 9/3            Right knee prom                                                    Neuro Re-Ed                                                                                                        Ther Ex                          Nu step                          Seated hr and tr:B: hep            Seated LAQ:B: hep            Seated hip flexion:R             Seated heel slides:R hep            Seated hip abduction isometrics:B:             Seated hip adduction isometrics:B:                          Supine gastrocnemius stretch:R             Supine hamstring stretch:R             Ankle pumps:R 10 x hep            Quad sets:R 3 sec  x 10 hep            Glute sets:B             Heel slides:R 10 x hep            Slr flexion:R 10 x hep            Supine hip abduction:R 10 x hep            SAQ:B:             Bridges with large bolster                          Standing hr and tr:B:             Standing hamstring curls:B:             Standing slr x 3:B:             Mini squats              SLS and tandem stance:B:             Side stepping and tandem ambulation             Lunges:B             Forward step ups:R 6\"            Lateral step ups:R 6\"            Step downs:R 4\"                                                   Ther Activity                                       Gait Training                                     "   Modalities             CP to right knee with patient supine and knee extended

## 2024-09-03 NOTE — LETTER
September 3, 2024    Antonio Morales MD  [address] [facility] [address]    Patient: Radha Mendez   YOB: 1955   Date of Visit: 9/3/2024     Encounter Diagnosis     ICD-10-CM    1. Primary osteoarthritis of right knee  M17.11           Dear Dr. Morales:    Thank you for your recent referral of Radha Mendez. Please review the attached evaluation summary from Radha's recent visit.     Please verify that you agree with the plan of care by signing the attached order.     If you have any questions or concerns, please do not hesitate to call.     I sincerely appreciate the opportunity to share in the care of one of your patients and hope to have another opportunity to work with you in the near future.       Sincerely,    Rod Donis, PT      Referring Provider:      I certify that I have read the below Plan of Care and certify the need for these services furnished under this plan of treatment while under my care.                    Antonio Morales MD  [address] [facility] [address]  Via Fax: [phone]          PT Evaluation  Pre Op Right Knee     Today's date: 9/3/2024  Patient name: Radha Mendez  : 1955  MRN: [id number]  Referring provider: Antonio Morales MD  Dx:   Encounter Diagnosis     ICD-10-CM    1. Primary osteoarthritis of right knee  M17.11                      Assessment  Impairments: abnormal gait, abnormal or restricted ROM, abnormal movement, activity intolerance, impaired physical strength, lacks appropriate home exercise program and pain with function    Assessment details: PT IE: 9/3/24  Patient reported she will have a right knee meniscectomy on 9/10/24 due to MRI showing + for meniscal tear.  Patient noted she still has problems with her lumbar spine due to pain.  Patient noted she will see another pain management specialist on 24.  Patient noted she has constant right knee pain that limits all functional mobility.  Patient noted right knee  symptoms as follows: edema, pain, and TTP.  Patient noted she still tries to walk up to one hour in the am.  Patient is + moderate TTP at popliteal region and severe TTP at right lateral joint line.  Patient noted she has fallen 2 x over the past 2 - 3 months.  Patient denies right knee paresthesias.  Patient denies use of assistive device.  Patient noted she will return to PT post op on 9/13/24.  Patient noted she leaves to travel out of the country to [facility] on 10/24/24 for 9 days.  Patient noted she can return to the pool 2 weeks after arthroscopy.   Patient noted she can move better in the pool versus land regarding both her right knee and lumbar spine region.  Patient noted she does exhibit a limp, and prolonged walking is limited by pain aggravation.  Patient noted she limits stair climbing due to pain aggravation in her lumbar spine region.  Patient noted sleep remains deprived due to pain and weakness aggravation / limitations.  Patient noted she uses a shoe horn to assist with her right distal sock and shoe dressing.  Patient noted static standing based functional activities are limited by right and lumbar spine pain aggravation and weakness / fatigue in bilateral lower extremity and core / postural musculature.  Patient noted right knee edema persists.  Patient noted static standing to shower remains difficult and her  provides supervision.  Patient denies right knee buckling, locking, grinding or popping.  Patient noted she fell 2 x while tripping on objects.  Understanding of Dx/Px/POC: excellent     Prognosis: good  Prognosis details: Patient is a 69 y.o. year old female seen for outpatient PT evaluation with a Primary osteoarthritis of right knee [M17.11] . Patient presents to PT IE with the following problems, concerns, deficits and impairments: right knee region pain, + TTP, decreased right lower extremity range of motion, decreased right lower extremity strength, gait and stair  dysfunctions, transfer deficits, right knee edema, + TTP, functional limitations and decreased tolerance to activity.  Patient would benefit from skilled PT services under the following PT treatment plan to address the above noted deficits: therapeutic exercises and activities to facilitate right lower extremity mobility and strength, modalities, manual therapy techniques, gait and stair training, transfer training, balance and proprioception activities, edema reduction techniques, IASTM techniques, Kinesio taping techniques and a hep.  Thank you for the referral.     Goals  Short Term goals 4 - 6 weeks  1.  Patient will be independent HEP.   2.  Patient will report a 25 - 50% decrease in pain complaints.  3.  Increase strength 1/2 grade.  4.  Increase ROM 5-10 degrees.    Long Term goals 8 - 12 weeks  1.  Patient will report elimination of pain complaints.  2.  Patient will return to all recreational activities without restriction.  3.  ROM WFL.  4.  Strength 5/5.  5.  Patient will report ability to perform all transfers without right knee symptom aggravation or limitations.  6.  Patient will report ability to perform house hold gait without right knee symptom aggravation or limitations.  7.  Patient will report ability to perform community ambulation with or without spc without right knee symptom aggravation or limitations.  8.  Patient will report ability to perform showering without right knee symptom aggravation or limitations.  9.  Patient will report ability to perform self dressing activities without right knee symptom aggravation or limitations.  10.  Patient will report ability to increase sleeping by > 30 minutes prior to right knee symptom aggravation or limitations.  11.  Patient will report ability to perform self and house hold standing activities without right knee symptom aggravation or limitations.      Plan  Patient would benefit from: skilled physical therapy and PT eval  Planned modality  interventions: low level laser therapy, manual electrical stimulation, TENS, thermotherapy: hydrocollator packs, ultrasound, unattended electrical stimulation, cryotherapy and electrical stimulation/Russian stimulation    Planned therapy interventions: IASTM, joint mobilization, kinesiology taping, manual therapy, massage, balance, balance/weight bearing training, neuromuscular re-education, postural training, body mechanics training, self care, compression, strengthening, stretching, therapeutic activities, therapeutic exercise, therapeutic training, transfer training, flexibility, functional ROM exercises, gait training, graded activity, graded exercise, graded motor, home exercise program and IADL retraining    Frequency: 3x week  Duration in weeks: 12  Treatment plan discussed with: patient      Subjective Evaluation    History of Present Illness  Mechanism of injury: Patient's PMHx is remarkable for hypothyroidism, HTN, lumbar spine DDD and formainal stenosis and L3 L4 laminectomy, discectomy and foraminectomy.    RIGHT KNEE     INDICATION:   M25.561: Pain in right knee.     COMPARISON: Right knee x-ray 8/31/2023     VIEWS:  XR KNEE 1 OR 2 VW RIGHT  Image: 1     FINDINGS:     There is no acute fracture or dislocation.     Joint effusion cannot be reliably evaluated without lateral view.     No significant degenerative changes.     No lytic or blastic osseous lesion.     Soft tissues are unremarkable.     IMPRESSION:     No acute osseous abnormality.                 MRI RIGHT KNEE     INDICATION:   M17.11: Unilateral primary osteoarthritis, right knee.     COMPARISON: Correlation is made with the prior radiograph dated 10/4/2023.     TECHNIQUE: Multiplanar/multisequence MR of the right knee was performed.  Imaging performed on 3.0T MRI     FINDINGS:     SUBCUTANEOUS TISSUES: Normal     JOINT EFFUSION: Trace joint effusion.     BAKER'S CYST: There is a small Baker's cyst.     MENISCI: There is a complex tear of  the body of the lateral meniscus extending into the anterior horn. The medial meniscus is intact.     CRUCIATE LIGAMENTS: Intact.     EXTENSOR APPARATUS: Intact.     COLLATERAL LIGAMENTS: Intact.     ARTICULAR SURFACES: Normal.     BONES: Normal.     MUSCULATURE:  Intact.     IMPRESSION:     Complex tear of the body and anterior horn of the lateral meniscus.     Trace joint effusion and small Baker's cyst.          Patient Goals  Patient goals for therapy: decreased pain, increased motion, increased strength, independence with ADLs/IADLs and return to sport/leisure activities  Patient goal: to enjoy traveling with  and to have less pain  Pain  At best pain ratin  At worst pain ratin  Location: Right lateral joint line of knee and popliteal region of right knee        Objective     Tenderness     Additional Tenderness Details   Patient is + moderate TTP at popliteal region and severe TTP at right lateral joint line.    Active Range of Motion   Left Hip   Flexion: 100 degrees   Abduction: 22 degrees     Right Hip   Flexion: 100 degrees with pain  Abduction: 20 degrees   Left Knee   Flexion: 116 degrees   Extension: 5 degrees   Extensor la degrees     Right Knee   Flexion: 110 degrees with pain  Extension: 5 degrees with pain  Extensor lag: 15 degrees with pain  Left Ankle/Foot   Dorsiflexion (ke): 8 degrees   Plantar flexion: 40 degrees     Right Ankle/Foot   Dorsiflexion (ke): 6 degrees   Plantar flexion: 34 degrees     Strength/Myotome Testing     Left Hip   Planes of Motion   Flexion: 4+  Extension: 4+  Abduction: 4+  Adduction: 5    Right Hip   Planes of Motion   Flexion: 4  Extension: 4  Abduction: 4+  Adduction: 5    Left Knee   Flexion: 5  Extension: 4+    Right Knee   Flexion: 4+  Extension: 4    Left Ankle/Foot   Dorsiflexion: 5  Plantar flexion: 5    Right Ankle/Foot   Dorsiflexion: 4+  Plantar flexion: 5    Ambulation     Ambulation: Level Surfaces   Ambulation without assistive device:  independent    Additional Level Surfaces Ambulation Details  Patient ambulates with right lower extremity antalgic gait pattern of decrease in right stance and left swing phase, decrease in right knee extension at mid stance, decrease in right sided weight shift with right stance phase and decrease in pace.    Ambulation: Stairs   Ascend stairs: independent  Pattern: non-reciprocal  Railings: two rails  Descend stairs: independent  Pattern: non-reciprocal  Railings: two rails    Comments   PT IE: 9/3/24.  Tug is at 13.96 seconds    General Comments:      Knee Comments  Girth Measurements:  Knee:  Suprapatellar region: Right at 46.6 CM and left at 46.0 CM;  Mid patellar region: Right at 45.4 CM and left at 44.3 Cm;  Infrapatellar region: Right at 42.7 CM and left at 42.4 Cm.             Precautions: Right Knee TKA on 9/10/24.    Patient's PMHx is remarkable for hypothyroidism, HTN, lumbar spine DDD and formainal stenosis and L3 L4 laminectomy, discectomy and foraminectomy.    Access Code: [email or web address]  URL: [email or web address]  Date: 09/03/2024  Prepared by: Rod Donis    Exercises  - Supine Ankle Pumps  - 1 x daily - 7 x weekly - 2 sets - 10 reps  - Supine Quad Set  - 1 x daily - 7 x weekly - 2 sets - 10 reps - 5 seconds hold  - Supine Heel Slide  - 1 x daily - 7 x weekly - 2 sets - 10 reps  - Supine Active Straight Leg Raise  - 1 x daily - 7 x weekly - 2 sets - 10 reps  - Supine Hip Abduction  - 1 x daily - 7 x weekly - 2 sets - 10 reps  - Seated Long Arc Quad  - 1 x daily - 7 x weekly - 2 sets - 10 reps  - Seated Heel Slide  - 1 x daily - 7 x weekly - 2 sets - 10 reps  - Seated Heel Toe Raises  - 1 x daily - 7 x weekly - 2 sets - 10 reps      Manuals 9/3            Right knee prom                                                    Neuro Re-Ed                                                                                                        Ther Ex                          Nu step                 "          Seated hr and tr:B: hep            Seated LAQ:B: hep            Seated hip flexion:R             Seated heel slides:R hep            Seated hip abduction isometrics:B:             Seated hip adduction isometrics:B:                          Supine gastrocnemius stretch:R             Supine hamstring stretch:R             Ankle pumps:R 10 x hep            Quad sets:R 3 sec  x 10 hep            Glute sets:B             Heel slides:R 10 x hep            Slr flexion:R 10 x hep            Supine hip abduction:R 10 x hep            SAQ:B:             Bridges with large bolster                          Standing hr and tr:B:             Standing hamstring curls:B:             Standing slr x 3:B:             Mini squats              SLS and tandem stance:B:             Side stepping and tandem ambulation             Lunges:B             Forward step ups:R 6\"            Lateral step ups:R 6\"            Step downs:R 4\"                                                   Ther Activity                                       Gait Training                                       Modalities             CP to right knee with patient supine and knee extended                                                "

## 2024-09-05 ENCOUNTER — HOSPITAL ENCOUNTER (OUTPATIENT)
Dept: MAMMOGRAPHY | Facility: CLINIC | Age: 69
End: 2024-09-05
Payer: MEDICARE

## 2024-09-05 VITALS — HEIGHT: 70 IN | BODY MASS INDEX: 22.62 KG/M2 | WEIGHT: 158 LBS

## 2024-09-05 DIAGNOSIS — R92.8 ABNORMAL SCREENING MAMMOGRAM: ICD-10-CM

## 2024-09-05 PROCEDURE — 76642 ULTRASOUND BREAST LIMITED: CPT

## 2024-09-05 PROCEDURE — G0279 TOMOSYNTHESIS, MAMMO: HCPCS

## 2024-09-05 PROCEDURE — 77065 DX MAMMO INCL CAD UNI: CPT

## 2024-09-13 ENCOUNTER — OFFICE VISIT (OUTPATIENT)
Dept: PHYSICAL THERAPY | Age: 69
End: 2024-09-13
Payer: MEDICARE

## 2024-09-13 DIAGNOSIS — M17.11 PRIMARY OSTEOARTHRITIS OF RIGHT KNEE: Primary | ICD-10-CM

## 2024-09-13 PROCEDURE — 97110 THERAPEUTIC EXERCISES: CPT | Performed by: PHYSICAL THERAPIST

## 2024-09-13 PROCEDURE — 97164 PT RE-EVAL EST PLAN CARE: CPT | Performed by: PHYSICAL THERAPIST

## 2024-09-13 NOTE — PROGRESS NOTES
PT Evaluation  Post Op Right Knee Menisectomy    Today's date: 2024  Patient name: Radha Mendez  : 1955  MRN: [id number]  Referring provider: No ref. provider found  Dx:   Encounter Diagnosis     ICD-10-CM    1. Primary osteoarthritis of right knee  M17.11                      Assessment  Impairments: abnormal gait, abnormal or restricted ROM, abnormal movement, activity intolerance, impaired physical strength, lacks appropriate home exercise program and pain with function  Feeding disorders: pharyngoesophageal dysphagia    Assessment details: PT IE: Post Op right knee meniscectomy on 9/10/24.    Patient reported she was able to receive a right knee meniscectomy, not a TKA.  Patient noted she returned home the same day of procedure.  Patient noted sit to stand transfers remain limited.  Patient noted she is walking better since right knee meniscectomy.  Patient denies right lower extremity paresthesias.  Patient noted right knee pain at rest is at 5 of 10, and with movement and activities in standing she experiences a 7 of 10.  Patient noted she is using CP for pain reduction.   Patient noted all walking, stair climbing, standing based activities are limited by pain aggravation.  Patient denies any specific restrictions other than pain aggravation.  Patient noted she is having her spouse provide CG of 1 with stair climbing, and she is limiting the stairs at  home as well.  Patient noted her right knee is edematous.  Patient denies use of assistive device.      PT IE: 9/3/24  Patient reported she will have a right knee meniscectomy on 9/10/24 due to MRI showing + for meniscal tear.  Patient noted she still has problems with her lumbar spine due to pain.  Patient noted she will see another pain management specialist on 24.  Patient noted she has constant right knee pain that limits all functional mobility.  Patient noted right knee symptoms as follows: edema, pain, and TTP.  Patient noted she still tries  to walk up to one hour in the am.  Patient is + moderate TTP at popliteal region and severe TTP at right lateral joint line.  Patient noted she has fallen 2 x over the past 2 - 3 months.  Patient denies right knee paresthesias.  Patient denies use of assistive device.  Patient noted she will return to PT post op on 9/13/24.  Patient noted she leaves to travel out of the country to [facility] on 10/24/24 for 9 days.  Patient noted she can return to the pool 2 weeks after arthroscopy.   Patient noted she can move better in the pool versus land regarding both her right knee and lumbar spine region.  Patient noted she does exhibit a limp, and prolonged walking is limited by pain aggravation.  Patient noted she limits stair climbing due to pain aggravation in her lumbar spine region.  Patient noted sleep remains deprived due to pain and weakness aggravation / limitations.  Patient noted she uses a shoe horn to assist with her right distal sock and shoe dressing.  Patient noted static standing based functional activities are limited by right and lumbar spine pain aggravation and weakness / fatigue in bilateral lower extremity and core / postural musculature.  Patient noted right knee edema persists.  Patient noted static standing to shower remains difficult and her  provides supervision.  Patient denies right knee buckling, locking, grinding or popping.  Patient noted she fell 2 x while tripping on objects.  Understanding of Dx/Px/POC: excellent     Prognosis: good  Prognosis details: Patient is a 69 y.o. year old female seen for outpatient PT evaluation with a Primary osteoarthritis of right knee [M17.11] and subsequent right knee meniscectomy on 9/10/24. Patient presents to PT IE with the following problems, concerns, deficits and impairments: right knee region pain, + TTP, decreased right lower extremity range of motion, decreased right lower extremity strength, gait and stair dysfunctions, transfer deficits,  right knee edema, + TTP, functional limitations and decreased tolerance to activity.  Patient would benefit from skilled PT services under the following PT treatment plan to address the above noted deficits: therapeutic exercises and activities to facilitate right lower extremity mobility and strength, modalities, manual therapy techniques, gait and stair training, transfer training, balance and proprioception activities, edema reduction techniques, IASTM techniques, Kinesio taping techniques and a hep.  Thank you for the referral.     Goals  Short Term goals 4 - 6 weeks  1.  Patient will be independent HEP.   2.  Patient will report a 25 - 50% decrease in pain complaints.  3.  Increase strength 1/2 grade.  4.  Increase ROM 5-10 degrees.    Long Term goals 8 - 12 weeks  1.  Patient will report elimination of pain complaints.  2.  Patient will return to all recreational activities without restriction.  3.  ROM WFL.  4.  Strength 5/5.  5.  Patient will report ability to perform all transfers without right knee symptom aggravation or limitations.  6.  Patient will report ability to perform house hold gait without right knee symptom aggravation or limitations.  7.  Patient will report ability to perform community ambulation with or without spc without right knee symptom aggravation or limitations.  8.  Patient will report ability to perform showering without right knee symptom aggravation or limitations.  9.  Patient will report ability to perform self dressing activities without right knee symptom aggravation or limitations.  10.  Patient will report ability to increase sleeping by > 30 minutes prior to right knee symptom aggravation or limitations.  11.  Patient will report ability to perform self and house hold standing activities without right knee symptom aggravation or limitations.      Plan  Patient would benefit from: skilled physical therapy and PT eval  Planned modality interventions: low level laser therapy,  manual electrical stimulation, TENS, thermotherapy: hydrocollator packs, ultrasound, unattended electrical stimulation, cryotherapy and electrical stimulation/Russian stimulation    Planned therapy interventions: IASTM, joint mobilization, kinesiology taping, manual therapy, massage, balance, balance/weight bearing training, neuromuscular re-education, postural training, body mechanics training, self care, compression, strengthening, stretching, therapeutic activities, therapeutic exercise, therapeutic training, transfer training, flexibility, functional ROM exercises, gait training, graded activity, graded exercise, graded motor, home exercise program and IADL retraining    Frequency: 2x week  Duration in weeks: 12  Treatment plan discussed with: patient        Subjective Evaluation    History of Present Illness  Mechanism of injury: Patient's PMHx is remarkable for hypothyroidism, HTN, lumbar spine DDD and formainal stenosis and L3 L4 laminectomy, discectomy and foraminectomy.    RIGHT KNEE     INDICATION:   M25.561: Pain in right knee.     COMPARISON: Right knee x-ray 8/31/2023     VIEWS:  XR KNEE 1 OR 2 VW RIGHT  Image: 1     FINDINGS:     There is no acute fracture or dislocation.     Joint effusion cannot be reliably evaluated without lateral view.     No significant degenerative changes.     No lytic or blastic osseous lesion.     Soft tissues are unremarkable.     IMPRESSION:     No acute osseous abnormality.                 MRI RIGHT KNEE     INDICATION:   M17.11: Unilateral primary osteoarthritis, right knee.     COMPARISON: Correlation is made with the prior radiograph dated 10/4/2023.     TECHNIQUE: Multiplanar/multisequence MR of the right knee was performed.  Imaging performed on 3.0T MRI     FINDINGS:     SUBCUTANEOUS TISSUES: Normal     JOINT EFFUSION: Trace joint effusion.     BAKER'S CYST: There is a small Baker's cyst.     MENISCI: There is a complex tear of the body of the lateral meniscus  extending into the anterior horn. The medial meniscus is intact.     CRUCIATE LIGAMENTS: Intact.     EXTENSOR APPARATUS: Intact.     COLLATERAL LIGAMENTS: Intact.     ARTICULAR SURFACES: Normal.     BONES: Normal.     MUSCULATURE:  Intact.     IMPRESSION:     Complex tear of the body and anterior horn of the lateral meniscus.     Trace joint effusion and small Baker's cyst.          Patient Goals  Patient goals for therapy: decreased pain, increased motion, increased strength, independence with ADLs/IADLs and return to sport/leisure activities  Patient goal: to enjoy traveling with  and to have less pain  Pain  At best pain ratin  At worst pain ratin  Location: Right lateral joint line of knee and popliteal region of right knee        Objective     Tenderness     Additional Tenderness Details   Patient is + severe TTP at popliteal region, right medial and lateral joint lines and parapatellar joint region.    Active Range of Motion   Left Hip   Flexion: 115 degrees   Abduction: 30 degrees     Right Hip   Flexion: 110 degrees with pain  Abduction: 25 degrees with pain  Left Knee   Flexion: 116 degrees   Extension: 5 degrees   Extensor la degrees     Right Knee   Flexion: 112 degrees with pain  Extension: -4 degrees with pain  Extensor lag: Right knee extensor lag: unable to perform active slr flexion. with pain  Left Ankle/Foot   Dorsiflexion (ke): 8 degrees   Plantar flexion: 40 degrees     Right Ankle/Foot   Dorsiflexion (ke): 4 degrees   Plantar flexion: 36 degrees     Strength/Myotome Testing     Left Hip   Planes of Motion   Flexion: 4+  Extension: 4+  Abduction: 4+  Adduction: 5    Right Hip   Planes of Motion   Flexion: 4-  Extension: 4-  Abduction: 4-  Adduction: 4    Left Knee   Flexion: 5  Extension: 4+    Right Knee   Flexion: 3+  Extension: 3+    Left Ankle/Foot   Dorsiflexion: 5  Plantar flexion: 5    Right Ankle/Foot   Dorsiflexion: 4  Plantar flexion: 4+    Ambulation     Ambulation:  Level Surfaces   Ambulation without assistive device: independent    Additional Level Surfaces Ambulation Details  Patient ambulates with right lower extremity antalgic gait pattern of decrease in right stance and left swing phase, decrease in right knee extension at mid stance, decrease in right sided weight shift with right stance phase and decrease in pace.    Ambulation: Stairs   Ascend stairs: contact guard assist  Pattern: non-reciprocal  Railings: two rails  Descend stairs: contact guard assist  Pattern: non-reciprocal  Railings: two rails    Comments   PT IE: 9/3/24.  Tug is at 13.96 seconds    General Comments:      Knee Comments  Pre Op Girth Measurements:  Knee:  Suprapatellar region: Right at 46.6 CM and left at 46.0 CM;  Mid patellar region: Right at 45.4 CM and left at 44.3 Cm;  Infrapatellar region: Right at 42.7 CM and left at 42.4 Cm.    9/13/24 PT IE Post OP Girth Measurements:  Knee:  Suprapatellar region: Right at 46.8 CM and left at 46.0 CM;  Mid patellar region: Right at 45.5 CM and left at 44.3 Cm;  Infrapatellar region: Right at 43.6 CM and left at 42.4 Cm.               Precautions: Right Knee TKA on 9/10/24.    Patient's PMHx is remarkable for hypothyroidism, HTN, lumbar spine DDD and formainal stenosis and L3 L4 laminectomy, discectomy and foraminectomy.    Access Code: [email or web address]  URL: [email or web address]  Date: 09/03/2024  Prepared by: Rod Donis    Exercises  - Supine Ankle Pumps  - 1 x daily - 7 x weekly - 2 sets - 10 reps  - Supine Quad Set  - 1 x daily - 7 x weekly - 2 sets - 10 reps - 5 seconds hold  - Supine Heel Slide  - 1 x daily - 7 x weekly - 2 sets - 10 reps  - Supine Active Straight Leg Raise  - 1 x daily - 7 x weekly - 2 sets - 10 reps  - Supine Hip Abduction  - 1 x daily - 7 x weekly - 2 sets - 10 reps  - Seated Long Arc Quad  - 1 x daily - 7 x weekly - 2 sets - 10 reps  - Seated Heel Slide  - 1 x daily - 7 x weekly - 2 sets - 10 reps  - Seated Heel Toe  "Raises  - 1 x daily - 7 x weekly - 2 sets - 10 reps      Manuals 9/3 9/13           Right knee prom                                                    Neuro Re-Ed                                                                                                        Ther Ex                          Nu step                          Seated hr and tr:B: hep 10 x 2           Seated LAQ:B: hep 2 x 10           Seated hip flexion:R  2 x 10           Seated heel slides:R hep 2 x 10           Seated hip abduction isometrics:B:             Seated hip adduction isometrics:B:                          Supine gastrocnemius stretch:R  10 sec x 5 Add to hep          Supine hamstring stretch:R  10 sec x 5 Add to hep          Ankle pumps:R 10 x hep 2 x 10           Quad sets:R 3 sec  x 10 hep 2 x 10           Glute sets:B  2 x 10           Heel slides:R 10 x hep 10 x 2           Slr flexion:R 10 x hep NT           Supine hip abduction:R 10 x hep 2 x 10           SAQ:B:  2 x 10           Bridges with large bolster  2 x 10                        Standing hr and tr:B:   add          Standing hamstring curls:B:   add          Standing slr x 3:B:   add          Mini squats    add          SLS and tandem stance:B:             Side stepping and tandem ambulation   add          Lunges:B             Forward step ups:R 6\"            Lateral step ups:R 6\"            Step downs:R 4\"                                                   Ther Activity                                       Gait Training                                       Modalities             CP to right knee with patient supine and knee extended  10 min                             "

## 2024-09-13 NOTE — LETTER
2024    Antonio Morales MD  [address] [facility] [address]    Patient: Radha Mendez   YOB: 1955   Date of Visit: 2024     Encounter Diagnosis     ICD-10-CM    1. Primary osteoarthritis of right knee  M17.11           Dear Dr. Morales:    Thank you for your recent referral of Radha Mendez. Please review the attached evaluation summary from Radha's recent visit.     Please verify that you agree with the plan of care by signing the attached order.     If you have any questions or concerns, please do not hesitate to call.     I sincerely appreciate the opportunity to share in the care of one of your patients and hope to have another opportunity to work with you in the near future.       Sincerely,    Rod Donis, PT      Referring Provider:      I certify that I have read the below Plan of Care and certify the need for these services furnished under this plan of treatment while under my care.                    Antonio Morales MD  [address] [facility] [address]  Via Fax: [phone]          PT Evaluation  Post Op Right Knee Menisectomy    Today's date: 2024  Patient name: Radha Mendez  : 1955  MRN: [id number]  Referring provider: No ref. provider found  Dx:   Encounter Diagnosis     ICD-10-CM    1. Primary osteoarthritis of right knee  M17.11                      Assessment  Impairments: abnormal gait, abnormal or restricted ROM, abnormal movement, activity intolerance, impaired physical strength, lacks appropriate home exercise program and pain with function  Feeding disorders: pharyngoesophageal dysphagia    Assessment details: PT IE: Post Op right knee meniscectomy on 9/10/24.    Patient reported she was able to receive a right knee meniscectomy, not a TKA.  Patient noted she returned home the same day of procedure.  Patient noted sit to stand transfers remain limited.  Patient noted she is walking better since right knee meniscectomy.   Patient denies right lower extremity paresthesias.  Patient noted right knee pain at rest is at 5 of 10, and with movement and activities in standing she experiences a 7 of 10.  Patient noted she is using CP for pain reduction.   Patient noted all walking, stair climbing, standing based activities are limited by pain aggravation.  Patient denies any specific restrictions other than pain aggravation.  Patient noted she is having her spouse provide CG of 1 with stair climbing, and she is limiting the stairs at  home as well.  Patient noted her right knee is edematous.  Patient denies use of assistive device.      PT IE: 9/3/24  Patient reported she will have a right knee meniscectomy on 9/10/24 due to MRI showing + for meniscal tear.  Patient noted she still has problems with her lumbar spine due to pain.  Patient noted she will see another pain management specialist on 9/4/24.  Patient noted she has constant right knee pain that limits all functional mobility.  Patient noted right knee symptoms as follows: edema, pain, and TTP.  Patient noted she still tries to walk up to one hour in the am.  Patient is + moderate TTP at popliteal region and severe TTP at right lateral joint line.  Patient noted she has fallen 2 x over the past 2 - 3 months.  Patient denies right knee paresthesias.  Patient denies use of assistive device.  Patient noted she will return to PT post op on 9/13/24.  Patient noted she leaves to travel out of the country to [facility] on 10/24/24 for 9 days.  Patient noted she can return to the pool 2 weeks after arthroscopy.   Patient noted she can move better in the pool versus land regarding both her right knee and lumbar spine region.  Patient noted she does exhibit a limp, and prolonged walking is limited by pain aggravation.  Patient noted she limits stair climbing due to pain aggravation in her lumbar spine region.  Patient noted sleep remains deprived due to pain and weakness aggravation /  limitations.  Patient noted she uses a shoe horn to assist with her right distal sock and shoe dressing.  Patient noted static standing based functional activities are limited by right and lumbar spine pain aggravation and weakness / fatigue in bilateral lower extremity and core / postural musculature.  Patient noted right knee edema persists.  Patient noted static standing to shower remains difficult and her  provides supervision.  Patient denies right knee buckling, locking, grinding or popping.  Patient noted she fell 2 x while tripping on objects.  Understanding of Dx/Px/POC: excellent     Prognosis: good  Prognosis details: Patient is a 69 y.o. year old female seen for outpatient PT evaluation with a Primary osteoarthritis of right knee [M17.11] and subsequent right knee meniscectomy on 9/10/24. Patient presents to PT IE with the following problems, concerns, deficits and impairments: right knee region pain, + TTP, decreased right lower extremity range of motion, decreased right lower extremity strength, gait and stair dysfunctions, transfer deficits, right knee edema, + TTP, functional limitations and decreased tolerance to activity.  Patient would benefit from skilled PT services under the following PT treatment plan to address the above noted deficits: therapeutic exercises and activities to facilitate right lower extremity mobility and strength, modalities, manual therapy techniques, gait and stair training, transfer training, balance and proprioception activities, edema reduction techniques, IASTM techniques, Kinesio taping techniques and a hep.  Thank you for the referral.     Goals  Short Term goals 4 - 6 weeks  1.  Patient will be independent HEP.   2.  Patient will report a 25 - 50% decrease in pain complaints.  3.  Increase strength 1/2 grade.  4.  Increase ROM 5-10 degrees.    Long Term goals 8 - 12 weeks  1.  Patient will report elimination of pain complaints.  2.  Patient will return to all  recreational activities without restriction.  3.  ROM WFL.  4.  Strength 5/5.  5.  Patient will report ability to perform all transfers without right knee symptom aggravation or limitations.  6.  Patient will report ability to perform house hold gait without right knee symptom aggravation or limitations.  7.  Patient will report ability to perform community ambulation with or without spc without right knee symptom aggravation or limitations.  8.  Patient will report ability to perform showering without right knee symptom aggravation or limitations.  9.  Patient will report ability to perform self dressing activities without right knee symptom aggravation or limitations.  10.  Patient will report ability to increase sleeping by > 30 minutes prior to right knee symptom aggravation or limitations.  11.  Patient will report ability to perform self and house hold standing activities without right knee symptom aggravation or limitations.      Plan  Patient would benefit from: skilled physical therapy and PT eval  Planned modality interventions: low level laser therapy, manual electrical stimulation, TENS, thermotherapy: hydrocollator packs, ultrasound, unattended electrical stimulation, cryotherapy and electrical stimulation/Russian stimulation    Planned therapy interventions: IASTM, joint mobilization, kinesiology taping, manual therapy, massage, balance, balance/weight bearing training, neuromuscular re-education, postural training, body mechanics training, self care, compression, strengthening, stretching, therapeutic activities, therapeutic exercise, therapeutic training, transfer training, flexibility, functional ROM exercises, gait training, graded activity, graded exercise, graded motor, home exercise program and IADL retraining    Frequency: 2x week  Duration in weeks: 12  Treatment plan discussed with: patient        Subjective Evaluation    History of Present Illness  Mechanism of injury: Patient's PMHx is  remarkable for hypothyroidism, HTN, lumbar spine DDD and formainal stenosis and L3 L4 laminectomy, discectomy and foraminectomy.    RIGHT KNEE     INDICATION:   M25.561: Pain in right knee.     COMPARISON: Right knee x-ray 2023     VIEWS:  XR KNEE 1 OR 2 VW RIGHT  Image: 1     FINDINGS:     There is no acute fracture or dislocation.     Joint effusion cannot be reliably evaluated without lateral view.     No significant degenerative changes.     No lytic or blastic osseous lesion.     Soft tissues are unremarkable.     IMPRESSION:     No acute osseous abnormality.                 MRI RIGHT KNEE     INDICATION:   M17.11: Unilateral primary osteoarthritis, right knee.     COMPARISON: Correlation is made with the prior radiograph dated 10/4/2023.     TECHNIQUE: Multiplanar/multisequence MR of the right knee was performed.  Imaging performed on 3.0T MRI     FINDINGS:     SUBCUTANEOUS TISSUES: Normal     JOINT EFFUSION: Trace joint effusion.     BAKER'S CYST: There is a small Baker's cyst.     MENISCI: There is a complex tear of the body of the lateral meniscus extending into the anterior horn. The medial meniscus is intact.     CRUCIATE LIGAMENTS: Intact.     EXTENSOR APPARATUS: Intact.     COLLATERAL LIGAMENTS: Intact.     ARTICULAR SURFACES: Normal.     BONES: Normal.     MUSCULATURE:  Intact.     IMPRESSION:     Complex tear of the body and anterior horn of the lateral meniscus.     Trace joint effusion and small Baker's cyst.          Patient Goals  Patient goals for therapy: decreased pain, increased motion, increased strength, independence with ADLs/IADLs and return to sport/leisure activities  Patient goal: to enjoy traveling with  and to have less pain  Pain  At best pain ratin  At worst pain ratin  Location: Right lateral joint line of knee and popliteal region of right knee        Objective     Tenderness     Additional Tenderness Details   Patient is + severe TTP at popliteal region, right  medial and lateral joint lines and parapatellar joint region.    Active Range of Motion   Left Hip   Flexion: 115 degrees   Abduction: 30 degrees     Right Hip   Flexion: 110 degrees with pain  Abduction: 25 degrees with pain  Left Knee   Flexion: 116 degrees   Extension: 5 degrees   Extensor la degrees     Right Knee   Flexion: 112 degrees with pain  Extension: -4 degrees with pain  Extensor lag: Right knee extensor lag: unable to perform active slr flexion. with pain  Left Ankle/Foot   Dorsiflexion (ke): 8 degrees   Plantar flexion: 40 degrees     Right Ankle/Foot   Dorsiflexion (ke): 4 degrees   Plantar flexion: 36 degrees     Strength/Myotome Testing     Left Hip   Planes of Motion   Flexion: 4+  Extension: 4+  Abduction: 4+  Adduction: 5    Right Hip   Planes of Motion   Flexion: 4-  Extension: 4-  Abduction: 4-  Adduction: 4    Left Knee   Flexion: 5  Extension: 4+    Right Knee   Flexion: 3+  Extension: 3+    Left Ankle/Foot   Dorsiflexion: 5  Plantar flexion: 5    Right Ankle/Foot   Dorsiflexion: 4  Plantar flexion: 4+    Ambulation     Ambulation: Level Surfaces   Ambulation without assistive device: independent    Additional Level Surfaces Ambulation Details  Patient ambulates with right lower extremity antalgic gait pattern of decrease in right stance and left swing phase, decrease in right knee extension at mid stance, decrease in right sided weight shift with right stance phase and decrease in pace.    Ambulation: Stairs   Ascend stairs: contact guard assist  Pattern: non-reciprocal  Railings: two rails  Descend stairs: contact guard assist  Pattern: non-reciprocal  Railings: two rails    Comments   PT IE: 9/3/24.  Tug is at 13.96 seconds    General Comments:      Knee Comments  Pre Op Girth Measurements:  Knee:  Suprapatellar region: Right at 46.6 CM and left at 46.0 CM;  Mid patellar region: Right at 45.4 CM and left at 44.3 Cm;  Infrapatellar region: Right at 42.7 CM and left at 42.4  Cm.    9/13/24 PT IE Post OP Girth Measurements:  Knee:  Suprapatellar region: Right at 46.8 CM and left at 46.0 CM;  Mid patellar region: Right at 45.5 CM and left at 44.3 Cm;  Infrapatellar region: Right at 43.6 CM and left at 42.4 Cm.               Precautions: Right Knee TKA on 9/10/24.    Patient's PMHx is remarkable for hypothyroidism, HTN, lumbar spine DDD and formainal stenosis and L3 L4 laminectomy, discectomy and foraminectomy.    Access Code: [email or web address]  URL: [email or web address]  Date: 09/03/2024  Prepared by: Rod Donis    Exercises  - Supine Ankle Pumps  - 1 x daily - 7 x weekly - 2 sets - 10 reps  - Supine Quad Set  - 1 x daily - 7 x weekly - 2 sets - 10 reps - 5 seconds hold  - Supine Heel Slide  - 1 x daily - 7 x weekly - 2 sets - 10 reps  - Supine Active Straight Leg Raise  - 1 x daily - 7 x weekly - 2 sets - 10 reps  - Supine Hip Abduction  - 1 x daily - 7 x weekly - 2 sets - 10 reps  - Seated Long Arc Quad  - 1 x daily - 7 x weekly - 2 sets - 10 reps  - Seated Heel Slide  - 1 x daily - 7 x weekly - 2 sets - 10 reps  - Seated Heel Toe Raises  - 1 x daily - 7 x weekly - 2 sets - 10 reps      Manuals 9/3 9/13           Right knee prom                                                    Neuro Re-Ed                                                                                                        Ther Ex                          Nu step                          Seated hr and tr:B: hep 10 x 2           Seated LAQ:B: hep 2 x 10           Seated hip flexion:R  2 x 10           Seated heel slides:R hep 2 x 10           Seated hip abduction isometrics:B:             Seated hip adduction isometrics:B:                          Supine gastrocnemius stretch:R  10 sec x 5 Add to hep          Supine hamstring stretch:R  10 sec x 5 Add to hep          Ankle pumps:R 10 x hep 2 x 10           Quad sets:R 3 sec  x 10 hep 2 x 10           Glute sets:B  2 x 10           Heel slides:R 10 x hep 10 x  "2           Slr flexion:R 10 x hep NT           Supine hip abduction:R 10 x hep 2 x 10           SAQ:B:  2 x 10           Bridges with large bolster  2 x 10                        Standing hr and tr:B:   add          Standing hamstring curls:B:   add          Standing slr x 3:B:   add          Mini squats    add          SLS and tandem stance:B:             Side stepping and tandem ambulation   add          Lunges:B             Forward step ups:R 6\"            Lateral step ups:R 6\"            Step downs:R 4\"                                                   Ther Activity                                       Gait Training                                       Modalities             CP to right knee with patient supine and knee extended  10 min                                             "

## 2024-09-16 ENCOUNTER — OFFICE VISIT (OUTPATIENT)
Dept: PHYSICAL THERAPY | Age: 69
End: 2024-09-16
Payer: MEDICARE

## 2024-09-16 DIAGNOSIS — M17.11 PRIMARY OSTEOARTHRITIS OF RIGHT KNEE: Primary | ICD-10-CM

## 2024-09-16 PROCEDURE — 97110 THERAPEUTIC EXERCISES: CPT

## 2024-09-16 NOTE — PROGRESS NOTES
"Daily Note     Today's date: 2024  Patient name: Radha Mendez  : 1955  MRN: [id number]  Referring provider: Antonio Morales MD  Dx:   Encounter Diagnosis     ICD-10-CM    1. Primary osteoarthritis of right knee  M17.11                      Subjective: Pt noted decreased pain at right knee this am \"I am moving around better\"       Objective: See treatment diary below      Assessment: Fair tolerance to exercises provided with additional HEP       Plan: Cont with plan of care      Precautions: Right Knee TKA on 9/10/24.    Patient's PMHx is remarkable for hypothyroidism, HTN, lumbar spine DDD and formainal stenosis and L3 L4 laminectomy, discectomy and foraminectomy.    Access Code: [email or web address]  URL: [email or web address]  Date: 2024  Prepared by: Rod Donis    Exercises  - Supine Ankle Pumps  - 1 x daily - 7 x weekly - 2 sets - 10 reps  - Supine Quad Set  - 1 x daily - 7 x weekly - 2 sets - 10 reps - 5 seconds hold  - Supine Heel Slide  - 1 x daily - 7 x weekly - 2 sets - 10 reps  - Supine Active Straight Leg Raise  - 1 x daily - 7 x weekly - 2 sets - 10 reps  - Supine Hip Abduction  - 1 x daily - 7 x weekly - 2 sets - 10 reps  - Seated Long Arc Quad  - 1 x daily - 7 x weekly - 2 sets - 10 reps  - Seated Heel Slide  - 1 x daily - 7 x weekly - 2 sets - 10 reps  - Seated Heel Toe Raises  - 1 x daily - 7 x weekly - 2 sets - 10 reps      Access Code: [email or web address] (ADDED TO PREVIOUS)   URL: [email or web address]  Date: 2024  Prepared by: Maria A Verma    Exercises  - Supine Ankle Pumps  - 1 x daily - 7 x weekly - 2 sets - 10 reps  - Supine Quad Set  - 1 x daily - 7 x weekly - 2 sets - 10 reps - 5 seconds hold  - Supine Heel Slide  - 1 x daily - 7 x weekly - 2 sets - 10 reps  - Supine Active Straight Leg Raise  - 1 x daily - 7 x weekly - 2 sets - 10 reps  - Supine Hip Abduction  - 1 x daily - 7 x weekly - 2 sets - 10 reps  - Seated Long Arc Quad  - 1 x daily - 7 x weekly - 2 " sets - 10 reps  - Seated Heel Slide  - 1 x daily - 7 x weekly - 2 sets - 10 reps  - Seated Heel Toe Raises  - 1 x daily - 7 x weekly - 2 sets - 10 reps  - Supine Bridge  - 1 x daily - 7 x weekly - 3 sets - 10 reps  - Sidelying Hip Abduction  - 1 x daily - 7 x weekly - 3 sets - 10 reps  - Standing Hip Abduction with Counter Support  - 1 x daily - 7 x weekly - 3 sets - 10 reps  - Supine Hamstring Stretch with Strap  - 1 x daily - 7 x weekly - 3 sets - 10 reps  - Mini Squat with Counter Support  - 1 x daily - 7 x weekly - 3 sets - 10 reps  - Long Sitting Calf Stretch with Strap  - 1 x daily - 7 x weekly - 3 sets - 10 reps  - Standing Hip Extension with Counter Support  - 1 x daily - 7 x weekly - 3 sets - 10 reps  - Heel Raises with Counter Support  - 1 x daily - 7 x weekly - 3 sets - 10 reps  - Standing Knee Flexion AROM  - 1 x daily - 7 x weekly - 3 sets - 10 reps  - Seated Heel Raise  - 1 x daily - 7 x weekly - 3 sets - 10 reps     Manuals 9/3 9/13 9/16          Right knee prom                                                    Neuro Re-Ed                                                    Ther Ex                          Nu step   10 min                        Seated hr and tr:B: hep 10 x 2 20x          Seated LAQ:B: hep 2 x 10 20x          Seated hip flexion:R  2 x 10 20x           Seated heel slides:R hep 2 x 10 20x           Seated hip abduction isometrics:B:   NT          Seated hip adduction isometrics:B:   NT                       Supine gastrocnemius stretch:R  10 sec x 5 20SEC 5X           Supine hamstring stretch:R  10 sec x 5 20SEC 5X           Ankle pumps:R 10 x hep 2 x 10 NT          Quad sets:R 3 sec  x 10 hep 2 x 10 20X 3SEC           Glute sets:B  2 x 10 NT          Heel slides:R 10 x hep 10 x 2 NT          Slr flexion:R 10 x hep NT NT          Supine hip abduction:R 10 x hep 2 x 10 NT          SAQ:B:  2 x 10 NT          Bridges with large bolster  2 x 10 20X                        Standing hr and  "tr:B:   20x           Standing hamstring curls:B:   20x           Standing slr x 3:B:   20x           Mini squats    20x           SLS and tandem stance:B:   NT          Tandem ambulation    NT          Side stepping  ambulation   6x           Lunges:B   NT          Forward step ups:R 6\"  NT          Lateral step ups:R 6\"  NT          Step downs:R 4\"  NT                                                 Ther Activity                                       Gait Training                                       Modalities             CP to right knee with patient supine and knee extended  10 min 10 MIN                             "

## 2024-09-18 ENCOUNTER — OFFICE VISIT (OUTPATIENT)
Dept: PHYSICAL THERAPY | Age: 69
End: 2024-09-18
Payer: MEDICARE

## 2024-09-18 DIAGNOSIS — M17.11 PRIMARY OSTEOARTHRITIS OF RIGHT KNEE: Primary | ICD-10-CM

## 2024-09-18 DIAGNOSIS — I10 ESSENTIAL HYPERTENSION: ICD-10-CM

## 2024-09-18 PROCEDURE — 97110 THERAPEUTIC EXERCISES: CPT

## 2024-09-18 RX ORDER — AMILORIDE HYDROCHLORIDE AND HYDROCHLOROTHIAZIDE 5; 50 MG/1; MG/1
1 TABLET ORAL DAILY
Qty: 30 TABLET | Refills: 5 | Status: SHIPPED | OUTPATIENT
Start: 2024-09-18

## 2024-09-18 NOTE — PROGRESS NOTES
"Daily Note     Today's date: 2024  Patient name: Radha Mendez  : 1955  MRN: [id number]  Referring provider: Antonio Morales MD  Dx:   Encounter Diagnosis     ICD-10-CM    1. Primary osteoarthritis of right knee  M17.11                        Subjective: Pt noted \"my knee aches on and off\"       Objective: See treatment diary below      Assessment: Good tolerance to exercises. Decreased edema with modalities, Slowly improving with L Knee ROM       Plan: Cont with plan of care      Precautions: Right Knee TKA on 9/10/24.    Patient's PMHx is remarkable for hypothyroidism, HTN, lumbar spine DDD and formainal stenosis and L3 L4 laminectomy, discectomy and foraminectomy.    Access Code: [email or web address]  URL: [email or web address]  Date: 2024  Prepared by: Rod Donis    Exercises  - Supine Ankle Pumps  - 1 x daily - 7 x weekly - 2 sets - 10 reps  - Supine Quad Set  - 1 x daily - 7 x weekly - 2 sets - 10 reps - 5 seconds hold  - Supine Heel Slide  - 1 x daily - 7 x weekly - 2 sets - 10 reps  - Supine Active Straight Leg Raise  - 1 x daily - 7 x weekly - 2 sets - 10 reps  - Supine Hip Abduction  - 1 x daily - 7 x weekly - 2 sets - 10 reps  - Seated Long Arc Quad  - 1 x daily - 7 x weekly - 2 sets - 10 reps  - Seated Heel Slide  - 1 x daily - 7 x weekly - 2 sets - 10 reps  - Seated Heel Toe Raises  - 1 x daily - 7 x weekly - 2 sets - 10 reps      Access Code: [email or web address] (ADDED TO PREVIOUS)   URL: [email or web address]  Date: 2024  Prepared by: Maria A Verma    Exercises  - Supine Ankle Pumps  - 1 x daily - 7 x weekly - 2 sets - 10 reps  - Supine Quad Set  - 1 x daily - 7 x weekly - 2 sets - 10 reps - 5 seconds hold  - Supine Heel Slide  - 1 x daily - 7 x weekly - 2 sets - 10 reps  - Supine Active Straight Leg Raise  - 1 x daily - 7 x weekly - 2 sets - 10 reps  - Supine Hip Abduction  - 1 x daily - 7 x weekly - 2 sets - 10 reps  - Seated Long Arc Quad  - 1 x daily - 7 x weekly - 2 " sets - 10 reps  - Seated Heel Slide  - 1 x daily - 7 x weekly - 2 sets - 10 reps  - Seated Heel Toe Raises  - 1 x daily - 7 x weekly - 2 sets - 10 reps  - Supine Bridge  - 1 x daily - 7 x weekly - 3 sets - 10 reps  - Sidelying Hip Abduction  - 1 x daily - 7 x weekly - 3 sets - 10 reps  - Standing Hip Abduction with Counter Support  - 1 x daily - 7 x weekly - 3 sets - 10 reps  - Supine Hamstring Stretch with Strap  - 1 x daily - 7 x weekly - 3 sets - 10 reps  - Mini Squat with Counter Support  - 1 x daily - 7 x weekly - 3 sets - 10 reps  - Long Sitting Calf Stretch with Strap  - 1 x daily - 7 x weekly - 3 sets - 10 reps  - Standing Hip Extension with Counter Support  - 1 x daily - 7 x weekly - 3 sets - 10 reps  - Heel Raises with Counter Support  - 1 x daily - 7 x weekly - 3 sets - 10 reps  - Standing Knee Flexion AROM  - 1 x daily - 7 x weekly - 3 sets - 10 reps  - Seated Heel Raise  - 1 x daily - 7 x weekly - 3 sets - 10 reps     Manuals 9/3 9/13 9/16 9/18         Right knee prom                                                    Neuro Re-Ed                                                    Ther Ex             Bike     10 min          Nu step   10 min  NT                      Seated hr and tr:B: hep 10 x 2 20x NT         Seated LAQ:B: hep 2 x 10 20x 20X          Seated hip flexion:R  2 x 10 20x  20X          Seated heel slides:R hep 2 x 10 20x  20X          Seated hip abduction isometrics:B:   NT NT         Seated hip adduction isometrics:B:   NT NT                      Supine gastrocnemius stretch:R  10 sec x 5 20SEC 5X  20SEC 5X          Supine hamstring stretch:R  10 sec x 5 20SEC 5X  20SEC 5X          Ankle pumps:R 10 x hep 2 x 10 NT NT         Quad sets:R 3 sec  x 10 hep 2 x 10 20X 3SEC  20X 3SEC          Glute sets:B  2 x 10 NT NT         Heel slides:R 10 x hep 10 x 2 NT NT         Slr flexion:R 10 x hep NT NT 20X          Supine hip abduction:R 10 x hep 2 x 10 NT NT         SAQ:B:  2 x 10 NT 20X 3SEC         "  Bridges with large bolster  2 x 10 20X  20X                       Standing hr and tr:B:   20x  20X          Standing hamstring curls:B:   20x  20X          Standing slr x 3:B:   20x  20X          Mini squats    20x  20X          SLS and tandem stance:B:   NT NT         Tandem ambulation    NT NT         Side stepping  ambulation   6x  NT         Lunges:B   NT NT         Forward step ups:R 6\"  NT NT         Lateral step ups:R 6\"  NT NT         Step downs:R 4\"  NT NT                                                Ther Activity                                       Gait Training                                       Modalities             CP to right knee with patient supine and knee extended  10 min 10 MIN  10 MIN                            "

## 2024-09-20 ENCOUNTER — OFFICE VISIT (OUTPATIENT)
Dept: PHYSICAL THERAPY | Age: 69
End: 2024-09-20
Payer: MEDICARE

## 2024-09-20 DIAGNOSIS — M17.11 PRIMARY OSTEOARTHRITIS OF RIGHT KNEE: Primary | ICD-10-CM

## 2024-09-20 PROCEDURE — 97110 THERAPEUTIC EXERCISES: CPT | Performed by: PHYSICAL THERAPIST

## 2024-09-20 PROCEDURE — 97112 NEUROMUSCULAR REEDUCATION: CPT | Performed by: PHYSICAL THERAPIST

## 2024-09-20 NOTE — PROGRESS NOTES
Daily Note     Today's date: 2024  Patient name: Radha Mendez  : 1955  MRN: [id number]  Referring provider: Antonio Morales MD  Dx:   Encounter Diagnosis     ICD-10-CM    1. Primary osteoarthritis of right knee  M17.11                        Subjective: Patient reported her right knee pain is at 4 of 10.  Patient reported she is walking outside again for fitness, but pain, weakness and fatigue limits prolonged fitness walking.      Objective: See treatment diary below      Assessment: Patient presents with left knee extension at 0 degrees with quad strength improving as exhibited by 10 degree quad lag.  Plus, patient ambulates with improved right stance phase and knee flexion with swing phase.  But, she exhibits weakness, fatigue and pain that limits prolonged standing, walking and stair climbing activities.  Thus, PT is warranted to facilitate right lower extremity strength, right lower extremity flexion mobility and pain reduction to promote improved standing, walking and stair climbing activities.        Plan: Cont with plan of care      Precautions: Right Knee TKA on 9/10/24.    Patient's PMHx is remarkable for hypothyroidism, HTN, lumbar spine DDD and formainal stenosis and L3 L4 laminectomy, discectomy and foraminectomy.    Access Code: [email or web address]  URL: [email or web address]  Date: 2024  Prepared by: Rod Donis    Exercises  - Supine Ankle Pumps  - 1 x daily - 7 x weekly - 2 sets - 10 reps  - Supine Quad Set  - 1 x daily - 7 x weekly - 2 sets - 10 reps - 5 seconds hold  - Supine Heel Slide  - 1 x daily - 7 x weekly - 2 sets - 10 reps  - Supine Active Straight Leg Raise  - 1 x daily - 7 x weekly - 2 sets - 10 reps  - Supine Hip Abduction  - 1 x daily - 7 x weekly - 2 sets - 10 reps  - Seated Long Arc Quad  - 1 x daily - 7 x weekly - 2 sets - 10 reps  - Seated Heel Slide  - 1 x daily - 7 x weekly - 2 sets - 10 reps  - Seated Heel Toe Raises  - 1 x daily - 7 x weekly - 2 sets - 10  reps      Access Code: [email or web address] (ADDED TO PREVIOUS)   URL: [email or web address]  Date: 09/16/2024  Prepared by: Maria A Verma    Exercises  - Supine Ankle Pumps  - 1 x daily - 7 x weekly - 2 sets - 10 reps  - Supine Quad Set  - 1 x daily - 7 x weekly - 2 sets - 10 reps - 5 seconds hold  - Supine Heel Slide  - 1 x daily - 7 x weekly - 2 sets - 10 reps  - Supine Active Straight Leg Raise  - 1 x daily - 7 x weekly - 2 sets - 10 reps  - Supine Hip Abduction  - 1 x daily - 7 x weekly - 2 sets - 10 reps  - Seated Long Arc Quad  - 1 x daily - 7 x weekly - 2 sets - 10 reps  - Seated Heel Slide  - 1 x daily - 7 x weekly - 2 sets - 10 reps  - Seated Heel Toe Raises  - 1 x daily - 7 x weekly - 2 sets - 10 reps  - Supine Bridge  - 1 x daily - 7 x weekly - 3 sets - 10 reps  - Sidelying Hip Abduction  - 1 x daily - 7 x weekly - 3 sets - 10 reps  - Standing Hip Abduction with Counter Support  - 1 x daily - 7 x weekly - 3 sets - 10 reps  - Supine Hamstring Stretch with Strap  - 1 x daily - 7 x weekly - 3 sets - 10 reps  - Mini Squat with Counter Support  - 1 x daily - 7 x weekly - 3 sets - 10 reps  - Long Sitting Calf Stretch with Strap  - 1 x daily - 7 x weekly - 3 sets - 10 reps  - Standing Hip Extension with Counter Support  - 1 x daily - 7 x weekly - 3 sets - 10 reps  - Heel Raises with Counter Support  - 1 x daily - 7 x weekly - 3 sets - 10 reps  - Standing Knee Flexion AROM  - 1 x daily - 7 x weekly - 3 sets - 10 reps  - Seated Heel Raise  - 1 x daily - 7 x weekly - 3 sets - 10 reps     Manuals 9/3 9/13 9/16 9/18 9/20        Right knee prom                                                    Neuro Re-Ed                                                    Ther Ex             Bike     10 min          Nu step   10 min  NT 10 min                     Seated hr and tr:B: hep 10 x 2 20x NT NT        Seated LAQ:B: hep 2 x 10 20x 20X  2 x 10        Seated hip flexion:R  2 x 10 20x  20X  2 x 10        Seated heel  "slides:R hep 2 x 10 20x  20X  NT        Seated hip abduction isometrics:B:   NT NT NT        Seated hip adduction isometrics:B:   NT NT NT                     Supine gastrocnemius stretch:R  10 sec x 5 20SEC 5X  20SEC 5X  20 sec x 5        Supine hamstring stretch:R  10 sec x 5 20SEC 5X  20SEC 5X  20 sec x 5        Ankle pumps:R 10 x hep 2 x 10 NT NT NT        Quad sets:R 3 sec  x 10 hep 2 x 10 20X 3SEC  20X 3SEC  5 sec x 20        Glute sets:B  2 x 10 NT NT NT        Heel slides:R 10 x hep 10 x 2 NT NT 2 x 10        Slr flexion:R 10 x hep NT NT 20X  2 x 10        Supine hip abduction:R 10 x hep 2 x 10 NT NT NT        SAQ:B:  2 x 10 NT 20X 3SEC  3 sec x 20        Bridges with large bolster  2 x 10 20X  20X  3 sec x 20                                  Standing hr and tr:B:   20x  20X  2  x 10        Standing hamstring curls:B:   20x  20X  2 x 10        Standing slr x 3:B:   20x  20X  2 x 10        Mini squats    20x  20X  2 x 10        SLS and tandem stance:B:   NT NT NT        Tandem ambulation    NT NT 10 feet 4 x in parallel bars        Side stepping  ambulation   6x  NT 10 feet 4 x in parallel bars        Lunges:B   NT NT NT        Forward step ups:R 6\"  NT NT NT        Lateral step ups:R 6\"  NT NT NT        Step downs:R 4\"  NT NT NT                                               Ther Activity                                       Gait Training                                       Modalities             CP to right knee with patient supine and knee extended  10 min 10 MIN  10 MIN                            "

## 2024-09-23 ENCOUNTER — OFFICE VISIT (OUTPATIENT)
Dept: PHYSICAL THERAPY | Age: 69
End: 2024-09-23
Payer: MEDICARE

## 2024-09-23 DIAGNOSIS — M17.11 PRIMARY OSTEOARTHRITIS OF RIGHT KNEE: Primary | ICD-10-CM

## 2024-09-23 PROCEDURE — 97110 THERAPEUTIC EXERCISES: CPT | Performed by: PHYSICAL THERAPIST

## 2024-09-23 PROCEDURE — 97112 NEUROMUSCULAR REEDUCATION: CPT | Performed by: PHYSICAL THERAPIST

## 2024-09-23 NOTE — PROGRESS NOTES
Daily Note     Today's date: 2024  Patient name: Radha Mendez  : 1955  MRN: [id number]  Referring provider: Antonio Morales MD  Dx:   Encounter Diagnosis     ICD-10-CM    1. Primary osteoarthritis of right knee  M17.11                        Subjective: Patient reported her right knee pain is at 3 of 10.  Patient noted prolonged standing, walking and stair climbing remains limited by right knee pain aggravation.      Objective: See treatment diary below      Assessment: Patient presents with continued progress with right lower extremity arom as exhibited by no pain aggravation during both open and closed kinetic chain positional therapeutic exercises.  Thus, she agrees to increase resistance on next PT visit to address weakness and fatigue deficits since pain reduction continues to persist since onset of right knee arthroscopy.  But, she exhibits weakness, fatigue and pain that limits prolonged standing, walking and stair climbing activities.  Thus, PT is warranted to facilitate right lower extremity strength, right lower extremity flexion mobility and pain reduction to promote improved standing, walking and stair climbing activities.        Plan: Cont with plan of care      Precautions: Right Knee TKA on 9/10/24.    Patient's PMHx is remarkable for hypothyroidism, HTN, lumbar spine DDD and formainal stenosis and L3 L4 laminectomy, discectomy and foraminectomy.    Access Code: [email or web address]  URL: [email or web address]  Date: 2024  Prepared by: Rod Donis    Exercises  - Supine Ankle Pumps  - 1 x daily - 7 x weekly - 2 sets - 10 reps  - Supine Quad Set  - 1 x daily - 7 x weekly - 2 sets - 10 reps - 5 seconds hold  - Supine Heel Slide  - 1 x daily - 7 x weekly - 2 sets - 10 reps  - Supine Active Straight Leg Raise  - 1 x daily - 7 x weekly - 2 sets - 10 reps  - Supine Hip Abduction  - 1 x daily - 7 x weekly - 2 sets - 10 reps  - Seated Long Arc Quad  - 1 x daily - 7 x weekly - 2 sets - 10  reps  - Seated Heel Slide  - 1 x daily - 7 x weekly - 2 sets - 10 reps  - Seated Heel Toe Raises  - 1 x daily - 7 x weekly - 2 sets - 10 reps      Access Code: [email or web address] (ADDED TO PREVIOUS)   URL: [email or web address]  Date: 09/16/2024  Prepared by: Maria A Verma    Exercises  - Supine Ankle Pumps  - 1 x daily - 7 x weekly - 2 sets - 10 reps  - Supine Quad Set  - 1 x daily - 7 x weekly - 2 sets - 10 reps - 5 seconds hold  - Supine Heel Slide  - 1 x daily - 7 x weekly - 2 sets - 10 reps  - Supine Active Straight Leg Raise  - 1 x daily - 7 x weekly - 2 sets - 10 reps  - Supine Hip Abduction  - 1 x daily - 7 x weekly - 2 sets - 10 reps  - Seated Long Arc Quad  - 1 x daily - 7 x weekly - 2 sets - 10 reps  - Seated Heel Slide  - 1 x daily - 7 x weekly - 2 sets - 10 reps  - Seated Heel Toe Raises  - 1 x daily - 7 x weekly - 2 sets - 10 reps  - Supine Bridge  - 1 x daily - 7 x weekly - 3 sets - 10 reps  - Sidelying Hip Abduction  - 1 x daily - 7 x weekly - 3 sets - 10 reps  - Standing Hip Abduction with Counter Support  - 1 x daily - 7 x weekly - 3 sets - 10 reps  - Supine Hamstring Stretch with Strap  - 1 x daily - 7 x weekly - 3 sets - 10 reps  - Mini Squat with Counter Support  - 1 x daily - 7 x weekly - 3 sets - 10 reps  - Long Sitting Calf Stretch with Strap  - 1 x daily - 7 x weekly - 3 sets - 10 reps  - Standing Hip Extension with Counter Support  - 1 x daily - 7 x weekly - 3 sets - 10 reps  - Heel Raises with Counter Support  - 1 x daily - 7 x weekly - 3 sets - 10 reps  - Standing Knee Flexion AROM  - 1 x daily - 7 x weekly - 3 sets - 10 reps  - Seated Heel Raise  - 1 x daily - 7 x weekly - 3 sets - 10 reps     Manuals 9/3 9/13 9/16 9/18 9/20 9/23       Right knee prom                                                    Neuro Re-Ed                                                    Ther Ex             Bike     10 min          Nu step   10 min  NT 10 min 10 min                    Seated hr and tr:B:  "hep 10 x 2 20x NT NT NT       Seated LAQ:B: hep 2 x 10 20x 20X  2 x 10 2 x 10 2#      Seated hip flexion:R  2 x 10 20x  20X  2 x 10 2 x 10 2#      Seated heel slides:R hep 2 x 10 20x  20X  NT NT       Seated hip abduction isometrics:B:   NT NT NT NT       Seated hip adduction isometrics:B:   NT NT NT NT                    Supine gastrocnemius stretch:R  10 sec x 5 20SEC 5X  20SEC 5X  20 sec x 5 20 sec x 5       Supine hamstring stretch:R  10 sec x 5 20SEC 5X  20SEC 5X  20 sec x 5 20 sec x 5       Ankle pumps:R 10 x hep 2 x 10 NT NT NT NT       Quad sets:R 3 sec  x 10 hep 2 x 10 20X 3SEC  20X 3SEC  5 sec x 20 NT       Glute sets:B  2 x 10 NT NT NT NT       Heel slides:R 10 x hep 10 x 2 NT NT 2 x 10 2 x 10       Slr flexion:R 10 x hep NT NT 20X  2 x 10 2 x 10       Supine hip abduction:R 10 x hep 2 x 10 NT NT NT NT       SAQ:B:  2 x 10 NT 20X 3SEC  3 sec x 20 2 x 10 2#      Bridges   2 x 10 20X  20X  3 sec x 20 2 x 10                                 Standing hr and tr:B:   20x  20X  2  x 10 2 x 10       Standing hamstring curls:B:   20x  20X  2 x 10 2 x 10 2#      Standing slr x 3:B:   20x  20X  2 x 10 2 x 10 2#      Mini squats    20x  20X  2 x 10 2 x 10       SLS and tandem stance:B:   NT NT NT NT       Tandem ambulation    NT NT 10 feet 4 x in parallel bars 10 feet 4 x in parallel bars       Side stepping  ambulation   6x  NT 10 feet 4 x in parallel bars 10 feet 4 x in parallel bars       Lunges:B   NT NT NT 2 x 10       Forward step ups:R 6\"  NT NT NT 2 x 10       Lateral step ups:R 6\"  NT NT NT NT       Step downs:R 4\"  NT NT NT NT                                              Ther Activity                                       Gait Training                                       Modalities             CP to right knee with patient supine and knee extended  10 min 10 MIN  10 MIN  deferred deferred                         "

## 2024-09-25 ENCOUNTER — OFFICE VISIT (OUTPATIENT)
Dept: PHYSICAL THERAPY | Age: 69
End: 2024-09-25
Payer: MEDICARE

## 2024-09-25 DIAGNOSIS — M17.11 PRIMARY OSTEOARTHRITIS OF RIGHT KNEE: Primary | ICD-10-CM

## 2024-09-25 PROCEDURE — 97110 THERAPEUTIC EXERCISES: CPT

## 2024-09-25 NOTE — PROGRESS NOTES
"Daily Note     Today's date: 2024  Patient name: Radha Mendez  : 1955  MRN: [id number]  Referring provider: Antonio Morales MD  Dx:   Encounter Diagnosis     ICD-10-CM    1. Primary osteoarthritis of right knee  M17.11                          Subjective: Patient reported \"I am sore today because of the rain\"       Objective: See treatment diary below      Assessment: Good tolerance to exercises today. Progress as able       Plan: Cont with plan of care      Precautions: Right Knee TKA on 9/10/24.    Patient's PMHx is remarkable for hypothyroidism, HTN, lumbar spine DDD and formainal stenosis and L3 L4 laminectomy, discectomy and foraminectomy.    Access Code: [email or web address]  URL: [email or web address]  Date: 2024  Prepared by: Rod Donis    Exercises  - Supine Ankle Pumps  - 1 x daily - 7 x weekly - 2 sets - 10 reps  - Supine Quad Set  - 1 x daily - 7 x weekly - 2 sets - 10 reps - 5 seconds hold  - Supine Heel Slide  - 1 x daily - 7 x weekly - 2 sets - 10 reps  - Supine Active Straight Leg Raise  - 1 x daily - 7 x weekly - 2 sets - 10 reps  - Supine Hip Abduction  - 1 x daily - 7 x weekly - 2 sets - 10 reps  - Seated Long Arc Quad  - 1 x daily - 7 x weekly - 2 sets - 10 reps  - Seated Heel Slide  - 1 x daily - 7 x weekly - 2 sets - 10 reps  - Seated Heel Toe Raises  - 1 x daily - 7 x weekly - 2 sets - 10 reps      Access Code: [email or web address] (ADDED TO PREVIOUS)   URL: [email or web address]  Date: 2024  Prepared by: Maria A Verma    Exercises  - Supine Ankle Pumps  - 1 x daily - 7 x weekly - 2 sets - 10 reps  - Supine Quad Set  - 1 x daily - 7 x weekly - 2 sets - 10 reps - 5 seconds hold  - Supine Heel Slide  - 1 x daily - 7 x weekly - 2 sets - 10 reps  - Supine Active Straight Leg Raise  - 1 x daily - 7 x weekly - 2 sets - 10 reps  - Supine Hip Abduction  - 1 x daily - 7 x weekly - 2 sets - 10 reps  - Seated Long Arc Quad  - 1 x daily - 7 x weekly - 2 sets - 10 reps  - " Seated Heel Slide  - 1 x daily - 7 x weekly - 2 sets - 10 reps  - Seated Heel Toe Raises  - 1 x daily - 7 x weekly - 2 sets - 10 reps  - Supine Bridge  - 1 x daily - 7 x weekly - 3 sets - 10 reps  - Sidelying Hip Abduction  - 1 x daily - 7 x weekly - 3 sets - 10 reps  - Standing Hip Abduction with Counter Support  - 1 x daily - 7 x weekly - 3 sets - 10 reps  - Supine Hamstring Stretch with Strap  - 1 x daily - 7 x weekly - 3 sets - 10 reps  - Mini Squat with Counter Support  - 1 x daily - 7 x weekly - 3 sets - 10 reps  - Long Sitting Calf Stretch with Strap  - 1 x daily - 7 x weekly - 3 sets - 10 reps  - Standing Hip Extension with Counter Support  - 1 x daily - 7 x weekly - 3 sets - 10 reps  - Heel Raises with Counter Support  - 1 x daily - 7 x weekly - 3 sets - 10 reps  - Standing Knee Flexion AROM  - 1 x daily - 7 x weekly - 3 sets - 10 reps  - Seated Heel Raise  - 1 x daily - 7 x weekly - 3 sets - 10 reps     Manuals 9/3 9/13 9/16 9/18 9/20 9/23 9/25      Right knee prom                                                    Neuro Re-Ed                                                    Ther Ex             Bike     10 min          Nu step   10 min  NT 10 min 10 min 10 mi                                 Seated LAQ:B: hep 2 x 10 20x 20X  2 x 10 2 x 10 20x 2.5#       Seated hip flexion:R  2 x 10 20x  20X  2 x 10 2 x 10 20x 2.5#       Seated heel slides:R hep 2 x 10 20x  20X  NT NT 20x 2.5#       Seated hip abduction isometrics:B:   NT NT NT NT NT      Seated hip adduction isometrics:B:   NT NT NT NT NT                   Supine gastrocnemius stretch:R  10 sec x 5 20SEC 5X  20SEC 5X  20 sec x 5 20 sec x 5 NT      Supine hamstring stretch:R  10 sec x 5 20SEC 5X  20SEC 5X  20 sec x 5 20 sec x 5 20SEC 5X       Ankle pumps:R 10 x hep 2 x 10 NT NT NT NT NT      Quad sets:R 3 sec  x 10 hep 2 x 10 20X 3SEC  20X 3SEC  5 sec x 20 NT NT      Glute sets:B  2 x 10 NT NT NT NT NT      Heel slides:R 10 x hep 10 x 2 NT NT 2 x 10 2 x 10  "NT      Slr flexion:R 10 x hep NT NT 20X  2 x 10 2 x 10 20X 2.5#       Supine hip abduction:R 10 x hep 2 x 10 NT NT NT NT NT      SAQ:B:  2 x 10 NT 20X 3SEC  3 sec x 20 2 x 10 2.5# 20X       Bridges   2 x 10 20X  20X  3 sec x 20 2 x 10 20X                                 Standing hr and tr:B:   20x  20X  2  x 10 2 x 10 20X       Standing hamstring curls:B:   20x  20X  2 x 10 2 x 10 2.5# 20X       Standing slr x 3:B:   20x  20X  2 x 10 2 x 10 2.5# 20X       Mini squats    20x  20X  2 x 10 2 x 10 20X       SLS and tandem stance:B:   NT NT NT NT NT      Tandem ambulation    NT NT 10 feet 4 x in parallel bars 10 feet 4 x in parallel bars NT      Side stepping  ambulation   6x  NT 10 feet 4 x in parallel bars 10 feet 4 x in parallel bars 6X on foam       Lunges:B   NT NT NT 2 x 10 NT      Forward step 8\" ups:R 6\"  NT NT NT 2 x 10 20X 2.5#       Lateral step ups:R 8\"  6\"  NT NT NT NT 20X 2.5#       Step downs 8\"  4\"  NT NT NT NT 20X 2.5#                                              Ther Activity                                       Gait Training                                       Modalities             CP to right knee with patient supine and knee extended  10 min 10 MIN  10 MIN  deferred deferred                         "

## 2024-09-27 ENCOUNTER — OFFICE VISIT (OUTPATIENT)
Dept: PHYSICAL THERAPY | Age: 69
End: 2024-09-27
Payer: MEDICARE

## 2024-09-27 DIAGNOSIS — M17.11 PRIMARY OSTEOARTHRITIS OF RIGHT KNEE: Primary | ICD-10-CM

## 2024-09-27 PROCEDURE — 97140 MANUAL THERAPY 1/> REGIONS: CPT

## 2024-09-27 PROCEDURE — 97110 THERAPEUTIC EXERCISES: CPT

## 2024-09-27 NOTE — PROGRESS NOTES
"Daily Note     Today's date: 2024  Patient name: Radha Mendez  : 1955  MRN: [id number]  Referring provider: Antonio Morales MD  Dx:   Encounter Diagnosis     ICD-10-CM    1. Primary osteoarthritis of right knee  M17.11                            Subjective: Patient reported \"I am sore today I was very active yesterday\"       Objective: See treatment diary below      Assessment: Fair tolerance to exercises despite overall soreness today.       Plan: Cont with plan of care      Precautions: Right Knee TKA on 9/10/24.    Patient's PMHx is remarkable for hypothyroidism, HTN, lumbar spine DDD and formainal stenosis and L3 L4 laminectomy, discectomy and foraminectomy.    Access Code: [email or web address]  URL: [email or web address]  Date: 2024  Prepared by: Rod Donis    Exercises  - Supine Ankle Pumps  - 1 x daily - 7 x weekly - 2 sets - 10 reps  - Supine Quad Set  - 1 x daily - 7 x weekly - 2 sets - 10 reps - 5 seconds hold  - Supine Heel Slide  - 1 x daily - 7 x weekly - 2 sets - 10 reps  - Supine Active Straight Leg Raise  - 1 x daily - 7 x weekly - 2 sets - 10 reps  - Supine Hip Abduction  - 1 x daily - 7 x weekly - 2 sets - 10 reps  - Seated Long Arc Quad  - 1 x daily - 7 x weekly - 2 sets - 10 reps  - Seated Heel Slide  - 1 x daily - 7 x weekly - 2 sets - 10 reps  - Seated Heel Toe Raises  - 1 x daily - 7 x weekly - 2 sets - 10 reps      Access Code: [email or web address] (ADDED TO PREVIOUS)   URL: [email or web address]  Date: 2024  Prepared by: Maria A Verma    Exercises  - Supine Ankle Pumps  - 1 x daily - 7 x weekly - 2 sets - 10 reps  - Supine Quad Set  - 1 x daily - 7 x weekly - 2 sets - 10 reps - 5 seconds hold  - Supine Heel Slide  - 1 x daily - 7 x weekly - 2 sets - 10 reps  - Supine Active Straight Leg Raise  - 1 x daily - 7 x weekly - 2 sets - 10 reps  - Supine Hip Abduction  - 1 x daily - 7 x weekly - 2 sets - 10 reps  - Seated Long Arc Quad  - 1 x daily - 7 x weekly - 2 sets " - 10 reps  - Seated Heel Slide  - 1 x daily - 7 x weekly - 2 sets - 10 reps  - Seated Heel Toe Raises  - 1 x daily - 7 x weekly - 2 sets - 10 reps  - Supine Bridge  - 1 x daily - 7 x weekly - 3 sets - 10 reps  - Sidelying Hip Abduction  - 1 x daily - 7 x weekly - 3 sets - 10 reps  - Standing Hip Abduction with Counter Support  - 1 x daily - 7 x weekly - 3 sets - 10 reps  - Supine Hamstring Stretch with Strap  - 1 x daily - 7 x weekly - 3 sets - 10 reps  - Mini Squat with Counter Support  - 1 x daily - 7 x weekly - 3 sets - 10 reps  - Long Sitting Calf Stretch with Strap  - 1 x daily - 7 x weekly - 3 sets - 10 reps  - Standing Hip Extension with Counter Support  - 1 x daily - 7 x weekly - 3 sets - 10 reps  - Heel Raises with Counter Support  - 1 x daily - 7 x weekly - 3 sets - 10 reps  - Standing Knee Flexion AROM  - 1 x daily - 7 x weekly - 3 sets - 10 reps  - Seated Heel Raise  - 1 x daily - 7 x weekly - 3 sets - 10 reps     Manuals 9/3 9/13 9/16 9/18 9/20 9/23 9/25 9/27     Right knee prom                                                    Neuro Re-Ed                                                    Ther Ex             Bike     10 min          Nu step   10 min  NT 10 min 10 min 10 mi 10 min                                 Seated LAQ:B: hep 2 x 10 20x 20X  2 x 10 2 x 10 20x 2.5#  20x 2.5#      Seated hip flexion:R  2 x 10 20x  20X  2 x 10 2 x 10 20x 2.5#  20x 2.5#      Seated heel slides:R hep 2 x 10 20x  20X  NT NT 20x 2.5#  20x 2.5#      Seated hip abduction isometrics:B:   NT NT NT NT NT NT     Seated hip adduction isometrics:B:   NT NT NT NT NT NT                  Supine gastrocnemius stretch:R  10 sec x 5 20SEC 5X  20SEC 5X  20 sec x 5 20 sec x 5 NT NT     Supine hamstring stretch:R  10 sec x 5 20SEC 5X  20SEC 5X  20 sec x 5 20 sec x 5 20SEC 5X  20SEC 5X      Ankle pumps:R 10 x hep 2 x 10 NT NT NT NT NT NT     Quad sets:R 3 sec  x 10 hep 2 x 10 20X 3SEC  20X 3SEC  5 sec x 20 NT NT NT     Glute sets:B  2 x 10  "NT NT NT NT NT NT     Heel slides:R 10 x hep 10 x 2 NT NT 2 x 10 2 x 10 NT 20X      Slr flexion:R 10 x hep NT NT 20X  2 x 10 2 x 10 20X 2.5#  20X 2.5#      Supine hip abduction:R 10 x hep 2 x 10 NT NT NT NT NT NT     SAQ:B:  2 x 10 NT 20X 3SEC  3 sec x 20 2 x 10 2.5# 20X  20X 2.5#      Bridges   2 x 10 20X  20X  3 sec x 20 2 x 10 20X  20X 2.5#                                Standing hr and tr:B:   20x  20X  2  x 10 2 x 10 20X  20X 2.5#      Standing hamstring curls:B:   20x  20X  2 x 10 2 x 10 2.5# 20X  20X 2.5#      Standing slr x 3:B:   20x  20X  2 x 10 2 x 10 2.5# 20X       Mini squats    20x  20X  2 x 10 2 x 10 20X       SLS and tandem stance:B:   NT NT NT NT NT      Tandem ambulation    NT NT 10 feet 4 x in parallel bars 10 feet 4 x in parallel bars NT      Side stepping  ambulation   6x  NT 10 feet 4 x in parallel bars 10 feet 4 x in parallel bars 6X on foam       Lunges:B   NT NT NT 2 x 10 NT      Forward step 8\" ups:R 6\"  NT NT NT 2 x 10 20X 2.5#       Lateral step ups:R 8\"  6\"  NT NT NT NT 20X 2.5#       Step downs 8\"  4\"  NT NT NT NT 20X 2.5#                                              Ther Activity                                       Gait Training                                       Modalities             CP to right knee with patient supine and knee extended  10 min 10 MIN  10 MIN  deferred deferred 10 MIN                         "

## 2024-09-30 ENCOUNTER — OFFICE VISIT (OUTPATIENT)
Dept: PHYSICAL THERAPY | Age: 69
End: 2024-09-30
Payer: MEDICARE

## 2024-09-30 DIAGNOSIS — M17.11 PRIMARY OSTEOARTHRITIS OF RIGHT KNEE: Primary | ICD-10-CM

## 2024-09-30 PROCEDURE — 97110 THERAPEUTIC EXERCISES: CPT

## 2024-09-30 PROCEDURE — 97140 MANUAL THERAPY 1/> REGIONS: CPT

## 2024-09-30 NOTE — PROGRESS NOTES
Daily Note     Today's date: 2024  Patient name: Radha Mendez  : 1955  MRN: [id number]  Referring provider: Antonio Morales MD  Dx:   Encounter Diagnosis     ICD-10-CM    1. Primary osteoarthritis of right knee  M17.11                              Subjective: Patient reported 8/10 pain today with sleep deprivation.       Objective: See treatment diary below      Assessment: Held ankle weights due to increased pain at arrival today. Improved tolerance to exercises with decreased R knee symptoms. Improving with R knee PROM.       Plan: Cont with plan of care      Precautions: Right Knee TKA on 9/10/24.    Patient's PMHx is remarkable for hypothyroidism, HTN, lumbar spine DDD and formainal stenosis and L3 L4 laminectomy, discectomy and foraminectomy.    Access Code: [email or web address]  URL: [email or web address]  Date: 2024  Prepared by: Rod Donis    Exercises  - Supine Ankle Pumps  - 1 x daily - 7 x weekly - 2 sets - 10 reps  - Supine Quad Set  - 1 x daily - 7 x weekly - 2 sets - 10 reps - 5 seconds hold  - Supine Heel Slide  - 1 x daily - 7 x weekly - 2 sets - 10 reps  - Supine Active Straight Leg Raise  - 1 x daily - 7 x weekly - 2 sets - 10 reps  - Supine Hip Abduction  - 1 x daily - 7 x weekly - 2 sets - 10 reps  - Seated Long Arc Quad  - 1 x daily - 7 x weekly - 2 sets - 10 reps  - Seated Heel Slide  - 1 x daily - 7 x weekly - 2 sets - 10 reps  - Seated Heel Toe Raises  - 1 x daily - 7 x weekly - 2 sets - 10 reps      Access Code: [email or web address] (ADDED TO PREVIOUS)   URL: [email or web address]  Date: 2024  Prepared by: Maria A Verma    Exercises  - Supine Ankle Pumps  - 1 x daily - 7 x weekly - 2 sets - 10 reps  - Supine Quad Set  - 1 x daily - 7 x weekly - 2 sets - 10 reps - 5 seconds hold  - Supine Heel Slide  - 1 x daily - 7 x weekly - 2 sets - 10 reps  - Supine Active Straight Leg Raise  - 1 x daily - 7 x weekly - 2 sets - 10 reps  - Supine Hip Abduction  - 1 x daily - 7  x weekly - 2 sets - 10 reps  - Seated Long Arc Quad  - 1 x daily - 7 x weekly - 2 sets - 10 reps  - Seated Heel Slide  - 1 x daily - 7 x weekly - 2 sets - 10 reps  - Seated Heel Toe Raises  - 1 x daily - 7 x weekly - 2 sets - 10 reps  - Supine Bridge  - 1 x daily - 7 x weekly - 3 sets - 10 reps  - Sidelying Hip Abduction  - 1 x daily - 7 x weekly - 3 sets - 10 reps  - Standing Hip Abduction with Counter Support  - 1 x daily - 7 x weekly - 3 sets - 10 reps  - Supine Hamstring Stretch with Strap  - 1 x daily - 7 x weekly - 3 sets - 10 reps  - Mini Squat with Counter Support  - 1 x daily - 7 x weekly - 3 sets - 10 reps  - Long Sitting Calf Stretch with Strap  - 1 x daily - 7 x weekly - 3 sets - 10 reps  - Standing Hip Extension with Counter Support  - 1 x daily - 7 x weekly - 3 sets - 10 reps  - Heel Raises with Counter Support  - 1 x daily - 7 x weekly - 3 sets - 10 reps  - Standing Knee Flexion AROM  - 1 x daily - 7 x weekly - 3 sets - 10 reps  - Seated Heel Raise  - 1 x daily - 7 x weekly - 3 sets - 10 reps     Manuals 9/3 9/13 9/16 9/18 9/20 9/23 9/25 9/27 9/30    Right knee prom                                                    Neuro Re-Ed                                                    Ther Ex             Bike     10 min          Nu step   10 min  NT 10 min 10 min 10 mi 10 min  10 min                                Seated LAQ:B: hep 2 x 10 20x 20X  2 x 10 2 x 10 20x 2.5#  20x 2.5#  20x     Seated hip flexion:R  2 x 10 20x  20X  2 x 10 2 x 10 20x 2.5#  20x 2.5#  20x     Seated heel slides:R hep 2 x 10 20x  20X  NT NT 20x 2.5#  20x 2.5#  20x     Seated hip abduction isometrics:B:   NT NT NT NT NT NT NT    Seated hip adduction isometrics:B:   NT NT NT NT NT NT NT                 Supine gastrocnemius stretch:R  10 sec x 5 20SEC 5X  20SEC 5X  20 sec x 5 20 sec x 5 NT NT NT    Supine hamstring stretch:R  10 sec x 5 20SEC 5X  20SEC 5X  20 sec x 5 20 sec x 5 20SEC 5X  20SEC 5X  20SEC 5X     Ankle pumps:R 10 x hep 2 x  "10 NT NT NT NT NT NT NT    Quad sets:R 3 sec  x 10 hep 2 x 10 20X 3SEC  20X 3SEC  5 sec x 20 NT NT NT     Glute sets:B  2 x 10 NT NT NT NT NT NT     Heel slides:R 10 x hep 10 x 2 NT NT 2 x 10 2 x 10 NT 20X      Slr flexion:R 10 x hep NT NT 20X  2 x 10 2 x 10 20X 2.5#  20X 2.5#      Supine hip abduction:R 10 x hep 2 x 10 NT NT NT NT NT NT     SAQ:B:  2 x 10 NT 20X 3SEC  3 sec x 20 2 x 10 2.5# 20X  20X 2.5#      Bridges   2 x 10 20X  20X  3 sec x 20 2 x 10 20X  20X 2.5#                                Standing hr and tr:B:   20x  20X  2  x 10 2 x 10 20X  20X 2.5#      Standing hamstring curls:B:   20x  20X  2 x 10 2 x 10 2.5# 20X  20X 2.5#      Standing slr x 3:B:   20x  20X  2 x 10 2 x 10 2.5# 20X       Mini squats    20x  20X  2 x 10 2 x 10 20X       SLS and tandem stance:B:   NT NT NT NT NT      Tandem ambulation    NT NT 10 feet 4 x in parallel bars 10 feet 4 x in parallel bars NT      Side stepping  ambulation   6x  NT 10 feet 4 x in parallel bars 10 feet 4 x in parallel bars 6X on foam       Lunges:B   NT NT NT 2 x 10 NT      Forward step 8\" ups:R 6\"  NT NT NT 2 x 10 20X 2.5#       Lateral step ups:R 8\"  6\"  NT NT NT NT 20X 2.5#       Step downs 8\"  4\"  NT NT NT NT 20X 2.5#                                              Ther Activity                                       Gait Training                                       Modalities             CP to right knee with patient supine and knee extended  10 min 10 MIN  10 MIN  deferred deferred 10 MIN                         "

## 2024-10-02 ENCOUNTER — EVALUATION (OUTPATIENT)
Dept: PHYSICAL THERAPY | Age: 69
End: 2024-10-02
Payer: MEDICARE

## 2024-10-02 DIAGNOSIS — M17.11 PRIMARY OSTEOARTHRITIS OF RIGHT KNEE: Primary | ICD-10-CM

## 2024-10-02 PROCEDURE — 97750 PHYSICAL PERFORMANCE TEST: CPT | Performed by: PHYSICAL THERAPIST

## 2024-10-02 PROCEDURE — 97110 THERAPEUTIC EXERCISES: CPT | Performed by: PHYSICAL THERAPIST

## 2024-10-02 PROCEDURE — 97112 NEUROMUSCULAR REEDUCATION: CPT | Performed by: PHYSICAL THERAPIST

## 2024-10-02 NOTE — PROGRESS NOTES
PT Evaluation  Post Op PT Reassessment Right Knee Menisectomy    Today's date: 10/2/2024  Patient name: Radha Mendez  : 1955  MRN: [id number]  Referring provider: Antonio Morales MD  Dx:   Encounter Diagnosis     ICD-10-CM    1. Primary osteoarthritis of right knee  M17.11                      Assessment  Impairments: abnormal gait, abnormal or restricted ROM, abnormal movement, activity intolerance, impaired physical strength, lacks appropriate home exercise program and pain with function  Feeding disorders: pharyngoesophageal dysphagia    Assessment details: PT Reassessment: 10/02/24.  Patient noted the following progress since onset of PT: decrease in right knee pain, right knee mobility, stair climbing, walking, resuming some of her recreational activities, self and house hold activities.  But, she noted the following deficits that still persists: prolonged walking, right knee and bilateral hip pain, prolonged walking, prolonged standing, repeated transfers and needs to hold onto the railing when performing the stairs.      PT IE: Post Op right knee meniscectomy on 9/10/24.    Patient reported she was able to receive a right knee meniscectomy, not a TKA.  Patient noted she returned home the same day of procedure.  Patient noted sit to stand transfers remain limited.  Patient noted she is walking better since right knee meniscectomy.  Patient denies right lower extremity paresthesias.  Patient noted right knee pain at rest is at 5 of 10, and with movement and activities in standing she experiences a 7 of 10.  Patient noted she is using CP for pain reduction.   Patient noted all walking, stair climbing, standing based activities are limited by pain aggravation.  Patient denies any specific restrictions other than pain aggravation.  Patient noted she is having her spouse provide CG of 1 with stair climbing, and she is limiting the stairs at  home as well.  Patient noted her right knee is edematous.  Patient  denies use of assistive device.      PT IE: 9/3/24  Patient reported she will have a right knee meniscectomy on 9/10/24 due to MRI showing + for meniscal tear.  Patient noted she still has problems with her lumbar spine due to pain.  Patient noted she will see another pain management specialist on 9/4/24.  Patient noted she has constant right knee pain that limits all functional mobility.  Patient noted right knee symptoms as follows: edema, pain, and TTP.  Patient noted she still tries to walk up to one hour in the am.  Patient is + moderate TTP at popliteal region and severe TTP at right lateral joint line.  Patient noted she has fallen 2 x over the past 2 - 3 months.  Patient denies right knee paresthesias.  Patient denies use of assistive device.  Patient noted she will return to PT post op on 9/13/24.  Patient noted she leaves to travel out of the country to [facility] on 10/24/24 for 9 days.  Patient noted she can return to the pool 2 weeks after arthroscopy.   Patient noted she can move better in the pool versus land regarding both her right knee and lumbar spine region.  Patient noted she does exhibit a limp, and prolonged walking is limited by pain aggravation.  Patient noted she limits stair climbing due to pain aggravation in her lumbar spine region.  Patient noted sleep remains deprived due to pain and weakness aggravation / limitations.  Patient noted she uses a shoe horn to assist with her right distal sock and shoe dressing.  Patient noted static standing based functional activities are limited by right and lumbar spine pain aggravation and weakness / fatigue in bilateral lower extremity and core / postural musculature.  Patient noted right knee edema persists.  Patient noted static standing to shower remains difficult and her  provides supervision.  Patient denies right knee buckling, locking, grinding or popping.  Patient noted she fell 2 x while tripping on objects.  Understanding of  Dx/Px/POC: excellent     Prognosis: good  Prognosis details: Patient is a 69 y.o. year old female seen for outpatient PT reevaluation with a Primary osteoarthritis of right knee [M17.11] and subsequent right knee meniscectomy on 9/10/24. Patient presents with the following progress since onset of PT: decrease in right knee pain, increase in right lower extremity mobility and strength, gait and stair improvements, transfer improvements, decrease in TTP self and house hold iadls and functional activities.  Patient presents to PT reassessment with the following problems, concerns, deficits and impairments: right knee region pain, + TTP, decreased right lower extremity range of motion, decreased right lower extremity strength, gait and stair dysfunctions, transfer deficits, right knee edema, functional limitations and decreased tolerance to activity.  Patient would benefit from skilled PT services under the following PT treatment plan to address the above noted deficits: therapeutic exercises and activities to facilitate right lower extremity mobility and strength, modalities, manual therapy techniques, gait and stair training, transfer training, balance and proprioception activities, edema reduction techniques, IASTM techniques, Kinesio taping techniques and a hep.  Thank you for the referral.     Goals  Short Term goals 4 - 6 weeks  1.  Patient will be independent HEP.  MET.  2.  Patient will report a 25 - 50% decrease in pain complaints.  MET.  3.  Increase strength 1/2 grade.   MET.  4.  Increase ROM 5-10 degrees.  MET.    Long Term goals 8 - 12 weeks  1.  Patient will report elimination of pain complaints.  Partially MET.  2.  Patient will return to all recreational activities without restriction.  Partially MET.  3.  ROM WFL.  Partially MET.  4.  Strength 5/5.  Partially MET.  5.  Patient will report ability to perform all transfers without right knee symptom aggravation or limitations.  Partially MET.  6.   Patient will report ability to perform house hold gait without right knee symptom aggravation or limitations.  Partially MET.  7.  Patient will report ability to perform community ambulation with or without spc without right knee symptom aggravation or limitations.  Partially MET.  8.  Patient will report ability to perform showering without right knee symptom aggravation or limitations.  Partially MET.  9.  Patient will report ability to perform self dressing activities without right knee symptom aggravation or limitations.  Partially MET.  10.  Patient will report ability to increase sleeping by > 30 minutes prior to right knee symptom aggravation or limitations.  Partially MET.  11.  Patient will report ability to perform self and house hold standing activities without right knee symptom aggravation or limitations.  Partially MET.      Plan  Patient would benefit from: skilled physical therapy and PT eval  Planned modality interventions: low level laser therapy, manual electrical stimulation, TENS, thermotherapy: hydrocollator packs, ultrasound, unattended electrical stimulation, cryotherapy and electrical stimulation/Russian stimulation    Planned therapy interventions: IASTM, joint mobilization, kinesiology taping, manual therapy, massage, balance, balance/weight bearing training, neuromuscular re-education, postural training, body mechanics training, self care, compression, strengthening, stretching, therapeutic activities, therapeutic exercise, therapeutic training, transfer training, flexibility, functional ROM exercises, gait training, graded activity, graded exercise, graded motor, home exercise program and IADL retraining    Frequency: 2x week  Duration in weeks: 12  Treatment plan discussed with: patient        Subjective Evaluation    History of Present Illness  Mechanism of injury: Patient's PMHx is remarkable for hypothyroidism, HTN, lumbar spine DDD and formainal stenosis and L3 L4 laminectomy,  discectomy and foraminectomy.    RIGHT KNEE     INDICATION:   M25.561: Pain in right knee.     COMPARISON: Right knee x-ray 2023     VIEWS:  XR KNEE 1 OR 2 VW RIGHT  Image: 1     FINDINGS:     There is no acute fracture or dislocation.     Joint effusion cannot be reliably evaluated without lateral view.     No significant degenerative changes.     No lytic or blastic osseous lesion.     Soft tissues are unremarkable.     IMPRESSION:     No acute osseous abnormality.                 MRI RIGHT KNEE     INDICATION:   M17.11: Unilateral primary osteoarthritis, right knee.     COMPARISON: Correlation is made with the prior radiograph dated 10/4/2023.     TECHNIQUE: Multiplanar/multisequence MR of the right knee was performed.  Imaging performed on 3.0T MRI     FINDINGS:     SUBCUTANEOUS TISSUES: Normal     JOINT EFFUSION: Trace joint effusion.     BAKER'S CYST: There is a small Baker's cyst.     MENISCI: There is a complex tear of the body of the lateral meniscus extending into the anterior horn. The medial meniscus is intact.     CRUCIATE LIGAMENTS: Intact.     EXTENSOR APPARATUS: Intact.     COLLATERAL LIGAMENTS: Intact.     ARTICULAR SURFACES: Normal.     BONES: Normal.     MUSCULATURE:  Intact.     IMPRESSION:     Complex tear of the body and anterior horn of the lateral meniscus.     Trace joint effusion and small Baker's cyst.          Patient Goals  Patient goals for therapy: decreased pain, increased motion, increased strength, independence with ADLs/IADLs and return to sport/leisure activities  Patient goal: to enjoy traveling with  and to have less pain  Pain  At best pain rating: 3  At worst pain ratin  Location: Right lateral joint line of knee and popliteal region of right knee          Objective     Tenderness     Additional Tenderness Details   Patient is + moderate TTP at popliteal region, right medial and lateral joint lines and parapatellar joint region.    Active Range of Motion   Left  Hip   Flexion: 115 degrees   Abduction: 30 degrees     Right Hip   Flexion: 112 degrees   Abduction: 28 degrees   Left Knee   Flexion: 116 degrees   Extension: 5 degrees   Extensor la degrees     Right Knee   Flexion: 120 degrees with pain  Extension: -4 degrees with pain  Extensor la degrees with pain  Left Ankle/Foot   Dorsiflexion (ke): 8 degrees   Plantar flexion: 40 degrees     Right Ankle/Foot   Dorsiflexion (ke): 6 degrees   Plantar flexion: 36 degrees     Strength/Myotome Testing     Left Hip   Planes of Motion   Flexion: 4+  Extension: 4+  Abduction: 4+  Adduction: 5    Right Hip   Planes of Motion   Flexion: 4  Extension: 4  Abduction: 4  Adduction: 4+    Left Knee   Flexion: 5  Extension: 5    Right Knee   Flexion: 4  Extension: 4    Left Ankle/Foot   Dorsiflexion: 5  Plantar flexion: 5    Right Ankle/Foot   Dorsiflexion: 4+  Plantar flexion: 5    Ambulation     Ambulation: Level Surfaces   Ambulation without assistive device: independent    Additional Level Surfaces Ambulation Details  Patient ambulates with decrease in right stance and left swing phase, decrease in right knee extension at mid stance, decrease in right sided weight shift with right stance phase and decrease in pace.    Ambulation: Stairs   Ascend stairs: independent  Pattern: non-reciprocal  Railings: two rails  Descend stairs: independent  Pattern: non-reciprocal  Railings: two rails    Comments   PT IE: 9/3/24.  Tug is at 13.96 seconds    General Comments:      Knee Comments  Pre Op Girth Measurements:  Knee:  Suprapatellar region: Right at 46.6 CM and left at 46.0 CM;  Mid patellar region: Right at 45.4 CM and left at 44.3 Cm;  Infrapatellar region: Right at 42.7 CM and left at 42.4 Cm.    24 PT IE Post OP Girth Measurements:  Knee:  Suprapatellar region: Right at 46.8 CM and left at 46.0 CM;  Mid patellar region: Right at 45.5 CM and left at 44.3 Cm;  Infrapatellar region: Right at 43.6 CM and left at 42.4  Cm.    10/02/24 PT IE Post OP Girth Measurements:  Knee:  Suprapatellar region: Right at 47.4 CM and left at 46.0 CM;  Mid patellar region: Right at 46.8 CM and left at 44.3 Cm;  Infrapatellar region: Right at 44.7 CM and left at 42.4 Cm.               Precautions: Right Knee TKA on 9/10/24.    Patient's PMHx is remarkable for hypothyroidism, HTN, lumbar spine DDD and formainal stenosis and L3 L4 laminectomy, discectomy and foraminectomy.    Access Code: [email or web address]  URL: [email or web address]  Date: 09/03/2024  Prepared by: Rod Donis    Exercises  - Supine Ankle Pumps  - 1 x daily - 7 x weekly - 2 sets - 10 reps  - Supine Quad Set  - 1 x daily - 7 x weekly - 2 sets - 10 reps - 5 seconds hold  - Supine Heel Slide  - 1 x daily - 7 x weekly - 2 sets - 10 reps  - Supine Active Straight Leg Raise  - 1 x daily - 7 x weekly - 2 sets - 10 reps  - Supine Hip Abduction  - 1 x daily - 7 x weekly - 2 sets - 10 reps  - Seated Long Arc Quad  - 1 x daily - 7 x weekly - 2 sets - 10 reps  - Seated Heel Slide  - 1 x daily - 7 x weekly - 2 sets - 10 reps  - Seated Heel Toe Raises  - 1 x daily - 7 x weekly - 2 sets - 10 reps      Access Code: [email or web address] (ADDED TO PREVIOUS)   URL: [email or web address]  Date: 09/16/2024  Prepared by: Maria A Verma    Exercises  - Supine Ankle Pumps  - 1 x daily - 7 x weekly - 2 sets - 10 reps  - Supine Quad Set  - 1 x daily - 7 x weekly - 2 sets - 10 reps - 5 seconds hold  - Supine Heel Slide  - 1 x daily - 7 x weekly - 2 sets - 10 reps  - Supine Active Straight Leg Raise  - 1 x daily - 7 x weekly - 2 sets - 10 reps  - Supine Hip Abduction  - 1 x daily - 7 x weekly - 2 sets - 10 reps  - Seated Long Arc Quad  - 1 x daily - 7 x weekly - 2 sets - 10 reps  - Seated Heel Slide  - 1 x daily - 7 x weekly - 2 sets - 10 reps  - Seated Heel Toe Raises  - 1 x daily - 7 x weekly - 2 sets - 10 reps  - Supine Bridge  - 1 x daily - 7 x weekly - 3 sets - 10 reps  - Sidelying Hip Abduction   - 1 x daily - 7 x weekly - 3 sets - 10 reps  - Standing Hip Abduction with Counter Support  - 1 x daily - 7 x weekly - 3 sets - 10 reps  - Supine Hamstring Stretch with Strap  - 1 x daily - 7 x weekly - 3 sets - 10 reps  - Mini Squat with Counter Support  - 1 x daily - 7 x weekly - 3 sets - 10 reps  - Long Sitting Calf Stretch with Strap  - 1 x daily - 7 x weekly - 3 sets - 10 reps  - Standing Hip Extension with Counter Support  - 1 x daily - 7 x weekly - 3 sets - 10 reps  - Heel Raises with Counter Support  - 1 x daily - 7 x weekly - 3 sets - 10 reps  - Standing Knee Flexion AROM  - 1 x daily - 7 x weekly - 3 sets - 10 reps  - Seated Heel Raise  - 1 x daily - 7 x weekly - 3 sets - 10 reps     Manuals 9/3 9/13 9/16 9/18 9/20 9/23 9/25 9/27 9/30 10/02   Right knee prom                                                    Neuro Re-Ed                                                    Ther Ex             Bike     10 min          Nu step   10 min  NT 10 min 10 min 10 mi 10 min  10 min  10 min                              Seated LAQ:B: hep 2 x 10 20x 20X  2 x 10 2 x 10 20x 2.5#  20x 2.5#  20x  2# x 20   Seated hip flexion:R  2 x 10 20x  20X  2 x 10 2 x 10 20x 2.5#  20x 2.5#  20x  2# x 20   Seated heel slides:R hep 2 x 10 20x  20X  NT NT 20x 2.5#  20x 2.5#  20x  NT   Seated hip abduction isometrics:B:   NT NT NT NT NT NT NT NT   Seated hip adduction isometrics:B:   NT NT NT NT NT NT NT NT                Supine gastrocnemius stretch:R  10 sec x 5 20SEC 5X  20SEC 5X  20 sec x 5 20 sec x 5 NT NT NT NT   Supine hamstring stretch:R  10 sec x 5 20SEC 5X  20SEC 5X  20 sec x 5 20 sec x 5 20SEC 5X  20SEC 5X  20SEC 5X  20 sec x 5   Ankle pumps:R 10 x hep 2 x 10 NT NT NT NT NT NT NT NT   Quad sets:R 3 sec  x 10 hep 2 x 10 20X 3SEC  20X 3SEC  5 sec x 20 NT NT NT NT NT   Glute sets:B  2 x 10 NT NT NT NT NT NT NT NT   Heel slides:R 10 x hep 10 x 2 NT NT 2 x 10 2 x 10 NT 20X  NT NT   Slr flexion:R 10 x hep NT NT 20X  2 x 10 2 x 10 20X  "2.5#  20X 2.5#  NT 2# x 20   Supine hip abduction:R 10 x hep 2 x 10 NT NT NT NT NT NT NT NT   SAQ:B:  2 x 10 NT 20X 3SEC  3 sec x 20 2 x 10 2.5# 20X  20X 2.5#  NT 2# x 20   Bridges   2 x 10 20X  20X  3 sec x 20 2 x 10 20X  20X 2.5#  NT NT                             Standing hr and tr:B:   20x  20X  2  x 10 2 x 10 20X  20X 2.5#  NT 2 x 10   Standing hamstring curls:B:   20x  20X  2 x 10 2 x 10 2.5# 20X  20X 2.5#  NT 2# x 20   Standing slr x 3:B:   20x  20X  2 x 10 2 x 10 2.5# 20X   NT 2# x 20   Mini squats    20x  20X  2 x 10 2 x 10 20X   NT 2 x 10   SLS and tandem stance:B:   NT NT NT NT NT  NT NT   Tandem ambulation    NT NT 10 feet 4 x in parallel bars 10 feet 4 x in parallel bars NT  NT 10 feet on airex pad with 2# x 4   Side stepping  ambulation   6x  NT 10 feet 4 x in parallel bars 10 feet 4 x in parallel bars 6X on foam   NT 10 feet on airex pad with 2# x 4   Lunges:B   NT NT NT 2 x 10 NT  NT NT   Forward step 8\" ups:R 6\"  NT NT NT 2 x 10 20X 2.5#   NT 6\" x 20 with 2#   Lateral step ups:R 8\"  6\"  NT NT NT NT 20X 2.5#   NT 6\" x 20 with 2#   Step downs 8\"  4\"  NT NT NT NT 20X 2.5#   NT 4\" x 20 with 2#                                          Ther Activity                                       Gait Training                                       Modalities             CP to right knee with patient supine and knee extended  10 min 10 MIN  10 MIN  deferred deferred 10 MIN                      "

## 2024-10-02 NOTE — LETTER
October 3, 2024    Antonio Morales MD  [address] [facility] [address]    Patient: Radha Mendez   YOB: 1955   Date of Visit: 10/2/2024     Encounter Diagnosis     ICD-10-CM    1. Primary osteoarthritis of right knee  M17.11           Dear Dr. Morales:    Thank you for your recent referral of Radha Mendez. Please review the attached evaluation summary from Radha's recent visit.     Please verify that you agree with the plan of care by signing the attached order.     If you have any questions or concerns, please do not hesitate to call.     I sincerely appreciate the opportunity to share in the care of one of your patients and hope to have another opportunity to work with you in the near future.       Sincerely,    Rod Donis, PT      Referring Provider:      I certify that I have read the below Plan of Care and certify the need for these services furnished under this plan of treatment while under my care.                    Antonio Morales MD  [address] [facility] [address]  Via Fax: [phone]          PT Evaluation  Post Op PT Reassessment Right Knee Menisectomy    Today's date: 10/2/2024  Patient name: Radha Mendez  : 1955  MRN: [id number]  Referring provider: Antonio Morales MD  Dx:   Encounter Diagnosis     ICD-10-CM    1. Primary osteoarthritis of right knee  M17.11                      Assessment  Impairments: abnormal gait, abnormal or restricted ROM, abnormal movement, activity intolerance, impaired physical strength, lacks appropriate home exercise program and pain with function  Feeding disorders: pharyngoesophageal dysphagia    Assessment details: PT Reassessment: 10/02/24.  Patient noted the following progress since onset of PT: decrease in right knee pain, right knee mobility, stair climbing, walking, resuming some of her recreational activities, self and house hold activities.  But, she noted the following deficits that still persists: prolonged  walking, right knee and bilateral hip pain, prolonged walking, prolonged standing, repeated transfers and needs to hold onto the railing when performing the stairs.      PT IE: Post Op right knee meniscectomy on 9/10/24.    Patient reported she was able to receive a right knee meniscectomy, not a TKA.  Patient noted she returned home the same day of procedure.  Patient noted sit to stand transfers remain limited.  Patient noted she is walking better since right knee meniscectomy.  Patient denies right lower extremity paresthesias.  Patient noted right knee pain at rest is at 5 of 10, and with movement and activities in standing she experiences a 7 of 10.  Patient noted she is using CP for pain reduction.   Patient noted all walking, stair climbing, standing based activities are limited by pain aggravation.  Patient denies any specific restrictions other than pain aggravation.  Patient noted she is having her spouse provide CG of 1 with stair climbing, and she is limiting the stairs at  home as well.  Patient noted her right knee is edematous.  Patient denies use of assistive device.      PT IE: 9/3/24  Patient reported she will have a right knee meniscectomy on 9/10/24 due to MRI showing + for meniscal tear.  Patient noted she still has problems with her lumbar spine due to pain.  Patient noted she will see another pain management specialist on 9/4/24.  Patient noted she has constant right knee pain that limits all functional mobility.  Patient noted right knee symptoms as follows: edema, pain, and TTP.  Patient noted she still tries to walk up to one hour in the am.  Patient is + moderate TTP at popliteal region and severe TTP at right lateral joint line.  Patient noted she has fallen 2 x over the past 2 - 3 months.  Patient denies right knee paresthesias.  Patient denies use of assistive device.  Patient noted she will return to PT post op on 9/13/24.  Patient noted she leaves to travel out of the country to  [place] on 10/24/24 for 9 days.  Patient noted she can return to the pool 2 weeks after arthroscopy.   Patient noted she can move better in the pool versus land regarding both her right knee and lumbar spine region.  Patient noted she does exhibit a limp, and prolonged walking is limited by pain aggravation.  Patient noted she limits stair climbing due to pain aggravation in her lumbar spine region.  Patient noted sleep remains deprived due to pain and weakness aggravation / limitations.  Patient noted she uses a shoe horn to assist with her right distal sock and shoe dressing.  Patient noted static standing based functional activities are limited by right and lumbar spine pain aggravation and weakness / fatigue in bilateral lower extremity and core / postural musculature.  Patient noted right knee edema persists.  Patient noted static standing to shower remains difficult and her  provides supervision.  Patient denies right knee buckling, locking, grinding or popping.  Patient noted she fell 2 x while tripping on objects.  Understanding of Dx/Px/POC: excellent     Prognosis: good  Prognosis details: Patient is a 69 y.o. year old female seen for outpatient PT reevaluation with a Primary osteoarthritis of right knee [M17.11] and subsequent right knee meniscectomy on 9/10/24. Patient presents with the following progress since onset of PT: decrease in right knee pain, increase in right lower extremity mobility and strength, gait and stair improvements, transfer improvements, decrease in TTP self and house hold iadls and functional activities.  Patient presents to PT reassessment with the following problems, concerns, deficits and impairments: right knee region pain, + TTP, decreased right lower extremity range of motion, decreased right lower extremity strength, gait and stair dysfunctions, transfer deficits, right knee edema, functional limitations and decreased tolerance to activity.  Patient would benefit  from skilled PT services under the following PT treatment plan to address the above noted deficits: therapeutic exercises and activities to facilitate right lower extremity mobility and strength, modalities, manual therapy techniques, gait and stair training, transfer training, balance and proprioception activities, edema reduction techniques, IASTM techniques, Kinesio taping techniques and a hep.  Thank you for the referral.     Goals  Short Term goals 4 - 6 weeks  1.  Patient will be independent HEP.  MET.  2.  Patient will report a 25 - 50% decrease in pain complaints.  MET.  3.  Increase strength 1/2 grade.   MET.  4.  Increase ROM 5-10 degrees.  MET.    Long Term goals 8 - 12 weeks  1.  Patient will report elimination of pain complaints.  Partially MET.  2.  Patient will return to all recreational activities without restriction.  Partially MET.  3.  ROM WFL.  Partially MET.  4.  Strength 5/5.  Partially MET.  5.  Patient will report ability to perform all transfers without right knee symptom aggravation or limitations.  Partially MET.  6.  Patient will report ability to perform house hold gait without right knee symptom aggravation or limitations.  Partially MET.  7.  Patient will report ability to perform community ambulation with or without spc without right knee symptom aggravation or limitations.  Partially MET.  8.  Patient will report ability to perform showering without right knee symptom aggravation or limitations.  Partially MET.  9.  Patient will report ability to perform self dressing activities without right knee symptom aggravation or limitations.  Partially MET.  10.  Patient will report ability to increase sleeping by > 30 minutes prior to right knee symptom aggravation or limitations.  Partially MET.  11.  Patient will report ability to perform self and house hold standing activities without right knee symptom aggravation or limitations.  Partially MET.      Plan  Patient would benefit from:  skilled physical therapy and PT eval  Planned modality interventions: low level laser therapy, manual electrical stimulation, TENS, thermotherapy: hydrocollator packs, ultrasound, unattended electrical stimulation, cryotherapy and electrical stimulation/Russian stimulation    Planned therapy interventions: IASTM, joint mobilization, kinesiology taping, manual therapy, massage, balance, balance/weight bearing training, neuromuscular re-education, postural training, body mechanics training, self care, compression, strengthening, stretching, therapeutic activities, therapeutic exercise, therapeutic training, transfer training, flexibility, functional ROM exercises, gait training, graded activity, graded exercise, graded motor, home exercise program and IADL retraining    Frequency: 2x week  Duration in weeks: 12  Treatment plan discussed with: patient        Subjective Evaluation    History of Present Illness  Mechanism of injury: Patient's PMHx is remarkable for hypothyroidism, HTN, lumbar spine DDD and formainal stenosis and L3 L4 laminectomy, discectomy and foraminectomy.    RIGHT KNEE     INDICATION:   M25.561: Pain in right knee.     COMPARISON: Right knee x-ray 8/31/2023     VIEWS:  XR KNEE 1 OR 2 VW RIGHT  Image: 1     FINDINGS:     There is no acute fracture or dislocation.     Joint effusion cannot be reliably evaluated without lateral view.     No significant degenerative changes.     No lytic or blastic osseous lesion.     Soft tissues are unremarkable.     IMPRESSION:     No acute osseous abnormality.                 MRI RIGHT KNEE     INDICATION:   M17.11: Unilateral primary osteoarthritis, right knee.     COMPARISON: Correlation is made with the prior radiograph dated 10/4/2023.     TECHNIQUE: Multiplanar/multisequence MR of the right knee was performed.  Imaging performed on 3.0T MRI     FINDINGS:     SUBCUTANEOUS TISSUES: Normal     JOINT EFFUSION: Trace joint effusion.     BAKER'S CYST: There is a  small Baker's cyst.     MENISCI: There is a complex tear of the body of the lateral meniscus extending into the anterior horn. The medial meniscus is intact.     CRUCIATE LIGAMENTS: Intact.     EXTENSOR APPARATUS: Intact.     COLLATERAL LIGAMENTS: Intact.     ARTICULAR SURFACES: Normal.     BONES: Normal.     MUSCULATURE:  Intact.     IMPRESSION:     Complex tear of the body and anterior horn of the lateral meniscus.     Trace joint effusion and small Baker's cyst.          Patient Goals  Patient goals for therapy: decreased pain, increased motion, increased strength, independence with ADLs/IADLs and return to sport/leisure activities  Patient goal: to enjoy traveling with  and to have less pain  Pain  At best pain rating: 3  At worst pain ratin  Location: Right lateral joint line of knee and popliteal region of right knee          Objective     Tenderness     Additional Tenderness Details   Patient is + moderate TTP at popliteal region, right medial and lateral joint lines and parapatellar joint region.    Active Range of Motion   Left Hip   Flexion: 115 degrees   Abduction: 30 degrees     Right Hip   Flexion: 112 degrees   Abduction: 28 degrees   Left Knee   Flexion: 116 degrees   Extension: 5 degrees   Extensor la degrees     Right Knee   Flexion: 120 degrees with pain  Extension: -4 degrees with pain  Extensor la degrees with pain  Left Ankle/Foot   Dorsiflexion (ke): 8 degrees   Plantar flexion: 40 degrees     Right Ankle/Foot   Dorsiflexion (ke): 6 degrees   Plantar flexion: 36 degrees     Strength/Myotome Testing     Left Hip   Planes of Motion   Flexion: 4+  Extension: 4+  Abduction: 4+  Adduction: 5    Right Hip   Planes of Motion   Flexion: 4  Extension: 4  Abduction: 4  Adduction: 4+    Left Knee   Flexion: 5  Extension: 5    Right Knee   Flexion: 4  Extension: 4    Left Ankle/Foot   Dorsiflexion: 5  Plantar flexion: 5    Right Ankle/Foot   Dorsiflexion: 4+  Plantar flexion:  5    Ambulation     Ambulation: Level Surfaces   Ambulation without assistive device: independent    Additional Level Surfaces Ambulation Details  Patient ambulates with decrease in right stance and left swing phase, decrease in right knee extension at mid stance, decrease in right sided weight shift with right stance phase and decrease in pace.    Ambulation: Stairs   Ascend stairs: independent  Pattern: non-reciprocal  Railings: two rails  Descend stairs: independent  Pattern: non-reciprocal  Railings: two rails    Comments   PT IE: 9/3/24.  Tug is at 13.96 seconds    General Comments:      Knee Comments  Pre Op Girth Measurements:  Knee:  Suprapatellar region: Right at 46.6 CM and left at 46.0 CM;  Mid patellar region: Right at 45.4 CM and left at 44.3 Cm;  Infrapatellar region: Right at 42.7 CM and left at 42.4 Cm.    9/13/24 PT IE Post OP Girth Measurements:  Knee:  Suprapatellar region: Right at 46.8 CM and left at 46.0 CM;  Mid patellar region: Right at 45.5 CM and left at 44.3 Cm;  Infrapatellar region: Right at 43.6 CM and left at 42.4 Cm.    10/02/24 PT IE Post OP Girth Measurements:  Knee:  Suprapatellar region: Right at 47.4 CM and left at 46.0 CM;  Mid patellar region: Right at 46.8 CM and left at 44.3 Cm;  Infrapatellar region: Right at 44.7 CM and left at 42.4 Cm.               Precautions: Right Knee TKA on 9/10/24.    Patient's PMHx is remarkable for hypothyroidism, HTN, lumbar spine DDD and formainal stenosis and L3 L4 laminectomy, discectomy and foraminectomy.    Access Code: [email or web address]  URL: [email or web address]  Date: 09/03/2024  Prepared by: Rod Donis    Exercises  - Supine Ankle Pumps  - 1 x daily - 7 x weekly - 2 sets - 10 reps  - Supine Quad Set  - 1 x daily - 7 x weekly - 2 sets - 10 reps - 5 seconds hold  - Supine Heel Slide  - 1 x daily - 7 x weekly - 2 sets - 10 reps  - Supine Active Straight Leg Raise  - 1 x daily - 7 x weekly - 2 sets - 10 reps  - Supine Hip Abduction   - 1 x daily - 7 x weekly - 2 sets - 10 reps  - Seated Long Arc Quad  - 1 x daily - 7 x weekly - 2 sets - 10 reps  - Seated Heel Slide  - 1 x daily - 7 x weekly - 2 sets - 10 reps  - Seated Heel Toe Raises  - 1 x daily - 7 x weekly - 2 sets - 10 reps      Access Code: [email or web address] (ADDED TO PREVIOUS)   URL: [email or web address]  Date: 09/16/2024  Prepared by: Maria A Verma    Exercises  - Supine Ankle Pumps  - 1 x daily - 7 x weekly - 2 sets - 10 reps  - Supine Quad Set  - 1 x daily - 7 x weekly - 2 sets - 10 reps - 5 seconds hold  - Supine Heel Slide  - 1 x daily - 7 x weekly - 2 sets - 10 reps  - Supine Active Straight Leg Raise  - 1 x daily - 7 x weekly - 2 sets - 10 reps  - Supine Hip Abduction  - 1 x daily - 7 x weekly - 2 sets - 10 reps  - Seated Long Arc Quad  - 1 x daily - 7 x weekly - 2 sets - 10 reps  - Seated Heel Slide  - 1 x daily - 7 x weekly - 2 sets - 10 reps  - Seated Heel Toe Raises  - 1 x daily - 7 x weekly - 2 sets - 10 reps  - Supine Bridge  - 1 x daily - 7 x weekly - 3 sets - 10 reps  - Sidelying Hip Abduction  - 1 x daily - 7 x weekly - 3 sets - 10 reps  - Standing Hip Abduction with Counter Support  - 1 x daily - 7 x weekly - 3 sets - 10 reps  - Supine Hamstring Stretch with Strap  - 1 x daily - 7 x weekly - 3 sets - 10 reps  - Mini Squat with Counter Support  - 1 x daily - 7 x weekly - 3 sets - 10 reps  - Long Sitting Calf Stretch with Strap  - 1 x daily - 7 x weekly - 3 sets - 10 reps  - Standing Hip Extension with Counter Support  - 1 x daily - 7 x weekly - 3 sets - 10 reps  - Heel Raises with Counter Support  - 1 x daily - 7 x weekly - 3 sets - 10 reps  - Standing Knee Flexion AROM  - 1 x daily - 7 x weekly - 3 sets - 10 reps  - Seated Heel Raise  - 1 x daily - 7 x weekly - 3 sets - 10 reps     Manuals 9/3 9/13 9/16 9/18 9/20 9/23 9/25 9/27 9/30 10/02   Right knee prom                                                    Neuro Re-Ed                                                     Ther Ex             Bike     10 min          Nu step   10 min  NT 10 min 10 min 10 mi 10 min  10 min  10 min                              Seated LAQ:B: hep 2 x 10 20x 20X  2 x 10 2 x 10 20x 2.5#  20x 2.5#  20x  2# x 20   Seated hip flexion:R  2 x 10 20x  20X  2 x 10 2 x 10 20x 2.5#  20x 2.5#  20x  2# x 20   Seated heel slides:R hep 2 x 10 20x  20X  NT NT 20x 2.5#  20x 2.5#  20x  NT   Seated hip abduction isometrics:B:   NT NT NT NT NT NT NT NT   Seated hip adduction isometrics:B:   NT NT NT NT NT NT NT NT                Supine gastrocnemius stretch:R  10 sec x 5 20SEC 5X  20SEC 5X  20 sec x 5 20 sec x 5 NT NT NT NT   Supine hamstring stretch:R  10 sec x 5 20SEC 5X  20SEC 5X  20 sec x 5 20 sec x 5 20SEC 5X  20SEC 5X  20SEC 5X  20 sec x 5   Ankle pumps:R 10 x hep 2 x 10 NT NT NT NT NT NT NT NT   Quad sets:R 3 sec  x 10 hep 2 x 10 20X 3SEC  20X 3SEC  5 sec x 20 NT NT NT NT NT   Glute sets:B  2 x 10 NT NT NT NT NT NT NT NT   Heel slides:R 10 x hep 10 x 2 NT NT 2 x 10 2 x 10 NT 20X  NT NT   Slr flexion:R 10 x hep NT NT 20X  2 x 10 2 x 10 20X 2.5#  20X 2.5#  NT 2# x 20   Supine hip abduction:R 10 x hep 2 x 10 NT NT NT NT NT NT NT NT   SAQ:B:  2 x 10 NT 20X 3SEC  3 sec x 20 2 x 10 2.5# 20X  20X 2.5#  NT 2# x 20   Bridges   2 x 10 20X  20X  3 sec x 20 2 x 10 20X  20X 2.5#  NT NT                             Standing hr and tr:B:   20x  20X  2  x 10 2 x 10 20X  20X 2.5#  NT 2 x 10   Standing hamstring curls:B:   20x  20X  2 x 10 2 x 10 2.5# 20X  20X 2.5#  NT 2# x 20   Standing slr x 3:B:   20x  20X  2 x 10 2 x 10 2.5# 20X   NT 2# x 20   Mini squats    20x  20X  2 x 10 2 x 10 20X   NT 2 x 10   SLS and tandem stance:B:   NT NT NT NT NT  NT NT   Tandem ambulation    NT NT 10 feet 4 x in parallel bars 10 feet 4 x in parallel bars NT  NT 10 feet on airex pad with 2# x 4   Side stepping  ambulation   6x  NT 10 feet 4 x in parallel bars 10 feet 4 x in parallel bars 6X on foam   NT 10 feet on airex pad with 2# x 4   Lunges:B    "NT NT NT 2 x 10 NT  NT NT   Forward step 8\" ups:R 6\"  NT NT NT 2 x 10 20X 2.5#   NT 6\" x 20 with 2#   Lateral step ups:R 8\"  6\"  NT NT NT NT 20X 2.5#   NT 6\" x 20 with 2#   Step downs 8\"  4\"  NT NT NT NT 20X 2.5#   NT 4\" x 20 with 2#                                          Ther Activity                                       Gait Training                                       Modalities             CP to right knee with patient supine and knee extended  10 min 10 MIN  10 MIN  deferred deferred 10 MIN                                      "

## 2024-10-04 ENCOUNTER — OFFICE VISIT (OUTPATIENT)
Dept: PHYSICAL THERAPY | Age: 69
End: 2024-10-04
Payer: MEDICARE

## 2024-10-04 DIAGNOSIS — M17.11 PRIMARY OSTEOARTHRITIS OF RIGHT KNEE: Primary | ICD-10-CM

## 2024-10-04 PROCEDURE — 97110 THERAPEUTIC EXERCISES: CPT

## 2024-10-04 NOTE — PROGRESS NOTES
"Daily Note     Today's date: 10/4/2024  Patient name: Radha Mendez  : 1955  MRN: [id number]  Referring provider: Antonio Morales MD  Dx:   Encounter Diagnosis     ICD-10-CM    1. Primary osteoarthritis of right knee  M17.11                      Subjective: Pt reported \"I am feeling good and I am ready to be discharged\"       Objective: See treatment diary below 1:1 time nona JUMA PT       Assessment: D/C to HEP       Plan: Cont with plan of care      Precautions: Right Knee TKA on 9/10/24.    Patient's PMHx is remarkable for hypothyroidism, HTN, lumbar spine DDD and formainal stenosis and L3 L4 laminectomy, discectomy and foraminectomy.    Access Code: [email or web address]  URL: [email or web address]  Date: 2024  Prepared by: Rod Donis    Exercises  - Supine Ankle Pumps  - 1 x daily - 7 x weekly - 2 sets - 10 reps  - Supine Quad Set  - 1 x daily - 7 x weekly - 2 sets - 10 reps - 5 seconds hold  - Supine Heel Slide  - 1 x daily - 7 x weekly - 2 sets - 10 reps  - Supine Active Straight Leg Raise  - 1 x daily - 7 x weekly - 2 sets - 10 reps  - Supine Hip Abduction  - 1 x daily - 7 x weekly - 2 sets - 10 reps  - Seated Long Arc Quad  - 1 x daily - 7 x weekly - 2 sets - 10 reps  - Seated Heel Slide  - 1 x daily - 7 x weekly - 2 sets - 10 reps  - Seated Heel Toe Raises  - 1 x daily - 7 x weekly - 2 sets - 10 reps      Access Code: [email or web address] (ADDED TO PREVIOUS)   URL: [email or web address]  Date: 2024  Prepared by: Maria A Verma    Exercises  - Supine Ankle Pumps  - 1 x daily - 7 x weekly - 2 sets - 10 reps  - Supine Quad Set  - 1 x daily - 7 x weekly - 2 sets - 10 reps - 5 seconds hold  - Supine Heel Slide  - 1 x daily - 7 x weekly - 2 sets - 10 reps  - Supine Active Straight Leg Raise  - 1 x daily - 7 x weekly - 2 sets - 10 reps  - Supine Hip Abduction  - 1 x daily - 7 x weekly - 2 sets - 10 reps  - Seated Long Arc Quad  - 1 x daily - 7 x weekly - 2 sets - 10 reps  - Seated Heel Slide  " - 1 x daily - 7 x weekly - 2 sets - 10 reps  - Seated Heel Toe Raises  - 1 x daily - 7 x weekly - 2 sets - 10 reps  - Supine Bridge  - 1 x daily - 7 x weekly - 3 sets - 10 reps  - Sidelying Hip Abduction  - 1 x daily - 7 x weekly - 3 sets - 10 reps  - Standing Hip Abduction with Counter Support  - 1 x daily - 7 x weekly - 3 sets - 10 reps  - Supine Hamstring Stretch with Strap  - 1 x daily - 7 x weekly - 3 sets - 10 reps  - Mini Squat with Counter Support  - 1 x daily - 7 x weekly - 3 sets - 10 reps  - Long Sitting Calf Stretch with Strap  - 1 x daily - 7 x weekly - 3 sets - 10 reps  - Standing Hip Extension with Counter Support  - 1 x daily - 7 x weekly - 3 sets - 10 reps  - Heel Raises with Counter Support  - 1 x daily - 7 x weekly - 3 sets - 10 reps  - Standing Knee Flexion AROM  - 1 x daily - 7 x weekly - 3 sets - 10 reps  - Seated Heel Raise  - 1 x daily - 7 x weekly - 3 sets - 10 reps     Manuals 10/4 9/13 9/16 9/18 9/20 9/23 9/25 9/27 9/30 10/02   Right knee prom NT                                                   Neuro Re-Ed                                                    Ther Ex             Bike  NT   10 min          Nu step 10 min   10 min  NT 10 min 10 min 10 mi 10 min  10 min  10 min                              Seated LAQ:B: 20X 3#  2 x 10 20x 20X  2 x 10 2 x 10 20x 2.5#  20x 2.5#  20x  2# x 20   Seated hip flexion:R 20X 3#  2 x 10 20x  20X  2 x 10 2 x 10 20x 2.5#  20x 2.5#  20x  2# x 20   Seated heel slides:R hep 2 x 10 20x  20X  NT NT 20x 2.5#  20x 2.5#  20x  NT   Seated hip abduction isometrics:B: NT  NT NT NT NT NT NT NT NT   Seated hip adduction isometrics:B: NT  NT NT NT NT NT NT NT NT                Supine gastrocnemius stretch:R NT 10 sec x 5 20SEC 5X  20SEC 5X  20 sec x 5 20 sec x 5 NT NT NT NT   Supine hamstring stretch:R 20SEC 5X  10 sec x 5 20SEC 5X  20SEC 5X  20 sec x 5 20 sec x 5 20SEC 5X  20SEC 5X  20SEC 5X  20 sec x 5   Ankle pumps:R 10 x hep 2 x 10 NT NT NT NT NT NT NT NT   Quad  "sets:R 3 sec  x 10 hep 2 x 10 20X 3SEC  20X 3SEC  5 sec x 20 NT NT NT NT NT   Glute sets:B NT 2 x 10 NT NT NT NT NT NT NT NT   Heel slides:R 10 x hep 10 x 2 NT NT 2 x 10 2 x 10 NT 20X  NT NT   Slr flexion:R 20X  NT NT 20X  2 x 10 2 x 10 20X 2.5#  20X 2.5#  NT 2# x 20   Supine hip abduction:R NT 2 x 10 NT NT NT NT NT NT NT NT   SAQ:B: 20X 3SEC 3#  2 x 10 NT 20X 3SEC  3 sec x 20 2 x 10 2.5# 20X  20X 2.5#  NT 2# x 20   Bridges  20X 3SEC  2 x 10 20X  20X  3 sec x 20 2 x 10 20X  20X 2.5#  NT NT                             Standing hr and tr:B: 20X   20x  20X  2  x 10 2 x 10 20X  20X 2.5#  NT 2 x 10   Standing hamstring curls:B: 20X 3#   20x  20X  2 x 10 2 x 10 2.5# 20X  20X 2.5#  NT 2# x 20   Standing slr x 3:B: 20X 3#   20x  20X  2 x 10 2 x 10 2.5# 20X  20x 3#  NT 2# x 20   Mini squats  20X   20x  20X  2 x 10 2 x 10 20X  20x  NT 2 x 10   SLS and tandem stance:B: 30SEC 5X   NT NT NT NT NT  NT NT   Tandem ambulation on foam  6x 3#   NT NT 10 feet 4 x in parallel bars 10 feet 4 x in parallel bars NT  NT 10 feet on airex pad with 2# x 4   Side stepping  ambulation on foam  6x 3#   6x  NT 10 feet 4 x in parallel bars 10 feet 4 x in parallel bars 6X on foam   NT 10 feet on airex pad with 2# x 4   Lunges:B NT  NT NT NT 2 x 10 NT  NT NT   Forward step 8\" ups:R 8\" 20X 3#   NT NT NT 2 x 10 20X 2.5#   NT 6\" x 20 with 2#   Lateral step ups:R 8\"  8\" 20X 3#   NT NT NT NT 20X 2.5#   NT 6\" x 20 with 2#   Step downs 8\"  8\" 20X 3#   NT NT NT NT 20X 2.5#   NT 4\" x 20 with 2#                                          Ther Activity                                       Gait Training                                       Modalities             CP to right knee with patient supine and knee extended NT 10 min 10 MIN  10 MIN  deferred deferred 10 MIN                           "

## 2024-10-09 DIAGNOSIS — I10 ESSENTIAL HYPERTENSION: ICD-10-CM

## 2024-10-09 RX ORDER — AMILORIDE HYDROCHLORIDE AND HYDROCHLOROTHIAZIDE 5; 50 MG/1; MG/1
1 TABLET ORAL DAILY
Qty: 90 TABLET | Refills: 1 | Status: SHIPPED | OUTPATIENT
Start: 2024-10-09

## 2024-10-09 NOTE — TELEPHONE ENCOUNTER
Reason for call:   [x] Refill   [] Prior Auth  [x] Other: [facility] did not received the rx on 9-18-24    Office:   [x] PCP/Provider - Arina Baltazar DO /[facility]   [] Specialty/Provider -     Medication:     AMILoride-hydrochlorothiazide (MODURETIC) 5-50 mg per tablet       Dose/Frequency: TAKE 1 TABLET BY MOUTH EVERY DAY,     Quantity: 90    Pharmacy: [id number] - [place], [place] - [address]     Does the patient have enough for 3 days?   [] Yes   [x] No - Send as HP to POD

## 2024-11-19 ENCOUNTER — RA CDI HCC (OUTPATIENT)
Dept: OTHER | Facility: HOSPITAL | Age: 69
End: 2024-11-19

## 2024-11-26 ENCOUNTER — OFFICE VISIT (OUTPATIENT)
Dept: INTERNAL MEDICINE CLINIC | Age: 69
End: 2024-11-26
Payer: MEDICARE

## 2024-11-26 VITALS
SYSTOLIC BLOOD PRESSURE: 128 MMHG | TEMPERATURE: 97.8 F | OXYGEN SATURATION: 97 % | HEART RATE: 79 BPM | DIASTOLIC BLOOD PRESSURE: 70 MMHG | BODY MASS INDEX: 25.91 KG/M2 | WEIGHT: 181 LBS | HEIGHT: 70 IN

## 2024-11-26 DIAGNOSIS — L81.9 ATYPICAL PIGMENTED SKIN LESION: Primary | ICD-10-CM

## 2024-11-26 PROCEDURE — 99213 OFFICE O/P EST LOW 20 MIN: CPT | Performed by: FAMILY MEDICINE

## 2024-11-26 PROCEDURE — G2211 COMPLEX E/M VISIT ADD ON: HCPCS | Performed by: FAMILY MEDICINE

## 2024-11-26 NOTE — PROGRESS NOTES
Assessment/Plan:    1. Atypical pigmented skin lesion  -     Ambulatory Referral to Dermatology; Future          There are no Patient Instructions on file for this visit.    No follow-ups on file.    Subjective:      Patient ID: Radha Mendez is a 69 y.o. female.    Chief Complaint   Patient presents with    pt states sun spots       HPI    New area patient notices been getting darker on face, slight change in color.  Would like to have evaluated by dermatology, otherwise feeling well.  Does use sunscreen.    The following portions of the patient's history were reviewed and updated as appropriate: allergies, current medications, past family history, past medical history, past social history, past surgical history and problem list.    Review of Systems        Constitutional:  Denies fever or chills   Eyes:  Denies double , blurry vision or eye pain  HENT:  Denies nasal congestion, sore throat or new hearing issues  Respiratory:  Denies cough or shortness of breath or wheezing  Cardiovascular:  Denies palpitations or chest pain  GI:  Denies abdominal pain, nausea, or vomiting, no loose stools, no reflux  Integument:  Denies rash , no open areas  Neurologic:  Denies headache or focal weakness, no dizziness  : no dysuria, or hematuria    Current Outpatient Medications   Medication Sig Dispense Refill    Acetaminophen (TYLENOL PO) Take 325-500 mg by mouth as needed      AMILoride-hydrochlorothiazide (MODURETIC) 5-50 mg per tablet Take 1 tablet by mouth daily 90 tablet 1    Ascorbic Acid (vitamin C) 1000 MG tablet Take 1,000 mg by mouth daily      Calcium Carbonate-Vitamin D3 600-400 MG-UNIT TABS Take 2 tablets by mouth daily      Collagen Hydrolysate POWD 1 Scoop by Does not apply route daily       Glucos-Chond-MSM-Bor-D3-Hyalur (MOVE FREE JOINT HEALTH ADV + D PO)       levothyroxine 50 mcg tablet TAKE 1 AND 1/2 TABLET BY MOUTH ONCE DAILY EVERY MONDAY, WEDNESDAY, AND FRIDAY ALTERNATING WITH 1 TABLET BY MOUTH ONCE DAILY  "EVERY SUNDAY, TUESDAY, THURSDAY, AND SATURDAY 108 tablet 1    Magnesium Carbonate POWD 325 mg by Does not apply route daily      Multiple Vitamin (MULTI-DAY) TABS Take 2 tablets by mouth daily Ultra Aric      Omega-3 Fatty Acids (FISH OIL PO) Take by mouth daily       No current facility-administered medications for this visit.       Objective:    /70 (BP Location: Left arm, Patient Position: Sitting, Cuff Size: Standard)   Pulse 79   Temp 97.8 °F (36.6 °C) (Temporal)   Ht 5' 10\" (1.778 m)   Wt 82.1 kg (181 lb)   SpO2 97%   BMI 25.97 kg/m²        Physical Exam      Constitutional:  Well developed, well nourished, no acute distress, non-toxic appearance   Eyes:  PERRL, conjunctiva normal , non icteric sclera  HENT:  Atraumatic, oropharynx moist. Neck-  supple   Respiratory:  CTA b/l, normal breath sounds, no rales, no wheezing   Cardiovascular:  RRR, no murmurs, no LE edema b/l  GI:  Soft, nondistended, normal bowel sounds x 4, nontender, no organomegaly, no mass, no rebound, no guarding   Neurologic:  no focal deficits noted   Psychiatric:  Speech and behavior appropriate , AAO x 3         Arina Baltazar DO  "

## 2025-01-16 DIAGNOSIS — E03.9 ACQUIRED HYPOTHYROIDISM: ICD-10-CM

## 2025-01-16 RX ORDER — LEVOTHYROXINE SODIUM 50 UG/1
TABLET ORAL
Qty: 108 TABLET | Refills: 1 | Status: SHIPPED | OUTPATIENT
Start: 2025-01-16

## 2025-03-23 DIAGNOSIS — I10 ESSENTIAL HYPERTENSION: ICD-10-CM

## 2025-03-24 RX ORDER — AMILORIDE HYDROCHLORIDE AND HYDROCHLOROTHIAZIDE 5; 50 MG/1; MG/1
1 TABLET ORAL DAILY
Qty: 90 TABLET | Refills: 1 | Status: SHIPPED | OUTPATIENT
Start: 2025-03-24

## 2025-04-02 DIAGNOSIS — I10 ESSENTIAL HYPERTENSION: ICD-10-CM

## 2025-04-03 RX ORDER — AMILORIDE HYDROCHLORIDE AND HYDROCHLOROTHIAZIDE 5; 50 MG/1; MG/1
1 TABLET ORAL DAILY
Qty: 90 TABLET | Refills: 1 | OUTPATIENT
Start: 2025-04-03

## 2025-04-03 RX ORDER — AMILORIDE HYDROCHLORIDE AND HYDROCHLOROTHIAZIDE 5; 50 MG/1; MG/1
1 TABLET ORAL DAILY
Qty: 90 TABLET | Refills: 1 | Status: SHIPPED | OUTPATIENT
Start: 2025-04-03

## 2025-04-03 NOTE — TELEPHONE ENCOUNTER
Patient called again in regards to medication.  Patient is upset it has not been called into pharmacy. She stated she will be out of medication.  Informed waiting for provider.  Please contact when sent to pharmacy.

## 2025-04-03 NOTE — TELEPHONE ENCOUNTER
Is refill pod able to fill this medication? Med was previously filled but pharmacy claims not to have received.

## 2025-04-03 NOTE — TELEPHONE ENCOUNTER
Pt called, was advised by pharmacy that prescription was never received. Last script was received by pharmacy back in January. Please sent prescription again for AMILoride-hydrochlorothiazide (MODURETIC) 5-50 mg per tablet to   [id number] - [place], [place] - [address]  [address], [place] [address]  Phone: [phone]  Fax: [phone]     Pt is out and needs it today

## 2025-04-04 NOTE — TELEPHONE ENCOUNTER
Patient returned call. She mentioned she did  the medication. Please advise back to patient if needed.

## 2025-04-04 NOTE — TELEPHONE ENCOUNTER
Left message on machine to let patient know medication was already filled yesterday but does not show as if he picked it up.

## 2025-05-19 ENCOUNTER — OFFICE VISIT (OUTPATIENT)
Dept: INTERNAL MEDICINE CLINIC | Age: 70
End: 2025-05-19
Payer: MEDICARE

## 2025-05-19 VITALS
BODY MASS INDEX: 25.77 KG/M2 | DIASTOLIC BLOOD PRESSURE: 82 MMHG | WEIGHT: 180 LBS | HEART RATE: 79 BPM | SYSTOLIC BLOOD PRESSURE: 122 MMHG | TEMPERATURE: 97.3 F | HEIGHT: 70 IN | OXYGEN SATURATION: 98 %

## 2025-05-19 DIAGNOSIS — G89.29 CHRONIC PAIN OF BOTH KNEES: ICD-10-CM

## 2025-05-19 DIAGNOSIS — E03.9 ACQUIRED HYPOTHYROIDISM: ICD-10-CM

## 2025-05-19 DIAGNOSIS — M25.561 CHRONIC PAIN OF BOTH KNEES: ICD-10-CM

## 2025-05-19 DIAGNOSIS — M48.061 LUMBAR FORAMINAL STENOSIS: ICD-10-CM

## 2025-05-19 DIAGNOSIS — Z13.1 SCREENING FOR DIABETES MELLITUS: ICD-10-CM

## 2025-05-19 DIAGNOSIS — Z78.0 POST-MENOPAUSAL: ICD-10-CM

## 2025-05-19 DIAGNOSIS — R79.89 HIGH SERUM HIGH DENSITY LIPOPROTEIN (HDL): ICD-10-CM

## 2025-05-19 DIAGNOSIS — I10 BENIGN ESSENTIAL HTN: Primary | ICD-10-CM

## 2025-05-19 DIAGNOSIS — Z13.220 SCREENING CHOLESTEROL LEVEL: ICD-10-CM

## 2025-05-19 DIAGNOSIS — M25.562 CHRONIC PAIN OF BOTH KNEES: ICD-10-CM

## 2025-05-19 PROCEDURE — G0439 PPPS, SUBSEQ VISIT: HCPCS | Performed by: FAMILY MEDICINE

## 2025-05-19 PROCEDURE — 99214 OFFICE O/P EST MOD 30 MIN: CPT | Performed by: FAMILY MEDICINE

## 2025-05-19 NOTE — PATIENT INSTRUCTIONS
Medicare Preventive Visit Patient Instructions  Thank you for completing your Welcome to Medicare Visit or Medicare Annual Wellness Visit today. Your next wellness visit will be due in one year (5/20/2026).  The screening/preventive services that you may require over the next 5-10 years are detailed below. Some tests may not apply to you based off risk factors and/or age. Screening tests ordered at today's visit but not completed yet may show as past due. Also, please note that scanned in results may not display below.  Preventive Screenings:  Service Recommendations Previous Testing/Comments   Colorectal Cancer Screening  * Colonoscopy    * Fecal Occult Blood Test (FOBT)/Fecal Immunochemical Test (FIT)  * Fecal DNA/Cologuard Test  * Flexible Sigmoidoscopy Age: 45-75 years old   Colonoscopy: every 10 years (may be performed more frequently if at higher risk)  OR  FOBT/FIT: every 1 year  OR  Cologuard: every 3 years  OR  Sigmoidoscopy: every 5 years  Screening may be recommended earlier than age 45 if at higher risk for colorectal cancer. Also, an individualized decision between you and your healthcare provider will decide whether screening between the ages of 76-85 would be appropriate. Colonoscopy: 01/01/2007  FOBT/FIT: Not on file  Cologuard: 08/21/2024  Sigmoidoscopy: Not on file    Screening Current     Breast Cancer Screening Age: 40+ years old  Frequency: every 1-2 years  Not required if history of left and right mastectomy Mammogram: 08/28/2024    Screening Current   Cervical Cancer Screening Between the ages of 21-29, pap smear recommended once every 3 years.   Between the ages of 30-65, can perform pap smear with HPV co-testing every 5 years.   Recommendations may differ for women with a history of total hysterectomy, cervical cancer, or abnormal pap smears in past. Pap Smear: 09/26/2023    Screening Not Indicated   Hepatitis C Screening Once for adults born between 1945 and 1965  More frequently in  patients at high risk for Hepatitis C Hep C Antibody: 07/01/2019    Screening Current   Diabetes Screening 1-2 times per year if you're at risk for diabetes or have pre-diabetes Fasting glucose: 94 mg/dL (8/24/2024)  A1C: 5.9 % (10/11/2023)  Screening Current   Cholesterol Screening Once every 5 years if you don't have a lipid disorder. May order more often based on risk factors. Lipid panel: 08/24/2024    Screening Current     Other Preventive Screenings Covered by Medicare:  Abdominal Aortic Aneurysm (AAA) Screening: covered once if your at risk. You're considered to be at risk if you have a family history of AAA.  Lung Cancer Screening: covers low dose CT scan once per year if you meet all of the following conditions: (1) Age 55-77; (2) No signs or symptoms of lung cancer; (3) Current smoker or have quit smoking within the last 15 years; (4) You have a tobacco smoking history of at least 20 pack years (packs per day multiplied by number of years you smoked); (5) You get a written order from a healthcare provider.  Glaucoma Screening: covered annually if you're considered high risk: (1) You have diabetes OR (2) Family history of glaucoma OR (3)  aged 50 and older OR (4)  American aged 65 and older  Osteoporosis Screening: covered every 2 years if you meet one of the following conditions: (1) You're estrogen deficient and at risk for osteoporosis based off medical history and other findings; (2) Have a vertebral abnormality; (3) On glucocorticoid therapy for more than 3 months; (4) Have primary hyperparathyroidism; (5) On osteoporosis medications and need to assess response to drug therapy.   Last bone density test (DXA Scan): 05/31/2023.  HIV Screening: covered annually if you're between the age of 15-65. Also covered annually if you are younger than 15 and older than 65 with risk factors for HIV infection. For pregnant patients, it is covered up to 3 times per  pregnancy.    Immunizations:  Immunization Recommendations   Influenza Vaccine Annual influenza vaccination during flu season is recommended for all persons aged >= 6 months who do not have contraindications   Pneumococcal Vaccine   * Pneumococcal conjugate vaccine = PCV13 (Prevnar 13), PCV15 (Vaxneuvance), PCV20 (Prevnar 20)  * Pneumococcal polysaccharide vaccine = PPSV23 (Pneumovax) Adults 19-65 yo with certain risk factors or if 65+ yo  If never received any pneumonia vaccine: recommend Prevnar 20 (PCV20)  Give PCV20 if previously received 1 dose of PCV13 or PPSV23   Hepatitis B Vaccine 3 dose series if at intermediate or high risk (ex: diabetes, end stage renal disease, liver disease)   Respiratory syncytial virus (RSV) Vaccine - COVERED BY MEDICARE PART D  * RSVPreF3 (Arexvy) CDC recommends that adults 60 years of age and older may receive a single dose of RSV vaccine using shared clinical decision-making (SCDM)   Tetanus (Td) Vaccine - COST NOT COVERED BY MEDICARE PART B Following completion of primary series, a booster dose should be given every 10 years to maintain immunity against tetanus. Td may also be given as tetanus wound prophylaxis.   Tdap Vaccine - COST NOT COVERED BY MEDICARE PART B Recommended at least once for all adults. For pregnant patients, recommended with each pregnancy.   Shingles Vaccine (Shingrix) - COST NOT COVERED BY MEDICARE PART B  2 shot series recommended in those 19 years and older who have or will have weakened immune systems or those 50 years and older     Health Maintenance Due:      Topic Date Due   • Breast Cancer Screening: Mammogram  08/28/2025   • Colorectal Cancer Screening  08/21/2027   • Hepatitis C Screening  Completed     Immunizations Due:      Topic Date Due   • COVID-19 Vaccine (8 - 2024-25 season) 09/01/2024     Advance Directives   What are advance directives?  Advance directives are legal documents that state your wishes and plans for medical care. These plans  are made ahead of time in case you lose your ability to make decisions for yourself. Advance directives can apply to any medical decision, such as the treatments you want, and if you want to donate organs.   What are the types of advance directives?  There are many types of advance directives, and each state has rules about how to use them. You may choose a combination of any of the following:  Living will:  This is a written record of the treatment you want. You can also choose which treatments you do not want, which to limit, and which to stop at a certain time. This includes surgery, medicine, IV fluid, and tube feedings.   Durable power of  for healthcare (DPAHC):  This is a written record that states who you want to make healthcare choices for you when you are unable to make them for yourself. This person, called a proxy, is usually a family member or a friend. You may choose more than 1 proxy.  Do not resuscitate (DNR) order:  A DNR order is used in case your heart stops beating or you stop breathing. It is a request not to have certain forms of treatment, such as CPR. A DNR order may be included in other types of advance directives.  Medical directive:  This covers the care that you want if you are in a coma, near death, or unable to make decisions for yourself. You can list the treatments you want for each condition. Treatment may include pain medicine, surgery, blood transfusions, dialysis, IV or tube feedings, and a ventilator (breathing machine).  Values history:  This document has questions about your views, beliefs, and how you feel and think about life. This information can help others choose the care that you would choose.  Why are advance directives important?  An advance directive helps you control your care. Although spoken wishes may be used, it is better to have your wishes written down. Spoken wishes can be misunderstood, or not followed. Treatments may be given even if you do not want  them. An advance directive may make it easier for your family to make difficult choices about your care.   Weight Management   Why it is important to manage your weight:  Being overweight increases your risk of health conditions such as heart disease, high blood pressure, type 2 diabetes, and certain types of cancer. It can also increase your risk for osteoarthritis, sleep apnea, and other respiratory problems. Aim for a slow, steady weight loss. Even a small amount of weight loss can lower your risk of health problems.  How to lose weight safely:  A safe and healthy way to lose weight is to eat fewer calories and get regular exercise. You can lose up about 1 pound a week by decreasing the number of calories you eat by 500 calories each day.   Healthy meal plan for weight management:  A healthy meal plan includes a variety of foods, contains fewer calories, and helps you stay healthy. A healthy meal plan includes the following:  Eat whole-grain foods more often.  A healthy meal plan should contain fiber. Fiber is the part of grains, fruits, and vegetables that is not broken down by your body. Whole-grain foods are healthy and provide extra fiber in your diet. Some examples of whole-grain foods are whole-wheat breads and pastas, oatmeal, brown rice, and bulgur.  Eat a variety of vegetables every day.  Include dark, leafy greens such as spinach, kale, frederic greens, and mustard greens. Eat yellow and orange vegetables such as carrots, sweet potatoes, and winter squash.   Eat a variety of fruits every day.  Choose fresh or canned fruit (canned in its own juice or light syrup) instead of juice. Fruit juice has very little or no fiber.  Eat low-fat dairy foods.  Drink fat-free (skim) milk or 1% milk. Eat fat-free yogurt and low-fat cottage cheese. Try low-fat cheeses such as mozzarella and other reduced-fat cheeses.  Choose meat and other protein foods that are low in fat.  Choose beans or other legumes such as split  peas or lentils. Choose fish, skinless poultry (chicken or turkey), or lean cuts of red meat (beef or pork). Before you cook meat or poultry, cut off any visible fat.   Use less fat and oil.  Try baking foods instead of frying them. Add less fat, such as margarine, sour cream, regular salad dressing and mayonnaise to foods. Eat fewer high-fat foods. Some examples of high-fat foods include french fries, doughnuts, ice cream, and cakes.  Eat fewer sweets.  Limit foods and drinks that are high in sugar. This includes candy, cookies, regular soda, and sweetened drinks.  Exercise:  Exercise at least 30 minutes per day on most days of the week. Some examples of exercise include walking, biking, dancing, and swimming. You can also fit in more physical activity by taking the stairs instead of the elevator or parking farther away from stores. Ask your healthcare provider about the best exercise plan for you.    © Copyright [employer] 2018 Information is for End User's use only and may not be sold, redistributed or otherwise used for commercial purposes. All illustrations and images included in CareNotes® are the copyrighted property of [employer] or [employer]

## 2025-05-19 NOTE — PROGRESS NOTES
Assessment/Plan:    1. Benign essential HTN  -     Comprehensive metabolic panel; Future  -     CBC and differential; Future  2. High serum high density lipoprotein (HDL)  -     Lipid Panel with Direct LDL reflex; Future  3. Acquired hypothyroidism  -     TSH, 3rd generation with Free T4 reflex; Future  4. Lumbar foraminal stenosis  -     TSH, 3rd generation with Free T4 reflex; Future  5. Screening cholesterol level  -     Lipid Panel with Direct LDL reflex; Future  6. Screening for diabetes mellitus  7. Post-menopausal  -     DXA bone density spine hip and pelvis; Future; Expected date: 05/19/2025  8. Chronic pain of both knees  -     XR knee 3 vw right non injury; Future; Expected date: 05/19/2025  -     XR knee 3 vw left non injury; Future; Expected date: 05/19/2025          Patient Instructions   Medicare Preventive Visit Patient Instructions  Thank you for completing your Welcome to Medicare Visit or Medicare Annual Wellness Visit today. Your next wellness visit will be due in one year (5/20/2026).  The screening/preventive services that you may require over the next 5-10 years are detailed below. Some tests may not apply to you based off risk factors and/or age. Screening tests ordered at today's visit but not completed yet may show as past due. Also, please note that scanned in results may not display below.  Preventive Screenings:  Service Recommendations Previous Testing/Comments   Colorectal Cancer Screening  * Colonoscopy    * Fecal Occult Blood Test (FOBT)/Fecal Immunochemical Test (FIT)  * Fecal DNA/Cologuard Test  * Flexible Sigmoidoscopy Age: 45-75 years old   Colonoscopy: every 10 years (may be performed more frequently if at higher risk)  OR  FOBT/FIT: every 1 year  OR  Cologuard: every 3 years  OR  Sigmoidoscopy: every 5 years  Screening may be recommended earlier than age 45 if at higher risk for colorectal cancer. Also, an individualized decision between you and your healthcare provider will  decide whether screening between the ages of 76-85 would be appropriate. Colonoscopy: 01/01/2007  FOBT/FIT: Not on file  Cologuard: 08/21/2024  Sigmoidoscopy: Not on file    Screening Current     Breast Cancer Screening Age: 40+ years old  Frequency: every 1-2 years  Not required if history of left and right mastectomy Mammogram: 08/28/2024    Screening Current   Cervical Cancer Screening Between the ages of 21-29, pap smear recommended once every 3 years.   Between the ages of 30-65, can perform pap smear with HPV co-testing every 5 years.   Recommendations may differ for women with a history of total hysterectomy, cervical cancer, or abnormal pap smears in past. Pap Smear: 09/26/2023    Screening Not Indicated   Hepatitis C Screening Once for adults born between 1945 and 1965  More frequently in patients at high risk for Hepatitis C Hep C Antibody: 07/01/2019    Screening Current   Diabetes Screening 1-2 times per year if you're at risk for diabetes or have pre-diabetes Fasting glucose: 94 mg/dL (8/24/2024)  A1C: 5.9 % (10/11/2023)  Screening Current   Cholesterol Screening Once every 5 years if you don't have a lipid disorder. May order more often based on risk factors. Lipid panel: 08/24/2024    Screening Current     Other Preventive Screenings Covered by Medicare:  Abdominal Aortic Aneurysm (AAA) Screening: covered once if your at risk. You're considered to be at risk if you have a family history of AAA.  Lung Cancer Screening: covers low dose CT scan once per year if you meet all of the following conditions: (1) Age 55-77; (2) No signs or symptoms of lung cancer; (3) Current smoker or have quit smoking within the last 15 years; (4) You have a tobacco smoking history of at least 20 pack years (packs per day multiplied by number of years you smoked); (5) You get a written order from a healthcare provider.  Glaucoma Screening: covered annually if you're considered high risk: (1) You have diabetes OR (2) Family  history of glaucoma OR (3)  aged 50 and older OR (4)  American aged 65 and older  Osteoporosis Screening: covered every 2 years if you meet one of the following conditions: (1) You're estrogen deficient and at risk for osteoporosis based off medical history and other findings; (2) Have a vertebral abnormality; (3) On glucocorticoid therapy for more than 3 months; (4) Have primary hyperparathyroidism; (5) On osteoporosis medications and need to assess response to drug therapy.   Last bone density test (DXA Scan): 05/31/2023.  HIV Screening: covered annually if you're between the age of 15-65. Also covered annually if you are younger than 15 and older than 65 with risk factors for HIV infection. For pregnant patients, it is covered up to 3 times per pregnancy.    Immunizations:  Immunization Recommendations   Influenza Vaccine Annual influenza vaccination during flu season is recommended for all persons aged >= 6 months who do not have contraindications   Pneumococcal Vaccine   * Pneumococcal conjugate vaccine = PCV13 (Prevnar 13), PCV15 (Vaxneuvance), PCV20 (Prevnar 20)  * Pneumococcal polysaccharide vaccine = PPSV23 (Pneumovax) Adults 19-65 yo with certain risk factors or if 65+ yo  If never received any pneumonia vaccine: recommend Prevnar 20 (PCV20)  Give PCV20 if previously received 1 dose of PCV13 or PPSV23   Hepatitis B Vaccine 3 dose series if at intermediate or high risk (ex: diabetes, end stage renal disease, liver disease)   Respiratory syncytial virus (RSV) Vaccine - COVERED BY MEDICARE PART D  * RSVPreF3 (Arexvy) CDC recommends that adults 60 years of age and older may receive a single dose of RSV vaccine using shared clinical decision-making (SCDM)   Tetanus (Td) Vaccine - COST NOT COVERED BY MEDICARE PART B Following completion of primary series, a booster dose should be given every 10 years to maintain immunity against tetanus. Td may also be given as tetanus wound prophylaxis.    Tdap Vaccine - COST NOT COVERED BY MEDICARE PART B Recommended at least once for all adults. For pregnant patients, recommended with each pregnancy.   Shingles Vaccine (Shingrix) - COST NOT COVERED BY MEDICARE PART B  2 shot series recommended in those 19 years and older who have or will have weakened immune systems or those 50 years and older     Health Maintenance Due:      Topic Date Due    Breast Cancer Screening: Mammogram  08/28/2025    Colorectal Cancer Screening  08/21/2027    Hepatitis C Screening  Completed     Immunizations Due:      Topic Date Due    COVID-19 Vaccine (8 - 2024-25 season) 09/01/2024     Advance Directives   What are advance directives?  Advance directives are legal documents that state your wishes and plans for medical care. These plans are made ahead of time in case you lose your ability to make decisions for yourself. Advance directives can apply to any medical decision, such as the treatments you want, and if you want to donate organs.   What are the types of advance directives?  There are many types of advance directives, and each state has rules about how to use them. You may choose a combination of any of the following:  Living will:  This is a written record of the treatment you want. You can also choose which treatments you do not want, which to limit, and which to stop at a certain time. This includes surgery, medicine, IV fluid, and tube feedings.   Durable power of  for healthcare (DPAHC):  This is a written record that states who you want to make healthcare choices for you when you are unable to make them for yourself. This person, called a proxy, is usually a family member or a friend. You may choose more than 1 proxy.  Do not resuscitate (DNR) order:  A DNR order is used in case your heart stops beating or you stop breathing. It is a request not to have certain forms of treatment, such as CPR. A DNR order may be included in other types of advance  directives.  Medical directive:  This covers the care that you want if you are in a coma, near death, or unable to make decisions for yourself. You can list the treatments you want for each condition. Treatment may include pain medicine, surgery, blood transfusions, dialysis, IV or tube feedings, and a ventilator (breathing machine).  Values history:  This document has questions about your views, beliefs, and how you feel and think about life. This information can help others choose the care that you would choose.  Why are advance directives important?  An advance directive helps you control your care. Although spoken wishes may be used, it is better to have your wishes written down. Spoken wishes can be misunderstood, or not followed. Treatments may be given even if you do not want them. An advance directive may make it easier for your family to make difficult choices about your care.   Weight Management   Why it is important to manage your weight:  Being overweight increases your risk of health conditions such as heart disease, high blood pressure, type 2 diabetes, and certain types of cancer. It can also increase your risk for osteoarthritis, sleep apnea, and other respiratory problems. Aim for a slow, steady weight loss. Even a small amount of weight loss can lower your risk of health problems.  How to lose weight safely:  A safe and healthy way to lose weight is to eat fewer calories and get regular exercise. You can lose up about 1 pound a week by decreasing the number of calories you eat by 500 calories each day.   Healthy meal plan for weight management:  A healthy meal plan includes a variety of foods, contains fewer calories, and helps you stay healthy. A healthy meal plan includes the following:  Eat whole-grain foods more often.  A healthy meal plan should contain fiber. Fiber is the part of grains, fruits, and vegetables that is not broken down by your body. Whole-grain foods are healthy and provide  extra fiber in your diet. Some examples of whole-grain foods are whole-wheat breads and pastas, oatmeal, brown rice, and bulgur.  Eat a variety of vegetables every day.  Include dark, leafy greens such as spinach, kale, frederic greens, and mustard greens. Eat yellow and orange vegetables such as carrots, sweet potatoes, and winter squash.   Eat a variety of fruits every day.  Choose fresh or canned fruit (canned in its own juice or light syrup) instead of juice. Fruit juice has very little or no fiber.  Eat low-fat dairy foods.  Drink fat-free (skim) milk or 1% milk. Eat fat-free yogurt and low-fat cottage cheese. Try low-fat cheeses such as mozzarella and other reduced-fat cheeses.  Choose meat and other protein foods that are low in fat.  Choose beans or other legumes such as split peas or lentils. Choose fish, skinless poultry (chicken or turkey), or lean cuts of red meat (beef or pork). Before you cook meat or poultry, cut off any visible fat.   Use less fat and oil.  Try baking foods instead of frying them. Add less fat, such as margarine, sour cream, regular salad dressing and mayonnaise to foods. Eat fewer high-fat foods. Some examples of high-fat foods include french fries, doughnuts, ice cream, and cakes.  Eat fewer sweets.  Limit foods and drinks that are high in sugar. This includes candy, cookies, regular soda, and sweetened drinks.  Exercise:  Exercise at least 30 minutes per day on most days of the week. Some examples of exercise include walking, biking, dancing, and swimming. You can also fit in more physical activity by taking the stairs instead of the elevator or parking farther away from stores. Ask your healthcare provider about the best exercise plan for you.    © Copyright [employer] 2018 Information is for End User's use only and may not be sold, redistributed or otherwise used for commercial purposes. All illustrations and images included in CareNotes® are the copyrighted property of  "[employer] or [employer]       Return in about 6 months (around 11/19/2025).    Subjective:      Patient ID: Radha Mendez is a 70 y.o. female.    Chief Complaint   Patient presents with    Medicare Wellness Visit     [facility]    Follow-up     Follow up          Mammo - 9/2       HPI  Answers submitted by the patient for this visit:  Medicare Annual Wellness Visit (Submitted on 5/14/2025)  How would you rate your overall health?: very good  Compared to last year, how is your physical health?: same  In general, how satisfied are you with your life?: very satisfied  Compared to last year, how is your eyesight?: same  Compared to last year, how is your hearing?: same  Compared to last year, how is your emotional/mental health?: same  How often is anger a problem for you?: never, rarely  How often do you feel unusually tired/fatigued?: never, rarely  In the past 7 days, how much pain have you experienced?: some  If you answered \"some\" or \"a lot\", please rate the severity of your pain on a scale of 1 to 10 (1 being the least severe pain and 10 being the most intense pain).: 3/10  In the past 6 months, have you lost or gained 10 pounds without trying?: No  One or more falls in the last year: No  In the past 6 months, have you accidentally leaked urine?: No  Do you have trouble with the stairs inside or outside your home?: Yes  Does your home have working smoke alarms?: Yes  Does your home have a carbon monoxide monitor?: Yes  Which safety hazards (if any) have you experienced in your home? Please select all that apply.: none  How would you describe your current diet? Please select all that apply.: Regular  In addition to prescription medications, are you taking any over-the-counter supplements?: Yes  If yes, what supplements are you taking?: Vitamins, minerals  Can you manage your medications?: Yes  Are you currently taking any opioid medications?: No  Can you walk and transfer into and out of your bed and " chair?: Yes  Can you dress and groom yourself?: Yes  Can you bathe or shower yourself?: Yes  Can you feed yourself?: Yes  Can you do your laundry/ housekeeping?: Yes  Can you manage your money, pay your bills, and track your expenses?: Yes  Can you make your own meals?: Yes  Can you do your own shopping?: Yes  Within the last 12 months, have you had any hospitalizations or Emergency Department visits?: No  Do you have a living will?: No  Do you have a Durable POA (Power of ) for healthcare decisions?: No  Do you have an Advanced Directive for end of life decisions?: No  How often have you used an illegal drug (including marijuana) or a prescription medication for non-medical reasons in the past year?: never  What is the typical number of drinks you consume in a day?: 0  What is the typical number of drinks you consume in a week?: 1  How often did you have a drink containing alcohol in the past year?: monthly or less  How many drinks did you have on a typical day  when you were drinking in the past year?: 0  How often did you have 6 or more drinks on one occasion in the past year?: never    Bilateral knee pain with right sided knee meniscal tear, had surgery done but not much improved.  Has chronic pain and only takes Tylenol when necessary which is once every 2 weeks.  Does 1 hour walking daily, does aqua therapy 4 times a week and Sukhi 3-4 times a week.  Has a lot of chronic pain with doing these exercises.    Hypertension.  On amiloride with hydrochlorothiazide that she is tolerating well.  Checks blood pressure at home and is well controlled.  No side effects and renal function is stable.  September 2021, well controlled with medications.  She is compliant   Oct 2022: doing well, compliant with meds, BP well controlled   April 2023, well controlled with no issues  October 2023, blood pressure well controlled, takes medications and is compliant  May 2024, blood pressure well-controlled.  Taking  medications as prescribed and tries to walk for exercise.  Has gained a little bit of weight since her hernia surgery  May 2025, blood pressure well-controlled, no new issues has gained weight and remains very active     Hypothyroidism, on levothyroxine 50 mcg daily and is compliant.  Thyroid levels are well controlled.  April 2023, well controlled with no issues.  October 2023, well-controlled TSH and T4 that we check yearly, on medications and tolerating well  May 2025, TSH and T4 in range, no new issues due for blood work in August 2025 on medications and remains compliant     Lumbar foraminal stenosis and radiculopathy, following with neurosurgery and has been recommended a discectomy which she will schedule.  She has an appointment on Thursday for final details.  She is not able to travel due to this issue right now and is in the process of changing her travel plans this summer for trip to [place].  She has not started gabapentin but at night takes Tylenol and she thinks it is slightly helpful.,  And no falls  October 2023, originally very upset with surgery as it did not resolve her pain however now slightly better but still not able to do a lot of her activities of daily living such as driving  May 2024:   Patient with continued lower lumbar pain after her previous spine surgery.  She is now interested in second opinion and has made an appointment with another spine doctor however he has requested a repeat MRI before they evaluate her in the office.  Patient has continued lower lumbar pain.  She does walk as a primary form of exercise on a regular basis.  Uses aqua therapy twice a week that she goes for and takes Tylenol to help relieve her pain.  Pain does not wake her up at night but if she feels very stiff and tries to do her own stretching.  It was difficult getting in and out of the car and she has missed some vacationing with family due to her back discomfort.  May 2025, saw specialist without any  relief, remains very active but has a lot of pain in cannot stand up straight.  Takes her Tylenol when necessary but not on a regular basis.       Patient here due to not feeling 100% after surgery, she still has back pain and chronic right knee pain, she is doing home physical therapy and not driving yet because she does not feel safe.  She is also complaining of pelvic congestion and pain.  Had this issue 2 years ago where she saw GYN and I had previously ordered a pelvic ultrasound.  Nothing more was done after that as internal exam was negative.  Currently patient feels some abdominal and pelvic discomfort but no pain at the site of the palpable fullness or mass that she has over the left pubic rami.  She did experience constipation after her surgery with pain meds but states she is more regular now.  She denies any nausea vomiting or diarrhea and states occasionally she has pain from the front to the back.  Her only imaging was a pelvic ultrasound 2 years ago  Patient has delivered 2 children's via vaginal delivery with no urological complaints  October 2023, following with GI now and CT scan done but still pending, stool cultures are negative for any pathogens.  Patient still has issues and is being ruled out for microscopic colitis and hernia, has colonoscopy scheduled.  Does not feel any better but does not feel any worse.  May 2024, general surgery has repaired the hernia and she is much better  May 2025, resolved      The following portions of the patient's history were reviewed and updated as appropriate: allergies, current medications, past family history, past medical history, past social history, past surgical history and problem list.    Review of Systems      Constitutional:  Denies fever or chills   Eyes:  Denies double , blurry vision or eye pain  HENT:  Denies nasal congestion, sore throat or new hearing issues  Respiratory:  Denies cough or shortness of breath or wheezing  Cardiovascular:  Denies  "palpitations or chest pain  GI:  Denies abdominal pain, nausea, or vomiting, no loose stools, no reflux  Integument:  Denies rash , no open areas  Neurologic:  Denies headache or focal weakness, no dizziness  : no dysuria, or hematuria      Current Medications[1]    Objective:    /82 (BP Location: Left arm, Patient Position: Sitting, Cuff Size: Standard)   Pulse 79   Temp (!) 97.3 °F (36.3 °C) (Temporal)   Ht 5' 10\" (1.778 m)   Wt 81.6 kg (180 lb)   SpO2 98%   BMI 25.83 kg/m²        Physical Exam         Constitutional:  Well developed, well nourished, no acute distress, non-toxic appearance   Eyes:  PERRL, conjunctiva normal , non icteric sclera  HENT:  Atraumatic, oropharynx moist. Neck-  supple   Respiratory:  CTA b/l, normal breath sounds, no rales, no wheezing   Cardiovascular:  RRR, no murmurs, no LE edema b/l  GI:  Soft, nondistended, normal bowel sounds x 4, nontender, no organomegaly, no mass, no rebound, no guarding   Neurologic:  no focal deficits noted   Psychiatric:  Speech and behavior appropriate , AAO x 3    Arina Baltazar DO         [1]   Current Outpatient Medications   Medication Sig Dispense Refill    Acetaminophen (TYLENOL PO) Take 325-500 mg by mouth as needed      AMILoride-hydrochlorothiazide (MODURETIC) 5-50 mg per tablet Take 1 tablet by mouth daily 90 tablet 1    Ascorbic Acid (vitamin C) 1000 MG tablet Take 1,000 mg by mouth in the morning.      Calcium Carbonate-Vitamin D3 600-400 MG-UNIT TABS Take 2 tablets by mouth in the morning.      Collagen Hydrolysate POWD Use 1 Scoop in the morning.      Glucos-Chond-MSM-Bor-D3-Hyalur (MOVE FREE JOINT HEALTH ADV + D PO)       levothyroxine 50 mcg tablet TAKE 1 AND 1/2 TABLET BY MOUTH DAILY EVERY MONDAY, WEDNESDAY AND FRIDAY ALTERNATING WITH 1 TABLET ONCE DAILY EVERY SUNDAY, TUESDAY, THURSDAY, AND SATURDAY 108 tablet 1    Magnesium Carbonate POWD Use 325 mg in the morning.      Multiple Vitamin (MULTI-DAY) TABS Take 2 tablets by mouth " in the morning. Ultra Aric.      Omega-3 Fatty Acids (FISH OIL PO) Take by mouth in the morning.       No current facility-administered medications for this visit.

## 2025-05-19 NOTE — PROGRESS NOTES
Name: Radha Mendez      : 1955      MRN: [id number]  Encounter Provider: Arina Baltazar DO  Encounter Date: 2025   Encounter department: [facility]  :  Assessment & Plan       Preventive health issues were discussed with patient, and age appropriate screening tests were ordered as noted in patient's After Visit Summary. Personalized health advice and appropriate referrals for health education or preventive services given if needed, as noted in patient's After Visit Summary.    History of Present Illness     HPI   Patient Care Team:  Arina Baltazar DO as PCP - General (Family Medicine)  Arina Baltazar DO as PCP - [facility]CP-[facility]  Vanesa Siu MD (Obstetrics and Gynecology)    Review of Systems  Medical History Reviewed by provider this encounter:       Annual Wellness Visit Questionnaire   Radha is here for her Subsequent Wellness visit.     Health Risk Assessment:   Patient rates overall health as very good. Patient feels that their physical health rating is same. Patient is very satisfied with their life. Eyesight was rated as same. Hearing was rated as same. Patient feels that their emotional and mental health rating is same. Patients states they are never, rarely angry. Patient states they are never, rarely unusually tired/fatigued. Pain experienced in the last 7 days has been some. Patient's pain rating has been 3/10. Patient states that she has experienced no weight loss or gain in last 6 months.     Depression Screening:   PHQ-2 Score: 0      Fall Risk Screening:   In the past year, patient has experienced: no history of falling in past year      Urinary Incontinence Screening:   Patient has not leaked urine accidently in the last six months.     Home Safety:  Patient has trouble with stairs inside or outside of their home. Patient has working smoke alarms and has working carbon monoxide detector. Home safety hazards include: none.      Nutrition:   Current diet is Regular.     Medications:   Patient is currently taking over-the-counter supplements. OTC medications include: see medication list. Patient is able to manage medications.     Activities of Daily Living (ADLs)/Instrumental Activities of Daily Living (IADLs):   Walk and transfer into and out of bed and chair?: Yes  Dress and groom yourself?: Yes    Bathe or shower yourself?: Yes    Feed yourself? Yes  Do your laundry/housekeeping?: Yes  Manage your money, pay your bills and track your expenses?: Yes  Make your own meals?: Yes    Do your own shopping?: Yes    Previous Hospitalizations:   Any hospitalizations or ED visits within the last 12 months?: No      Advance Care Planning:   Living will: No    Durable POA for healthcare: No    Advanced directive: No      Comments: Her     Cognitive Screening:   Provider or family/friend/caregiver concerned regarding cognition?: No    Preventive Screenings      Cardiovascular Screening:    General: Screening Current      Diabetes Screening:     General: Screening Current      Colorectal Cancer Screening:     General: Screening Current      Breast Cancer Screening:     General: Screening Current      Cervical Cancer Screening:    General: Screening Not Indicated      Lung Cancer Screening:     General: Screening Not Indicated      Hepatitis C Screening:    General: Screening Current    Screening, Brief Intervention, and Referral to Treatment (SBIRT)     Screening  Typical number of drinks in a day: 0  Typical number of drinks in a week: 1  Interpretation: Low risk drinking behavior.    AUDIT-C Screenin) How often did you have a drink containing alcohol in the past year? monthly or less  2) How many drinks did you have on a typical day when you were drinking in the past year? 0  3) How often did you have 6 or more drinks on one occasion in the past year? never    AUDIT-C Score: 1  Interpretation: Score 0-2 (female): Negative screen for  "alcohol misuse    Single Item Drug Screening:  How often have you used an illegal drug (including marijuana) or a prescription medication for non-medical reasons in the past year? never    Single Item Drug Screen Score: 0  Interpretation: Negative screen for possible drug use disorder    Brief Intervention  Alcohol & drug use screenings were reviewed. No concerns regarding substance use disorder identified.     Other Counseling Topics:   Car/seat belt/driving safety, skin self-exam, sunscreen and regular weightbearing exercise and calcium and vitamin D intake.     Social Drivers of Health     Financial Resource Strain: Low Risk  (4/17/2023)    Overall Financial Resource Strain (CARDIA)     Difficulty of Paying Living Expenses: Not hard at all   Food Insecurity: Patient Declined (5/19/2025)    Nursing - Inadequate Food Risk Classification     Worried About Running Out of Food in the Last Year: Patient declined     Ran Out of Food in the Last Year: Patient declined   Transportation Needs: Patient Declined (5/19/2025)    PRAPARE - Transportation     Lack of Transportation (Medical): Patient declined     Lack of Transportation (Non-Medical): Patient declined   Housing Stability: Patient Declined (5/19/2025)    Housing Stability Vital Sign     Unable to Pay for Housing in the Last Year: Patient declined     Homeless in the Last Year: Patient declined   Utilities: Patient Declined (5/19/2025)    [facility] Utilities     Threatened with loss of utilities: Patient declined     No results found.    Objective   /82 (BP Location: Left arm, Patient Position: Sitting, Cuff Size: Standard)   Pulse 79   Temp (!) 97.3 °F (36.3 °C) (Temporal)   Ht 5' 10\" (1.778 m)   Wt 81.6 kg (180 lb)   SpO2 98%   BMI 25.83 kg/m²     Physical Exam    "

## 2025-06-07 ENCOUNTER — APPOINTMENT (OUTPATIENT)
Dept: RADIOLOGY | Age: 70
End: 2025-06-07
Payer: MEDICARE

## 2025-06-07 DIAGNOSIS — M25.562 CHRONIC PAIN OF BOTH KNEES: ICD-10-CM

## 2025-06-07 DIAGNOSIS — G89.29 CHRONIC PAIN OF BOTH KNEES: ICD-10-CM

## 2025-06-07 DIAGNOSIS — M25.561 CHRONIC PAIN OF BOTH KNEES: ICD-10-CM

## 2025-06-07 PROCEDURE — 73562 X-RAY EXAM OF KNEE 3: CPT

## 2025-06-16 ENCOUNTER — TELEPHONE (OUTPATIENT)
Age: 70
End: 2025-06-16

## 2025-06-16 ENCOUNTER — TELEPHONE (OUTPATIENT)
Dept: INTERNAL MEDICINE CLINIC | Age: 70
End: 2025-06-16

## 2025-06-16 DIAGNOSIS — M25.562 CHRONIC PAIN OF BOTH KNEES: ICD-10-CM

## 2025-06-16 DIAGNOSIS — G89.29 CHRONIC PAIN OF BOTH KNEES: ICD-10-CM

## 2025-06-16 DIAGNOSIS — M25.561 CHRONIC PAIN OF BOTH KNEES: ICD-10-CM

## 2025-06-16 DIAGNOSIS — M17.0 PRIMARY OSTEOARTHRITIS OF BOTH KNEES: Primary | ICD-10-CM

## 2025-06-16 NOTE — TELEPHONE ENCOUNTER
Referral placed. Patient was informed that she would need to come into the office to sign a medical release form prior to us being able to send the x-rays. She stated she will come in to the office either today or tomorrow.

## 2025-06-16 NOTE — TELEPHONE ENCOUNTER
Pt called requesting a referral to an Orthopedic doctor for constant pain in knees.    In the meantime, Pt has scheduled an consultation with [facility] in [place] and would like the xrays sent to their fax # is [phone].

## 2025-06-23 NOTE — TELEPHONE ENCOUNTER
Patient called stating that she was suppose to be contacted for ortho, referral is closed, would like to know what is going on with this.    Also stated that the issue is not just with her knees it is with her back and unsure if she would need a catscan or MRI.    Please advise.

## 2025-06-23 NOTE — TELEPHONE ENCOUNTER
Spoke to patient. Informed her of the referral already in her chart. She will reach out to schedule appointment with Ortho.

## 2025-06-27 ENCOUNTER — TELEPHONE (OUTPATIENT)
Age: 70
End: 2025-06-27

## 2025-06-27 NOTE — TELEPHONE ENCOUNTER
Pt called to request medical records , warm transfer to Ronda at  for pt as she can not fax.  Patient appreciative of assistance.

## 2025-06-27 NOTE — TELEPHONE ENCOUNTER
Pt calling in because she is going to see a provider for her spine problems and they requested she get an MRI spine ordered from her PCP. Pt is also going to meet with Dr. Grimes at [facility] for her knees and asked that her knee xrays be faxed to her office in [place]. She could not find their fax # but their phone # is [phone].    Please advise if PCP able to place MRI order and contact pt to confirm and fax xrays.

## 2025-06-28 DIAGNOSIS — E03.9 ACQUIRED HYPOTHYROIDISM: ICD-10-CM

## 2025-06-30 ENCOUNTER — TELEPHONE (OUTPATIENT)
Age: 70
End: 2025-06-30

## 2025-06-30 RX ORDER — LEVOTHYROXINE SODIUM 50 UG/1
TABLET ORAL
Qty: 108 TABLET | Refills: 1 | Status: SHIPPED | OUTPATIENT
Start: 2025-06-30

## 2025-06-30 NOTE — TELEPHONE ENCOUNTER
"Dr. Baltazar,     Please see patient's request:    \"Pt calling in because she is going to see a provider for her spine problems and they requested she get an MRI spine ordered from her PCP.\"    Thank you!      "

## 2025-06-30 NOTE — TELEPHONE ENCOUNTER
Caller: Radha     Doctor/Office: Dr Monroy     CB#: [phone]      What needs to be faxed: Last [facility]     ATTN to: Dr Sarah Grimes     Fax#: [phone]      Documents were successfully e-faxed

## 2025-07-01 NOTE — TELEPHONE ENCOUNTER
Called patient. Left message on machine for patient to reach out to radiology to send her MRI result to requesting doctor.     Left number for  radiology.

## 2025-07-01 NOTE — TELEPHONE ENCOUNTER
Spoke with patient and discussed with Dr Baltazar.  Verified radiology faxed copies of xray to [facility] to Dr Grimes.  Pt requested we fax the MRI spine ordered by Dr Baltazar to Dr. Laurence HERNÁNDEZ.  Per Dr. Giovanny simpson to send.  Faxed MRI spine to [facility] Dr. Dang as part of coordination of care to [phone]

## 2025-07-02 NOTE — TELEPHONE ENCOUNTER
Caller: Patient    Doctor: Dr. ESQUIVEL    Reason for call: patient will be going to [facility] for PM and would like her records faxed over   Would like Procedure and note faxed over    FAX# [phone]  ATT Sarah Crosson     Call back#:

## (undated) DEVICE — ASTOUND STANDARD SURGICAL GOWN, XXL: Brand: CONVERTORS

## (undated) DEVICE — PENCIL ELECTROSURG E-Z CLEAN -0035H

## (undated) DEVICE — DISPOSABLE EQUIPMENT COVER: Brand: SMALL TOWEL DRAPE

## (undated) DEVICE — BASIC SINGLE BASIN 2-LF: Brand: MEDLINE INDUSTRIES, INC.

## (undated) DEVICE — MINOR PROCEDURE DRAPE: Brand: CONVERTORS

## (undated) DEVICE — HEMOSTATIC MATRIX SURGIFLO 8ML W/THROMBIN

## (undated) DEVICE — ADHESIVE SKIN HIGH VISCOSITY EXOFIN 1ML

## (undated) DEVICE — DRAPE SURGIKIT SADDLE BAG

## (undated) DEVICE — BETHLEHEM UNIVERSAL SPINE, KIT: Brand: CARDINAL HEALTH

## (undated) DEVICE — CHLORAPREP HI-LITE 26ML ORANGE

## (undated) DEVICE — STERILE POLYISOPRENE POWDER-FREE SURGICAL GLOVES: Brand: PROTEXIS

## (undated) DEVICE — INTENDED FOR TISSUE SEPARATION, AND OTHER PROCEDURES THAT REQUIRE A SHARP SURGICAL BLADE TO PUNCTURE OR CUT.: Brand: BARD-PARKER SAFETY BLADES SIZE 15, STERILE

## (undated) DEVICE — PENROSE DRAIN, 18 X 3 8: Brand: CARDINAL HEALTH

## (undated) DEVICE — NEEDLE BLUNT 18 G X 1 1/2IN

## (undated) DEVICE — SUT PROLENE 2-0 SH 30 IN 8833H

## (undated) DEVICE — SKIN MARKER DUAL TIP WITH RULER CAP, FLEXIBLE RULER AND LABELS: Brand: DEVON

## (undated) DEVICE — GLOVE INDICATOR PI UNDERGLOVE SZ 8.5 BLUE

## (undated) DEVICE — JACKSON TABLE FOAM POSITIONING KIT: Brand: CARDINAL HEALTH

## (undated) DEVICE — SURGIFOAM 8.5 X 12.5

## (undated) DEVICE — SYRINGE 10ML LL CONTROL TOP

## (undated) DEVICE — ANTIBACTERIAL VIOLET BRAIDED (POLYGLACTIN 910), SYNTHETIC ABSORBABLE SUTURE: Brand: COATED VICRYL

## (undated) DEVICE — SNAP KOVER: Brand: UNBRANDED

## (undated) DEVICE — TOOL MR8-15BA50 MR8 15CM BALL 5MM: Brand: MIDAS REX MR8

## (undated) DEVICE — SUT VICRYL 3-0 SH 27 IN J416H

## (undated) DEVICE — 3M™ STERI-STRIP™ REINFORCED ADHESIVE SKIN CLOSURES, R1547, 1/2 IN X 4 IN (12 MM X 100 MM), 6 STRIPS/ENVELOPE: Brand: 3M™ STERI-STRIP™

## (undated) DEVICE — NEEDLE 22 G X 1 1/2 SAFETY

## (undated) DEVICE — SPONGE PVP SCRUB WING STERILE

## (undated) DEVICE — INTENDED FOR TISSUE SEPARATION, AND OTHER PROCEDURES THAT REQUIRE A SHARP SURGICAL BLADE TO PUNCTURE OR CUT.: Brand: BARD-PARKER SAFETY BLADES SIZE 10, STERILE

## (undated) DEVICE — BASIC PACK: Brand: CONVERTORS

## (undated) DEVICE — LIGHT HANDLE COVER SLEEVE DISP BLUE STELLAR

## (undated) DEVICE — 1820 FOAM BLOCK NEEDLE COUNTER: Brand: DEVON

## (undated) DEVICE — LIGHT GLOVE GREEN

## (undated) DEVICE — INTENDED FOR TISSUE SEPARATION, AND OTHER PROCEDURES THAT REQUIRE A SHARP SURGICAL BLADE TO PUNCTURE OR CUT.: Brand: BARD-PARKER ® CARBON RIB-BACK BLADES

## (undated) DEVICE — NEEDLE 25G X 1 1/2

## (undated) DEVICE — NEURO PATTIES 1/2 X 1 1/2

## (undated) DEVICE — SUT MONOCRYL 4-0 PS-2 27 IN Y426H

## (undated) DEVICE — SPONGE LAP 18 X 4 IN STRL RFD

## (undated) DEVICE — STANDARD SURGICAL GOWN, L: Brand: CONVERTORS

## (undated) DEVICE — SPECIMEN CONTAINER STERILE PEEL PACK

## (undated) DEVICE — SCD SEQUENTIAL COMPRESSION COMFORT SLEEVE MEDIUM KNEE LENGTH: Brand: KENDALL SCD

## (undated) DEVICE — DISPOSABLE OR TOWEL: Brand: CARDINAL HEALTH

## (undated) DEVICE — GLOVE SRG BIOGEL ORTHOPEDIC 8

## (undated) DEVICE — SUT MONOCRYL PLUS 3-0 PS-2 27 IN MCP427H

## (undated) DEVICE — TIBURON TRANSVERSE LAPAROTOMY SHEET: Brand: CONVERTORS

## (undated) DEVICE — SYRINGE 30ML LL